# Patient Record
Sex: FEMALE | Race: WHITE | NOT HISPANIC OR LATINO | Employment: OTHER | URBAN - METROPOLITAN AREA
[De-identification: names, ages, dates, MRNs, and addresses within clinical notes are randomized per-mention and may not be internally consistent; named-entity substitution may affect disease eponyms.]

---

## 2017-01-06 ENCOUNTER — HOSPITAL ENCOUNTER (OUTPATIENT)
Dept: MRI IMAGING | Facility: HOSPITAL | Age: 57
Discharge: HOME | End: 2017-01-06
Attending: RADIOLOGY
Payer: COMMERCIAL

## 2017-01-06 DIAGNOSIS — C79.31 SECONDARY MALIGNANT NEOPLASM OF BRAIN (HCC): ICD-10-CM

## 2017-01-06 PROCEDURE — A9585 GADOBUTROL INJECTION: HCPCS | Performed by: RADIOLOGY

## 2017-01-06 PROCEDURE — 70553 MRI BRAIN STEM W/O & W/DYE: CPT

## 2017-01-06 RX ADMIN — GADOBUTROL 8 ML: 604.72 INJECTION INTRAVENOUS at 11:23

## 2017-01-09 ENCOUNTER — TRANSCRIBE ORDERS (OUTPATIENT)
Dept: ADMINISTRATIVE | Facility: HOSPITAL | Age: 57
End: 2017-01-09

## 2017-01-09 DIAGNOSIS — C34.00 MALIGNANT NEOPLASM OF HILUS OF LUNG, UNSPECIFIED LATERALITY (HCC): Primary | ICD-10-CM

## 2017-01-11 ENCOUNTER — GENERIC CONVERSION - ENCOUNTER (OUTPATIENT)
Dept: OTHER | Facility: OTHER | Age: 57
End: 2017-01-11

## 2017-01-11 ENCOUNTER — TRANSCRIBE ORDERS (OUTPATIENT)
Dept: ADMINISTRATIVE | Facility: HOSPITAL | Age: 57
End: 2017-01-11

## 2017-01-11 ENCOUNTER — APPOINTMENT (OUTPATIENT)
Dept: RADIATION ONCOLOGY | Facility: HOSPITAL | Age: 57
End: 2017-01-11
Attending: RADIOLOGY
Payer: COMMERCIAL

## 2017-01-11 DIAGNOSIS — C79.31 SECONDARY MALIGNANT NEOPLASM OF BRAIN (HCC): ICD-10-CM

## 2017-01-11 DIAGNOSIS — C79.49 SECONDARY MALIGNANT NEOPLASM OF BRAIN AND SPINAL CORD (HCC): Primary | ICD-10-CM

## 2017-01-11 DIAGNOSIS — C79.31 SECONDARY MALIGNANT NEOPLASM OF BRAIN AND SPINAL CORD (HCC): Primary | ICD-10-CM

## 2017-01-11 PROCEDURE — 99214 OFFICE O/P EST MOD 30 MIN: CPT | Performed by: RADIOLOGY

## 2017-01-16 ENCOUNTER — HOSPITAL ENCOUNTER (OUTPATIENT)
Dept: RADIOLOGY | Facility: HOSPITAL | Age: 57
Discharge: HOME | End: 2017-01-16
Attending: INTERNAL MEDICINE
Payer: COMMERCIAL

## 2017-01-16 DIAGNOSIS — C34.00 MALIGNANT NEOPLASM OF HILUS OF LUNG, UNSPECIFIED LATERALITY (HCC): ICD-10-CM

## 2017-01-16 PROCEDURE — 71250 CT THORAX DX C-: CPT

## 2017-01-16 PROCEDURE — 74176 CT ABD & PELVIS W/O CONTRAST: CPT

## 2017-01-18 ENCOUNTER — ALLSCRIPTS OFFICE VISIT (OUTPATIENT)
Dept: OTHER | Facility: OTHER | Age: 57
End: 2017-01-18

## 2017-01-19 ENCOUNTER — HOSPITAL ENCOUNTER (OUTPATIENT)
Dept: INFUSION CENTER | Facility: CLINIC | Age: 57
Discharge: HOME | End: 2017-01-19
Payer: COMMERCIAL

## 2017-01-19 VITALS
RESPIRATION RATE: 16 BRPM | BODY MASS INDEX: 30.28 KG/M2 | TEMPERATURE: 98 F | HEART RATE: 87 BPM | HEIGHT: 67 IN | WEIGHT: 192.9 LBS | OXYGEN SATURATION: 95 % | DIASTOLIC BLOOD PRESSURE: 82 MMHG | SYSTOLIC BLOOD PRESSURE: 128 MMHG

## 2017-01-19 PROCEDURE — 96413 CHEMO IV INFUSION 1 HR: CPT

## 2017-01-19 PROCEDURE — 96417 CHEMO IV INFUS EACH ADDL SEQ: CPT

## 2017-01-19 RX ORDER — SODIUM CHLORIDE 9 MG/ML
20 INJECTION, SOLUTION INTRAVENOUS CONTINUOUS
Status: DISCONTINUED | OUTPATIENT
Start: 2017-01-19 | End: 2017-01-22 | Stop reason: HOSPADM

## 2017-01-19 RX ADMIN — Medication 1200 MG: at 14:16

## 2017-01-19 RX ADMIN — HEPARIN 300 UNITS: 100 SYRINGE at 15:55

## 2017-01-19 RX ADMIN — SODIUM CHLORIDE 20 ML/HR: 0.9 INJECTION, SOLUTION INTRAVENOUS at 13:25

## 2017-01-19 RX ADMIN — Medication 1254 MG: at 15:07

## 2017-01-19 NOTE — PLAN OF CARE
Problem: Potential for Falls  Goal: Patient will remain free of falls  INTERVENTIONS:  - Assess patient frequently for physical needs  - Identify cognitive and physical deficits and behaviors that affect risk of falls    - [place] fall precautions as indicated by assessment   - Educate patient/family on patient safety including physical limitations  - Instruct patient to call for assistance with activity based on assessment  - Modify environment to reduce risk of injury  - Consider OT/PT consult to assist with strengthening/mobility   Outcome: Progressing

## 2017-02-08 ENCOUNTER — ALLSCRIPTS OFFICE VISIT (OUTPATIENT)
Dept: OTHER | Facility: OTHER | Age: 57
End: 2017-02-08

## 2017-02-09 ENCOUNTER — HOSPITAL ENCOUNTER (OUTPATIENT)
Dept: INFUSION CENTER | Facility: CLINIC | Age: 57
Discharge: HOME | End: 2017-02-09
Payer: COMMERCIAL

## 2017-02-09 VITALS
HEART RATE: 76 BPM | WEIGHT: 195 LBS | BODY MASS INDEX: 30.61 KG/M2 | RESPIRATION RATE: 18 BRPM | HEIGHT: 67 IN | TEMPERATURE: 97.6 F | DIASTOLIC BLOOD PRESSURE: 62 MMHG | SYSTOLIC BLOOD PRESSURE: 118 MMHG

## 2017-02-09 LAB — TSH SERPL DL<=0.05 MIU/L-ACNC: 2.55 UIU/ML (ref 0.36–3.74)

## 2017-02-09 PROCEDURE — 96413 CHEMO IV INFUSION 1 HR: CPT

## 2017-02-09 PROCEDURE — 84443 ASSAY THYROID STIM HORMONE: CPT | Performed by: INTERNAL MEDICINE

## 2017-02-09 RX ORDER — SODIUM CHLORIDE 9 MG/ML
20 INJECTION, SOLUTION INTRAVENOUS CONTINUOUS
Status: DISCONTINUED | OUTPATIENT
Start: 2017-02-09 | End: 2017-02-12 | Stop reason: HOSPADM

## 2017-02-09 RX ADMIN — Medication 1254 MG: at 15:45

## 2017-02-09 RX ADMIN — Medication 1200 MG: at 15:08

## 2017-02-09 RX ADMIN — SODIUM CHLORIDE 20 ML/HR: 0.9 INJECTION, SOLUTION INTRAVENOUS at 14:05

## 2017-02-09 RX ADMIN — HEPARIN 300 UNITS: 100 SYRINGE at 16:25

## 2017-02-22 ENCOUNTER — TRANSCRIBE ORDERS (OUTPATIENT)
Dept: ADMINISTRATIVE | Facility: HOSPITAL | Age: 57
End: 2017-02-22

## 2017-02-22 ENCOUNTER — ALLSCRIPTS OFFICE VISIT (OUTPATIENT)
Dept: OTHER | Facility: OTHER | Age: 57
End: 2017-02-22

## 2017-02-22 ENCOUNTER — HOSPITAL ENCOUNTER (OUTPATIENT)
Dept: RADIOLOGY | Facility: HOSPITAL | Age: 57
Discharge: HOME | End: 2017-02-22
Payer: COMMERCIAL

## 2017-02-22 DIAGNOSIS — M79.671 RIGHT FOOT PAIN: ICD-10-CM

## 2017-02-22 DIAGNOSIS — M79.671 PAIN OF RIGHT FOOT: ICD-10-CM

## 2017-02-22 DIAGNOSIS — M79.671 RIGHT FOOT PAIN: Primary | ICD-10-CM

## 2017-02-22 PROCEDURE — 73630 X-RAY EXAM OF FOOT: CPT

## 2017-02-23 ENCOUNTER — GENERIC CONVERSION - ENCOUNTER (OUTPATIENT)
Dept: OTHER | Facility: OTHER | Age: 57
End: 2017-02-23

## 2017-03-01 ENCOUNTER — ALLSCRIPTS OFFICE VISIT (OUTPATIENT)
Dept: OTHER | Facility: OTHER | Age: 57
End: 2017-03-01

## 2017-03-01 RX ORDER — SODIUM CHLORIDE 9 MG/ML
20 INJECTION, SOLUTION INTRAVENOUS CONTINUOUS
Status: DISCONTINUED | OUTPATIENT
Start: 2017-03-02 | End: 2017-03-05 | Stop reason: HOSPADM

## 2017-03-02 ENCOUNTER — TRANSCRIBE ORDERS (OUTPATIENT)
Dept: ADMINISTRATIVE | Facility: HOSPITAL | Age: 57
End: 2017-03-02

## 2017-03-02 ENCOUNTER — HOSPITAL ENCOUNTER (OUTPATIENT)
Dept: INFUSION CENTER | Facility: CLINIC | Age: 57
Discharge: HOME | End: 2017-03-02
Payer: COMMERCIAL

## 2017-03-02 VITALS
BODY MASS INDEX: 30.92 KG/M2 | SYSTOLIC BLOOD PRESSURE: 138 MMHG | HEART RATE: 87 BPM | HEIGHT: 67 IN | WEIGHT: 197 LBS | TEMPERATURE: 98.6 F | OXYGEN SATURATION: 97 % | DIASTOLIC BLOOD PRESSURE: 88 MMHG | RESPIRATION RATE: 19 BRPM

## 2017-03-02 DIAGNOSIS — C34.00 MALIGNANT NEOPLASM OF HILUS OF LUNG, UNSPECIFIED LATERALITY (HCC): Primary | ICD-10-CM

## 2017-03-02 LAB
BACTERIA UR QL AUTO: ABNORMAL /HPF
BILIRUB UR QL STRIP: NEGATIVE
CLARITY UR: CLEAR
COLOR UR: YELLOW
GLUCOSE UR STRIP-MCNC: NEGATIVE MG/DL
HGB UR QL STRIP.AUTO: NEGATIVE
KETONES UR STRIP-MCNC: NEGATIVE MG/DL
LEUKOCYTE ESTERASE UR QL STRIP: ABNORMAL
NITRITE UR QL STRIP: NEGATIVE
NON-SQ EPI CELLS URNS QL MICRO: ABNORMAL /HPF
PH UR STRIP.AUTO: 5.5 [PH] (ref 4.5–8)
PROT UR STRIP-MCNC: NEGATIVE MG/DL
RBC #/AREA URNS AUTO: ABNORMAL /HPF
SP GR UR STRIP.AUTO: 1.01 (ref 1–1.03)
UROBILINOGEN UR QL STRIP.AUTO: 0.2 E.U./DL
WBC #/AREA URNS AUTO: ABNORMAL /HPF

## 2017-03-02 PROCEDURE — 96417 CHEMO IV INFUS EACH ADDL SEQ: CPT

## 2017-03-02 PROCEDURE — 81001 URINALYSIS AUTO W/SCOPE: CPT | Performed by: INTERNAL MEDICINE

## 2017-03-02 PROCEDURE — 96413 CHEMO IV INFUSION 1 HR: CPT

## 2017-03-02 RX ADMIN — Medication 1254 MG: at 15:38

## 2017-03-02 RX ADMIN — HEPARIN 300 UNITS: 100 SYRINGE at 16:15

## 2017-03-02 RX ADMIN — Medication 1200 MG: at 15:00

## 2017-03-02 RX ADMIN — SODIUM CHLORIDE 20 ML/HR: 0.9 INJECTION, SOLUTION INTRAVENOUS at 14:20

## 2017-03-16 ENCOUNTER — HOSPITAL ENCOUNTER (OUTPATIENT)
Dept: RADIOLOGY | Facility: HOSPITAL | Age: 57
Discharge: HOME | End: 2017-03-16
Attending: INTERNAL MEDICINE
Payer: COMMERCIAL

## 2017-03-16 DIAGNOSIS — C34.00 MALIGNANT NEOPLASM OF HILUS OF LUNG, UNSPECIFIED LATERALITY (HCC): ICD-10-CM

## 2017-03-16 PROCEDURE — 71250 CT THORAX DX C-: CPT

## 2017-03-16 PROCEDURE — 74176 CT ABD & PELVIS W/O CONTRAST: CPT

## 2017-03-22 ENCOUNTER — ALLSCRIPTS OFFICE VISIT (OUTPATIENT)
Dept: OTHER | Facility: OTHER | Age: 57
End: 2017-03-22

## 2017-03-23 ENCOUNTER — HOSPITAL ENCOUNTER (OUTPATIENT)
Dept: INFUSION CENTER | Facility: CLINIC | Age: 57
Discharge: HOME | End: 2017-03-23
Payer: COMMERCIAL

## 2017-03-23 VITALS
SYSTOLIC BLOOD PRESSURE: 130 MMHG | HEIGHT: 67 IN | TEMPERATURE: 97.7 F | BODY MASS INDEX: 31.11 KG/M2 | DIASTOLIC BLOOD PRESSURE: 70 MMHG | HEART RATE: 80 BPM | RESPIRATION RATE: 20 BRPM | WEIGHT: 198.2 LBS

## 2017-03-23 PROCEDURE — 96417 CHEMO IV INFUS EACH ADDL SEQ: CPT

## 2017-03-23 PROCEDURE — 96413 CHEMO IV INFUSION 1 HR: CPT

## 2017-03-23 RX ORDER — SODIUM CHLORIDE 9 MG/ML
20 INJECTION, SOLUTION INTRAVENOUS CONTINUOUS
Status: DISCONTINUED | OUTPATIENT
Start: 2017-03-23 | End: 2017-03-26 | Stop reason: HOSPADM

## 2017-03-23 RX ADMIN — Medication 1254 MG: at 15:26

## 2017-03-23 RX ADMIN — HEPARIN 300 UNITS: 100 SYRINGE at 16:00

## 2017-03-23 RX ADMIN — Medication 1200 MG: at 14:50

## 2017-03-23 RX ADMIN — SODIUM CHLORIDE 20 ML/HR: 0.9 INJECTION, SOLUTION INTRAVENOUS at 14:00

## 2017-04-04 LAB
BUN SERPL-MCNC: 13 MG/DL (ref 6–24)
CREAT SERPL-MCNC: 0.83 MG/DL (ref 0.57–1)
EGFR AFRICAN AMERICAN (HISTORICAL): 90 ML/MIN/1.73
EGFR-AMERICAN CALC (HISTORICAL): 78 ML/MIN/1.73

## 2017-04-07 ENCOUNTER — HOSPITAL ENCOUNTER (OUTPATIENT)
Dept: MRI IMAGING | Facility: HOSPITAL | Age: 57
Discharge: HOME | End: 2017-04-07
Attending: RADIOLOGY
Payer: COMMERCIAL

## 2017-04-12 ENCOUNTER — ALLSCRIPTS OFFICE VISIT (OUTPATIENT)
Dept: OTHER | Facility: OTHER | Age: 57
End: 2017-04-12

## 2017-04-12 RX ORDER — SODIUM CHLORIDE 9 MG/ML
20 INJECTION, SOLUTION INTRAVENOUS CONTINUOUS
Status: DISCONTINUED | OUTPATIENT
Start: 2017-04-13 | End: 2017-04-16 | Stop reason: HOSPADM

## 2017-04-13 ENCOUNTER — HOSPITAL ENCOUNTER (OUTPATIENT)
Dept: INFUSION CENTER | Facility: CLINIC | Age: 57
Discharge: HOME | End: 2017-04-13
Payer: COMMERCIAL

## 2017-04-13 VITALS
TEMPERATURE: 98 F | BODY MASS INDEX: 30.76 KG/M2 | DIASTOLIC BLOOD PRESSURE: 70 MMHG | RESPIRATION RATE: 18 BRPM | HEART RATE: 86 BPM | OXYGEN SATURATION: 95 % | SYSTOLIC BLOOD PRESSURE: 118 MMHG | WEIGHT: 196.4 LBS

## 2017-04-13 PROCEDURE — 96413 CHEMO IV INFUSION 1 HR: CPT

## 2017-04-13 RX ADMIN — HEPARIN 300 UNITS: 100 SYRINGE at 15:33

## 2017-04-13 RX ADMIN — SODIUM CHLORIDE 20 ML/HR: 0.9 INJECTION, SOLUTION INTRAVENOUS at 14:20

## 2017-04-13 RX ADMIN — Medication 1254 MG: at 14:57

## 2017-04-13 NOTE — PROGRESS NOTES
Pt offers no complaints,  Pt seen by MD and clinical trials and cleared for Avastin today  Atezolizumab on hold for today due to elevated LFT's  [address] for [employer] per clinical trails TAL Alcazar

## 2017-05-03 ENCOUNTER — TRANSCRIBE ORDERS (OUTPATIENT)
Dept: ADMINISTRATIVE | Facility: HOSPITAL | Age: 57
End: 2017-05-03

## 2017-05-03 ENCOUNTER — ALLSCRIPTS OFFICE VISIT (OUTPATIENT)
Dept: OTHER | Facility: OTHER | Age: 57
End: 2017-05-03

## 2017-05-03 DIAGNOSIS — Z12.31 ENCOUNTER FOR SCREENING MAMMOGRAM FOR MALIGNANT NEOPLASM OF BREAST: ICD-10-CM

## 2017-05-03 DIAGNOSIS — C34.90 MALIGNANT NEOPLASM OF LUNG, UNSPECIFIED LATERALITY, UNSPECIFIED PART OF LUNG (HCC): Primary | ICD-10-CM

## 2017-05-04 ENCOUNTER — HOSPITAL ENCOUNTER (OUTPATIENT)
Dept: INFUSION CENTER | Facility: CLINIC | Age: 57
Discharge: HOME | End: 2017-05-04
Payer: COMMERCIAL

## 2017-05-04 VITALS
BODY MASS INDEX: 30.59 KG/M2 | OXYGEN SATURATION: 98 % | WEIGHT: 194.89 LBS | SYSTOLIC BLOOD PRESSURE: 122 MMHG | TEMPERATURE: 97.3 F | HEIGHT: 67 IN | RESPIRATION RATE: 20 BRPM | HEART RATE: 75 BPM | DIASTOLIC BLOOD PRESSURE: 72 MMHG

## 2017-05-04 PROCEDURE — 96413 CHEMO IV INFUSION 1 HR: CPT

## 2017-05-04 RX ORDER — SODIUM CHLORIDE 9 MG/ML
20 INJECTION, SOLUTION INTRAVENOUS CONTINUOUS
Status: DISCONTINUED | OUTPATIENT
Start: 2017-05-04 | End: 2017-05-07 | Stop reason: HOSPADM

## 2017-05-04 RX ADMIN — Medication 1254 MG: at 15:12

## 2017-05-04 RX ADMIN — Medication 300 UNITS: at 15:51

## 2017-05-04 RX ADMIN — SODIUM CHLORIDE 20 ML/HR: 0.9 INJECTION, SOLUTION INTRAVENOUS at 14:28

## 2017-05-15 ENCOUNTER — HOSPITAL ENCOUNTER (OUTPATIENT)
Dept: RADIOLOGY | Facility: HOSPITAL | Age: 57
Discharge: HOME | End: 2017-05-15
Attending: INTERNAL MEDICINE
Payer: COMMERCIAL

## 2017-05-15 DIAGNOSIS — C34.90 MALIGNANT NEOPLASM OF LUNG, UNSPECIFIED LATERALITY, UNSPECIFIED PART OF LUNG (HCC): ICD-10-CM

## 2017-05-15 PROCEDURE — 71250 CT THORAX DX C-: CPT

## 2017-05-15 PROCEDURE — 74176 CT ABD & PELVIS W/O CONTRAST: CPT

## 2017-05-23 ENCOUNTER — GENERIC CONVERSION - ENCOUNTER (OUTPATIENT)
Dept: OTHER | Facility: OTHER | Age: 57
End: 2017-05-23

## 2017-05-23 LAB
AMBIG ABBREV CMP14 DEFAULT (HISTORICAL): NORMAL
BASOPHILS # BLD AUTO: 0.1 X10E3/UL (ref 0–0.2)
BASOPHILS # BLD AUTO: 1 %
DEPRECATED RDW RBC AUTO: 14 % (ref 12.3–15.4)
EOSINOPHIL # BLD AUTO: 0.3 X10E3/UL (ref 0–0.4)
EOSINOPHIL # BLD AUTO: 4 %
HCT VFR BLD AUTO: 42.2 % (ref 34–46.6)
HGB BLD-MCNC: 14.2 G/DL (ref 11.1–15.9)
IMM.GRANULOCYTES (CD4/8) (HISTORICAL): 0 %
IMM.GRANULOCYTES (CD4/8) (HISTORICAL): 0 X10E3/UL (ref 0–0.1)
LYMPHOCYTES # BLD AUTO: 3 X10E3/UL (ref 0.7–3.1)
LYMPHOCYTES # BLD AUTO: 38 %
MCH RBC QN AUTO: 31.8 PG (ref 26.6–33)
MCHC RBC AUTO-ENTMCNC: 33.6 G/DL (ref 31.5–35.7)
MCV RBC AUTO: 95 FL (ref 79–97)
MONOCYTES # BLD AUTO: 0.6 X10E3/UL (ref 0.1–0.9)
MONOCYTES (HISTORICAL): 7 %
NEUTROPHILS # BLD AUTO: 4 X10E3/UL (ref 1.4–7)
NEUTROPHILS # BLD AUTO: 50 %
PLATELET # BLD AUTO: 274 X10E3/UL (ref 150–379)
RBC (HISTORICAL): 4.46 X10E6/UL (ref 3.77–5.28)
WBC # BLD AUTO: 8 X10E3/UL (ref 3.4–10.8)

## 2017-05-24 ENCOUNTER — ALLSCRIPTS OFFICE VISIT (OUTPATIENT)
Dept: OTHER | Facility: OTHER | Age: 57
End: 2017-05-24

## 2017-05-24 LAB
A/G RATIO (HISTORICAL): 1.7 (ref 1.2–2.2)
ALBUMIN SERPL BCP-MCNC: 4.7 G/DL (ref 3.5–5.5)
ALP SERPL-CCNC: 90 IU/L (ref 39–117)
ALT SERPL W P-5'-P-CCNC: 60 IU/L (ref 0–32)
AST SERPL W P-5'-P-CCNC: 35 IU/L (ref 0–40)
BACTERIA UR QL AUTO: ABNORMAL
BILIRUB SERPL-MCNC: 0.5 MG/DL (ref 0–1.2)
BILIRUB UR QL STRIP: NEGATIVE
BUN SERPL-MCNC: 19 MG/DL (ref 6–24)
BUN/CREA RATIO (HISTORICAL): 23 (ref 9–23)
CALCIUM SERPL-MCNC: 9.5 MG/DL (ref 8.7–10.2)
CHLORIDE SERPL-SCNC: 100 MMOL/L (ref 96–106)
CO2 SERPL-SCNC: 21 MMOL/L (ref 18–29)
COLOR UR: YELLOW
COMMENT (HISTORICAL): ABNORMAL
CREAT SERPL-MCNC: 0.82 MG/DL (ref 0.57–1)
EGFR AFRICAN AMERICAN (HISTORICAL): 91 ML/MIN/1.73
EGFR-AMERICAN CALC (HISTORICAL): 79 ML/MIN/1.73
FECAL OCCULT BLOOD DIAGNOSTIC (HISTORICAL): NEGATIVE
GLUCOSE (HISTORICAL): NEGATIVE
GLUCOSE SERPL-MCNC: 108 MG/DL (ref 65–99)
KETONES UR STRIP-MCNC: NEGATIVE MG/DL
LDH (HISTORICAL): 147 IU/L (ref 119–226)
LEUKOCYTE ESTERASE UR QL STRIP: ABNORMAL
MAGNESIUM SERPL-MCNC: 2.1 MG/DL (ref 1.6–2.3)
MICROSCOPIC EXAMINATION (HISTORICAL): ABNORMAL
MUCUS THREADS (HISTORICAL): PRESENT
NITRITE UR QL STRIP: NEGATIVE
NON-SQ EPI CELLS URNS QL MICRO: >10 /HPF
PH UR STRIP.AUTO: 6 [PH] (ref 5–7.5)
PHOSPHATE SERPL-MCNC: 3.6 MG/DL (ref 2.5–4.5)
POTASSIUM SERPL-SCNC: 4.7 MMOL/L (ref 3.5–5.2)
PROT UR STRIP-MCNC: ABNORMAL MG/DL
RBC (HISTORICAL): ABNORMAL /HPF
SODIUM SERPL-SCNC: 139 MMOL/L (ref 134–144)
SP GR UR STRIP.AUTO: 1.02 (ref 1–1.03)
T3FREE SERPL-MCNC: 2.4 PG/ML (ref 2–4.4)
T4 FREE SERPL-MCNC: 1.13 NG/DL (ref 0.82–1.77)
TOT. GLOBULIN, SERUM (HISTORICAL): 2.7 G/DL (ref 1.5–4.5)
TOTAL PROTEIN (HISTORICAL): 7.4 G/DL (ref 6–8.5)
TSH SERPL DL<=0.05 MIU/L-ACNC: 3.29 UIU/ML (ref 0.45–4.5)
UROBILINOGEN UR QL STRIP.AUTO: 0.2 EU/DL (ref 0.2–1)
WBC # BLD AUTO: ABNORMAL /HPF

## 2017-05-25 ENCOUNTER — HOSPITAL ENCOUNTER (OUTPATIENT)
Dept: INFUSION CENTER | Facility: HOSPITAL | Age: 57
Discharge: HOME | End: 2017-05-25
Payer: COMMERCIAL

## 2017-05-25 VITALS
TEMPERATURE: 98.6 F | WEIGHT: 195.11 LBS | BODY MASS INDEX: 30.62 KG/M2 | RESPIRATION RATE: 16 BRPM | DIASTOLIC BLOOD PRESSURE: 82 MMHG | SYSTOLIC BLOOD PRESSURE: 122 MMHG | OXYGEN SATURATION: 96 % | HEIGHT: 67 IN | HEART RATE: 104 BPM

## 2017-05-25 PROCEDURE — 96413 CHEMO IV INFUSION 1 HR: CPT

## 2017-05-25 RX ADMIN — Medication 1254 MG: at 15:45

## 2017-05-25 RX ADMIN — Medication 300 UNITS: at 16:28

## 2017-06-09 ENCOUNTER — APPOINTMENT (EMERGENCY)
Dept: RADIOLOGY | Facility: HOSPITAL | Age: 57
End: 2017-06-09
Payer: COMMERCIAL

## 2017-06-09 ENCOUNTER — HOSPITAL ENCOUNTER (EMERGENCY)
Facility: HOSPITAL | Age: 57
Discharge: HOME | End: 2017-06-09
Payer: COMMERCIAL

## 2017-06-09 VITALS
SYSTOLIC BLOOD PRESSURE: 150 MMHG | DIASTOLIC BLOOD PRESSURE: 94 MMHG | TEMPERATURE: 97.8 F | RESPIRATION RATE: 18 BRPM | OXYGEN SATURATION: 98 % | HEART RATE: 100 BPM

## 2017-06-09 DIAGNOSIS — L03.012 CELLULITIS OF LEFT INDEX FINGER: Primary | ICD-10-CM

## 2017-06-09 PROCEDURE — 99283 EMERGENCY DEPT VISIT LOW MDM: CPT

## 2017-06-09 PROCEDURE — A9270 NON-COVERED ITEM OR SERVICE: HCPCS | Performed by: PHYSICIAN ASSISTANT

## 2017-06-09 PROCEDURE — 73130 X-RAY EXAM OF HAND: CPT

## 2017-06-09 RX ORDER — NAPROXEN 500 MG/1
500 TABLET ORAL ONCE
Status: COMPLETED | OUTPATIENT
Start: 2017-06-09 | End: 2017-06-09

## 2017-06-09 RX ORDER — CEPHALEXIN 500 MG/1
500 CAPSULE ORAL ONCE
Status: COMPLETED | OUTPATIENT
Start: 2017-06-09 | End: 2017-06-09

## 2017-06-09 RX ORDER — CEPHALEXIN 500 MG/1
500 CAPSULE ORAL 4 TIMES DAILY
Qty: 40 CAPSULE | Refills: 0 | Status: SHIPPED | OUTPATIENT
Start: 2017-06-09 | End: 2017-06-19

## 2017-06-09 RX ADMIN — NAPROXEN 500 MG: 500 TABLET ORAL at 14:52

## 2017-06-09 RX ADMIN — CEPHALEXIN 500 MG: 500 CAPSULE ORAL at 14:51

## 2017-06-14 ENCOUNTER — ALLSCRIPTS OFFICE VISIT (OUTPATIENT)
Dept: OTHER | Facility: OTHER | Age: 57
End: 2017-06-14

## 2017-06-15 ENCOUNTER — HOSPITAL ENCOUNTER (OUTPATIENT)
Dept: INFUSION CENTER | Facility: CLINIC | Age: 57
Discharge: HOME | End: 2017-06-15
Payer: COMMERCIAL

## 2017-06-15 VITALS
BODY MASS INDEX: 30.02 KG/M2 | WEIGHT: 193.5 LBS | RESPIRATION RATE: 16 BRPM | TEMPERATURE: 96.2 F | SYSTOLIC BLOOD PRESSURE: 128 MMHG | HEART RATE: 78 BPM | DIASTOLIC BLOOD PRESSURE: 78 MMHG

## 2017-06-15 PROCEDURE — 96413 CHEMO IV INFUSION 1 HR: CPT

## 2017-06-15 RX ORDER — UBIDECARENONE 75 MG
CAPSULE ORAL DAILY
COMMUNITY
End: 2018-01-31 | Stop reason: SDUPTHER

## 2017-06-15 RX ORDER — SODIUM CHLORIDE 9 MG/ML
20 INJECTION, SOLUTION INTRAVENOUS CONTINUOUS
Status: DISCONTINUED | OUTPATIENT
Start: 2017-06-15 | End: 2017-06-18 | Stop reason: HOSPADM

## 2017-06-15 RX ADMIN — SODIUM CHLORIDE 20 ML/HR: 0.9 INJECTION, SOLUTION INTRAVENOUS at 14:23

## 2017-06-15 RX ADMIN — Medication 1254 MG: at 15:02

## 2017-06-15 RX ADMIN — Medication 300 UNITS: at 15:41

## 2017-06-15 NOTE — PLAN OF CARE
Problem: Potential for Falls  Goal: Patient will remain free of falls  INTERVENTIONS:  - Assess patient frequently for physical needs  -  Identify cognitive and physical deficits and behaviors that affect risk of falls    -  [place] fall precautions as indicated by assessment   - Educate patient/family on patient safety including physical limitations  - Instruct patient to call for assistance with activity based on assessment  - Modify environment to reduce risk of injury  - Consider OT/PT consult to assist with strengthening/mobility   Outcome: Progressing

## 2017-06-15 NOTE — PROGRESS NOTES
Pt to clinic for avastin therapy on study [id number] for NSCLC  OK to treat based on labs drawn on 6/14 per Clinical Trial staff  Pt is feeling well  Today she offers no c/o  Therapy given as per orders without incident  Pt to return as insructed by chemo calendar provided by CT office

## 2017-06-26 ENCOUNTER — ALLSCRIPTS OFFICE VISIT (OUTPATIENT)
Dept: OTHER | Facility: OTHER | Age: 57
End: 2017-06-26

## 2017-06-27 DIAGNOSIS — C79.31 SECONDARY MALIGNANT NEOPLASM OF BRAIN (HCC): ICD-10-CM

## 2017-06-27 DIAGNOSIS — S61.219A LACERATION OF FINGER WITHOUT FOREIGN BODY WITHOUT DAMAGE TO NAIL: ICD-10-CM

## 2017-06-28 ENCOUNTER — APPOINTMENT (OUTPATIENT)
Dept: OCCUPATIONAL THERAPY | Facility: CLINIC | Age: 57
End: 2017-06-28
Payer: COMMERCIAL

## 2017-06-28 PROCEDURE — 97165 OT EVAL LOW COMPLEX 30 MIN: CPT

## 2017-06-28 PROCEDURE — 97110 THERAPEUTIC EXERCISES: CPT

## 2017-07-03 ENCOUNTER — TRANSCRIBE ORDERS (OUTPATIENT)
Dept: ADMINISTRATIVE | Facility: HOSPITAL | Age: 57
End: 2017-07-03

## 2017-07-03 DIAGNOSIS — Z09 FOLLOW UP: Primary | ICD-10-CM

## 2017-07-05 ENCOUNTER — ALLSCRIPTS OFFICE VISIT (OUTPATIENT)
Dept: OTHER | Facility: OTHER | Age: 57
End: 2017-07-05

## 2017-07-05 RX ORDER — SODIUM CHLORIDE 9 MG/ML
20 INJECTION, SOLUTION INTRAVENOUS CONTINUOUS
Status: DISCONTINUED | OUTPATIENT
Start: 2017-07-06 | End: 2017-07-09 | Stop reason: HOSPADM

## 2017-07-06 ENCOUNTER — HOSPITAL ENCOUNTER (OUTPATIENT)
Dept: INFUSION CENTER | Facility: CLINIC | Age: 57
Discharge: HOME | End: 2017-07-06
Payer: COMMERCIAL

## 2017-07-06 ENCOUNTER — APPOINTMENT (OUTPATIENT)
Dept: OCCUPATIONAL THERAPY | Facility: CLINIC | Age: 57
End: 2017-07-06
Payer: COMMERCIAL

## 2017-07-06 VITALS
RESPIRATION RATE: 16 BRPM | BODY MASS INDEX: 30.53 KG/M2 | HEART RATE: 72 BPM | SYSTOLIC BLOOD PRESSURE: 138 MMHG | HEIGHT: 67 IN | TEMPERATURE: 98 F | WEIGHT: 194.5 LBS | DIASTOLIC BLOOD PRESSURE: 86 MMHG

## 2017-07-06 PROCEDURE — 96413 CHEMO IV INFUSION 1 HR: CPT

## 2017-07-06 RX ORDER — SODIUM CHLORIDE 9 MG/ML
20 INJECTION, SOLUTION INTRAVENOUS CONTINUOUS
Status: DISCONTINUED | OUTPATIENT
Start: 2017-07-06 | End: 2017-07-06

## 2017-07-06 RX ADMIN — Medication 300 UNITS: at 16:05

## 2017-07-06 RX ADMIN — SODIUM CHLORIDE 20 ML/HR: 0.9 INJECTION, SOLUTION INTRAVENOUS at 14:45

## 2017-07-06 RX ADMIN — Medication 1254 MG: at 15:30

## 2017-07-06 NOTE — PROGRESS NOTES
Pt tolerated infusion without issue  Port de accessed after infusion complete DSD applied   Pt has f/u appt declined AVS

## 2017-07-06 NOTE — PROGRESS NOTES
Pt to infusion center for avastin infusion for non small cell lung ca  Pt offers no complaints at this time  Labs reviewed  Port accessed without issue +blood return flushes easily pt tolerated well

## 2017-07-11 ENCOUNTER — APPOINTMENT (OUTPATIENT)
Dept: OCCUPATIONAL THERAPY | Facility: CLINIC | Age: 57
End: 2017-07-11
Payer: COMMERCIAL

## 2017-07-12 ENCOUNTER — APPOINTMENT (OUTPATIENT)
Dept: RADIATION ONCOLOGY | Facility: HOSPITAL | Age: 57
End: 2017-07-12
Attending: RADIOLOGY
Payer: COMMERCIAL

## 2017-07-12 ENCOUNTER — GENERIC CONVERSION - ENCOUNTER (OUTPATIENT)
Dept: OTHER | Facility: OTHER | Age: 57
End: 2017-07-12

## 2017-07-12 PROCEDURE — 99214 OFFICE O/P EST MOD 30 MIN: CPT | Performed by: RADIOLOGY

## 2017-07-12 PROCEDURE — G0463 HOSPITAL OUTPT CLINIC VISIT: HCPCS | Performed by: RADIOLOGY

## 2017-07-17 ENCOUNTER — HOSPITAL ENCOUNTER (OUTPATIENT)
Dept: RADIOLOGY | Facility: HOSPITAL | Age: 57
Discharge: HOME | End: 2017-07-17
Attending: INTERNAL MEDICINE
Payer: COMMERCIAL

## 2017-07-17 DIAGNOSIS — Z09 FOLLOW UP: ICD-10-CM

## 2017-07-17 PROCEDURE — 71250 CT THORAX DX C-: CPT

## 2017-07-17 PROCEDURE — 74176 CT ABD & PELVIS W/O CONTRAST: CPT

## 2017-07-26 ENCOUNTER — ALLSCRIPTS OFFICE VISIT (OUTPATIENT)
Dept: OTHER | Facility: OTHER | Age: 57
End: 2017-07-26

## 2017-07-26 RX ORDER — SODIUM CHLORIDE 9 MG/ML
20 INJECTION, SOLUTION INTRAVENOUS CONTINUOUS
Status: DISCONTINUED | OUTPATIENT
Start: 2017-07-27 | End: 2017-07-30 | Stop reason: HOSPADM

## 2017-07-27 ENCOUNTER — HOSPITAL ENCOUNTER (OUTPATIENT)
Dept: INFUSION CENTER | Facility: CLINIC | Age: 57
Discharge: HOME | End: 2017-07-27
Payer: COMMERCIAL

## 2017-07-27 VITALS
DIASTOLIC BLOOD PRESSURE: 80 MMHG | HEART RATE: 79 BPM | BODY MASS INDEX: 30.37 KG/M2 | OXYGEN SATURATION: 98 % | TEMPERATURE: 97.5 F | SYSTOLIC BLOOD PRESSURE: 142 MMHG | HEIGHT: 67 IN | WEIGHT: 193.5 LBS | RESPIRATION RATE: 16 BRPM

## 2017-07-27 PROCEDURE — 96413 CHEMO IV INFUSION 1 HR: CPT

## 2017-07-27 RX ADMIN — SODIUM CHLORIDE 20 ML/HR: 0.9 INJECTION, SOLUTION INTRAVENOUS at 14:02

## 2017-07-27 RX ADMIN — Medication 1254 MG: at 14:56

## 2017-07-27 RX ADMIN — HEPARIN 300 UNITS: 100 SYRINGE at 15:37

## 2017-07-27 NOTE — PROGRESS NOTES
Pt has no c/o --- feels well  Pt labs within parameters to proceed - clin Trials nurse cleared pt for treatment today

## 2017-08-16 ENCOUNTER — TRANSCRIBE ORDERS (OUTPATIENT)
Dept: ADMINISTRATIVE | Facility: HOSPITAL | Age: 57
End: 2017-08-16

## 2017-08-16 ENCOUNTER — ALLSCRIPTS OFFICE VISIT (OUTPATIENT)
Dept: OTHER | Facility: OTHER | Age: 57
End: 2017-08-16

## 2017-08-16 DIAGNOSIS — C34.90 MALIGNANT NEOPLASM OF LUNG, UNSPECIFIED LATERALITY, UNSPECIFIED PART OF LUNG (HCC): Primary | ICD-10-CM

## 2017-08-16 RX ORDER — SODIUM CHLORIDE 9 MG/ML
20 INJECTION, SOLUTION INTRAVENOUS CONTINUOUS
Status: DISCONTINUED | OUTPATIENT
Start: 2017-08-17 | End: 2017-08-20 | Stop reason: HOSPADM

## 2017-08-17 ENCOUNTER — HOSPITAL ENCOUNTER (OUTPATIENT)
Dept: INFUSION CENTER | Facility: CLINIC | Age: 57
Discharge: HOME | End: 2017-08-17
Payer: COMMERCIAL

## 2017-08-17 VITALS
HEIGHT: 67 IN | DIASTOLIC BLOOD PRESSURE: 72 MMHG | SYSTOLIC BLOOD PRESSURE: 122 MMHG | RESPIRATION RATE: 18 BRPM | TEMPERATURE: 97.8 F | WEIGHT: 189.5 LBS | HEART RATE: 76 BPM | BODY MASS INDEX: 29.74 KG/M2

## 2017-08-17 PROCEDURE — 96413 CHEMO IV INFUSION 1 HR: CPT

## 2017-08-17 RX ADMIN — Medication 300 UNITS: at 16:30

## 2017-08-17 RX ADMIN — SODIUM CHLORIDE 20 ML/HR: 0.9 INJECTION, SOLUTION INTRAVENOUS at 14:54

## 2017-08-17 RX ADMIN — Medication 1254 MG: at 15:47

## 2017-08-17 NOTE — PROGRESS NOTES
Pt is here for chemo  She offers no complaints at this time   Labs reviewed by Dr Timi Kamara on 8/16/17 and pt is ok to treat by Clinical Trials

## 2017-09-06 ENCOUNTER — GENERIC CONVERSION - ENCOUNTER (OUTPATIENT)
Dept: OTHER | Facility: OTHER | Age: 57
End: 2017-09-06

## 2017-09-06 RX ORDER — SODIUM CHLORIDE 9 MG/ML
20 INJECTION, SOLUTION INTRAVENOUS CONTINUOUS
Status: DISCONTINUED | OUTPATIENT
Start: 2017-09-07 | End: 2017-09-10 | Stop reason: HOSPADM

## 2017-09-07 ENCOUNTER — HOSPITAL ENCOUNTER (OUTPATIENT)
Dept: INFUSION CENTER | Facility: CLINIC | Age: 57
Discharge: HOME | End: 2017-09-07
Payer: COMMERCIAL

## 2017-09-07 VITALS
HEART RATE: 79 BPM | SYSTOLIC BLOOD PRESSURE: 130 MMHG | RESPIRATION RATE: 16 BRPM | BODY MASS INDEX: 29.29 KG/M2 | TEMPERATURE: 98 F | WEIGHT: 187 LBS | DIASTOLIC BLOOD PRESSURE: 100 MMHG | OXYGEN SATURATION: 96 %

## 2017-09-07 PROCEDURE — 96413 CHEMO IV INFUSION 1 HR: CPT

## 2017-09-07 RX ADMIN — Medication 1254 MG: at 15:26

## 2017-09-07 RX ADMIN — SODIUM CHLORIDE 20 ML/HR: 0.9 INJECTION, SOLUTION INTRAVENOUS at 14:44

## 2017-09-07 RX ADMIN — Medication 300 UNITS: at 16:00

## 2017-09-18 ENCOUNTER — HOSPITAL ENCOUNTER (OUTPATIENT)
Dept: RADIOLOGY | Facility: HOSPITAL | Age: 57
Discharge: HOME | End: 2017-09-18
Attending: INTERNAL MEDICINE
Payer: COMMERCIAL

## 2017-09-18 DIAGNOSIS — C34.90 MALIGNANT NEOPLASM OF UNSPECIFIED PART OF UNSPECIFIED BRONCHUS OR LUNG (HCC): ICD-10-CM

## 2017-09-18 PROCEDURE — 71250 CT THORAX DX C-: CPT

## 2017-09-18 PROCEDURE — 74176 CT ABD & PELVIS W/O CONTRAST: CPT

## 2017-09-27 ENCOUNTER — ALLSCRIPTS OFFICE VISIT (OUTPATIENT)
Dept: OTHER | Facility: OTHER | Age: 57
End: 2017-09-27

## 2017-09-27 RX ORDER — SODIUM CHLORIDE 9 MG/ML
20 INJECTION, SOLUTION INTRAVENOUS CONTINUOUS
Status: DISCONTINUED | OUTPATIENT
Start: 2017-09-28 | End: 2017-10-01 | Stop reason: HOSPADM

## 2017-09-28 ENCOUNTER — HOSPITAL ENCOUNTER (OUTPATIENT)
Dept: INFUSION CENTER | Facility: CLINIC | Age: 57
Discharge: HOME | End: 2017-09-28
Payer: COMMERCIAL

## 2017-09-28 VITALS
SYSTOLIC BLOOD PRESSURE: 138 MMHG | BODY MASS INDEX: 29.19 KG/M2 | HEART RATE: 80 BPM | DIASTOLIC BLOOD PRESSURE: 82 MMHG | RESPIRATION RATE: 20 BRPM | WEIGHT: 186.38 LBS | TEMPERATURE: 98.1 F

## 2017-09-28 PROCEDURE — 96413 CHEMO IV INFUSION 1 HR: CPT

## 2017-09-28 RX ADMIN — Medication 300 UNITS: at 15:15

## 2017-09-28 RX ADMIN — Medication 1254 MG: at 14:48

## 2017-09-28 RX ADMIN — SODIUM CHLORIDE 20 ML/HR: 0.9 INJECTION, SOLUTION INTRAVENOUS at 14:20

## 2017-09-28 NOTE — PROGRESS NOTES
Pt offers no complaints, resting comfortably  Vitals stable  Labs reviewed  Call bell in reach, will continue to monitor

## 2017-09-28 NOTE — PROGRESS NOTES
Pt tolerated treatment well without any adverse reactions  Aware of next appointments   Declines AVS

## 2017-10-16 ENCOUNTER — TRANSCRIBE ORDERS (OUTPATIENT)
Dept: ADMINISTRATIVE | Facility: HOSPITAL | Age: 57
End: 2017-10-16

## 2017-10-16 DIAGNOSIS — C34.90 MALIGNANT NEOPLASM OF LUNG, UNSPECIFIED LATERALITY, UNSPECIFIED PART OF LUNG (HCC): Primary | ICD-10-CM

## 2017-10-18 ENCOUNTER — GENERIC CONVERSION - ENCOUNTER (OUTPATIENT)
Dept: OTHER | Facility: OTHER | Age: 57
End: 2017-10-18

## 2017-10-18 RX ORDER — SODIUM CHLORIDE 9 MG/ML
20 INJECTION, SOLUTION INTRAVENOUS CONTINUOUS
Status: DISCONTINUED | OUTPATIENT
Start: 2017-10-19 | End: 2017-10-22 | Stop reason: HOSPADM

## 2017-10-19 ENCOUNTER — HOSPITAL ENCOUNTER (OUTPATIENT)
Dept: INFUSION CENTER | Facility: CLINIC | Age: 57
Discharge: HOME | End: 2017-10-19
Payer: COMMERCIAL

## 2017-11-08 ENCOUNTER — GENERIC CONVERSION - ENCOUNTER (OUTPATIENT)
Dept: OTHER | Facility: OTHER | Age: 57
End: 2017-11-08

## 2017-11-08 RX ORDER — SODIUM CHLORIDE 9 MG/ML
20 INJECTION, SOLUTION INTRAVENOUS CONTINUOUS
Status: DISCONTINUED | OUTPATIENT
Start: 2017-11-09 | End: 2017-11-12 | Stop reason: HOSPADM

## 2017-11-09 ENCOUNTER — HOSPITAL ENCOUNTER (OUTPATIENT)
Dept: INFUSION CENTER | Facility: CLINIC | Age: 57
Discharge: HOME | End: 2017-11-09
Payer: COMMERCIAL

## 2017-11-09 VITALS
HEART RATE: 73 BPM | RESPIRATION RATE: 16 BRPM | WEIGHT: 190.04 LBS | SYSTOLIC BLOOD PRESSURE: 122 MMHG | HEIGHT: 67 IN | TEMPERATURE: 98.2 F | DIASTOLIC BLOOD PRESSURE: 76 MMHG | BODY MASS INDEX: 29.83 KG/M2

## 2017-11-09 PROCEDURE — 96413 CHEMO IV INFUSION 1 HR: CPT

## 2017-11-09 PROCEDURE — J9035Q0 INV BEVACIZUMAB 400MG/16ML 16 ML: Performed by: INTERNAL MEDICINE

## 2017-11-09 RX ADMIN — SODIUM CHLORIDE 20 ML/HR: 0.9 INJECTION, SOLUTION INTRAVENOUS at 14:40

## 2017-11-09 RX ADMIN — Medication 1254 MG: at 15:18

## 2017-11-14 ENCOUNTER — ALLSCRIPTS OFFICE VISIT (OUTPATIENT)
Dept: OTHER | Facility: OTHER | Age: 57
End: 2017-11-14
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
## 2017-11-21 ENCOUNTER — HOSPITAL ENCOUNTER (OUTPATIENT)
Dept: RADIOLOGY | Facility: HOSPITAL | Age: 57
Discharge: HOME | End: 2017-11-21
Attending: INTERNAL MEDICINE
Payer: COMMERCIAL

## 2017-11-21 DIAGNOSIS — C34.90 MALIGNANT NEOPLASM OF LUNG, UNSPECIFIED LATERALITY, UNSPECIFIED PART OF LUNG (HCC): ICD-10-CM

## 2017-11-21 PROCEDURE — 74176 CT ABD & PELVIS W/O CONTRAST: CPT

## 2017-11-21 PROCEDURE — 71250 CT THORAX DX C-: CPT

## 2017-11-29 ENCOUNTER — GENERIC CONVERSION - ENCOUNTER (OUTPATIENT)
Dept: OTHER | Facility: OTHER | Age: 57
End: 2017-11-29

## 2017-11-29 RX ORDER — SODIUM CHLORIDE 9 MG/ML
20 INJECTION, SOLUTION INTRAVENOUS CONTINUOUS
Status: DISCONTINUED | OUTPATIENT
Start: 2017-11-30 | End: 2017-12-03 | Stop reason: HOSPADM

## 2017-11-30 ENCOUNTER — HOSPITAL ENCOUNTER (OUTPATIENT)
Dept: INFUSION CENTER | Facility: CLINIC | Age: 57
Discharge: HOME | End: 2017-11-30
Payer: COMMERCIAL

## 2017-11-30 VITALS
SYSTOLIC BLOOD PRESSURE: 124 MMHG | OXYGEN SATURATION: 98 % | TEMPERATURE: 97.5 F | DIASTOLIC BLOOD PRESSURE: 78 MMHG | WEIGHT: 192 LBS | RESPIRATION RATE: 20 BRPM | HEART RATE: 70 BPM | BODY MASS INDEX: 30.07 KG/M2

## 2017-11-30 PROCEDURE — J9035Q0 INV BEVACIZUMAB 400MG/16ML 16 ML: Performed by: INTERNAL MEDICINE

## 2017-11-30 PROCEDURE — 96413 CHEMO IV INFUSION 1 HR: CPT

## 2017-11-30 RX ADMIN — Medication 300 UNITS: at 15:35

## 2017-11-30 RX ADMIN — SODIUM CHLORIDE 20 ML/HR: 0.9 INJECTION, SOLUTION INTRAVENOUS at 14:20

## 2017-11-30 RX ADMIN — Medication 1254 MG: at 14:54

## 2017-11-30 NOTE — PROGRESS NOTES
Pt offers no complaints today  Clinical trials gave ok to proceed with treatment  Labs within parameters

## 2017-12-12 ENCOUNTER — TRANSCRIBE ORDERS (OUTPATIENT)
Dept: ADMINISTRATIVE | Facility: HOSPITAL | Age: 57
End: 2017-12-12

## 2017-12-12 DIAGNOSIS — C34.00 MALIGNANT NEOPLASM OF HILUS OF LUNG, UNSPECIFIED LATERALITY (HCC): Primary | ICD-10-CM

## 2017-12-20 ENCOUNTER — GENERIC CONVERSION - ENCOUNTER (OUTPATIENT)
Dept: OTHER | Facility: OTHER | Age: 57
End: 2017-12-20

## 2017-12-20 RX ORDER — SODIUM CHLORIDE 9 MG/ML
20 INJECTION, SOLUTION INTRAVENOUS CONTINUOUS
Status: DISCONTINUED | OUTPATIENT
Start: 2017-12-21 | End: 2017-12-24 | Stop reason: HOSPADM

## 2017-12-21 ENCOUNTER — HOSPITAL ENCOUNTER (OUTPATIENT)
Dept: INFUSION CENTER | Facility: CLINIC | Age: 57
Discharge: HOME | End: 2017-12-23
Payer: COMMERCIAL

## 2017-12-21 VITALS
RESPIRATION RATE: 16 BRPM | TEMPERATURE: 98.1 F | BODY MASS INDEX: 30.38 KG/M2 | DIASTOLIC BLOOD PRESSURE: 90 MMHG | WEIGHT: 194 LBS | HEART RATE: 99 BPM | SYSTOLIC BLOOD PRESSURE: 130 MMHG

## 2017-12-21 PROCEDURE — J9035Q0 INV BEVACIZUMAB 400MG/16ML 16 ML: Performed by: INTERNAL MEDICINE

## 2017-12-21 PROCEDURE — 96413 CHEMO IV INFUSION 1 HR: CPT

## 2017-12-21 RX ADMIN — SODIUM CHLORIDE 20 ML/HR: 0.9 INJECTION, SOLUTION INTRAVENOUS at 14:15

## 2017-12-21 RX ADMIN — Medication 300 UNITS: at 15:23

## 2017-12-21 RX ADMIN — Medication 1254 MG: at 14:41

## 2018-01-09 LAB
BUN SERPL-MCNC: 13 MG/DL (ref 6–24)
CREAT SERPL-MCNC: 0.81 MG/DL (ref 0.57–1)
EGFR AFRICAN AMERICAN (HISTORICAL): 92 ML/MIN/1.73
EGFR-AMERICAN CALC (HISTORICAL): 80 ML/MIN/1.73

## 2018-01-10 ENCOUNTER — GENERIC CONVERSION - ENCOUNTER (OUTPATIENT)
Dept: OTHER | Facility: OTHER | Age: 58
End: 2018-01-10

## 2018-01-10 RX ORDER — SODIUM CHLORIDE 9 MG/ML
20 INJECTION, SOLUTION INTRAVENOUS CONTINUOUS
Status: DISCONTINUED | OUTPATIENT
Start: 2018-01-11 | End: 2018-01-14 | Stop reason: HOSPADM

## 2018-01-11 ENCOUNTER — HOSPITAL ENCOUNTER (OUTPATIENT)
Dept: INFUSION CENTER | Facility: CLINIC | Age: 58
Discharge: HOME | End: 2018-01-13
Payer: COMMERCIAL

## 2018-01-11 VITALS
SYSTOLIC BLOOD PRESSURE: 142 MMHG | RESPIRATION RATE: 18 BRPM | HEART RATE: 96 BPM | WEIGHT: 190.5 LBS | HEIGHT: 67 IN | TEMPERATURE: 97.5 F | DIASTOLIC BLOOD PRESSURE: 90 MMHG | BODY MASS INDEX: 29.9 KG/M2

## 2018-01-11 PROCEDURE — 96413 CHEMO IV INFUSION 1 HR: CPT

## 2018-01-11 PROCEDURE — J9035Q0 INV BEVACIZUMAB 400MG/16ML 16 ML: Performed by: INTERNAL MEDICINE

## 2018-01-11 RX ADMIN — Medication 1254 MG: at 15:13

## 2018-01-11 RX ADMIN — Medication 300 UNITS: at 15:53

## 2018-01-11 RX ADMIN — SODIUM CHLORIDE 20 ML/HR: 0.9 INJECTION, SOLUTION INTRAVENOUS at 14:30

## 2018-01-11 NOTE — PROGRESS NOTES
Nurse spoke with Nima Jenkins from clinical trials & nurse to recheck b/p at this time but pt  Is o k  To treat today  Nurse & Autumn Roldan both informed the pt  Of the same  Pt  Agreeable &  verbalized understanding

## 2018-01-11 NOTE — PLAN OF CARE
Problem: Potential for Falls  Goal: Patient will remain free of falls  INTERVENTIONS:  - Assess patient frequently for physical needs  -  Identify cognitive and physical deficits and behaviors that affect risk of falls    -  [place] fall precautions as indicated by assessment   - Educate patient/family on patient safety including physical limitations  - Instruct patient to call for assistance with activity based on assessment  - Modify environment to reduce risk of injury  - Consider OT/PT consult to assist with strengthening/mobility   Outcome: Progressing

## 2018-01-11 NOTE — PROGRESS NOTES
Nurse spoke with Pietro Magallon from [facility] trials and informed her of pts  Manual b/p of 152/90  Leslie Najera stated she would inform Dr Boyd Stock of the same and get back to nurse regarding treatment
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
## 2018-01-11 NOTE — RESULT NOTES
Verified Results  * MAMMO SCREENING BILATERAL W CAD 22Apr2016 10:46AM Arielle Began Order Number: [id number]     Test Name Result Flag Reference   MAMMO SCREENING BILATERAL W CAD (Report)     Patient History:   Patient is postmenopausal, had previous chemotherapy at age 60,    and has history of other cancer  Patient is a former smoker  Patient's BMI is 27 7  Reason for exam: screening (asymptomatic)  Mammo Screening Bilateral W CAD: April 22, 2016 - Check In #:    [de-identified]   Bilateral CC and MLO view(s) were taken  Technologist: SKYLER Sandra   Prior study comparison: March 27, 2015, bilateral screening    mammogram performed at [place] [address]  July   10, 2013, bilateral screening mammogram performed at [place] [address]  May 9, 2011, bilateral screening    mammogram performed at [place] [address]  March 4, 2010, bilateral screening mammogram performed at [place] [address]  January 5, 2009, screening    mammogram performed at [place] [address]  There are scattered fibroglandular densities  No new dominant    soft tissue mass, architectural distortion or suspicious    calcifications are noted  The skin and nipple structures are    within normal limits  Benign appearing calcifications are    noted  Stable intramammary lymphnodes noted  No mammographic evidence of malignancy  No    significant changes when compared with prior studies  ASSESSMENT: BiRad:2 - Benign     Recommendation:   Routine screening mammogram of both breasts in 1 year  Analyzed by CAD     8-10% of cancers will be missed on mammography  Management of a    palpable abnormality must be based on clinical grounds  Patients   will be notified of their results via letter from our facility  Accredited by [employer] of [employer] and FDA       Transcription Location: ERIK Raman 98: [id number] Risk Value(s):   Danaer-Cutaick 10 Year: 3 117%, Danaer-Cuzianderson Lifetime: 9 026%,    Myriad Table: 1 5%, ALBARO 5 Year: 1 0%, NCI Lifetime: 6 5%   Signed by:   Maryann Pan MD   4/22/16       Discussion/Summary   Chan Cabrera,   Your mammogram is normal    Dr Collins Johnson

## 2018-01-12 ENCOUNTER — HOSPITAL ENCOUNTER (OUTPATIENT)
Dept: MRI IMAGING | Facility: HOSPITAL | Age: 58
Discharge: HOME | End: 2018-01-12
Attending: RADIOLOGY
Payer: COMMERCIAL

## 2018-01-12 VITALS
DIASTOLIC BLOOD PRESSURE: 90 MMHG | RESPIRATION RATE: 18 BRPM | SYSTOLIC BLOOD PRESSURE: 130 MMHG | HEART RATE: 101 BPM | WEIGHT: 185.5 LBS | TEMPERATURE: 98.6 F | OXYGEN SATURATION: 98 % | BODY MASS INDEX: 29.11 KG/M2 | HEIGHT: 67 IN

## 2018-01-12 DIAGNOSIS — C79.31 SECONDARY MALIGNANT NEOPLASM OF BRAIN (HCC): ICD-10-CM

## 2018-01-12 PROCEDURE — A9585 GADOBUTROL INJECTION: HCPCS | Performed by: RADIOLOGY

## 2018-01-12 PROCEDURE — 70553 MRI BRAIN STEM W/O & W/DYE: CPT

## 2018-01-12 RX ADMIN — GADOBUTROL 8 ML: 604.72 INJECTION INTRAVENOUS at 10:25

## 2018-01-12 NOTE — CONSULTS
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 metastases status post resection and radiotherapy  Patient will follow-up with neurosurgery and radiation oncology as directed  Patient will continue with the [address]  4 Left second digit infection - resolved  Patient will continue to monitor  5 Increased bruising - same as before  Patient will be careful about her activities and trauma  Patient will call if she has significant bleeding or progressive bruising, hematomas etc     6 Right eye chalazion  This has been going on for 3 days  The area is red and inflamed  Patient has been referred to Ophthalmology  Carefully review your medication list and verify that the list is accurate and up-to-date  Please call the oncology office if there are medications missing from the list, medications on the list that you are not currently taking or if there is a dosage or instruction that is different from how you're taking a medication  Health Management   * MAMMO SCREENING BILATERAL W CAD; every 2 years; Last 22Apr2016; Next Due: [id number]; Active    Thank you very much for allowing me to participate in the care of this patient  If you have any questions, please do not hesitate to contact me        Signatures   Electronically signed by : Tonya Wiggins Mike Harkins MD; Sep 27 2017  2:35PM EST                       (Author)
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
## 2018-01-12 NOTE — PROGRESS NOTES
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 x 4 1 cm strongly suspicious for bronchogenic carcinoma  The mass contacts the posterior medial pleural surface and potentially contacts the right posterior tracheal border  No pathologic adenopathy was identified and no evidence of metastatic disease in the chest, abdomen or pelvis  Screening bilateral digital mammogram from March 27, 2015 was category 2, benign       Lab Results 8/22/16 WBC = 7 4 hemoglobin = 14 hematocrit = 41 platelet = 630 calcium = 9 6 BUN = 12 creatinine = 0 85 LFTs WNL LDH = 156  6/22/16 WBC = 8 2 hemoglobin = 13 9 hematocrit = 42 platelet = 455 BUN = 11 creatinine = 1 00 LFTs WNL calcium = 9 1 LDH = 180  6/1/16 WBC = 6 7 hemoglobin = 13 3 hematocrit = 39 7 platelet = 109 magnesium = 1 8 phosphorus = 4 5 BUN = 16 creatinine = 0 9 calcium = 9 2 LFTs WNL  5/12/16 WBC = 7 hemoglobin = 13 2 hematocrit = 40 MCV = 103 platelet = 058 BUN = 14 creatinine = 0 80 calcium = 8 5 LFTs WNL LDH = 162 urinalysis without blood or protein, occasional bacteria  4/21/16 WBC = 6 26 hemoglobin = 13 2 hematocrit = 39 6 MCV = 105 platelet = 811 BUN = 13 creatinine = 0 8 calcium = 8 7 LFTs WNL LDH = 157 TSH = 1 665  3/31/16 WBC = 5 38 hemoglobin = 12 9 hematocrit = 39 MCV = 107 platelet count = 815 BUN = 10 creatinine = 0 69 LFTs WNL calcium = 9 1 magnesium = 2 0 phosphorus = 4 3 LDH = 137  3/10/16 LDH = 145  3/8/16 BUN = 12 creatinine = 0 59 LFTs WNL WBC = 9 0 hemoglobin = 9 7 hematocrit = 30 MCV = 106 platelet = 249  6/04/72 BUN = 15 creatinine = 0 68 calcium = 9 1 alkaline phosphatase = 125 other LFTs WNL WBC = 5 54 hemoglobin = 10 8 hematocrit = 32 platelet = 449  7/89/67 BUN = 10 creatinine = 0 65 calcium = 9 2 LDH = 135 WBC = 8 9 hemoglobin = 10 hematocrit = 30 1 platelet = 615  0/9/04 WBC = 6 48 hemoglobin = 11 hematocrit = 34 platelet = 818  28/77/47 hepatitis B surface antigen = nonreactive hepatitis C antibody = nonreactive HIV 1/2 antibody and p24 antigen = nonreactive    3/13/15 WBC = 9 7 hemoglobin = 14 3 hematocrit = 42 platelet = 569 BUN = 13 creatinine = 0 73 calcium = 9 5 LFTs WNL  PET PET CT was performed on October 8, 2015  Impression stated large right upper lobe mass with necrosis extending to the suprahilar region and mediastinum consistent with malignancy, SUV = 15 7  Mass measured 6 7 x 4 6 cm  There was a hypermetabolic right paratracheal mediastinal lymph node measuring 7 x 12 mm, SUV = 5 9  Patient had a few bilateral axillary lymph nodes that were nonspecific with an SUV measuring 1 5  There was a 3 mm subpleural nodule in the superior right lower lung too small to clarify  There was a hypermetabolic posterior superior prepancreatic lymph node measuring 1 3 x 1 cm with an SUV of 3 4 - radiologist was concerned this was a metastatic lesion  Health Management  Screening for malignant neoplasm of breast   * MAMMO SCREENING BILATERAL W CAD; every 2 years; Last 22Apr2016; Next Due: 46KPN7525;   Active    Future Appointments    Date/Time Provider Specialty Site   10/19/2016 01:30 PM Abad Jones MD PhD Neurosurgery [facility] [address] [place] [address]   09/14/2016 10:45 AM Benjamen Nones, Dipti De Souza MD Hematology Oncology [facility]     Signatures   Electronically signed by : Angel Wyatt MD; Aug 24 2016  2:06PM EST                       (Author)
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
## 2018-01-13 VITALS
SYSTOLIC BLOOD PRESSURE: 142 MMHG | WEIGHT: 190.13 LBS | DIASTOLIC BLOOD PRESSURE: 84 MMHG | HEIGHT: 67 IN | RESPIRATION RATE: 18 BRPM | OXYGEN SATURATION: 97 % | TEMPERATURE: 97.7 F | BODY MASS INDEX: 29.84 KG/M2 | HEART RATE: 90 BPM

## 2018-01-13 VITALS
DIASTOLIC BLOOD PRESSURE: 98 MMHG | SYSTOLIC BLOOD PRESSURE: 161 MMHG | WEIGHT: 194.25 LBS | HEART RATE: 77 BPM | BODY MASS INDEX: 30.49 KG/M2 | HEIGHT: 67 IN

## 2018-01-13 VITALS
RESPIRATION RATE: 18 BRPM | HEART RATE: 94 BPM | OXYGEN SATURATION: 94 % | BODY MASS INDEX: 29.86 KG/M2 | HEIGHT: 67 IN | SYSTOLIC BLOOD PRESSURE: 140 MMHG | DIASTOLIC BLOOD PRESSURE: 80 MMHG | WEIGHT: 190.25 LBS | TEMPERATURE: 96.8 F

## 2018-01-13 VITALS
HEART RATE: 87 BPM | TEMPERATURE: 98.7 F | DIASTOLIC BLOOD PRESSURE: 80 MMHG | OXYGEN SATURATION: 97 % | SYSTOLIC BLOOD PRESSURE: 132 MMHG | HEIGHT: 67 IN | BODY MASS INDEX: 30.63 KG/M2 | WEIGHT: 195.13 LBS | RESPIRATION RATE: 18 BRPM

## 2018-01-13 VITALS
RESPIRATION RATE: 18 BRPM | WEIGHT: 195.25 LBS | BODY MASS INDEX: 30.64 KG/M2 | DIASTOLIC BLOOD PRESSURE: 80 MMHG | HEART RATE: 88 BPM | SYSTOLIC BLOOD PRESSURE: 140 MMHG | OXYGEN SATURATION: 98 % | HEIGHT: 67 IN | TEMPERATURE: 96.4 F

## 2018-01-13 VITALS
OXYGEN SATURATION: 99 % | HEART RATE: 98 BPM | HEIGHT: 67 IN | DIASTOLIC BLOOD PRESSURE: 80 MMHG | WEIGHT: 193 LBS | BODY MASS INDEX: 30.29 KG/M2 | SYSTOLIC BLOOD PRESSURE: 128 MMHG

## 2018-01-14 VITALS
TEMPERATURE: 98.9 F | WEIGHT: 194.13 LBS | OXYGEN SATURATION: 98 % | HEART RATE: 98 BPM | DIASTOLIC BLOOD PRESSURE: 100 MMHG | SYSTOLIC BLOOD PRESSURE: 180 MMHG | BODY MASS INDEX: 30.47 KG/M2 | HEIGHT: 67 IN | RESPIRATION RATE: 18 BRPM

## 2018-01-14 VITALS
TEMPERATURE: 98 F | RESPIRATION RATE: 18 BRPM | HEART RATE: 112 BPM | HEIGHT: 67 IN | DIASTOLIC BLOOD PRESSURE: 80 MMHG | WEIGHT: 196 LBS | BODY MASS INDEX: 30.76 KG/M2 | SYSTOLIC BLOOD PRESSURE: 140 MMHG | OXYGEN SATURATION: 96 %

## 2018-01-14 VITALS
HEART RATE: 90 BPM | BODY MASS INDEX: 30.66 KG/M2 | OXYGEN SATURATION: 98 % | WEIGHT: 195.38 LBS | RESPIRATION RATE: 18 BRPM | TEMPERATURE: 97.6 F | SYSTOLIC BLOOD PRESSURE: 164 MMHG | DIASTOLIC BLOOD PRESSURE: 98 MMHG | HEIGHT: 67 IN

## 2018-01-14 VITALS
SYSTOLIC BLOOD PRESSURE: 140 MMHG | TEMPERATURE: 97.7 F | HEIGHT: 67 IN | DIASTOLIC BLOOD PRESSURE: 90 MMHG | OXYGEN SATURATION: 98 % | RESPIRATION RATE: 16 BRPM | BODY MASS INDEX: 30.97 KG/M2 | HEART RATE: 104 BPM | WEIGHT: 197.31 LBS

## 2018-01-14 VITALS
WEIGHT: 193.25 LBS | BODY MASS INDEX: 30.33 KG/M2 | HEIGHT: 67 IN | HEART RATE: 99 BPM | RESPIRATION RATE: 18 BRPM | TEMPERATURE: 97.3 F | OXYGEN SATURATION: 98 % | DIASTOLIC BLOOD PRESSURE: 100 MMHG | SYSTOLIC BLOOD PRESSURE: 160 MMHG

## 2018-01-14 VITALS
SYSTOLIC BLOOD PRESSURE: 144 MMHG | HEART RATE: 98 BPM | WEIGHT: 198.13 LBS | HEIGHT: 67 IN | DIASTOLIC BLOOD PRESSURE: 90 MMHG | OXYGEN SATURATION: 98 % | TEMPERATURE: 98.2 F | BODY MASS INDEX: 31.1 KG/M2 | RESPIRATION RATE: 18 BRPM

## 2018-01-14 VITALS
WEIGHT: 193.25 LBS | SYSTOLIC BLOOD PRESSURE: 132 MMHG | HEIGHT: 67 IN | HEART RATE: 80 BPM | OXYGEN SATURATION: 98 % | RESPIRATION RATE: 16 BRPM | TEMPERATURE: 97.3 F | DIASTOLIC BLOOD PRESSURE: 70 MMHG | BODY MASS INDEX: 30.33 KG/M2

## 2018-01-14 VITALS
BODY MASS INDEX: 30.63 KG/M2 | WEIGHT: 195.13 LBS | SYSTOLIC BLOOD PRESSURE: 118 MMHG | HEART RATE: 80 BPM | DIASTOLIC BLOOD PRESSURE: 80 MMHG | RESPIRATION RATE: 16 BRPM | HEIGHT: 67 IN | TEMPERATURE: 97.4 F

## 2018-01-14 VITALS
OXYGEN SATURATION: 98 % | SYSTOLIC BLOOD PRESSURE: 140 MMHG | HEIGHT: 67 IN | WEIGHT: 197 LBS | HEART RATE: 99 BPM | BODY MASS INDEX: 30.92 KG/M2 | RESPIRATION RATE: 18 BRPM | DIASTOLIC BLOOD PRESSURE: 100 MMHG | TEMPERATURE: 99.1 F

## 2018-01-14 VITALS
BODY MASS INDEX: 30.64 KG/M2 | HEART RATE: 97 BPM | RESPIRATION RATE: 18 BRPM | DIASTOLIC BLOOD PRESSURE: 80 MMHG | SYSTOLIC BLOOD PRESSURE: 140 MMHG | WEIGHT: 195.25 LBS | TEMPERATURE: 98.3 F | OXYGEN SATURATION: 98 % | HEIGHT: 67 IN

## 2018-01-15 NOTE — RESULT NOTES
Verified Results  * XR FOOT 3+ VIEW RIGHT [id number] 02:50PM Ce Salgado     Test Name Result Flag Reference   XR FOOT 3+ VW RIGHT (Report)     RIGHT FOOT     INDICATION: Right foot pain  History of fall  COMPARISON: None     VIEWS: 3     FINDINGS:     There is no acute fracture or dislocation  No degenerative changes  No lytic or blastic lesions are seen  Soft tissues are unremarkable  IMPRESSION:     No acute osseous abnormality  Workstation performed: [id number]     Signed by:   Bonifacio Vazquez MD   2/23/17       Discussion/Summary   Wesly Overton,    Here is a copy of the results we discussed     Dr Michael Gilbert

## 2018-01-15 NOTE — RESULT NOTES
Message  Patient seen today as part of the multi-disciplinary [facility]  MRI brain shows stability of the right temporal lobe finding  Radiation Oncology has ordered a follow-up MRI be done on 7/8, and a follow-up visit 7/13  Plan  Brain metastasis    · Follow-up visit in 3 months Evaluation and Treatment  Follow-up  Status: Hold For -  Scheduling  Requested for: 13Apr2016  Non-small cell lung cancer    · * CT CHEST ABDOMEN PELVIS WO CONTRAST; Status:Need Information - Financial  Authorization,Retrospective By Protocol Authorization;  Requested for:68Eju6280;     Signatures   Electronically signed by : Stacey Rodriguez MD PhD; Apr 13 2016  4:48PM EST                       (Author)
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
## 2018-01-22 ENCOUNTER — HOSPITAL ENCOUNTER (OUTPATIENT)
Dept: RADIOLOGY | Facility: HOSPITAL | Age: 58
Discharge: HOME | End: 2018-01-22
Attending: INTERNAL MEDICINE
Payer: COMMERCIAL

## 2018-01-22 ENCOUNTER — OFFICE VISIT (OUTPATIENT)
Dept: RADIATION ONCOLOGY | Facility: HOSPITAL | Age: 58
End: 2018-01-22
Attending: RADIOLOGY
Payer: COMMERCIAL

## 2018-01-22 ENCOUNTER — GENERIC CONVERSION - ENCOUNTER (OUTPATIENT)
Dept: OTHER | Facility: OTHER | Age: 58
End: 2018-01-22

## 2018-01-22 VITALS
WEIGHT: 189.25 LBS | OXYGEN SATURATION: 95 % | TEMPERATURE: 98.1 F | DIASTOLIC BLOOD PRESSURE: 90 MMHG | RESPIRATION RATE: 18 BRPM | HEART RATE: 110 BPM | HEIGHT: 67 IN | BODY MASS INDEX: 29.7 KG/M2 | SYSTOLIC BLOOD PRESSURE: 140 MMHG

## 2018-01-22 VITALS
RESPIRATION RATE: 18 BRPM | HEART RATE: 95 BPM | DIASTOLIC BLOOD PRESSURE: 80 MMHG | SYSTOLIC BLOOD PRESSURE: 138 MMHG | WEIGHT: 190.5 LBS | OXYGEN SATURATION: 98 % | HEIGHT: 67 IN | BODY MASS INDEX: 29.9 KG/M2 | TEMPERATURE: 97.6 F

## 2018-01-22 VITALS
TEMPERATURE: 98 F | HEART RATE: 114 BPM | WEIGHT: 189 LBS | BODY MASS INDEX: 29.66 KG/M2 | RESPIRATION RATE: 18 BRPM | DIASTOLIC BLOOD PRESSURE: 80 MMHG | OXYGEN SATURATION: 99 % | HEIGHT: 67 IN | SYSTOLIC BLOOD PRESSURE: 138 MMHG

## 2018-01-22 VITALS
TEMPERATURE: 96.6 F | WEIGHT: 191.25 LBS | SYSTOLIC BLOOD PRESSURE: 136 MMHG | HEART RATE: 91 BPM | HEIGHT: 67 IN | DIASTOLIC BLOOD PRESSURE: 78 MMHG | RESPIRATION RATE: 16 BRPM | OXYGEN SATURATION: 98 % | BODY MASS INDEX: 30.02 KG/M2

## 2018-01-22 DIAGNOSIS — C34.00 MALIGNANT NEOPLASM OF HILUS OF LUNG, UNSPECIFIED LATERALITY (HCC): ICD-10-CM

## 2018-01-22 DIAGNOSIS — C34.90 MALIGNANT NEOPLASM OF UNSPECIFIED PART OF UNSPECIFIED BRONCHUS OR LUNG (HCC): ICD-10-CM

## 2018-01-22 PROCEDURE — 71250 CT THORAX DX C-: CPT

## 2018-01-22 PROCEDURE — 99213 OFFICE O/P EST LOW 20 MIN: CPT | Performed by: RADIOLOGY

## 2018-01-22 PROCEDURE — 74176 CT ABD & PELVIS W/O CONTRAST: CPT

## 2018-01-22 PROCEDURE — G0463 HOSPITAL OUTPT CLINIC VISIT: HCPCS | Performed by: RADIOLOGY

## 2018-01-24 VITALS
RESPIRATION RATE: 16 BRPM | OXYGEN SATURATION: 97 % | WEIGHT: 191 LBS | HEART RATE: 104 BPM | SYSTOLIC BLOOD PRESSURE: 136 MMHG | DIASTOLIC BLOOD PRESSURE: 88 MMHG | HEIGHT: 67 IN | TEMPERATURE: 98.5 F | BODY MASS INDEX: 29.98 KG/M2

## 2018-01-24 VITALS
BODY MASS INDEX: 30.04 KG/M2 | RESPIRATION RATE: 15 BRPM | HEIGHT: 67 IN | WEIGHT: 191.38 LBS | HEART RATE: 98 BPM | TEMPERATURE: 98 F | DIASTOLIC BLOOD PRESSURE: 76 MMHG | OXYGEN SATURATION: 98 % | SYSTOLIC BLOOD PRESSURE: 122 MMHG

## 2018-01-31 ENCOUNTER — OFFICE VISIT (OUTPATIENT)
Dept: HEMATOLOGY ONCOLOGY | Facility: MEDICAL CENTER | Age: 58
End: 2018-01-31
Payer: COMMERCIAL

## 2018-01-31 ENCOUNTER — TELEPHONE (OUTPATIENT)
Dept: HEMATOLOGY ONCOLOGY | Facility: MEDICAL CENTER | Age: 58
End: 2018-01-31

## 2018-01-31 VITALS
TEMPERATURE: 98.1 F | DIASTOLIC BLOOD PRESSURE: 100 MMHG | BODY MASS INDEX: 30.38 KG/M2 | SYSTOLIC BLOOD PRESSURE: 160 MMHG | WEIGHT: 194 LBS | HEART RATE: 88 BPM

## 2018-01-31 DIAGNOSIS — R03.0 ELEVATED BLOOD PRESSURE READING IN OFFICE WITHOUT DIAGNOSIS OF HYPERTENSION: ICD-10-CM

## 2018-01-31 DIAGNOSIS — T45.1X5A CHEMOTHERAPY-INDUCED NAUSEA: ICD-10-CM

## 2018-01-31 DIAGNOSIS — R11.0 CHEMOTHERAPY-INDUCED NAUSEA: ICD-10-CM

## 2018-01-31 DIAGNOSIS — C34.91 ADENOCARCINOMA OF LUNG, STAGE 4, RIGHT (HCC): Primary | ICD-10-CM

## 2018-01-31 PROBLEM — R79.89 ABNORMAL LFTS: Status: ACTIVE | Noted: 2017-02-08

## 2018-01-31 PROCEDURE — 99215 OFFICE O/P EST HI 40 MIN: CPT | Performed by: PHYSICIAN ASSISTANT

## 2018-01-31 RX ORDER — RANITIDINE HCL 75 MG
1 TABLET ORAL DAILY
COMMUNITY
End: 2021-02-22 | Stop reason: ALTCHOICE

## 2018-01-31 RX ORDER — ONDANSETRON HYDROCHLORIDE 8 MG/1
8 TABLET, FILM COATED ORAL 3 TIMES DAILY
Qty: 1 TABLET | Refills: 2 | Status: SHIPPED | OUTPATIENT
Start: 2018-01-31 | End: 2021-02-22 | Stop reason: ALTCHOICE

## 2018-01-31 RX ORDER — SODIUM CHLORIDE 9 MG/ML
20 INJECTION, SOLUTION INTRAVENOUS CONTINUOUS
Status: DISCONTINUED | OUTPATIENT
Start: 2018-02-01 | End: 2018-02-04 | Stop reason: HOSPADM

## 2018-01-31 RX ORDER — UBIDECARENONE 75 MG
1 CAPSULE ORAL DAILY
COMMUNITY
End: 2018-10-03

## 2018-01-31 RX ORDER — ONDANSETRON HYDROCHLORIDE 24 MG/1
24 TABLET, FILM COATED ORAL ONCE
COMMUNITY
End: 2018-01-31 | Stop reason: SDUPTHER

## 2018-01-31 NOTE — PROGRESS NOTES
[facility]  Hematology/Oncology Outpatient Follow-Up  Alfredo Martinez, 1960, [id number]  1/31/2018    Assessment/Plan:    1  Adenocarcinoma of the right lung stage IV  Patient was initially diagnosed with symptomatic brain metastases  Patient is on [facility] [id number] (phase III open label randomized study of [id number] [anti-PD-L1 antibody] in combination with carboplatin and paclitaxel +/- Avastin versus carboplatin and paclitaxel +/- Avastin in patients with stage IV chemotherapy naive non-small cell lung carcinoma  Due to a grade 3 LFT elevation adverse event the patient was taken off of the study drug, PD-L1 agent,  is now on single agent Avastin for maintenance  Repeat scans demonstrate disease stability in both the chest as well as the brain  Laboratory assessment was acceptable for the patient to undergo cycle 39 on Thursday 2/1/18  This patient was seen in conjunction with clinical trials, Colette Krishnamurthy RN  Follow-up appointments have been scheduled with clinical trials  2   Elevated blood pressure without history of hypertension  Patient was asked to follow up with Dr Linda Us for further observation and advisement  Urinalysis was negative for proteinurea  3   Chemotherapy related nausea  Patient requests refill of Zofran  Patient uses this medication infrequently but use her last tablet within the last month  Labs and imaging for follow up:  Orders Placed This Encounter   Procedures    Ambulatory referral to Family Practice     Standing Status:   Future     Standing Expiration Date:   7/31/2018     Referral Priority:   Routine     Referral Type:   Consult - AMB     Referral Reason:   Specialty Services Required     Referred to Provider:   Meme Steve DO     Requested Specialty:   Family Medicine     Number of Visits Requested:   1     Expiration Date:   1/31/2019     The patient is scheduled for follow-up in approximately 3 weeks with Dr Oswaldo Cohen     Patient voiced agreement and understanding to the above  Patient knows to call the Hematology/Oncology office with any questions and concerns regarding the above  Patient was seen today with Walter Amaya research RN  Carefully review your medication list in verify the list is accurate and up-to-date  Please call the hematologic/Oncology office if there medications missing from the less, medications on the list that your not currently taking or if there is a dosage or instruction that is different from higher taking medication     -------------------------------------------------------------------------------------------------------    Chief complaint:   Chief Complaint   Patient presents with    Follow-up     Adenocarcinoma of the lung, stage IV with brain metastases status post excision and radiation  On clinical trial       History of present illness/Cancer History:      Adenocarcinoma of lung, stage 4, right ([facility] [address] )    9/5/2015 Initial Diagnosis     Patient was referred to the emergency room for evaluation of vertigo as sent from the balance center  (patient reported )  Patient underwent the following pertinent imaging scans  MRI of the brain demonstrated a mass in the right superior all temporal gyrus with measurements of 4 2 x 3 3 x 3 6 cm with vasogenic edema and mass effect  There was a near uncal herniation noted  CT of the chest and abdomen/pelvis demonstrated a necrotic right upper lobe mass measuring 7 x 4 2 x 4 1 cm strongly suspicious for bronchogenic carcinoma  The mass contacts the posterior medial pleural surface and potentially contacts the right posterior tracheal border  No pathologic adenopathy was identified in no evidence of metastatic disease in the chest abdomen or pelvis  9/6/2015 Surgery     Non-small cell carcinoma of lung ([facility] [address] ) diagnosed from biopsy of right temporal mass  Pathology demonstrates poorly differentiated non-small cell carcinoma    Pathologist stated that the immunoprofile supported the primary lung non-small cell carcinoma favor adenocarcinoma  Squamous carcinoma could not be excluded, secondary to focal P 40 staining  Franki Morales path report demonstrated no ALK gene rearrangement or dleation and negative ROS1 rearrangement, No EGFR mutations were found  Surgery completed by Dr Ze Bowie            - 10/15/2015 Radiation     SRT to brain 3000 cGY in 5 fractions  10/20/2015 -  Research Study Participant     [id number] Phase III open label randomized study of [id number] [Anti-PDL1 antibody] in combination with carboplatin and paclitaxel +/- Avastin versus Carboplatin and Paciltaxel +/- Avastin in patients with stage IV Chemotherapy  Naive non-small cell lung carcinoma  Patient was randomized to receive off for medications  2015 - 2016 Chemotherapy     Started Carboplatin, Paclitaxel, Avastin and PD-L1 (atezolizumab; study drug)  Completed 6 cycles with cycle 6 day 1 on 2/18/16         3/10/2016 -  Chemotherapy     Beginning cycle 7, maintenance cycle with Avastin and PD-L1 (Atezolizumab; study drug)         2017 Adverse Reaction     LFT elevation-persisted  Patient was taken off of study drug Atezolizumab, but continues on Avastin maintance  Staging: Non-small cell carcinoma of lung ([facility] [address] )    Staging form: Lung, AJCC 7th Edition    - Clinical stage from 2015: Stage IV (TX, NX, M1b) - Signed by Jessica Garcia PA-C on 2018     Molecular Testing: Neg ALK, ROS-1 and EGFR rearrangements/mutations    ECO    Interval history:  Patient notes feeling well today  Patient denies any significant issues with chemotherapy  Patient notes weight is stable  Patient states activities are within normal limits  Patient denies dizziness, balance issues, flushing, chest pain, palpitations, shortness of breath, lower extremity edema  Patient notes occasional nausea, last occurring either last cycle of the cycle before    Otherwise she denies vomiting  Patient notes constipation, taking 2 doses of stool softeners last night  Patient believes this is related to previous barium contrast from CT scans completed on 01/22/18  Patient reports having follow-up with Dr Angelic Ledesma  MRI, brain, is stable  Review of Systems   Constitutional: Negative for activity change, appetite change, fatigue, fever and unexpected weight change  HENT: Negative for nosebleeds  Respiratory: Negative for cough and shortness of breath  Cardiovascular: Negative for chest pain, palpitations and leg swelling  Gastrointestinal: Positive for nausea (occasional as noted in interval history)  Negative for abdominal pain, constipation, diarrhea and vomiting  Genitourinary: Negative for hematuria  Neurological: Negative for dizziness, weakness, light-headedness and headaches  Current Outpatient Prescriptions:     ondansetron (ZOFRAN) 8 mg tablet, Take 1 tablet (8 mg total) by mouth 3 (three) times a day, Disp: 1 tablet, Rfl: 2    ALPRAZolam (XANAX) 0 25 mg tablet, Take 0 25 mg by mouth daily at bedtime as needed for anxiety, Disp: , Rfl:     atorvastatin (LIPITOR) 10 mg tablet, Take 10 mg by mouth daily before breakfast  , Disp: , Rfl:     cyanocobalamin (VITAMIN B-12) 100 mcg tablet, Take by mouth daily, Disp: , Rfl:     levETIRAcetam (KEPPRA) 500 mg tablet, Take 500 mg by mouth 2 (two) times a day, Disp: , Rfl:     Omega-3 Fatty Acids (FISH OIL PO), Take 2 tablets by mouth , Disp: , Rfl:     prochlorperazine (COMPAZINE) 10 mg tablet, Take 10 mg by mouth every 6 (six) hours as needed for nausea or vomiting, Disp: , Rfl:     ranitidine (ZANTAC) 150 mg tablet, Take 150 mg by mouth daily  , Disp: , Rfl:   No current facility-administered medications for this visit       Facility-Administered Medications Ordered in Other Visits:     alteplase (CATHFLO) injection 2 mg, 2 mg, Intracatheter, PRN, Felice Baig MD    sodium chloride 0 9 % infusion, 20 mL/hr, Intravenous, Continuous, Mehul Goodwin MD      No current facility-administered medications for this visit  Allergies   Allergen Reactions    Clindamycin/Lincomycin     Iodinated Diagnostic Agents      Objective:   /100 (BP Location: Right arm, Patient Position: Sitting, Cuff Size: Large)   Pulse 88   Temp 98 1 °F (36 7 °C) (Tympanic)   Wt 88 kg (194 lb)   LMP  (LMP Unknown)   BMI 30 38 kg/m²     Physical Exam   Constitutional: She appears well-developed  No distress  HENT:   Head: Normocephalic and atraumatic  Mouth/Throat: No oropharyngeal exudate  Eyes: EOM are normal  Pupils are equal, round, and reactive to light  Neck: Normal range of motion  Cardiovascular: Normal rate and regular rhythm  Pulmonary/Chest: Effort normal and breath sounds normal  No respiratory distress  Vitals reviewed  Pertinent Laboratory Results and Imaging Review:  CT chest abdomen pelvis without contrast 1/22/18  IMPRESSION:     There is interval decrease in size of the cystic portion of the cavitary right upper lobe mass however, solid components of this mass are not significantly changed from previous examination  Otherwise unchanged examination with no new metastatic lesions   identified in the chest, abdomen or pelvis  MRI brain with and without contrast 1/12/18     IMPRESSION:     Stable MR appearance of the brain  Treatment-related changes right temporal lobe without evidence for tumor recurrence      Progression of paranasal sinus disease        Past Medical History:   Diagnosis Date    Allergic rhinitis     Eczema     Hyperlipemia     Lung cancer ([address] )     Maintenance chemotherapy     Secondary cancer of brain ([address] )     Vertigo      Past Surgical History:   Procedure Laterality Date    APPENDECTOMY      BRAIN TUMOR EXCISION      CENTRAL VENOUS CATHETER INSERTION Right     PORTACATH       Social History     Social History    Marital status: /Civil Union     Spouse name: N/A  Number of children: N/A    Years of education: N/A     Social History Main Topics    Smoking status: Former Smoker    Smokeless tobacco: Not on file    Alcohol use Yes      Comment: occasionally    Drug use: Yes     Types: Marijuana    Sexual activity: Not on file     Other Topics Concern    Not on file     Social History Narrative    No narrative on file       No family history on file  Please note: This report has been generated by a voice recognition software system  Therefore there may be syntax, spelling, and/or grammatical errors  Please call if you have any questions

## 2018-02-01 ENCOUNTER — OFFICE VISIT (OUTPATIENT)
Dept: FAMILY MEDICINE CLINIC | Facility: CLINIC | Age: 58
End: 2018-02-01
Payer: COMMERCIAL

## 2018-02-01 ENCOUNTER — HOSPITAL ENCOUNTER (OUTPATIENT)
Dept: INFUSION CENTER | Facility: CLINIC | Age: 58
Discharge: HOME | End: 2018-02-01
Payer: COMMERCIAL

## 2018-02-01 VITALS
HEART RATE: 76 BPM | BODY MASS INDEX: 30.8 KG/M2 | HEIGHT: 67 IN | TEMPERATURE: 97.4 F | WEIGHT: 196.21 LBS | RESPIRATION RATE: 18 BRPM | SYSTOLIC BLOOD PRESSURE: 160 MMHG | DIASTOLIC BLOOD PRESSURE: 78 MMHG

## 2018-02-01 VITALS
DIASTOLIC BLOOD PRESSURE: 90 MMHG | TEMPERATURE: 97.1 F | HEIGHT: 67 IN | RESPIRATION RATE: 18 BRPM | BODY MASS INDEX: 30.92 KG/M2 | WEIGHT: 197 LBS | HEART RATE: 72 BPM | SYSTOLIC BLOOD PRESSURE: 138 MMHG

## 2018-02-01 DIAGNOSIS — I10 BENIGN HYPERTENSION: Primary | ICD-10-CM

## 2018-02-01 PROCEDURE — J9035Q0 INV BEVACIZUMAB 400MG/16ML 16 ML: Performed by: PHYSICIAN ASSISTANT

## 2018-02-01 PROCEDURE — 96413 CHEMO IV INFUSION 1 HR: CPT

## 2018-02-01 PROCEDURE — 99213 OFFICE O/P EST LOW 20 MIN: CPT | Performed by: FAMILY MEDICINE

## 2018-02-01 RX ORDER — AMLODIPINE BESYLATE 2.5 MG/1
5 TABLET ORAL DAILY
Qty: 30 TABLET | Refills: 3 | Status: SHIPPED | OUTPATIENT
Start: 2018-02-01 | End: 2018-03-01 | Stop reason: SDUPTHER

## 2018-02-01 RX ADMIN — SODIUM CHLORIDE 20 ML/HR: 0.9 INJECTION, SOLUTION INTRAVENOUS at 14:32

## 2018-02-01 RX ADMIN — Medication 300 UNITS: at 15:36

## 2018-02-01 RX ADMIN — Medication 1254 MG: at 14:57

## 2018-02-01 NOTE — PLAN OF CARE
Problem: Potential for Falls  Goal: Patient will remain free of falls  INTERVENTIONS:  - Assess patient frequently for physical needs  -  Identify cognitive and physical deficits and behaviors that affect risk of falls  -  [place] fall precautions as indicated by assessment   - Educate patient/family on patient safety including physical limitations  - Instruct patient to call for assistance with activity based on assessment  - Modify environment to reduce risk of injury  - Consider OT/PT consult to assist with strengthening/mobility   Outcome: Progressing      Problem: SAFETY ADULT  Goal: Patient will remain free of falls  INTERVENTIONS:  - Assess patient frequently for physical needs  -  Identify cognitive and physical deficits and behaviors that affect risk of falls  -  [place] fall precautions as indicated by assessment   - Educate patient/family on patient safety including physical limitations  - Instruct patient to call for assistance with activity based on assessment  - Modify environment to reduce risk of injury  - Consider OT/PT consult to assist with strengthening/mobility   Outcome: Progressing      Problem: Knowledge Deficit  Goal: Patient/family/caregiver demonstrates understanding of disease process, treatment plan, medications, and discharge instructions  Complete learning assessment and assess knowledge base    Interventions:  - Provide teaching at level of understanding  - Provide teaching via preferred learning methods  Outcome: Progressing

## 2018-02-01 NOTE — ASSESSMENT & PLAN NOTE
Newly diagnosed, will start medication  Goal of keeping her pressure down so she can continue her cancer treatment

## 2018-02-01 NOTE — PROGRESS NOTES
Assessment/Plan:    Benign hypertension  Newly diagnosed, will start medication  Goal of keeping her pressure down so she can continue her cancer treatment  risks and benefits of medication discussed  Diagnoses and all orders for this visit:    Benign hypertension  -     amLODIPine (NORVASC) 2 5 mg tablet; Take 2 tablets (5 mg total) by mouth daily          Patient Instructions   Check pressure at home once a week  Bring home BP monitor to next visit  Return in about 1 month (around 3/1/2018) for Blood pressure check  Subjective:      Patient ID: Hardeep Lopez is a 59 y o  female  Chief Complaint   Patient presents with    Blood Pressure Check     sent by Dr Catracho Varghese office       She is still getting  Chemo  She had high blood pressure readings at her oncologist's office  Her brain scans are now clear  She has done with her trial   She has been busy kicking cancer's ass  The following portions of the patient's history were reviewed and updated as appropriate: allergies, current medications, past family history, past medical history, past social history, past surgical history and problem list     Review of Systems   Constitutional: Negative for fever  Neurological: Negative for headaches  Current Outpatient Prescriptions   Medication Sig Dispense Refill    ALPRAZolam (XANAX) 0 25 mg tablet Take 0 25 mg by mouth daily at bedtime as needed for anxiety      cyanocobalamin (VITAMIN B-12) 100 mcg tablet Take 1 tablet by mouth daily      levETIRAcetam (KEPPRA) 500 mg tablet Take 500 mg by mouth 2 (two) times a day      Omega-3 Fatty Acids (FISH OIL PO) Take 2 tablets by mouth        ondansetron (ZOFRAN) 8 mg tablet Take 1 tablet (8 mg total) by mouth 3 (three) times a day 1 tablet 2    prochlorperazine (COMPAZINE) 10 mg tablet Take 10 mg by mouth every 6 (six) hours as needed for nausea or vomiting      ranitidine (ZANTAC) 75 MG tablet Take 1 tablet by mouth daily      amLODIPine (NORVASC) 2 5 mg tablet Take 2 tablets (5 mg total) by mouth daily 30 tablet 3    atorvastatin (LIPITOR) 10 mg tablet Take 10 mg by mouth daily before breakfast         No current facility-administered medications for this visit  Facility-Administered Medications Ordered in Other Visits   Medication Dose Route Frequency Provider Last Rate Last Dose    alteplase (CATHFLO) injection 2 mg  2 mg Intracatheter PRN Hakeem Huntley MD        alteplase (CATHFLO) injection 2 mg  2 mg Intracatheter PRN Kathy Das, PA-C        heparin lock flush 100 units/mL injection 300 Units  300 Units Intracatheter PRN Lorevonne Das, PA-C   300 Units at 02/01/18 1536    sodium chloride 0 9 % infusion  20 mL/hr Intravenous Continuous Hakeem Huntley MD        sodium chloride 0 9 % infusion  20 mL/hr Intravenous Continuous Lorevonne Das, PA-C   Stopped at 02/01/18 1535       Objective:    /90   Pulse 72   Temp (!) 97 1 °F (36 2 °C)   Resp 18   Ht 5' 7" (1 702 m)   Wt 89 4 kg (197 lb)   LMP  (LMP Unknown)   BMI 30 85 kg/m²        Physical Exam   Constitutional: She appears well-developed and well-nourished  HENT:   Head: Normocephalic and atraumatic  Right Ear: External ear normal    Left Ear: External ear normal    Mouth/Throat: Oropharynx is clear and moist    Cardiovascular: Normal rate, regular rhythm and normal heart sounds  Exam reveals no friction rub  No murmur heard  Pulmonary/Chest: Effort normal and breath sounds normal  No respiratory distress  She has no wheezes  She has no rales  Musculoskeletal: She exhibits no edema or deformity  Nursing note and vitals reviewed               Jeromy Fletcher DO

## 2018-02-01 NOTE — PROGRESS NOTES
Patient /78  Patient was at PCP today regarding her elevated bp and they put her on amlodipine  Marleny Booker, research coordinator aware of patients bp today  Avastin to be given

## 2018-02-14 DIAGNOSIS — C34.91 MALIGNANT NEOPLASM OF RIGHT LUNG, UNSPECIFIED PART OF LUNG (HCC): Primary | ICD-10-CM

## 2018-02-20 LAB
ALBUMIN SERPL-MCNC: 4.4 G/DL (ref 3.5–5.5)
ALBUMIN/GLOB SERPL: 1.8 {RATIO} (ref 1.2–2.2)
ALP SERPL-CCNC: 81 IU/L (ref 39–117)
ALT SERPL-CCNC: 84 IU/L (ref 0–32)
APPEARANCE UR: CLEAR
AST SERPL-CCNC: 54 IU/L (ref 0–40)
BASOPHILS # BLD AUTO: 0 X10E3/UL (ref 0–0.2)
BASOPHILS NFR BLD AUTO: 1 %
BILIRUB SERPL-MCNC: 0.5 MG/DL (ref 0–1.2)
BILIRUB UR QL STRIP: NEGATIVE
BUN SERPL-MCNC: 12 MG/DL (ref 6–24)
BUN/CREAT SERPL: 13 (ref 9–23)
CALCIUM SERPL-MCNC: 9.7 MG/DL (ref 8.7–10.2)
CHLORIDE SERPL-SCNC: 99 MMOL/L (ref 96–106)
CO2 SERPL-SCNC: 23 MMOL/L (ref 18–29)
COLOR UR: YELLOW
CREAT SERPL-MCNC: 0.89 MG/DL (ref 0.57–1)
EOSINOPHIL # BLD AUTO: 0.1 X10E3/UL (ref 0–0.4)
EOSINOPHIL NFR BLD AUTO: 2 %
ERYTHROCYTE [DISTWIDTH] IN BLOOD BY AUTOMATED COUNT: 13.8 % (ref 12.3–15.4)
GLOBULIN SER-MCNC: 2.5 G/DL (ref 1.5–4.5)
GLUCOSE SERPL-MCNC: 107 MG/DL (ref 65–99)
GLUCOSE UR QL: NEGATIVE
HCT VFR BLD AUTO: 43.8 % (ref 34–46.6)
HGB BLD-MCNC: 15.1 G/DL (ref 11.1–15.9)
HGB UR QL STRIP: NEGATIVE
IMM GRANULOCYTES # BLD: 0 X10E3/UL (ref 0–0.1)
IMM GRANULOCYTES NFR BLD: 0 %
KETONES UR QL STRIP: NEGATIVE
LDH SERPL-CCNC: 149 IU/L (ref 119–226)
LEUKOCYTE ESTERASE UR QL STRIP: NEGATIVE
LYMPHOCYTES # BLD AUTO: 2.6 X10E3/UL (ref 0.7–3.1)
LYMPHOCYTES NFR BLD AUTO: 41 %
MAGNESIUM SERPL-MCNC: 1.7 MG/DL (ref 1.6–2.3)
MCH RBC QN AUTO: 32.2 PG (ref 26.6–33)
MCHC RBC AUTO-ENTMCNC: 34.5 G/DL (ref 31.5–35.7)
MCV RBC AUTO: 93 FL (ref 79–97)
MICRO URNS: NORMAL
MONOCYTES # BLD AUTO: 0.4 X10E3/UL (ref 0.1–0.9)
MONOCYTES NFR BLD AUTO: 7 %
NEUTROPHILS # BLD AUTO: 3.3 X10E3/UL (ref 1.4–7)
NEUTROPHILS NFR BLD AUTO: 49 %
NITRITE UR QL STRIP: NEGATIVE
PH UR STRIP: 5.5 [PH] (ref 5–7.5)
PHOSPHATE SERPL-MCNC: 3.5 MG/DL (ref 2.5–4.5)
PLATELET # BLD AUTO: 255 X10E3/UL (ref 150–379)
POTASSIUM SERPL-SCNC: 5.1 MMOL/L (ref 3.5–5.2)
PROT SERPL-MCNC: 6.9 G/DL (ref 6–8.5)
PROT UR QL STRIP: NEGATIVE
RBC # BLD AUTO: 4.69 X10E6/UL (ref 3.77–5.28)
SL AMB EGFR AFRICAN AMERICAN: 82 ML/MIN/1.73
SL AMB EGFR NON AFRICAN AMERICAN: 71 ML/MIN/1.73
SODIUM SERPL-SCNC: 137 MMOL/L (ref 134–144)
SP GR UR: 1.01 (ref 1–1.03)
UROBILINOGEN UR STRIP-ACNC: 0.2 EU/DL (ref 0.2–1)
WBC # BLD AUTO: 6.5 X10E3/UL (ref 3.4–10.8)

## 2018-02-21 ENCOUNTER — OFFICE VISIT (OUTPATIENT)
Dept: HEMATOLOGY ONCOLOGY | Facility: MEDICAL CENTER | Age: 58
End: 2018-02-21
Payer: COMMERCIAL

## 2018-02-21 VITALS — DIASTOLIC BLOOD PRESSURE: 84 MMHG | HEART RATE: 110 BPM | SYSTOLIC BLOOD PRESSURE: 122 MMHG | TEMPERATURE: 98.8 F

## 2018-02-21 DIAGNOSIS — C34.90 MALIGNANT NEOPLASM OF LUNG, UNSPECIFIED LATERALITY, UNSPECIFIED PART OF LUNG (HCC): Primary | ICD-10-CM

## 2018-02-21 PROCEDURE — 99213 OFFICE O/P EST LOW 20 MIN: CPT | Performed by: INTERNAL MEDICINE

## 2018-02-21 RX ORDER — SODIUM CHLORIDE 9 MG/ML
20 INJECTION, SOLUTION INTRAVENOUS CONTINUOUS
Status: DISCONTINUED | OUTPATIENT
Start: 2018-02-22 | End: 2018-02-25 | Stop reason: HOSPADM

## 2018-02-21 NOTE — PROGRESS NOTES
Magalis Contreras  1960  [facility] [place]  [address] [facility] [address]    DISCUSSION  SUMMARY:    75-year-old female with M1 non-small cell lung carcinoma/adenocarcinoma  Patient is on [facility] 20015-14 (phase III open label randomized study of [id number] [anti-PD-L1 antibody] in combination with carboplatin and paclitaxel +/- Avastin versus carboplatin and paclitaxel +/- Avastin in patients with stage IV chemotherapy naÃ¯ve non-small cell lung carcinoma)  Patient was randomized to receive all 4 medications  Mrs Karmen Harrison completed the 6 cycles of treatment without significant toxicities  Patient had been on maintenance (study drug (atezolizumab) and avastin) - now only Avastin (see below)  Issues:      1  Stage IV non-small cell lung carcinoma  Patient feels well and clinically there are no lung cancer related issues  Previous CAT scans demonstrated stable disease with no evidence of new metastatic lesions  LFTs are acceptable for Avastin  Next treatment is scheduled for tomorrow  Patient is also being followed by [facility],       2  Pain control - patient has had some increased right neck and shoulder pain  There is no evidence of disease progression clinically and recent scans did not demonstrate any abnormalities  We discussed options including a rheumatology evaluation  Patient would rather hold off on any additional evaluations at this time  Patient has pain medications at home if necessary      3  Brain metastases status post resection and radiotherapy  Patient will follow-up with neurosurgery and radiation oncology as directed  As above, the keppra dose was recently changed -no issues  She will follow up with neurology also  Patient is to return in 3 weeks  Patient knows to call the hematology/oncology office if there are any other questions or concerns      Carefully review your medication list and verify that the list is accurate and up-to-date  Please call the hematology/oncology office if there are medications missing from the list, medications on the list that you are not currently taking or if there is a dosage or instruction that is different from how you're taking that medication  Patient goals and areas of care: continue with the Avastin  Patient is able to self-care   _____________________________________________________________________________________    Chief Complaint   Patient presents with    Follow-up     non-small cell lung carcinoma with brain metastases on protocol, on Avastin     Advance Care Planning/Advance Directives: not discussed       Adenocarcinoma of lung, stage 4, right ([facility] [address] )    9/5/2015 Initial Diagnosis     Patient was referred to the emergency room for evaluation of vertigo as [facility]  (patient reported )  Patient underwent the following pertinent imaging scans  MRI of the brain demonstrated a mass in the right superior all temporal gyrus with measurements of 4 2 x 3 3 x 3 6 cm with vasogenic edema and mass effect  There was a near uncal herniation noted  CT of the chest and abdomen/pelvis demonstrated a necrotic right upper lobe mass measuring 7 x 4 2 x 4 1 cm strongly suspicious for bronchogenic carcinoma  The mass contacts the posterior medial pleural surface and potentially contacts the right posterior tracheal border  No pathologic adenopathy was identified in no evidence of metastatic disease in the chest abdomen or pelvis  9/6/2015 Surgery     Non-small cell carcinoma of lung ([facility] [address] ) diagnosed from biopsy of right temporal mass  Pathology demonstrates poorly differentiated non-small cell carcinoma  Pathologist stated that the immunoprofile supported the primary lung non-small cell carcinoma favor adenocarcinoma  Squamous carcinoma could not be excluded, secondary to focal P 40 staining    [facility] path report demonstrated no ALK gene rearrangement or dleation and negative ROS1 rearrangement, No EGFR mutations were found  Surgery completed by Dr Kt Rowan            - 10/15/2015 Radiation     SRT to brain 3000 cGY in 5 fractions  10/20/2015 -  Research Study Participant     [id number] Phase III open label randomized study of [id number] [Anti-PDL1 antibody] in combination with carboplatin and paclitaxel +/- Avastin versus Carboplatin and Paciltaxel +/- Avastin in patients with stage IV Chemotherapy  Naive non-small cell lung carcinoma  Patient was randomized to receive off for medications  11/30/2015 - 2/18/2016 Chemotherapy     Started Carboplatin, Paclitaxel, Avastin and PD-L1 (atezolizumab; study drug)  Completed 6 cycles with cycle 6 day 1 on 2/18/16         3/10/2016 -  Chemotherapy     Beginning cycle 7, maintenance cycle with Avastin and PD-L1 (Atezolizumab; study drug)         5/24/2017 Adverse Reaction     LFT elevation-persisted  Patient was taken off of study drug Atezolizumab, but continues on Avastin maintance  History of Present Illness:    59year old female recently diagnosed with IV lung cancer here for followup  Mrs Martita Cisneros began to have headaches last spring 2015  Patient was seen by her PCP and treated with antibiotics  Initially the symptoms got better the patient went on vacation  After returning from vacation, Mrs Martita Cisneros once again developed headaches  Patient was treated with a second round of antibiotics and then was diagnosed with vertigo  Patient was sent to the [address] [place] [address]  Although the specifics are not entirely clear, the therapist in the [address] [place] [address] sent the patient to the emergency room  A CAT scan of the brain demonstrated a brain lesion and patient was transferred to [place]  Patient was seen by Dr Kt Rowan and underwent resection demonstrating adenocarcinoma  Additional testing demonstrated a lung mass  Patient improved and was discharged   Patient completed SRT with Dr Karely Qiu and   Edgar  is on [facility] 55540-65 (phase III open label randomized study of [id number] [anti-PD-L1 antibody] in combination with carboplatin and paclitaxel +/- Avastin versus carboplatin and paclitaxel +/- Avastin in patients with stage IV chemotherapy naÃ¯ve non-small cell lung carcinoma)  Mrs Colton Shirley was randomized to receive the anti-PD-L1 antibody with chemotherapy - patient completed 6 cycles and was placed on maintenance  Mrs Temi Kohler previously suffered a tonic-clonic seizure and was seen in the emergency room  CAT scan of the head did not demonstrate any evidence of disease progression  The seizure was thought to be due to encephalomalacia  Since that time, there have been no seizure issues  Mrs Colton Shirley is on Keppra  Patient has had elevated liver function tests  Because the abnormalities were grade 3, patient was taken off the [id number] and has continued on protocol with Avastin only  Chemotherapy was held on the last cycle because of elevated LFTs  More recently, liver function tests have been better/closer to normal  Patient presents for follow-up  Patient states feeling okay, baseline  Appetite is good, weight is stable  No fevers, chills or sweats  No pulmonary issues  No CNS issues, mental status change, blurred vision, headaches or dizziness  No  or GI issues  No GYN issues  Activities are baseline  Fatigue is the same as before  No specific issues with the Avastin  Mrs Temi Kohler feels that she has had more pain/arthritis in her right neck and right shoulder recently  Patient states that the right anterior chest wall port feels uncomfortable  Review of Systems   Constitutional: Negative  HENT: Negative  Eyes: Negative  Respiratory: Negative  Cardiovascular: Negative  Gastrointestinal: Negative  Endocrine: Negative  Genitourinary: Negative  Musculoskeletal: Positive for arthralgias and neck pain  Skin: Negative  Allergic/Immunologic: Negative  Neurological: Negative  Hematological: Negative  Psychiatric/Behavioral: Negative  All other systems reviewed and are negative  Patient Active Problem List   Diagnosis    Seizure ([place] [address] )    Adenocarcinoma of lung, stage 4, right (HCC)    Hyperlipidemia    Abnormal LFTs    Allergic rhinitis    Brain metastasis ([place] [address] )    Brain tumor ([place] [address] )    Encephalomalacia    Impaired fasting glucose    Insomnia    Lesion of temporal lobe    Non-small cell lung cancer ([place] [address] )    Benign hypertension     Past Medical History:   Diagnosis Date    Allergic rhinitis     Benign hypertension 2/1/2018    Eczema     Hyperlipemia     Lung cancer ([place] [address] )     Maintenance chemotherapy     Secondary cancer of brain ([place] [address] )     Vertigo      Past Surgical History:   Procedure Laterality Date    APPENDECTOMY      BRAIN TUMOR EXCISION      CENTRAL VENOUS CATHETER INSERTION Right     PORTACATH     Family History   Problem Relation Age of Onset    Diabetes Mother     Lung cancer Father      Social History     Social History    Marital status: /Civil Union     Spouse name: N/A    Number of children: N/A    Years of education: N/A     Occupational History    Not on file       Social History Main Topics    Smoking status: Former Smoker    Smokeless tobacco: Not on file    Alcohol use Yes      Comment: occasionally    Drug use: Yes     Types: Marijuana    Sexual activity: Not on file     Other Topics Concern    Not on file     Social History Narrative    No narrative on file       Current Outpatient Prescriptions:     ALPRAZolam (XANAX) 0 25 mg tablet, Take 0 25 mg by mouth daily at bedtime as needed for anxiety, Disp: , Rfl:     amLODIPine (NORVASC) 2 5 mg tablet, Take 2 tablets (5 mg total) by mouth daily, Disp: 30 tablet, Rfl: 3    atorvastatin (LIPITOR) 10 mg tablet, Take 10 mg by mouth daily before breakfast  , Disp: , Rfl:     cyanocobalamin (VITAMIN B-12) 100 mcg tablet, Take 1 tablet by mouth daily, Disp: , Rfl:     levETIRAcetam (KEPPRA) 500 mg tablet, Take 500 mg by mouth 2 (two) times a day, Disp: , Rfl:     Omega-3 Fatty Acids (FISH OIL PO), Take 2 tablets by mouth , Disp: , Rfl:     ondansetron (ZOFRAN) 8 mg tablet, Take 1 tablet (8 mg total) by mouth 3 (three) times a day, Disp: 1 tablet, Rfl: 2    prochlorperazine (COMPAZINE) 10 mg tablet, Take 10 mg by mouth every 6 (six) hours as needed for nausea or vomiting, Disp: , Rfl:     ranitidine (ZANTAC) 75 MG tablet, Take 1 tablet by mouth daily, Disp: , Rfl:   No current facility-administered medications for this visit  Facility-Administered Medications Ordered in Other Visits:     alteplase (CATHFLO) injection 2 mg, 2 mg, Intracatheter, PRN, Aydin Mayers MD    sodium chloride 0 9 % infusion, 20 mL/hr, Intravenous, Continuous, Aydin Mayers MD    Allergies   Allergen Reactions    Iodinated Diagnostic Agents Anaphylaxis and Itching    Clindamycin     Clindamycin/Lincomycin        Vitals:    02/21/18 1406   BP: 122/84   Pulse: (!) 110   Temp: 98 8 °F (37 1 °C)     Physical Exam   Constitutional: She is oriented to person, place, and time  She appears well-developed and well-nourished  HENT:   Head: Normocephalic and atraumatic  Right Ear: External ear normal    Left Ear: External ear normal    Nose: Nose normal    Mouth/Throat: Oropharynx is clear and moist    Eyes: Conjunctivae and EOM are normal  Pupils are equal, round, and reactive to light  Neck: Normal range of motion  Neck supple  Cardiovascular: Normal rate, regular rhythm, normal heart sounds and intact distal pulses  Pulmonary/Chest: Effort normal and breath sounds normal    Abdominal: Soft  Bowel sounds are normal    Musculoskeletal: Normal range of motion     Good range of motion in neck and right upper extremity, other are no abnormal masses or abnormalities, area is not tender with palpation or pressure   Neurological: She is alert and oriented to person, place, and time  She has normal reflexes  Skin: Skin is warm  Psychiatric: She has a normal mood and affect  Her behavior is normal  Judgment and thought content normal    Extremities: no edema, no cords, pulses are 1+  Lymphatics: no adenopathy in the neck, supraclavicular region, axilla and groin bilaterally    Performance Status: 1 - Symptomatic but completely ambulatory    Labs:    2/19/18 LDH = 149 BUN = 12 creatinine = 0 9 LFTs WNL calcium = 9 7 WBC = 6 5 hemoglobin = 15 1 hematocrit = 44 platelet = 734 neutrophil = 49%    Imaging    1/22/18 CAT scan of the chest and abdomen/pelvis    RECIST MEASUREMENTS:  TARGET LESION:   1: Right upper lobe cavitary lung mass: The overall size of the lesion is 3 5 x 2 4 cm on image 14 of series 3, significantly decreased from 4 3 x 2 7 cm on previous examination  Solid component along the medial aspect of the posterior wall is not significantly changed from previous examination currently measuring 1 5 x 0 9 cm on image 14 of series 3 compared with 1 8 x 0 8 cm when measured using similar technique on previous   examination  Nodular solid component along the lateral aspect of posterior wall measures approximately 0 7 x 0 4 cm on image 13 of series 3, and when measured using similar technique is unchanged from previous examinations  There is interval decrease in size of the cystic portion of the cavitary right upper lobe mass however, solid components of this mass are not significantly changed from previous examination  Otherwise unchanged examination with no new metastatic lesions   identified in the chest, abdomen or pelvis  Pathology    [facility] results demonstrated no ALK gene rearrangement or deletion and negative for ROS1 rearrangement  No EGFR mutations were found also  temporal mass from September 6, 2015 demonstrated metastatic poorly differentiated non-small cell carcinoma   The tumor cells stained positive for CK 7, TTF-1 and Napsin and negative for CK 20, CK 5/6 andmucicarmine  Pathologist stated that the immunoprofile supported the primary lung non-small cell carcinoma, favor adenocarcinoma  The pathologist also stated that given the focal p40 staining, a squamous carcinoma could not be excluded

## 2018-02-22 ENCOUNTER — HOSPITAL ENCOUNTER (OUTPATIENT)
Dept: INFUSION CENTER | Facility: CLINIC | Age: 58
Discharge: HOME | End: 2018-02-22
Payer: COMMERCIAL

## 2018-02-22 VITALS
HEART RATE: 78 BPM | TEMPERATURE: 98.1 F | OXYGEN SATURATION: 97 % | DIASTOLIC BLOOD PRESSURE: 70 MMHG | WEIGHT: 197 LBS | RESPIRATION RATE: 20 BRPM | BODY MASS INDEX: 30.92 KG/M2 | SYSTOLIC BLOOD PRESSURE: 122 MMHG

## 2018-02-22 PROCEDURE — 96413 CHEMO IV INFUSION 1 HR: CPT

## 2018-02-22 PROCEDURE — J9035Q0 INV BEVACIZUMAB 400MG/16ML 16 ML: Performed by: INTERNAL MEDICINE

## 2018-02-22 RX ADMIN — Medication 1254 MG: at 14:49

## 2018-02-22 RX ADMIN — SODIUM CHLORIDE 20 ML/HR: 0.9 INJECTION, SOLUTION INTRAVENOUS at 14:22

## 2018-02-22 RX ADMIN — Medication 300 UNITS: at 15:25

## 2018-02-22 NOTE — PLAN OF CARE
Problem: Potential for Falls  Goal: Patient will remain free of falls  INTERVENTIONS:  - Assess patient frequently for physical needs  -  Identify cognitive and physical deficits and behaviors that affect risk of falls  -  Bigler fall precautions as indicated by assessment   - Educate patient/family on patient safety including physical limitations  - Instruct patient to call for assistance with activity based on assessment  - Modify environment to reduce risk of injury  - Consider OT/PT consult to assist with strengthening/mobility   Outcome: Progressing      Problem: SAFETY ADULT  Goal: Patient will remain free of falls  INTERVENTIONS:  - Assess patient frequently for physical needs  -  Identify cognitive and physical deficits and behaviors that affect risk of falls  -  [place]ler fall precautions as indicated by assessment   - Educate patient/family on patient safety including physical limitations  - Instruct patient to call for assistance with activity based on assessment  - Modify environment to reduce risk of injury  - Consider OT/PT consult to assist with strengthening/mobility   Outcome: Progressing      Problem: Knowledge Deficit  Goal: Patient/family/caregiver demonstrates understanding of disease process, treatment plan, medications, and discharge instructions  Complete learning assessment and assess knowledge base    Interventions:  - Provide teaching at level of understanding  - Provide teaching via preferred learning methods  Outcome: Progressing

## 2018-03-01 ENCOUNTER — OFFICE VISIT (OUTPATIENT)
Dept: FAMILY MEDICINE CLINIC | Facility: CLINIC | Age: 58
End: 2018-03-01
Payer: COMMERCIAL

## 2018-03-01 VITALS
HEIGHT: 67 IN | RESPIRATION RATE: 18 BRPM | SYSTOLIC BLOOD PRESSURE: 140 MMHG | DIASTOLIC BLOOD PRESSURE: 85 MMHG | HEART RATE: 78 BPM | BODY MASS INDEX: 30.76 KG/M2 | TEMPERATURE: 98 F | WEIGHT: 196 LBS

## 2018-03-01 DIAGNOSIS — E78.2 MIXED HYPERLIPIDEMIA: ICD-10-CM

## 2018-03-01 DIAGNOSIS — I10 BENIGN HYPERTENSION: Primary | ICD-10-CM

## 2018-03-01 DIAGNOSIS — R03.0 ELEVATED BLOOD PRESSURE READING IN OFFICE WITHOUT DIAGNOSIS OF HYPERTENSION: ICD-10-CM

## 2018-03-01 PROCEDURE — 99213 OFFICE O/P EST LOW 20 MIN: CPT | Performed by: FAMILY MEDICINE

## 2018-03-01 RX ORDER — ATORVASTATIN CALCIUM 10 MG/1
10 TABLET, FILM COATED ORAL
Qty: 90 TABLET | Refills: 1 | Status: SHIPPED | OUTPATIENT
Start: 2018-03-01 | End: 2018-08-16 | Stop reason: SDUPTHER

## 2018-03-01 RX ORDER — AMLODIPINE BESYLATE 2.5 MG/1
2.5 TABLET ORAL DAILY
Qty: 90 TABLET | Refills: 1 | Status: SHIPPED | OUTPATIENT
Start: 2018-03-01 | End: 2018-12-05 | Stop reason: SDUPTHER

## 2018-03-01 NOTE — PROGRESS NOTES
Assessment/Plan:    Benign hypertension  Started on amlodipine 5mg a day at last visit  Mixed hyperlipidemia  She is going to restart the Lipitor  She stopped it for a few weeks to see if that was causing her aches  She didn't feel any different off the medication  Medication refilled  Diagnoses and all orders for this visit:    Benign hypertension  -     amLODIPine (NORVASC) 2 5 mg tablet; Take 1 tablet (2 5 mg total) by mouth daily  -     Comprehensive metabolic panel; Future    Elevated blood pressure reading in office without diagnosis of hypertension  -     Ambulatory referral to Family Practice    Mixed hyperlipidemia  -     atorvastatin (LIPITOR) 10 mg tablet; Take 1 tablet (10 mg total) by mouth daily before breakfast  -     Lipid Panel with Direct LDL reflex; Future              Return in about 6 months (around 9/1/2018) for Next scheduled follow up  Subjective:      Patient ID: Aristeo Collins is a 59 y o  female  Chief Complaint   Patient presents with    Follow-up     medication review  [facility]    Blood Pressure Check       She has not had any side effects  She is taking the 2 5 of the amlodipine once a day  She has not had any low blood pressure symptoms  She has not had any side effects  The following portions of the patient's history were reviewed and updated as appropriate: allergies, current medications, past family history, past medical history, past social history, past surgical history and problem list     Review of Systems   Respiratory: Negative for shortness of breath  Cardiovascular: Negative for palpitations           Current Outpatient Prescriptions   Medication Sig Dispense Refill    ALPRAZolam (XANAX) 0 25 mg tablet Take 0 25 mg by mouth daily at bedtime as needed for anxiety      amLODIPine (NORVASC) 2 5 mg tablet Take 1 tablet (2 5 mg total) by mouth daily 90 tablet 1    atorvastatin (LIPITOR) 10 mg tablet Take 1 tablet (10 mg total) by mouth daily before breakfast 90 tablet 1    cyanocobalamin (VITAMIN B-12) 100 mcg tablet Take 1 tablet by mouth daily      levETIRAcetam (KEPPRA) 500 mg tablet Take 500 mg by mouth daily        Omega-3 Fatty Acids (FISH OIL PO) Take 2 tablets by mouth   ondansetron (ZOFRAN) 8 mg tablet Take 1 tablet (8 mg total) by mouth 3 (three) times a day 1 tablet 2    prochlorperazine (COMPAZINE) 10 mg tablet Take 10 mg by mouth every 6 (six) hours as needed for nausea or vomiting      ranitidine (ZANTAC) 75 MG tablet Take 1 tablet by mouth daily       No current facility-administered medications for this visit  Facility-Administered Medications Ordered in Other Visits   Medication Dose Route Frequency Provider Last Rate Last Dose    alteplase (CATHFLO) injection 2 mg  2 mg Intracatheter PRN Monae Rojo MD        sodium chloride 0 9 % infusion  20 mL/hr Intravenous Continuous Monae Rojo MD           Objective:    /85   Pulse 78   Temp 98 °F (36 7 °C)   Resp 18   Ht 5' 6 5" (1 689 m)   Wt 88 9 kg (196 lb)   LMP  (LMP Unknown)   BMI 31 16 kg/m²        Physical Exam   Constitutional: She appears well-developed and well-nourished  HENT:   Head: Normocephalic and atraumatic  Right Ear: External ear normal    Left Ear: External ear normal    Mouth/Throat: Oropharynx is clear and moist    Cardiovascular: Normal rate, regular rhythm and normal heart sounds  Exam reveals no friction rub  No murmur heard  Pulmonary/Chest: Effort normal and breath sounds normal  No respiratory distress  She has no wheezes  She has no rales  Musculoskeletal: She exhibits no edema or deformity  Nursing note and vitals reviewed               Katie Burciaga DO

## 2018-03-01 NOTE — ASSESSMENT & PLAN NOTE
She is going to restart the Lipitor  She stopped it for a few weeks to see if that was causing her aches  She didn't feel any different off the medication  Medication refilled

## 2018-03-09 DIAGNOSIS — C34.90 MALIGNANT NEOPLASM OF LUNG, UNSPECIFIED LATERALITY, UNSPECIFIED PART OF LUNG (HCC): Primary | ICD-10-CM

## 2018-03-13 LAB
ALBUMIN SERPL-MCNC: 4.4 G/DL (ref 3.5–5.5)
ALBUMIN SERPL-MCNC: 4.5 G/DL (ref 3.5–5.5)
ALBUMIN/GLOB SERPL: 1.5 {RATIO} (ref 1.2–2.2)
ALBUMIN/GLOB SERPL: 1.6 {RATIO} (ref 1.2–2.2)
ALP SERPL-CCNC: 93 IU/L (ref 39–117)
ALP SERPL-CCNC: 93 IU/L (ref 39–117)
ALT SERPL-CCNC: 78 IU/L (ref 0–32)
ALT SERPL-CCNC: 78 IU/L (ref 0–32)
AMBIG ABBREV DEFAULT: NORMAL
APPEARANCE UR: CLEAR
AST SERPL-CCNC: 56 IU/L (ref 0–40)
AST SERPL-CCNC: 57 IU/L (ref 0–40)
BACTERIA URNS QL MICRO: NORMAL
BASOPHILS # BLD AUTO: 0 X10E3/UL (ref 0–0.2)
BASOPHILS NFR BLD AUTO: 0 %
BILIRUB SERPL-MCNC: 0.8 MG/DL (ref 0–1.2)
BILIRUB SERPL-MCNC: 0.8 MG/DL (ref 0–1.2)
BILIRUB UR QL STRIP: NEGATIVE
BUN SERPL-MCNC: 9 MG/DL (ref 6–24)
BUN SERPL-MCNC: 9 MG/DL (ref 6–24)
BUN/CREAT SERPL: 10 (ref 9–23)
BUN/CREAT SERPL: 11 (ref 9–23)
CALCIUM SERPL-MCNC: 9.4 MG/DL (ref 8.7–10.2)
CALCIUM SERPL-MCNC: 9.4 MG/DL (ref 8.7–10.2)
CHLORIDE SERPL-SCNC: 97 MMOL/L (ref 96–106)
CHLORIDE SERPL-SCNC: 98 MMOL/L (ref 96–106)
CHOLEST SERPL-MCNC: 182 MG/DL (ref 100–199)
CO2 SERPL-SCNC: 24 MMOL/L (ref 18–29)
CO2 SERPL-SCNC: 24 MMOL/L (ref 18–29)
COLOR UR: YELLOW
CREAT SERPL-MCNC: 0.84 MG/DL (ref 0.57–1)
CREAT SERPL-MCNC: 0.87 MG/DL (ref 0.57–1)
EOSINOPHIL # BLD AUTO: 0.1 X10E3/UL (ref 0–0.4)
EOSINOPHIL NFR BLD AUTO: 1 %
EPI CELLS #/AREA URNS HPF: NORMAL /HPF
ERYTHROCYTE [DISTWIDTH] IN BLOOD BY AUTOMATED COUNT: 14 % (ref 12.3–15.4)
GLOBULIN SER-MCNC: 2.9 G/DL (ref 1.5–4.5)
GLOBULIN SER-MCNC: 2.9 G/DL (ref 1.5–4.5)
GLUCOSE SERPL-MCNC: 116 MG/DL (ref 65–99)
GLUCOSE SERPL-MCNC: 116 MG/DL (ref 65–99)
GLUCOSE UR QL: NEGATIVE
HCT VFR BLD AUTO: 43.5 % (ref 34–46.6)
HDLC SERPL-MCNC: 45 MG/DL
HGB BLD-MCNC: 14.6 G/DL (ref 11.1–15.9)
HGB UR QL STRIP: NEGATIVE
IMM GRANULOCYTES # BLD: 0 X10E3/UL (ref 0–0.1)
IMM GRANULOCYTES NFR BLD: 0 %
KETONES UR QL STRIP: NEGATIVE
LDH SERPL-CCNC: 173 IU/L (ref 119–226)
LDLC SERPL CALC-MCNC: 108 MG/DL (ref 0–99)
LEUKOCYTE ESTERASE UR QL STRIP: NEGATIVE
LYMPHOCYTES # BLD AUTO: 2.3 X10E3/UL (ref 0.7–3.1)
LYMPHOCYTES NFR BLD AUTO: 31 %
MAGNESIUM SERPL-MCNC: 2.1 MG/DL (ref 1.6–2.3)
MCH RBC QN AUTO: 31.7 PG (ref 26.6–33)
MCHC RBC AUTO-ENTMCNC: 33.6 G/DL (ref 31.5–35.7)
MCV RBC AUTO: 95 FL (ref 79–97)
MICRO URNS: ABNORMAL
MICRODELETION SYND BLD/T FISH: NORMAL
MONOCYTES # BLD AUTO: 0.5 X10E3/UL (ref 0.1–0.9)
MONOCYTES NFR BLD AUTO: 7 %
MUCOUS THREADS URNS QL MICRO: PRESENT
NEUTROPHILS # BLD AUTO: 4.5 X10E3/UL (ref 1.4–7)
NEUTROPHILS NFR BLD AUTO: 61 %
NITRITE UR QL STRIP: NEGATIVE
PH UR STRIP: 6 [PH] (ref 5–7.5)
PHOSPHATE SERPL-MCNC: 3.2 MG/DL (ref 2.5–4.5)
PLATELET # BLD AUTO: 255 X10E3/UL (ref 150–379)
POTASSIUM SERPL-SCNC: 4.5 MMOL/L (ref 3.5–5.2)
POTASSIUM SERPL-SCNC: 4.6 MMOL/L (ref 3.5–5.2)
PROT SERPL-MCNC: 7.3 G/DL (ref 6–8.5)
PROT SERPL-MCNC: 7.4 G/DL (ref 6–8.5)
PROT UR QL STRIP: ABNORMAL
RBC # BLD AUTO: 4.6 X10E6/UL (ref 3.77–5.28)
RBC #/AREA URNS HPF: NORMAL /HPF
SL AMB EGFR AFRICAN AMERICAN: 84 ML/MIN/1.73
SL AMB EGFR AFRICAN AMERICAN: 88 ML/MIN/1.73
SL AMB EGFR NON AFRICAN AMERICAN: 73 ML/MIN/1.73
SL AMB EGFR NON AFRICAN AMERICAN: 76 ML/MIN/1.73
SODIUM SERPL-SCNC: 137 MMOL/L (ref 134–144)
SODIUM SERPL-SCNC: 137 MMOL/L (ref 134–144)
SP GR UR: 1.01 (ref 1–1.03)
TRIGL SERPL-MCNC: 144 MG/DL (ref 0–149)
UROBILINOGEN UR STRIP-ACNC: 1 EU/DL (ref 0.2–1)
WBC # BLD AUTO: 7.5 X10E3/UL (ref 3.4–10.8)
WBC #/AREA URNS HPF: NORMAL /HPF

## 2018-03-14 ENCOUNTER — OFFICE VISIT (OUTPATIENT)
Dept: HEMATOLOGY ONCOLOGY | Facility: MEDICAL CENTER | Age: 58
End: 2018-03-14
Payer: COMMERCIAL

## 2018-03-14 VITALS
DIASTOLIC BLOOD PRESSURE: 96 MMHG | WEIGHT: 197.8 LBS | HEART RATE: 93 BPM | BODY MASS INDEX: 31.45 KG/M2 | SYSTOLIC BLOOD PRESSURE: 158 MMHG | TEMPERATURE: 98.6 F | RESPIRATION RATE: 18 BRPM | OXYGEN SATURATION: 98 %

## 2018-03-14 DIAGNOSIS — C34.91 ADENOCARCINOMA OF LUNG, STAGE 4, RIGHT (HCC): Primary | ICD-10-CM

## 2018-03-14 PROCEDURE — 99213 OFFICE O/P EST LOW 20 MIN: CPT | Performed by: INTERNAL MEDICINE

## 2018-03-14 RX ORDER — SODIUM CHLORIDE 9 MG/ML
20 INJECTION, SOLUTION INTRAVENOUS CONTINUOUS
Status: DISCONTINUED | OUTPATIENT
Start: 2018-03-15 | End: 2018-03-18 | Stop reason: HOSPADM

## 2018-03-14 NOTE — PROGRESS NOTES
Cora Moraes  1960  [facility] [place] [id number]    DISCUSSION  SUMMARY:    57-year-old female with M1 non-small cell lung carcinoma/adenocarcinoma  Patient is on [facility] 00213-14 (phase III open label randomized study of [id number] [anti-PD-L1 antibody] in combination with carboplatin and paclitaxel +/- Avastin versus carboplatin and paclitaxel +/- Avastin in patients with stage IV chemotherapy naÃ¯ve non-small cell lung carcinoma)  Patient was randomized to receive all 4 medications  Mrs Carol Wallace completed the 6 cycles of treatment without significant toxicities  Patient had been on maintenance (study drug (atezolizumab) and avastin) - now only Avastin (see below)  Issues:      1  Stage IV non-small cell lung carcinoma  Patient feels well and clinically there are no lung cancer related issues  Previous CAT scans demonstrated stable disease with no evidence of new metastatic lesions  LFTs are acceptable for Avastin  Next treatment is scheduled for tomorrow  Patient is also being followed by [facility],   Repeat CT scans have been ordered for 2 weeks      2   Elevated LFTs  Patient has a history of hypercholesterolemia and is on Lipitor  Liver function tests are slightly elevated but acceptable  Surveillance is ongoing  3  Brain metastases status post resection and radiotherapy  Patient will follow-up with neurosurgery and radiation oncology as directed  As above, the keppra dose was recently changed -no issues  She will follow up with neurology also  Patient is to return in 3 weeks  Patient knows to call the hematology/oncology office if there are any other questions or concerns  Carefully review your medication list and verify that the list is accurate and up-to-date   Please call the hematology/oncology office if there are medications missing from the list, medications on the list that you are not currently taking or if there is a dosage or instruction that is different from how you're taking that medication  Patient goals and areas of care: continue with the Avastin  Patient is able to self-care   _____________________________________________________________________________________    Chief Complaint   Patient presents with    Follow-up     Metastatic non-small cell lung carcinoma/adenocarcinoma on protocol, on Avastin     Advance Care Planning/Advance Directives: not discussed       Adenocarcinoma of lung, stage 4, right ([address] )    9/5/2015 Initial Diagnosis     Patient was referred to the emergency room for evaluation of vertigo as [facility]  (patient reported )  Patient underwent the following pertinent imaging scans  MRI of the brain demonstrated a mass in the right superior all temporal gyrus with measurements of 4 2 x 3 3 x 3 6 cm with vasogenic edema and mass effect  There was a near uncal herniation noted  CT of the chest and abdomen/pelvis demonstrated a necrotic right upper lobe mass measuring 7 x 4 2 x 4 1 cm strongly suspicious for bronchogenic carcinoma  The mass contacts the posterior medial pleural surface and potentially contacts the right posterior tracheal border  No pathologic adenopathy was identified in no evidence of metastatic disease in the chest abdomen or pelvis  9/6/2015 Surgery     Non-small cell carcinoma of lung ([address] ) diagnosed from biopsy of right temporal mass  Pathology demonstrates poorly differentiated non-small cell carcinoma  Pathologist stated that the immunoprofile supported the primary lung non-small cell carcinoma favor adenocarcinoma  Squamous carcinoma could not be excluded, secondary to focal P 40 staining  Memory Meagher path report demonstrated no ALK gene rearrangement or dleation and negative ROS1 rearrangement, No EGFR mutations were found       Surgery completed by Dr Amelia Metzger            - 10/15/2015 Radiation     SRT to brain 3000 cGY in 5 fractions  10/20/2015 -  Research Study Participant     [id number] Phase III open label randomized study of [id number] [Anti-PDL1 antibody] in combination with carboplatin and paclitaxel +/- Avastin versus Carboplatin and Paciltaxel +/- Avastin in patients with stage IV Chemotherapy  Naive non-small cell lung carcinoma  Patient was randomized to receive off for medications  11/30/2015 - 2/18/2016 Chemotherapy     Started Carboplatin, Paclitaxel, Avastin and PD-L1 (atezolizumab; study drug)  Completed 6 cycles with cycle 6 day 1 on 2/18/16         3/10/2016 -  Chemotherapy     Beginning cycle 7, maintenance cycle with Avastin and PD-L1 (Atezolizumab; study drug)         5/24/2017 Adverse Reaction     LFT elevation-persisted  Patient was taken off of study drug Atezolizumab, but continues on Avastin maintance  History of Present Illness:    59year old female recently diagnosed with IV lung cancer here for followup  Mrs Mya Robertson began to have headaches last spring 2015  Patient was seen by her PCP and treated with antibiotics  Initially the symptoms got better the patient went on vacation  After returning from vacation, Mrs Mya Robertson once again developed headaches  Patient was treated with a second round of antibiotics and then was diagnosed with vertigo  Patient was sent to the [address] [place] [place] [address]  Although the specifics are not entirely clear, the therapist in the [address] [place] [place] [address] sent the patient to the emergency room  A CAT scan of the brain demonstrated a brain lesion and patient was transferred to [facility]  Patient was seen by Dr Ash Garnica and underwent resection demonstrating adenocarcinoma  Additional testing demonstrated a lung mass  Patient improved and was discharged  Patient completed SRT with Dr Jeronimo Majano and Dr Neftali Hartman      [facility] (phase III open label randomized study of [id number] [anti-PD-L1 antibody] in combination with carboplatin and paclitaxel +/- Avastin versus carboplatin and paclitaxel +/- Avastin in patients with stage IV chemotherapy naÃ¯ve non-small cell lung carcinoma)  Mrs Helga Delaney was randomized to receive the anti-PD-L1 antibody with chemotherapy - patient completed 6 cycles and was placed on maintenance  Mrs Dario Strickland previously suffered a tonic-clonic seizure and was seen in the emergency room  CAT scan of the head did not demonstrate any evidence of disease progression  The seizure was thought to be due to encephalomalacia  Since that time, there have been no seizure issues  Mrs Helga Delaney is on Keppra  Patient has had elevated liver function tests  Because the abnormalities were grade 3, patient was taken off the [id number] and has continued on protocol with Avastin only  Chemotherapy was held on the last cycle because of elevated LFTs  More recently, liver function tests have been better/closer to normal  Patient presents for follow-up  Patient states feeling okay, baseline  Appetite is good, weight is stable  No fevers, chills or sweats  No pulmonary issues  No CNS issues, mental status change, blurred vision, headaches or dizziness  No  or GI issues  No GYN issues  Activities are baseline  Fatigue is the same as before  No specific issues with the Avastin  Mrs Dario Strickland feels that she has had more pain/arthritis in her right neck and right shoulder recently  Patient thought she had a rash on her right cheek early this morning -went away on its own  Review of Systems   Constitutional: Negative  HENT: Negative  Eyes: Negative  Respiratory: Negative  Cardiovascular: Negative  Gastrointestinal: Negative  Endocrine: Negative  Genitourinary: Negative  Musculoskeletal: Positive for arthralgias  Negative for neck pain  Skin: Negative  Allergic/Immunologic: Negative  Neurological: Negative  Hematological: Negative  Psychiatric/Behavioral: Negative  All other systems reviewed and are negative  Patient Active Problem List   Diagnosis    Seizure ([facility] )    [facility], [facility]    Mixed hyperlipidemia    Abnormal LFTs    Allergic rhinitis    Brain metastasis ([facility] )    Brain tumor ([place] [address] )    Encephalomalacia    Impaired fasting glucose    Insomnia    Lesion of temporal lobe    Non-small cell lung cancer ([facility] )    Benign hypertension     Past Medical History:   Diagnosis Date    Allergic rhinitis     Alopecia     resolved 10/25/16    Anxiety     last assessed 10/4/16, resolved 10/25/16    Benign hypertension 2/1/2018    Benign neoplasm of skin of trunk 03/25/2008    last assessed 3/25/08    Benign neoplasm of skin of upper extremity and shoulder 03/25/2008    last assessed 3/25/08    Ear deformity, acquired     last assessed 10/13/14, resolved 3/12/15    Eczema     Hyperlipemia     Lung cancer ([place] [address] )     Maintenance chemotherapy     Secondary cancer of brain ([place] [address] )     Vertigo      Past Surgical History:   Procedure Laterality Date    APPENDECTOMY  1964    BRAIN TUMOR EXCISION      CENTRAL VENOUS CATHETER INSERTION Right     PORTACATH     Family History   Problem Relation Age of Onset    Diabetes Mother     Heart disease Mother     Lung cancer Father      Social History     Social History    Marital status: /Civil Union     Spouse name: N/A    Number of children: 1    Years of education: N/A     Occupational History    Not on file       Social History Main Topics    Smoking status: Former Smoker    Smokeless tobacco: Never Used    Alcohol use Yes      Comment: occasionally / Social     Drug use: Yes     Types: Marijuana    Sexual activity: Yes     Other Topics Concern    Not on file     Social History Narrative    High school or GED     Lives independently with spouse        Current Outpatient Prescriptions:     ALPRAZolam (XANAX) 0 25 mg tablet, Take 0 25 mg by mouth daily at bedtime as needed for anxiety, Disp: , Rfl:     amLODIPine (NORVASC) 2 5 mg tablet, Take 1 tablet (2 5 mg total) by mouth daily, Disp: 90 tablet, Rfl: 1    atorvastatin (LIPITOR) 10 mg tablet, Take 1 tablet (10 mg total) by mouth daily before breakfast, Disp: 90 tablet, Rfl: 1    cyanocobalamin (VITAMIN B-12) 100 mcg tablet, Take 1 tablet by mouth daily, Disp: , Rfl:     levETIRAcetam (KEPPRA) 500 mg tablet, Take 500 mg by mouth daily  , Disp: , Rfl:     Omega-3 Fatty Acids (FISH OIL PO), Take 2 tablets by mouth , Disp: , Rfl:     ondansetron (ZOFRAN) 8 mg tablet, Take 1 tablet (8 mg total) by mouth 3 (three) times a day, Disp: 1 tablet, Rfl: 2    prochlorperazine (COMPAZINE) 10 mg tablet, Take 10 mg by mouth every 6 (six) hours as needed for nausea or vomiting, Disp: , Rfl:     ranitidine (ZANTAC) 75 MG tablet, Take 1 tablet by mouth daily, Disp: , Rfl:   No current facility-administered medications for this visit  Facility-Administered Medications Ordered in Other Visits:     alteplase (CATHFLO) injection 2 mg, 2 mg, Intracatheter, PRN, Naomi Lucio MD    sodium chloride 0 9 % infusion, 20 mL/hr, Intravenous, Continuous, Naomi Lucio MD    Allergies   Allergen Reactions    Iodinated Diagnostic Agents Anaphylaxis and Itching    Clindamycin     Clindamycin/Lincomycin        Vitals:    03/14/18 1418   BP: 158/96   Pulse: 93   Resp: 18   Temp: 98 6 °F (37 °C)   SpO2: 98%     Physical Exam   Constitutional: She is oriented to person, place, and time  She appears well-developed and well-nourished  HENT:   Head: Normocephalic and atraumatic  Right Ear: External ear normal    Left Ear: External ear normal    Nose: Nose normal    Mouth/Throat: Oropharynx is clear and moist    Eyes: Conjunctivae and EOM are normal  Pupils are equal, round, and reactive to light  Neck: Normal range of motion  Neck supple  Cardiovascular: Normal rate, regular rhythm, normal heart sounds and intact distal pulses      Pulmonary/Chest: Effort normal and breath sounds normal  Abdominal: Soft  Bowel sounds are normal    Musculoskeletal: Normal range of motion  Neurological: She is alert and oriented to person, place, and time  She has normal reflexes  Skin: Skin is warm  Psychiatric: She has a normal mood and affect  Her behavior is normal  Judgment and thought content normal    Extremities: no edema, no cords, pulses are 1+  Lymphatics: no adenopathy in the neck, supraclavicular region, axilla and groin bilaterally    Performance Status: 1 - Symptomatic but completely ambulatory    Labs:    03/12/2018 WBC = 7 5 hemoglobin = 14 6 hematocrit = 43 5 platelet = 979 BUN = 9 creatinine = 0 84 AST = 56 ALT = 78 calcium = 9 4 alkaline phosphatase = 93 LDH = 173  2/19/18 LDH = 149 BUN = 12 creatinine = 0 9 LFTs WNL calcium = 9 7 WBC = 6 5 hemoglobin = 15 1 hematocrit = 44 platelet = 165 neutrophil = 49%    Imaging    1/22/18 CAT scan of the chest and abdomen/pelvis    RECIST MEASUREMENTS:  TARGET LESION:   1: Right upper lobe cavitary lung mass: The overall size of the lesion is 3 5 x 2 4 cm on image 14 of series 3, significantly decreased from 4 3 x 2 7 cm on previous examination  Solid component along the medial aspect of the posterior wall is not significantly changed from previous examination currently measuring 1 5 x 0 9 cm on image 14 of series 3 compared with 1 8 x 0 8 cm when measured using similar technique on previous   examination  Nodular solid component along the lateral aspect of posterior wall measures approximately 0 7 x 0 4 cm on image 13 of series 3, and when measured using similar technique is unchanged from previous examinations  There is interval decrease in size of the cystic portion of the cavitary right upper lobe mass however, solid components of this mass are not significantly changed from previous examination  Otherwise unchanged examination with no new metastatic lesions   identified in the chest, abdomen or pelvis      Pathology    [facility] results demonstrated no ALK gene rearrangement or deletion and negative for ROS1 rearrangement  No EGFR mutations were found also  temporal mass from September 6, 2015 demonstrated metastatic poorly differentiated non-small cell carcinoma  The tumor cells stained positive for CK 7, TTF-1 and Napsin and negative for CK 20, CK 5/6 andmucicarmine  Pathologist stated that the immunoprofile supported the primary lung non-small cell carcinoma, favor adenocarcinoma  The pathologist also stated that given the focal p40 staining, a squamous carcinoma could not be excluded

## 2018-03-15 ENCOUNTER — HOSPITAL ENCOUNTER (OUTPATIENT)
Dept: INFUSION CENTER | Facility: CLINIC | Age: 58
Discharge: HOME | End: 2018-03-15
Payer: COMMERCIAL

## 2018-03-15 VITALS
RESPIRATION RATE: 20 BRPM | HEART RATE: 72 BPM | BODY MASS INDEX: 30.76 KG/M2 | TEMPERATURE: 99 F | SYSTOLIC BLOOD PRESSURE: 140 MMHG | DIASTOLIC BLOOD PRESSURE: 78 MMHG | HEIGHT: 67 IN | WEIGHT: 196 LBS

## 2018-03-15 PROCEDURE — J9035Q0 INV BEVACIZUMAB 400MG/16ML 16 ML: Performed by: INTERNAL MEDICINE

## 2018-03-15 PROCEDURE — 96413 CHEMO IV INFUSION 1 HR: CPT

## 2018-03-15 RX ADMIN — Medication 1254 MG: at 15:00

## 2018-03-15 RX ADMIN — Medication 300 UNITS: at 15:41

## 2018-03-15 RX ADMIN — SODIUM CHLORIDE 20 ML/HR: 0.9 INJECTION, SOLUTION INTRAVENOUS at 14:30

## 2018-03-29 DIAGNOSIS — C34.90 NON-SMALL CELL LUNG CANCER, UNSPECIFIED LATERALITY (HCC): Primary | ICD-10-CM

## 2018-04-03 LAB
ALBUMIN SERPL-MCNC: 4.6 G/DL (ref 3.5–5.5)
ALBUMIN/GLOB SERPL: 1.6 {RATIO} (ref 1.2–2.2)
ALP SERPL-CCNC: 84 IU/L (ref 39–117)
ALT SERPL-CCNC: 91 IU/L (ref 0–32)
APPEARANCE UR: CLEAR
AST SERPL-CCNC: 73 IU/L (ref 0–40)
BACTERIA URNS QL MICRO: NORMAL
BASOPHILS # BLD AUTO: 0 X10E3/UL (ref 0–0.2)
BASOPHILS NFR BLD AUTO: 0 %
BILIRUB SERPL-MCNC: 0.8 MG/DL (ref 0–1.2)
BILIRUB UR QL STRIP: NEGATIVE
BUN SERPL-MCNC: 12 MG/DL (ref 6–24)
BUN/CREAT SERPL: 13 (ref 9–23)
CALCIUM SERPL-MCNC: 9.7 MG/DL (ref 8.7–10.2)
CHLORIDE SERPL-SCNC: 97 MMOL/L (ref 96–106)
CO2 SERPL-SCNC: 23 MMOL/L (ref 18–29)
COLOR UR: YELLOW
CREAT SERPL-MCNC: 0.89 MG/DL (ref 0.57–1)
EOSINOPHIL # BLD AUTO: 0.1 X10E3/UL (ref 0–0.4)
EOSINOPHIL NFR BLD AUTO: 1 %
EPI CELLS #/AREA URNS HPF: NORMAL /HPF
ERYTHROCYTE [DISTWIDTH] IN BLOOD BY AUTOMATED COUNT: 13.7 % (ref 12.3–15.4)
GLOBULIN SER-MCNC: 2.9 G/DL (ref 1.5–4.5)
GLUCOSE SERPL-MCNC: 108 MG/DL (ref 65–99)
GLUCOSE UR QL: NEGATIVE
HCT VFR BLD AUTO: 43.9 % (ref 34–46.6)
HGB BLD-MCNC: 15.4 G/DL (ref 11.1–15.9)
HGB UR QL STRIP: NEGATIVE
IMM GRANULOCYTES # BLD: 0 X10E3/UL (ref 0–0.1)
IMM GRANULOCYTES NFR BLD: 0 %
KETONES UR QL STRIP: NEGATIVE
LDH SERPL-CCNC: 168 IU/L (ref 119–226)
LEUKOCYTE ESTERASE UR QL STRIP: ABNORMAL
LYMPHOCYTES # BLD AUTO: 2.8 X10E3/UL (ref 0.7–3.1)
LYMPHOCYTES NFR BLD AUTO: 40 %
MAGNESIUM SERPL-MCNC: 1.9 MG/DL (ref 1.6–2.3)
MCH RBC QN AUTO: 32.7 PG (ref 26.6–33)
MCHC RBC AUTO-ENTMCNC: 35.1 G/DL (ref 31.5–35.7)
MCV RBC AUTO: 93 FL (ref 79–97)
MICRO URNS: ABNORMAL
MONOCYTES # BLD AUTO: 0.6 X10E3/UL (ref 0.1–0.9)
MONOCYTES NFR BLD AUTO: 8 %
MUCOUS THREADS URNS QL MICRO: PRESENT
NEUTROPHILS # BLD AUTO: 3.5 X10E3/UL (ref 1.4–7)
NEUTROPHILS NFR BLD AUTO: 51 %
NITRITE UR QL STRIP: NEGATIVE
PH UR STRIP: 6 [PH] (ref 5–7.5)
PHOSPHATE SERPL-MCNC: 4 MG/DL (ref 2.5–4.5)
PLATELET # BLD AUTO: 273 X10E3/UL (ref 150–379)
POTASSIUM SERPL-SCNC: 4.5 MMOL/L (ref 3.5–5.2)
PROT SERPL-MCNC: 7.5 G/DL (ref 6–8.5)
PROT UR QL STRIP: ABNORMAL
RBC # BLD AUTO: 4.71 X10E6/UL (ref 3.77–5.28)
RBC #/AREA URNS HPF: NORMAL /HPF
SL AMB EGFR AFRICAN AMERICAN: 82 ML/MIN/1.73
SL AMB EGFR NON AFRICAN AMERICAN: 71 ML/MIN/1.73
SODIUM SERPL-SCNC: 135 MMOL/L (ref 134–144)
SP GR UR: 1.01 (ref 1–1.03)
UROBILINOGEN UR STRIP-ACNC: 0.2 EU/DL (ref 0.2–1)
WBC # BLD AUTO: 7 X10E3/UL (ref 3.4–10.8)
WBC #/AREA URNS HPF: NORMAL /HPF

## 2018-04-04 ENCOUNTER — OFFICE VISIT (OUTPATIENT)
Dept: HEMATOLOGY ONCOLOGY | Facility: MEDICAL CENTER | Age: 58
End: 2018-04-04
Payer: COMMERCIAL

## 2018-04-04 VITALS
WEIGHT: 197 LBS | OXYGEN SATURATION: 98 % | SYSTOLIC BLOOD PRESSURE: 162 MMHG | RESPIRATION RATE: 18 BRPM | BODY MASS INDEX: 30.85 KG/M2 | HEART RATE: 95 BPM | DIASTOLIC BLOOD PRESSURE: 90 MMHG | TEMPERATURE: 99.5 F

## 2018-04-04 DIAGNOSIS — C34.91 ADENOCARCINOMA OF LUNG, STAGE 4, RIGHT (HCC): Primary | ICD-10-CM

## 2018-04-04 PROCEDURE — 99213 OFFICE O/P EST LOW 20 MIN: CPT | Performed by: INTERNAL MEDICINE

## 2018-04-04 RX ORDER — SODIUM CHLORIDE 9 MG/ML
20 INJECTION, SOLUTION INTRAVENOUS CONTINUOUS
Status: DISCONTINUED | OUTPATIENT
Start: 2018-04-05 | End: 2018-04-08 | Stop reason: HOSPADM

## 2018-04-04 NOTE — PROGRESS NOTES
Radha Flow  1960  [facility] [place] [id number]    DISCUSSION  SUMMARY:    58-year-old female with M1 non-small cell lung carcinoma/adenocarcinoma  Patient is on [facility] 52675-14 (phase III open label randomized study of [id number] [anti-PD-L1 antibody] in combination with carboplatin and paclitaxel +/- Avastin versus carboplatin and paclitaxel +/- Avastin in patients with stage IV chemotherapy naÃ¯ve non-small cell lung carcinoma)  Patient was randomized to receive all 4 medications  Mrs Venice Lanes completed the 6 cycles of treatment without significant toxicities  Patient had been on maintenance (study drug (atezolizumab) and avastin) - now only Avastin (see below)  Issues:      1  Stage IV non-small cell lung carcinoma  Patient feels well and clinically there are no lung cancer related issues  Previous CAT scans demonstrated stable disease with no evidence of new metastatic lesions  LFTs are elevated but acceptable for Avastin  Next treatment is scheduled for tomorrow  Patient is also being [facility] by [facility],   Repeat CT scans have been delayed by 2 weeks because of insurance concerns      2   Elevated LFTs  Patient has a history of hypercholesterolemia and has been on Lipitor in the past - PCP is monitoring  Liver function tests are elevated but acceptable  Surveillance is ongoing  3  Brain metastases status post resection and radiotherapy  Patient will follow-up with neurosurgery and radiation oncology as directed  As above, the keppra dose was recently changed - no issues  Patient will follow up with neurology also  Patient is to return in 3 weeks  Patient knows to call the hematology/oncology office if there are any other questions or concerns  Carefully review your medication list and verify that the list is accurate and up-to-date   Please call the hematology/oncology office if there are medications missing from the list, medications on the list that you are not currently taking or if there is a dosage or instruction that is different from how you're taking that medication  Patient goals and areas of care: continue with the Avastin  Patient is able to self-care   _____________________________________________________________________________________    Chief Complaint   Patient presents with    Follow-up     Metastatic non-small cell lung carcinoma on Avastin     Advance Care Planning/Advance Directives: not discussed       Adenocarcinoma of lung, stage 4, right ([facility] [address] )    9/5/2015 Initial Diagnosis     Patient was referred to the emergency room for evaluation of vertigo as [facility]  (patient reported )  Patient underwent the following pertinent imaging scans  MRI of the brain demonstrated a mass in the right superior all temporal gyrus with measurements of 4 2 x 3 3 x 3 6 cm with vasogenic edema and mass effect  There was a near uncal herniation noted  CT of the chest and abdomen/pelvis demonstrated a necrotic right upper lobe mass measuring 7 x 4 2 x 4 1 cm strongly suspicious for bronchogenic carcinoma  The mass contacts the posterior medial pleural surface and potentially contacts the right posterior tracheal border  No pathologic adenopathy was identified in no evidence of metastatic disease in the chest abdomen or pelvis  9/6/2015 Surgery     Non-small cell carcinoma of lung ([facility] [address] ) diagnosed from biopsy of right temporal mass  Pathology demonstrates poorly differentiated non-small cell carcinoma  Pathologist stated that the immunoprofile supported the primary lung non-small cell carcinoma favor adenocarcinoma  Squamous carcinoma could not be excluded, secondary to focal P 40 staining  Nadege Gr path report demonstrated no ALK gene rearrangement or dleation and negative ROS1 rearrangement, No EGFR mutations were found  Surgery completed by Dr Filemon Streeter  - 10/15/2015 Radiation     SRT to brain 3000 cGY in 5 fractions  10/20/2015 -  Research Study Participant     [id number] Phase III open label randomized study of [id number] [Anti-PDL1 antibody] in combination with carboplatin and paclitaxel +/- Avastin versus Carboplatin and Paciltaxel +/- Avastin in patients with stage IV Chemotherapy  Naive non-small cell lung carcinoma  Patient was randomized to receive off for medications  11/30/2015 - 2/18/2016 Chemotherapy     Started Carboplatin, Paclitaxel, Avastin and PD-L1 (atezolizumab; study drug)  Completed 6 cycles with cycle 6 day 1 on 2/18/16         3/10/2016 -  Chemotherapy     Beginning cycle 7, maintenance cycle with Avastin and PD-L1 (Atezolizumab; study drug)         5/24/2017 Adverse Reaction     LFT elevation-persisted  Patient was taken off of study drug Atezolizumab, but continues on Avastin maintance  History of Present Illness:    59year old female recently diagnosed with IV lung cancer here for followup  Mrs Mayra Ferreira began to have headaches last spring 2015  Patient was seen by her PCP and treated with antibiotics  Initially the symptoms got better the patient went on vacation  After returning from vacation, Mrs Mayra Ferreira once again developed headaches  Patient was treated with a second round of antibiotics and then was diagnosed with vertigo  Patient was sent to the [address] [place] [address]  Although the specifics are not entirely clear, the therapist in the [address] [place] [address] sent the patient to the emergency room  A CAT scan of the brain demonstrated a brain lesion and patient was transferred to [place]  Patient was seen by Dr Ebenezer De and underwent resection demonstrating adenocarcinoma  Additional testing demonstrated a lung mass  Patient improved and was discharged  Patient completed SRT with Dr Robson Santos and Dr Miguel Ochoa      [facility] (phase III open label randomized study of [id number] [anti-PD-L1 antibody] in combination with carboplatin and paclitaxel +/- Avastin versus carboplatin and paclitaxel +/- Avastin in patients with stage IV chemotherapy naÃ¯ve non-small cell lung carcinoma)  Mrs Ibrahima Gracia was randomized to receive the anti-PD-L1 antibody with chemotherapy - patient completed 6 cycles and was placed on maintenance  Mrs Raz Garcia previously suffered a tonic-clonic seizure and was seen in the emergency room  CAT scan of the head did not demonstrate any evidence of disease progression  The seizure was thought to be due to encephalomalacia  Since that time, there have been no seizure issues  Mrs Ibrahima Gracia is on Keppra  Patient has had elevated liver function tests  Because the abnormalities were grade 3, patient was taken off the [id number] and has continued on protocol with Avastin only  Chemotherapy was held on the last cycle because of elevated LFTs  Patient presents for follow-up  Patient states feeling well, baseline  Appetite is good, weight is stable  No fevers, chills or sweats  No pulmonary issues  No CNS issues, mental status change, blurred vision, headaches or dizziness  No  or GI issues  No GYN issues  Activities are baseline  Fatigue is the same as before  No specific issues with the Avastin  Previous issues with a rash resolved without treatment  Review of Systems   Constitutional: Negative  HENT: Negative  Eyes: Negative  Respiratory: Negative  Cardiovascular: Negative  Gastrointestinal: Negative  Endocrine: Negative  Genitourinary: Negative  Musculoskeletal: Positive for arthralgias  Negative for neck pain  Skin: Negative  Allergic/Immunologic: Negative  Neurological: Negative  Hematological: Negative  Psychiatric/Behavioral: Negative  All other systems reviewed and are negative       Patient Active Problem List   Diagnosis    Seizure ([facility] [address] )    Adenocarcinoma of lung, stage 4, right ([address] )    Mixed hyperlipidemia    Abnormal LFTs    Allergic rhinitis    Brain metastasis ([facility] )    Brain tumor ([facility] )    Encephalomalacia    Impaired fasting glucose    Insomnia    Lesion of temporal lobe    Non-small cell lung cancer ([facility] )    Benign hypertension     Past Medical History:   Diagnosis Date    Allergic rhinitis     Alopecia     resolved 10/25/16    Anxiety     last assessed 10/4/16, resolved 10/25/16    Benign hypertension 2/1/2018    Benign neoplasm of skin of trunk 03/25/2008    last assessed 3/25/08    Benign neoplasm of skin of upper extremity and shoulder 03/25/2008    last assessed 3/25/08    Ear deformity, acquired     last assessed 10/13/14, resolved 3/12/15    Eczema     Hyperlipemia     Lung cancer ([facility] )     Maintenance chemotherapy     Secondary cancer of brain ([facility] )     Vertigo      Past Surgical History:   Procedure Laterality Date    APPENDECTOMY  1964    BRAIN TUMOR EXCISION      CENTRAL VENOUS CATHETER INSERTION Right     PORTACATH     Family History   Problem Relation Age of Onset    Diabetes Mother     Heart disease Mother     Lung cancer Father      Social History     Social History    Marital status: /Civil Union     Spouse name: N/A    Number of children: 1    Years of education: N/A     Occupational History    Not on file       Social History Main Topics    Smoking status: Former Smoker    Smokeless tobacco: Never Used    Alcohol use Yes      Comment: occasionally / Social     Drug use: Yes     Types: Marijuana    Sexual activity: Yes     Other Topics Concern    Not on file     Social History Narrative    High school or GED     Lives independently with spouse        Current Outpatient Prescriptions:     ALPRAZolam (XANAX) 0 25 mg tablet, Take 0 25 mg by mouth daily at bedtime as needed for anxiety, Disp: , Rfl:     amLODIPine (NORVASC) 2 5 mg tablet, Take 1 tablet (2 5 mg total) by mouth daily, Disp: 90 tablet, Rfl: 1    atorvastatin (LIPITOR) 10 mg tablet, Take 1 tablet (10 mg total) by mouth daily before breakfast, Disp: 90 tablet, Rfl: 1    cyanocobalamin (VITAMIN B-12) 100 mcg tablet, Take 1 tablet by mouth daily, Disp: , Rfl:     levETIRAcetam (KEPPRA) 500 mg tablet, Take 500 mg by mouth daily  , Disp: , Rfl:     Omega-3 Fatty Acids (FISH OIL PO), Take 2 tablets by mouth , Disp: , Rfl:     ondansetron (ZOFRAN) 8 mg tablet, Take 1 tablet (8 mg total) by mouth 3 (three) times a day, Disp: 1 tablet, Rfl: 2    prochlorperazine (COMPAZINE) 10 mg tablet, Take 10 mg by mouth every 6 (six) hours as needed for nausea or vomiting, Disp: , Rfl:     ranitidine (ZANTAC) 75 MG tablet, Take 1 tablet by mouth daily, Disp: , Rfl:   No current facility-administered medications for this visit  Facility-Administered Medications Ordered in Other Visits:     alteplase (CATHFLO) injection 2 mg, 2 mg, Intracatheter, PRN, Nilda Ospina MD    sodium chloride 0 9 % infusion, 20 mL/hr, Intravenous, Continuous, Nilda Ospina MD    Allergies   Allergen Reactions    Iodinated Diagnostic Agents Anaphylaxis and Itching    Clindamycin     Clindamycin/Lincomycin        Vitals:    04/04/18 1422   BP: 162/90   Pulse: 95   Resp: 18   Temp: 99 5 °F (37 5 °C)   SpO2: 98%     Physical Exam   Constitutional: She is oriented to person, place, and time  She appears well-developed and well-nourished  HENT:   Head: Normocephalic and atraumatic  Right Ear: External ear normal    Left Ear: External ear normal    Nose: Nose normal    Mouth/Throat: Oropharynx is clear and moist    Eyes: Conjunctivae and EOM are normal  Pupils are equal, round, and reactive to light  Neck: Normal range of motion  Neck supple  Cardiovascular: Normal rate, regular rhythm, normal heart sounds and intact distal pulses  Pulmonary/Chest: Effort normal and breath sounds normal    Abdominal: Soft  Bowel sounds are normal    Musculoskeletal: Normal range of motion  Neurological: She is alert and oriented to person, place, and time   She has normal reflexes  Skin: Skin is warm  Psychiatric: She has a normal mood and affect  Her behavior is normal  Judgment and thought content normal    Extremities: no edema, no cords, pulses are 1+  Lymphatics: no adenopathy in the neck, supraclavicular region, axilla and groin bilaterally    Performance Status: 1 - Symptomatic but completely ambulatory    Labs:    04/02/2018 WBC = 7 0 hemoglobin = 15 4 hematocrit = 44 platelet = 692 neutrophil = 51% BUN = 12 creatinine = 0 89 AST = 73 ALT = 91 alkaline phosphatase = 84 total bilirubin = 0 8 LDH = 168 phosphorus = 4 0 magnesium = 1 9  03/12/2018 WBC = 7 5 hemoglobin = 14 6 hematocrit = 43 5 platelet = 749 BUN = 9 creatinine = 0 84 AST = 56 ALT = 78 calcium = 9 4 alkaline phosphatase = 93 LDH = 173  2/19/18 LDH = 149 BUN = 12 creatinine = 0 9 LFTs WNL calcium = 9 7 WBC = 6 5 hemoglobin = 15 1 hematocrit = 44 platelet = 641 neutrophil = 49%    Imaging    1/22/18 CAT scan of the chest and abdomen/pelvis    RECIST MEASUREMENTS:  TARGET LESION:   1: Right upper lobe cavitary lung mass: The overall size of the lesion is 3 5 x 2 4 cm on image 14 of series 3, significantly decreased from 4 3 x 2 7 cm on previous examination  Solid component along the medial aspect of the posterior wall is not significantly changed from previous examination currently measuring 1 5 x 0 9 cm on image 14 of series 3 compared with 1 8 x 0 8 cm when measured using similar technique on previous   examination  Nodular solid component along the lateral aspect of posterior wall measures approximately 0 7 x 0 4 cm on image 13 of series 3, and when measured using similar technique is unchanged from previous examinations  There is interval decrease in size of the cystic portion of the cavitary right upper lobe mass however, solid components of this mass are not significantly changed from previous examination    Otherwise unchanged examination with no new metastatic lesions   identified in the chest, abdomen or pelvis  Pathology    [facility] results demonstrated no ALK gene rearrangement or deletion and negative for ROS1 rearrangement  No EGFR mutations were found also  temporal mass from September 6, 2015 demonstrated metastatic poorly differentiated non-small cell carcinoma  The tumor cells stained positive for CK 7, TTF-1 and Napsin and negative for CK 20, CK 5/6 andmucicarmine  Pathologist stated that the immunoprofile supported the primary lung non-small cell carcinoma, favor adenocarcinoma  The pathologist also stated that given the focal p40 staining, a squamous carcinoma could not be excluded

## 2018-04-05 ENCOUNTER — HOSPITAL ENCOUNTER (OUTPATIENT)
Dept: INFUSION CENTER | Facility: CLINIC | Age: 58
Discharge: HOME | End: 2018-04-05
Payer: COMMERCIAL

## 2018-04-05 VITALS
HEIGHT: 67 IN | TEMPERATURE: 97.6 F | HEART RATE: 91 BPM | OXYGEN SATURATION: 97 % | BODY MASS INDEX: 30.61 KG/M2 | WEIGHT: 195 LBS | DIASTOLIC BLOOD PRESSURE: 88 MMHG | RESPIRATION RATE: 18 BRPM | SYSTOLIC BLOOD PRESSURE: 138 MMHG

## 2018-04-05 PROCEDURE — 96413 CHEMO IV INFUSION 1 HR: CPT

## 2018-04-05 PROCEDURE — J9035Q0 INV BEVACIZUMAB 400MG/16ML 16 ML: Performed by: INTERNAL MEDICINE

## 2018-04-05 RX ADMIN — SODIUM CHLORIDE 20 ML/HR: 0.9 INJECTION, SOLUTION INTRAVENOUS at 14:15

## 2018-04-05 RX ADMIN — Medication 1254 MG: at 14:44

## 2018-04-05 RX ADMIN — Medication 300 UNITS: at 15:24

## 2018-04-05 NOTE — PLAN OF CARE
Problem: Potential for Falls  Goal: Patient will remain free of falls  INTERVENTIONS:  - Assess patient frequently for physical needs  -  Identify cognitive and physical deficits and behaviors that affect risk of falls    -  [place] fall precautions as indicated by assessment   - Educate patient/family on patient safety including physical limitations  - Instruct patient to call for assistance with activity based on assessment  - Modify environment to reduce risk of injury  - Consider OT/PT consult to assist with strengthening/mobility   Outcome: Progressing

## 2018-04-16 ENCOUNTER — HOSPITAL ENCOUNTER (OUTPATIENT)
Dept: RADIOLOGY | Facility: HOSPITAL | Age: 58
Discharge: HOME | End: 2018-04-16
Attending: INTERNAL MEDICINE
Payer: COMMERCIAL

## 2018-04-16 ENCOUNTER — TRANSCRIBE ORDERS (OUTPATIENT)
Dept: ADMINISTRATIVE | Facility: HOSPITAL | Age: 58
End: 2018-04-16

## 2018-04-16 DIAGNOSIS — C34.91 ADENOCARCINOMA OF LUNG, STAGE 4, RIGHT (HCC): ICD-10-CM

## 2018-04-16 PROCEDURE — 71250 CT THORAX DX C-: CPT

## 2018-04-16 PROCEDURE — 74176 CT ABD & PELVIS W/O CONTRAST: CPT

## 2018-04-17 DIAGNOSIS — C34.90 NON-SMALL CELL LUNG CANCER, UNSPECIFIED LATERALITY (HCC): Primary | ICD-10-CM

## 2018-04-24 LAB
ALBUMIN SERPL-MCNC: 4.4 G/DL (ref 3.5–5.5)
ALBUMIN/GLOB SERPL: 1.6 {RATIO} (ref 1.2–2.2)
ALP SERPL-CCNC: 90 IU/L (ref 39–117)
ALT SERPL-CCNC: 84 IU/L (ref 0–32)
APPEARANCE UR: CLEAR
AST SERPL-CCNC: 49 IU/L (ref 0–40)
BASOPHILS # BLD AUTO: 0.1 X10E3/UL (ref 0–0.2)
BASOPHILS NFR BLD AUTO: 1 %
BILIRUB SERPL-MCNC: 0.7 MG/DL (ref 0–1.2)
BILIRUB UR QL STRIP: NEGATIVE
BUN SERPL-MCNC: 15 MG/DL (ref 6–24)
BUN/CREAT SERPL: 16 (ref 9–23)
CALCIUM SERPL-MCNC: 9.5 MG/DL (ref 8.7–10.2)
CHLORIDE SERPL-SCNC: 99 MMOL/L (ref 96–106)
CO2 SERPL-SCNC: 20 MMOL/L (ref 18–29)
COLOR UR: YELLOW
CREAT SERPL-MCNC: 0.93 MG/DL (ref 0.57–1)
EOSINOPHIL # BLD AUTO: 0.1 X10E3/UL (ref 0–0.4)
EOSINOPHIL NFR BLD AUTO: 2 %
ERYTHROCYTE [DISTWIDTH] IN BLOOD BY AUTOMATED COUNT: 13.7 % (ref 12.3–15.4)
GLOBULIN SER-MCNC: 2.8 G/DL (ref 1.5–4.5)
GLUCOSE SERPL-MCNC: 145 MG/DL (ref 65–99)
GLUCOSE UR QL: NEGATIVE
HCT VFR BLD AUTO: 42.7 % (ref 34–46.6)
HGB BLD-MCNC: 14.7 G/DL (ref 11.1–15.9)
HGB UR QL STRIP: NEGATIVE
IMM GRANULOCYTES # BLD: 0 X10E3/UL (ref 0–0.1)
IMM GRANULOCYTES NFR BLD: 0 %
KETONES UR QL STRIP: NEGATIVE
LDH SERPL-CCNC: 157 IU/L (ref 119–226)
LEUKOCYTE ESTERASE UR QL STRIP: NEGATIVE
LYMPHOCYTES # BLD AUTO: 2.4 X10E3/UL (ref 0.7–3.1)
LYMPHOCYTES NFR BLD AUTO: 40 %
MAGNESIUM SERPL-MCNC: 2 MG/DL (ref 1.6–2.3)
MCH RBC QN AUTO: 32.3 PG (ref 26.6–33)
MCHC RBC AUTO-ENTMCNC: 34.4 G/DL (ref 31.5–35.7)
MCV RBC AUTO: 94 FL (ref 79–97)
MICRO URNS: NORMAL
MONOCYTES # BLD AUTO: 0.5 X10E3/UL (ref 0.1–0.9)
MONOCYTES NFR BLD AUTO: 9 %
NEUTROPHILS # BLD AUTO: 2.9 X10E3/UL (ref 1.4–7)
NEUTROPHILS NFR BLD AUTO: 48 %
NITRITE UR QL STRIP: NEGATIVE
PH UR STRIP: 5.5 [PH] (ref 5–7.5)
PHOSPHATE SERPL-MCNC: 3.7 MG/DL (ref 2.5–4.5)
PLATELET # BLD AUTO: 260 X10E3/UL (ref 150–379)
POTASSIUM SERPL-SCNC: 4.2 MMOL/L (ref 3.5–5.2)
PROT SERPL-MCNC: 7.2 G/DL (ref 6–8.5)
PROT UR QL STRIP: NEGATIVE
RBC # BLD AUTO: 4.55 X10E6/UL (ref 3.77–5.28)
SL AMB EGFR AFRICAN AMERICAN: 78 ML/MIN/1.73
SL AMB EGFR NON AFRICAN AMERICAN: 67 ML/MIN/1.73
SODIUM SERPL-SCNC: 137 MMOL/L (ref 134–144)
SP GR UR: 1.01 (ref 1–1.03)
T3FREE SERPL-MCNC: 2.6 PG/ML (ref 2–4.4)
T4 FREE SERPL-MCNC: 1.28 NG/DL (ref 0.82–1.77)
TSH SERPL DL<=0.005 MIU/L-ACNC: 2.45 UIU/ML (ref 0.45–4.5)
UROBILINOGEN UR STRIP-ACNC: 0.2 EU/DL (ref 0.2–1)
WBC # BLD AUTO: 6 X10E3/UL (ref 3.4–10.8)

## 2018-04-25 ENCOUNTER — OFFICE VISIT (OUTPATIENT)
Dept: HEMATOLOGY ONCOLOGY | Facility: MEDICAL CENTER | Age: 58
End: 2018-04-25
Payer: COMMERCIAL

## 2018-04-25 VITALS
HEART RATE: 99 BPM | RESPIRATION RATE: 18 BRPM | OXYGEN SATURATION: 96 % | SYSTOLIC BLOOD PRESSURE: 150 MMHG | HEIGHT: 67 IN | WEIGHT: 195 LBS | DIASTOLIC BLOOD PRESSURE: 90 MMHG | BODY MASS INDEX: 30.61 KG/M2 | TEMPERATURE: 98.3 F

## 2018-04-25 DIAGNOSIS — C34.91 ADENOCARCINOMA OF LUNG, STAGE 4, RIGHT (HCC): Primary | ICD-10-CM

## 2018-04-25 PROCEDURE — 99214 OFFICE O/P EST MOD 30 MIN: CPT | Performed by: INTERNAL MEDICINE

## 2018-04-25 RX ORDER — SODIUM CHLORIDE 9 MG/ML
20 INJECTION, SOLUTION INTRAVENOUS CONTINUOUS
Status: DISCONTINUED | OUTPATIENT
Start: 2018-04-26 | End: 2018-04-29 | Stop reason: HOSPADM

## 2018-04-25 NOTE — PROGRESS NOTES
Anthony Jones  1960  [facility] [place] [id number]    DISCUSSION  SUMMARY:    49-year-old female with M1 non-small cell lung carcinoma/adenocarcinoma  Patient is on [address] [place] [address]14 (phase III open label randomized study of [id number] [anti-PD-L1 antibody] in combination with carboplatin and paclitaxel +/- Avastin versus carboplatin and paclitaxel +/- Avastin in patients with stage IV chemotherapy naÃ¯ve non-small cell lung carcinoma)  Patient was randomized to receive all 4 medications  Mrs Rachel Gonzáles completed the 6 cycles of treatment without significant toxicities  Patient had been on maintenance (study drug (atezolizumab) and avastin) - now only Avastin (see below)  Issues:      1  Stage IV non-small cell lung carcinoma  Patient feels well and clinically there are no lung cancer related issues  Recent CAT scans demonstrated stable disease with no evidence of new metastatic lesions  LFTs are elevated but acceptable for Avastin  Next treatment is scheduled for tomorrow  Patient is also being followed by [facility],         2   Elevated LFTs  Patient has a history of hypercholesterolemia and has been on Lipitor in the past - PCP is monitoring  Liver function tests are elevated but acceptable  Surveillance is ongoing  3  Brain metastases status post resection and radiotherapy  Patient will follow-up with neurosurgery and radiation oncology as directed  As above, the keppra dose was recently changed - no issues  Patient will follow up with neurology also  Patient is to return in 6 weeks (the next follow-up is delayed due to vacation)  Patient knows to call the hematology/oncology office if there are any other questions or concerns  Carefully review your medication list and verify that the list is accurate and up-to-date   Please call the hematology/oncology office if there are medications missing from the list, medications on the list that you are not currently taking or if there is a dosage or instruction that is different from how you're taking that medication  Patient goals and areas of care: continue with the Avastin  Patient is able to self-care   _____________________________________________________________________________________    Chief Complaint   Patient presents with    Follow-up     Metastatic non-small cell lung carcinoma on Avastin     Advance Care Planning/Advance Directives: not discussed       Adenocarcinoma of lung, stage 4, right ([facility] [address] )    9/5/2015 Initial Diagnosis     Patient was referred to the emergency room for evaluation of vertigo as sent from the [facility]  (patient reported )  Patient underwent the following pertinent imaging scans  MRI of the brain demonstrated a mass in the right superior all temporal gyrus with measurements of 4 2 x 3 3 x 3 6 cm with vasogenic edema and mass effect  There was a near uncal herniation noted  CT of the chest and abdomen/pelvis demonstrated a necrotic right upper lobe mass measuring 7 x 4 2 x 4 1 cm strongly suspicious for bronchogenic carcinoma  The mass contacts the posterior medial pleural surface and potentially contacts the right posterior tracheal border  No pathologic adenopathy was identified in no evidence of metastatic disease in the chest abdomen or pelvis  9/6/2015 Surgery     Non-small cell carcinoma of lung ([facility] [address] ) diagnosed from biopsy of right temporal mass  Pathology demonstrates poorly differentiated non-small cell carcinoma  Pathologist stated that the immunoprofile supported the primary lung non-small cell carcinoma favor adenocarcinoma  Squamous carcinoma could not be excluded, secondary to focal P 40 staining  Prabha Hale path report demonstrated no ALK gene rearrangement or dleation and negative ROS1 rearrangement, No EGFR mutations were found       Surgery completed by Dr Ebenezer De            - 10/15/2015 Radiation SRT to brain 3000 cGY in 5 fractions  10/20/2015 -  Research Study Participant     [id number] Phase III open label randomized study of [id number] [Anti-PDL1 antibody] in combination with carboplatin and paclitaxel +/- Avastin versus Carboplatin and Paciltaxel +/- Avastin in patients with stage IV Chemotherapy  Naive non-small cell lung carcinoma  Patient was randomized to receive off for medications  11/30/2015 - 2/18/2016 Chemotherapy     Started Carboplatin, Paclitaxel, Avastin and PD-L1 (atezolizumab; study drug)  Completed 6 cycles with cycle 6 day 1 on 2/18/16         3/10/2016 -  Chemotherapy     Beginning cycle 7, maintenance cycle with Avastin and PD-L1 (Atezolizumab; study drug)         5/24/2017 Adverse Reaction     LFT elevation-persisted  Patient was taken off of study drug Atezolizumab, but continues on Avastin maintance  History of Present Illness:    59year old female recently diagnosed with IV lung cancer here for followup  Mrs Juliet Sears began to have headaches last spring 2015  Patient was seen by her PCP and treated with antibiotics  Initially the symptoms got better the patient went on vacation  After returning from vacation, Mrs Juliet Sears once again developed headaches  Patient was treated with a second round of antibiotics and then was diagnosed with vertigo  Patient was sent to the [address] [place] [address]  Although the specifics are not entirely clear, the therapist in the [address] [place] [address] sent the patient to the emergency room  A CAT scan of the brain demonstrated a brain lesion and patient was transferred to [place]  Patient was seen by Dr Filemon Streeter and underwent resection demonstrating adenocarcinoma  Additional testing demonstrated a lung mass  Patient improved and was discharged  Patient completed SRT with Dr Munira Magaña and Dr David Howell        [facility] [address] (phase III open label randomized study of [id number] [anti-PD-L1 antibody] in combination with carboplatin and paclitaxel +/- Avastin versus carboplatin and paclitaxel +/- Avastin in patients with stage IV chemotherapy naÃ¯ve non-small cell lung carcinoma)  Mrs Jose A Alonso was randomized to receive the anti-PD-L1 antibody with chemotherapy - patient completed 6 cycles and was placed on maintenance  Mrs Soha Trivedi previously suffered a tonic-clonic seizure and was seen in the emergency room  CAT scan of the head did not demonstrate any evidence of disease progression  The seizure was thought to be due to encephalomalacia  Since that time, there have been no seizure issues  Mrs Jose A Alonso is on Keppra  Patient has had elevated liver function tests  Because the abnormalities were grade 3, patient was taken off the [id number] and has continued on protocol with Avastin only  Chemotherapy was held on the last cycle because of elevated LFTs  Patient presents for follow-up  Patient states feeling well, baseline  Appetite is good, weight is stable  No fevers, chills or sweats  No pulmonary issues  No CNS issues, mental status change, blurred vision, headaches or dizziness  No recent seizures  No  or GI issues  No GYN issues  Activities are baseline  Fatigue is the same as before  No specific issues with the Avastin  Previous issues with a rash resolved without treatment  Patient has decided to delay the next cycle of treatment as she is going away on vacation  Review of Systems   Constitutional: Negative  HENT: Negative  Eyes: Negative  Respiratory: Negative  Cardiovascular: Negative  Gastrointestinal: Negative  Endocrine: Negative  Genitourinary: Negative  Musculoskeletal: Positive for arthralgias  Negative for neck pain  Skin: Negative  Allergic/Immunologic: Negative  Neurological: Negative  Hematological: Negative  Psychiatric/Behavioral: Negative  All other systems reviewed and are negative       Patient Active Problem List   Diagnosis    Seizure ([facility] [address] )    Adenocarcinoma of lung, stage 4, right ([facility] [address] )    Mixed hyperlipidemia    Abnormal LFTs    Allergic rhinitis    Brain metastasis (HCC)    [facility]    Encephalomalacia    Impaired fasting glucose    Insomnia    Lesion of temporal lobe    Non-small cell lung cancer ([facility] )    Benign hypertension     Past Medical History:   Diagnosis Date    Allergic rhinitis     Alopecia     resolved 10/25/16    Anxiety     last assessed 10/4/16, resolved 10/25/16    Benign hypertension 2/1/2018    Benign neoplasm of skin of trunk 03/25/2008    last assessed 3/25/08    Benign neoplasm of skin of upper extremity and shoulder 03/25/2008    last assessed 3/25/08    Ear deformity, acquired     last assessed 10/13/14, resolved 3/12/15    Eczema     Hyperlipemia     Lung cancer ([facility] )     Maintenance chemotherapy     Secondary cancer of brain ([facility] )     Vertigo      Past Surgical History:   Procedure Laterality Date    APPENDECTOMY  1964    BRAIN TUMOR EXCISION      CENTRAL VENOUS CATHETER INSERTION Right     PORTACATH     Family History   Problem Relation Age of Onset    Diabetes Mother     Heart disease Mother     Lung cancer Father      Social History     Social History    Marital status: /Civil Union     Spouse name: N/A    Number of children: 1    Years of education: N/A     Occupational History    Not on file       Social History Main Topics    Smoking status: Former Smoker    Smokeless tobacco: Never Used    Alcohol use Yes      Comment: occasionally / Social     Drug use: Yes     Types: Marijuana    Sexual activity: Yes     Other Topics Concern    Not on file     Social History Narrative    High school or GED     Lives independently with spouse        Current Outpatient Prescriptions:     ALPRAZolam (XANAX) 0 25 mg tablet, Take 0 25 mg by mouth daily at bedtime as needed for anxiety, Disp: , Rfl:     amLODIPine (NORVASC) 2 5 mg tablet, Take 1 tablet (2 5 mg total) by mouth daily, Disp: 90 tablet, Rfl: 1    atorvastatin (LIPITOR) 10 mg tablet, Take 1 tablet (10 mg total) by mouth daily before breakfast, Disp: 90 tablet, Rfl: 1    cyanocobalamin (VITAMIN B-12) 100 mcg tablet, Take 1 tablet by mouth daily, Disp: , Rfl:     levETIRAcetam (KEPPRA) 500 mg tablet, Take 500 mg by mouth daily  , Disp: , Rfl:     Omega-3 Fatty Acids (FISH OIL PO), Take 2 tablets by mouth , Disp: , Rfl:     ondansetron (ZOFRAN) 8 mg tablet, Take 1 tablet (8 mg total) by mouth 3 (three) times a day, Disp: 1 tablet, Rfl: 2    prochlorperazine (COMPAZINE) 10 mg tablet, Take 10 mg by mouth every 6 (six) hours as needed for nausea or vomiting, Disp: , Rfl:     ranitidine (ZANTAC) 75 MG tablet, Take 1 tablet by mouth daily, Disp: , Rfl:   No current facility-administered medications for this visit  Facility-Administered Medications Ordered in Other Visits:     alteplase (CATHFLO) injection 2 mg, 2 mg, Intracatheter, PRN, Reyes Briceno MD    [START ON 4/26/2018] alteplase (CATHFLO) injection 2 mg, 2 mg, Intracatheter, PRN, Reyes Briceno MD    [START ON 4/26/2018] heparin lock flush 100 units/mL injection 300 Units, 300 Units, Intracatheter, PRN, MD Rhonda Adhikari  [START ON 4/26/2018] INV bevacizumab (INVESTIGATIONAL) 1,254 mg in sodium chloride 0 9 % 49 84 mL IVPB, 1,254 mg, Intravenous, Once, Reyes Briceno MD    sodium chloride 0 9 % infusion, 20 mL/hr, Intravenous, Continuous, MD Rhonda Adhikari  [START ON 4/26/2018] sodium chloride 0 9 % infusion, 20 mL/hr, Intravenous, Continuous, Reyes Briceno MD    Allergies   Allergen Reactions    Iodinated Diagnostic Agents Anaphylaxis and Itching    Clindamycin     Clindamycin/Lincomycin        Vitals:    04/25/18 1400   BP: 150/90   Pulse: 99   Resp: 18   Temp: 98 3 °F (36 8 °C)   SpO2: 96%     Physical Exam   Constitutional: She is oriented to person, place, and time  She appears well-developed and well-nourished  HENT:   Head: Normocephalic and atraumatic  Right Ear: External ear normal    Left Ear: External ear normal    Nose: Nose normal    Mouth/Throat: Oropharynx is clear and moist    Eyes: Conjunctivae and EOM are normal  Pupils are equal, round, and reactive to light  Neck: Normal range of motion  Neck supple  Cardiovascular: Normal rate, regular rhythm, normal heart sounds and intact distal pulses  Pulmonary/Chest: Effort normal and breath sounds normal    Abdominal: Soft  Bowel sounds are normal    Musculoskeletal: Normal range of motion  Neurological: She is alert and oriented to person, place, and time  She has normal reflexes  Skin: Skin is warm  Psychiatric: She has a normal mood and affect  Her behavior is normal  Judgment and thought content normal    Extremities: no edema, no cords, pulses are 1+  Lymphatics: no adenopathy in the neck, supraclavicular region, axilla and groin bilaterally    Performance Status: 1 - Symptomatic but completely ambulatory    Labs:    04/23/2018 TSH = 2 450 LDH = 157 BUN = 15 creatinine = 0 93 AST = 49 ALT = 84 alkaline phosphatase = 90 total bilirubin = 0 7 WBC = 6 0 hemoglobin = 14 7 hematocrit = 42 7 platelet = 046 neutrophil = 40%  04/02/2018 WBC = 7 0 hemoglobin = 15 4 hematocrit = 44 platelet = 376 neutrophil = 51% BUN = 12 creatinine = 0 89 AST = 73 ALT = 91 alkaline phosphatase = 84 total bilirubin = 0 8 LDH = 168 phosphorus = 4 0 magnesium = 1 9  03/12/2018 WBC = 7 5 hemoglobin = 14 6 hematocrit = 43 5 platelet = 016 BUN = 9 creatinine = 0 84 AST = 56 ALT = 78 calcium = 9 4 alkaline phosphatase = 93 LDH = 173  2/19/18 LDH = 149 BUN = 12 creatinine = 0 9 LFTs WNL calcium = 9 7 WBC = 6 5 hemoglobin = 15 1 hematocrit = 44 platelet = 248 neutrophil = 49%    Imaging    04/16/2018 CT scan of the chest and abdomen/pelvis    There is no significant interval change in size of the cystic or nodular components of the cavitary right upper lobe mass    No new metastatic lesions identified in the chest, abdomen or pelvis  1/22/18 CAT scan of the chest and abdomen/pelvis    RECIST MEASUREMENTS:  TARGET LESION:   1: Right upper lobe cavitary lung mass: The overall size of the lesion is 3 5 x 2 4 cm on image 14 of series 3, significantly decreased from 4 3 x 2 7 cm on previous examination  Solid component along the medial aspect of the posterior wall is not significantly changed from previous examination currently measuring 1 5 x 0 9 cm on image 14 of series 3 compared with 1 8 x 0 8 cm when measured using similar technique on previous   examination  Nodular solid component along the lateral aspect of posterior wall measures approximately 0 7 x 0 4 cm on image 13 of series 3, and when measured using similar technique is unchanged from previous examinations  There is interval decrease in size of the cystic portion of the cavitary right upper lobe mass however, solid components of this mass are not significantly changed from previous examination  Otherwise unchanged examination with no new metastatic lesions   identified in the chest, abdomen or pelvis  Pathology    [facility] results demonstrated no ALK gene rearrangement or deletion and negative for ROS1 rearrangement  No EGFR mutations were found also  temporal mass from September 6, 2015 demonstrated metastatic poorly differentiated non-small cell carcinoma  The tumor cells stained positive for CK 7, TTF-1 and Napsin and negative for CK 20, CK 5/6 andmucicarmine  Pathologist stated that the immunoprofile supported the primary lung non-small cell carcinoma, favor adenocarcinoma  The pathologist also stated that given the focal p40 staining, a squamous carcinoma could not be excluded

## 2018-04-26 ENCOUNTER — HOSPITAL ENCOUNTER (OUTPATIENT)
Dept: INFUSION CENTER | Facility: CLINIC | Age: 58
Discharge: HOME | End: 2018-04-26
Payer: COMMERCIAL

## 2018-04-26 VITALS
OXYGEN SATURATION: 99 % | HEIGHT: 67 IN | TEMPERATURE: 97.5 F | BODY MASS INDEX: 30.52 KG/M2 | HEART RATE: 71 BPM | RESPIRATION RATE: 18 BRPM | WEIGHT: 194.45 LBS | DIASTOLIC BLOOD PRESSURE: 78 MMHG | SYSTOLIC BLOOD PRESSURE: 132 MMHG

## 2018-04-26 PROCEDURE — J9035Q0 INV BEVACIZUMAB 400MG/16ML 16 ML: Performed by: INTERNAL MEDICINE

## 2018-04-26 PROCEDURE — 96413 CHEMO IV INFUSION 1 HR: CPT

## 2018-04-26 RX ADMIN — SODIUM CHLORIDE 20 ML/HR: 0.9 INJECTION, SOLUTION INTRAVENOUS at 14:32

## 2018-04-26 RX ADMIN — Medication 1254 MG: at 14:59

## 2018-04-26 RX ADMIN — Medication 300 UNITS: at 15:39

## 2018-04-26 NOTE — PLAN OF CARE
Problem: Potential for Falls  Goal: Patient will remain free of falls  INTERVENTIONS:  - Assess patient frequently for physical needs  -  Identify cognitive and physical deficits and behaviors that affect risk of falls    -  [place] fall precautions as indicated by assessment   - Educate patient/family on patient safety including physical limitations  - Instruct patient to call for assistance with activity based on assessment  - Modify environment to reduce risk of injury  - Consider OT/PT consult to assist with strengthening/mobility   Outcome: Progressing

## 2018-04-26 NOTE — PLAN OF CARE
Problem: Potential for Falls  Goal: Patient will remain free of falls  INTERVENTIONS:  - Assess patient frequently for physical needs  -  Identify cognitive and physical deficits and behaviors that affect risk of falls    -  [place] fall precautions as indicated by assessment   - Educate patient/family on patient safety including physical limitations  - Instruct patient to call for assistance with activity based on assessment  - Modify environment to reduce risk of injury  - Consider OT/PT consult to assist with strengthening/mobility   Outcome: Progressing

## 2018-04-26 NOTE — PROGRESS NOTES
Pt has no c/o --- clinical trials research nurse Yandel Reason  is here at chairside   Pts labs are acceptable to proceed  Pt feels well   We have ok to proceed   callbell within reach

## 2018-04-29 DIAGNOSIS — G40.909 EPILEPSY (HCC): Primary | ICD-10-CM

## 2018-05-03 NOTE — TELEPHONE ENCOUNTER
Verbal refill called in #[phone] with 3 additional refills to [facility] 5/3 at 3:42 pm   Patient has an appointment on 7/31

## 2018-05-07 RX ORDER — LEVETIRACETAM 500 MG/1
TABLET, EXTENDED RELEASE ORAL
Qty: 30 TABLET | Refills: 5 | Status: SHIPPED | OUTPATIENT
Start: 2018-05-07 | End: 2018-12-05 | Stop reason: ALTCHOICE

## 2018-05-21 DIAGNOSIS — C34.90 MALIGNANT NEOPLASM OF LUNG, UNSPECIFIED LATERALITY, UNSPECIFIED PART OF LUNG (HCC): Primary | ICD-10-CM

## 2018-05-30 ENCOUNTER — OFFICE VISIT (OUTPATIENT)
Dept: HEMATOLOGY ONCOLOGY | Facility: MEDICAL CENTER | Age: 58
End: 2018-05-30
Payer: COMMERCIAL

## 2018-05-30 VITALS
HEART RATE: 85 BPM | WEIGHT: 187 LBS | DIASTOLIC BLOOD PRESSURE: 72 MMHG | SYSTOLIC BLOOD PRESSURE: 130 MMHG | OXYGEN SATURATION: 96 % | HEIGHT: 67 IN | RESPIRATION RATE: 18 BRPM | BODY MASS INDEX: 29.35 KG/M2 | TEMPERATURE: 97.9 F

## 2018-05-30 DIAGNOSIS — C34.91 ADENOCARCINOMA OF LUNG, STAGE 4, RIGHT (HCC): Primary | ICD-10-CM

## 2018-05-30 LAB
ALBUMIN SERPL-MCNC: 4.5 G/DL (ref 3.5–5.5)
ALBUMIN/GLOB SERPL: 1.7 {RATIO} (ref 1.2–2.2)
ALP SERPL-CCNC: 77 IU/L (ref 39–117)
ALT SERPL-CCNC: 73 IU/L (ref 0–32)
APPEARANCE UR: CLEAR
AST SERPL-CCNC: 54 IU/L (ref 0–40)
BACTERIA URNS QL MICRO: NORMAL
BASOPHILS # BLD AUTO: 0.1 X10E3/UL (ref 0–0.2)
BASOPHILS NFR BLD AUTO: 1 %
BILIRUB SERPL-MCNC: 0.5 MG/DL (ref 0–1.2)
BILIRUB UR QL STRIP: NEGATIVE
BUN SERPL-MCNC: 16 MG/DL (ref 6–24)
BUN/CREAT SERPL: 18 (ref 9–23)
CALCIUM SERPL-MCNC: 9.6 MG/DL (ref 8.7–10.2)
CHLORIDE SERPL-SCNC: 99 MMOL/L (ref 96–106)
CO2 SERPL-SCNC: 21 MMOL/L (ref 18–29)
COLOR UR: YELLOW
CREAT SERPL-MCNC: 0.91 MG/DL (ref 0.57–1)
EOSINOPHIL # BLD AUTO: 0.1 X10E3/UL (ref 0–0.4)
EOSINOPHIL NFR BLD AUTO: 2 %
EPI CELLS #/AREA URNS HPF: NORMAL /HPF
ERYTHROCYTE [DISTWIDTH] IN BLOOD BY AUTOMATED COUNT: 14.2 % (ref 12.3–15.4)
GLOBULIN SER-MCNC: 2.6 G/DL (ref 1.5–4.5)
GLUCOSE SERPL-MCNC: 100 MG/DL (ref 65–99)
GLUCOSE UR QL: NEGATIVE
HCT VFR BLD AUTO: 42.6 % (ref 34–46.6)
HGB BLD-MCNC: 14.2 G/DL (ref 11.1–15.9)
HGB UR QL STRIP: NEGATIVE
IMM GRANULOCYTES # BLD: 0 X10E3/UL (ref 0–0.1)
IMM GRANULOCYTES NFR BLD: 0 %
KETONES UR QL STRIP: NEGATIVE
LDH SERPL-CCNC: 159 IU/L (ref 119–226)
LEUKOCYTE ESTERASE UR QL STRIP: ABNORMAL
LYMPHOCYTES # BLD AUTO: 2.7 X10E3/UL (ref 0.7–3.1)
LYMPHOCYTES NFR BLD AUTO: 39 %
MAGNESIUM SERPL-MCNC: 1.9 MG/DL (ref 1.6–2.3)
MCH RBC QN AUTO: 31.1 PG (ref 26.6–33)
MCHC RBC AUTO-ENTMCNC: 33.3 G/DL (ref 31.5–35.7)
MCV RBC AUTO: 93 FL (ref 79–97)
MICRO URNS: ABNORMAL
MONOCYTES # BLD AUTO: 0.4 X10E3/UL (ref 0.1–0.9)
MONOCYTES NFR BLD AUTO: 6 %
MUCOUS THREADS URNS QL MICRO: PRESENT
NEUTROPHILS # BLD AUTO: 3.7 X10E3/UL (ref 1.4–7)
NEUTROPHILS NFR BLD AUTO: 52 %
NITRITE UR QL STRIP: NEGATIVE
PH UR STRIP: 5.5 [PH] (ref 5–7.5)
PHOSPHATE SERPL-MCNC: 3.5 MG/DL (ref 2.5–4.5)
PLATELET # BLD AUTO: 234 X10E3/UL (ref 150–379)
POTASSIUM SERPL-SCNC: 4.6 MMOL/L (ref 3.5–5.2)
PROT SERPL-MCNC: 7.1 G/DL (ref 6–8.5)
PROT UR QL STRIP: NEGATIVE
RBC # BLD AUTO: 4.56 X10E6/UL (ref 3.77–5.28)
RBC #/AREA URNS HPF: NORMAL /HPF
SL AMB EGFR AFRICAN AMERICAN: 80 ML/MIN/1.73
SL AMB EGFR NON AFRICAN AMERICAN: 69 ML/MIN/1.73
SODIUM SERPL-SCNC: 139 MMOL/L (ref 134–144)
SP GR UR: 1.01 (ref 1–1.03)
UROBILINOGEN UR STRIP-ACNC: 0.2 EU/DL (ref 0.2–1)
WBC # BLD AUTO: 7.1 X10E3/UL (ref 3.4–10.8)
WBC #/AREA URNS HPF: NORMAL /HPF

## 2018-05-30 PROCEDURE — 99215 OFFICE O/P EST HI 40 MIN: CPT | Performed by: INTERNAL MEDICINE

## 2018-05-30 RX ORDER — OXYCODONE HYDROCHLORIDE 5 MG/1
5 TABLET ORAL EVERY 6 HOURS PRN
Qty: 90 TABLET | Refills: 0 | Status: SHIPPED | OUTPATIENT
Start: 2018-05-30 | End: 2019-04-30 | Stop reason: SDUPTHER

## 2018-05-30 RX ORDER — SODIUM CHLORIDE 9 MG/ML
20 INJECTION, SOLUTION INTRAVENOUS CONTINUOUS
Status: DISCONTINUED | OUTPATIENT
Start: 2018-05-31 | End: 2018-06-03 | Stop reason: HOSPADM

## 2018-05-30 NOTE — PROGRESS NOTES
Weston Gallo  1960  [facility] [place] [id number]    DISCUSSION  SUMMARY:    57-year-old female with M1 non-small cell lung carcinoma/adenocarcinoma  Patient is on [facility] [id number] (phase III open label randomized study of [id number] [anti-PD-L1 antibody] in combination with carboplatin and paclitaxel +/- Avastin versus carboplatin and paclitaxel +/- Avastin in patients with stage IV chemotherapy naive non-small cell lung carcinoma)  Patient was randomized to receive all 4 medications  Mrs Riya Toro completed the 6 cycles of treatment without significant toxicities  Patient had been on maintenance (study drug (atezolizumab) and avastin) - now only Avastin (atezolizumab was discontinued by the PI because of the elevated LFTs)  Issues:      1  Stage IV non-small cell lung carcinoma  Patient feels well and clinically there are no lung cancer related issues  Recent CAT scans demonstrated stable disease with no evidence of new metastatic lesions  LFTs are elevated but acceptable for Avastin  Patient will continue with the same regimen  Patient is also being followed by [facility],         2   Elevated LFTs  Patient has a history of hypercholesterolemia and has been on Lipitor in the past - PCP is monitoring  Liver function tests are elevated but acceptable  Surveillance is ongoing  3  Brain metastases status post resection and radiotherapy  Patient will follow-up with neurosurgery and radiation oncology as directed  As above, the keppra dose was recently changed - no issues  Patient will follow up with neurology also  4  Tender right anterior chest wall by port  Port has flushed well, there are no signs of infection or bleeding  Patient will monitor  5   Occasional generalized body aches  Patient last requested an oxycodone prescription 1 year ago    Oxycodone 5 mg every 6 hours as needed was reordered, 90 tablets  Patient knows to monitor for constipation or other GI side effects  Patient is to return in 3 weeks (the next follow-up is delayed due to vacation)  Patient knows to call the hematology/oncology office if there are any other questions or concerns  Carefully review your medication list and verify that the list is accurate and up-to-date  Please call the hematology/oncology office if there are medications missing from the list, medications on the list that you are not currently taking or if there is a dosage or instruction that is different from how you're taking that medication  Patient goals and areas of care: continue with the Avastin  Patient is able to self-care   _____________________________________________________________________________________    Chief Complaint   Patient presents with    Follow-up     Metastatic non-small cell lung carcinoma on Avastin     Advance Care Planning/Advance Directives: not discussed       Adenocarcinoma of lung, stage 4, right ([address] )    9/5/2015 Initial Diagnosis     Patient was referred to the emergency room for evaluation of vertigo as [facility]  (patient reported )  Patient underwent the following pertinent imaging scans  MRI of the brain demonstrated a mass in the right superior all temporal gyrus with measurements of 4 2 x 3 3 x 3 6 cm with vasogenic edema and mass effect  There was a near uncal herniation noted  CT of the chest and abdomen/pelvis demonstrated a necrotic right upper lobe mass measuring 7 x 4 2 x 4 1 cm strongly suspicious for bronchogenic carcinoma  The mass contacts the posterior medial pleural surface and potentially contacts the right posterior tracheal border  No pathologic adenopathy was identified in no evidence of metastatic disease in the chest abdomen or pelvis  9/6/2015 Surgery     Non-small cell carcinoma of lung ([address] ) diagnosed from biopsy of right temporal mass   Pathology demonstrates poorly differentiated non-small cell carcinoma  Pathologist stated that the immunoprofile supported the primary lung non-small cell carcinoma favor adenocarcinoma  Squamous carcinoma could not be excluded, secondary to focal P 40 staining  Melonie Puentes path report demonstrated no ALK gene rearrangement or dleation and negative ROS1 rearrangement, No EGFR mutations were found  Surgery completed by Dr Tio Cruz            - 10/15/2015 Radiation     SRT to brain 3000 cGY in 5 fractions  10/20/2015 -  Research Study Participant     [id number] Phase III open label randomized study of [id number] [Anti-PDL1 antibody] in combination with carboplatin and paclitaxel +/- Avastin versus Carboplatin and Paciltaxel +/- Avastin in patients with stage IV Chemotherapy  Naive non-small cell lung carcinoma  Patient was randomized to receive off for medications  11/30/2015 - 2/18/2016 Chemotherapy     Started Carboplatin, Paclitaxel, Avastin and PD-L1 (atezolizumab; study drug)  Completed 6 cycles with cycle 6 day 1 on 2/18/16         3/10/2016 -  Chemotherapy     Beginning cycle 7, maintenance cycle with Avastin and PD-L1 (Atezolizumab; study drug)         5/24/2017 Adverse Reaction     LFT elevation-persisted  Patient was taken off of study drug Atezolizumab, but continues on Avastin maintance  History of Present Illness:    59year old female recently diagnosed with IV lung cancer here for followup  Mrs Tedd Felty began to have headaches last spring 2015  Patient was seen by her PCP and treated with antibiotics  Initially the symptoms got better the patient went on vacation  After returning from vacation, Mrs Tedd Felty once again developed headaches  Patient was treated with a second round of antibiotics and then was diagnosed with vertigo  Patient was sent to the [address] [place] [address]  Although the specifics are not entirely clear, the therapist in the [address] [place] [address] sent the patient to the emergency room   A CAT scan of the brain demonstrated a brain lesion and patient was transferred to [place]  Patient was seen by Dr Nishi Meyer and underwent resection demonstrating adenocarcinoma  Additional testing demonstrated a lung mass  Patient improved and was discharged  Patient completed SRT with Dr Ophelia Santiago and Dr Dilia Terry  Patient is on [facility] [address]-14 (phase III open label randomized study of [id number] [anti-PD-L1 antibody] in combination with carboplatin and paclitaxel +/- Avastin versus carboplatin and paclitaxel +/- Avastin in patients with stage IV chemotherapy naÃ¯ve non-small cell lung carcinoma)  Mrs Sunitha Che was randomized to receive the anti-PD-L1 antibody with chemotherapy - patient completed 6 cycles and was placed on maintenance  Mrs Johnnie White previously suffered a tonic-clonic seizure and was seen in the emergency room  CAT scan of the head did not demonstrate any evidence of disease progression  The seizure was thought to be due to encephalomalacia  Since that time, there have been no seizure issues  Mrs Sunitha Che is on Keppra  Patient has had elevated liver function tests  Because the abnormalities were grade 3, patient was taken off the [id number] and has continued on protocol with Avastin only  Chemotherapy was held on the last cycle because of elevated LFTs  Patient presents for follow-up  Patient states feeling well, baseline  Appetite is good, weight is stable  No fevers, chills or sweats  No pulmonary issues  No CNS issues, mental status change, blurred vision, headaches or dizziness  No recent seizures  No  or GI issues  No GYN issues  Activities are baseline  Fatigue is the same as before  No specific issues with the Avastin  Previous issues with a rash resolved without treatment  Patient had delayed the last treatment for vacation -no issues while away  Review of Systems   Constitutional: Negative  HENT: Negative  Eyes: Negative  Respiratory: Negative      Cardiovascular: Negative  Gastrointestinal: Negative  Endocrine: Negative  Genitourinary: Negative  Musculoskeletal: Positive for arthralgias  Negative for neck pain  Skin: Negative  Allergic/Immunologic: Negative  Neurological: Negative  Hematological: Negative  Psychiatric/Behavioral: Negative  All other systems reviewed and are negative  Patient Active Problem List   Diagnosis    Seizure ([place] [address] )    Adenocarcinoma of lung, stage 4, right ([facility] )    Mixed hyperlipidemia    Abnormal LFTs    Allergic rhinitis    Brain metastasis ([facility] )    Brain tumor ([place] [address] )    Encephalomalacia    Impaired fasting glucose    Insomnia    Lesion of temporal lobe    Non-small cell lung cancer ([facility] )    Benign hypertension     Past Medical History:   Diagnosis Date    Allergic rhinitis     Alopecia     resolved 10/25/16    Anxiety     last assessed 10/4/16, resolved 10/25/16    Benign hypertension 2/1/2018    Benign neoplasm of skin of trunk 03/25/2008    last assessed 3/25/08    Benign neoplasm of skin of upper extremity and shoulder 03/25/2008    last assessed 3/25/08    Ear deformity, acquired     last assessed 10/13/14, resolved 3/12/15    Eczema     Hyperlipemia     Lung cancer ([place] [address] )     Maintenance chemotherapy     Secondary cancer of brain ([place] [address] )     Vertigo      Past Surgical History:   Procedure Laterality Date    APPENDECTOMY  1964    BRAIN TUMOR EXCISION      CENTRAL VENOUS CATHETER INSERTION Right     PORTACATH     Family History   Problem Relation Age of Onset    Diabetes Mother     Heart disease Mother     Lung cancer Father      Social History     Social History    Marital status: /Civil Union     Spouse name: N/A    Number of children: 1    Years of education: N/A     Occupational History    Not on file       Social History Main Topics    Smoking status: Former Smoker    Smokeless tobacco: Never Used    Alcohol use Yes      Comment: occasionally / Social     Drug use: Yes     Types: Marijuana    Sexual activity: Yes     Other Topics Concern    Not on file     Social History Narrative    High school or GED     Lives independently with spouse        Current Outpatient Prescriptions:     ALPRAZolam (XANAX) 0 25 mg tablet, Take 0 25 mg by mouth daily at bedtime as needed for anxiety, Disp: , Rfl:     amLODIPine (NORVASC) 2 5 mg tablet, Take 1 tablet (2 5 mg total) by mouth daily, Disp: 90 tablet, Rfl: 1    atorvastatin (LIPITOR) 10 mg tablet, Take 1 tablet (10 mg total) by mouth daily before breakfast, Disp: 90 tablet, Rfl: 1    cyanocobalamin (VITAMIN B-12) 100 mcg tablet, Take 1 tablet by mouth daily, Disp: , Rfl:     levETIRAcetam (KEPPRA XR) 500 MG 24 hr tablet, TAKE ONE TABLET BY MOUTH ONCE DAILY IN THE MORNING, Disp: 30 tablet, Rfl: 5    levETIRAcetam (KEPPRA) 500 mg tablet, Take 500 mg by mouth daily  , Disp: , Rfl:     Omega-3 Fatty Acids (FISH OIL PO), Take 2 tablets by mouth , Disp: , Rfl:     ondansetron (ZOFRAN) 8 mg tablet, Take 1 tablet (8 mg total) by mouth 3 (three) times a day, Disp: 1 tablet, Rfl: 2    prochlorperazine (COMPAZINE) 10 mg tablet, Take 10 mg by mouth every 6 (six) hours as needed for nausea or vomiting, Disp: , Rfl:     ranitidine (ZANTAC) 75 MG tablet, Take 1 tablet by mouth daily, Disp: , Rfl:   No current facility-administered medications for this visit  Facility-Administered Medications Ordered in Other Visits:     alteplase (CATHFLO) injection 2 mg, 2 mg, Intracatheter, PRN, Marian Gutierrez MD    sodium chloride 0 9 % infusion, 20 mL/hr, Intravenous, Continuous, Marian Gutierrez MD    Allergies   Allergen Reactions    Iodinated Diagnostic Agents Anaphylaxis and Itching    Clindamycin     Clindamycin/Lincomycin        Vitals:    05/30/18 1403   BP: 130/72   Pulse: 85   Resp: 18   Temp: 97 9 °F (36 6 °C)   SpO2: 96%     Physical Exam   Constitutional: She is oriented to person, place, and time   She appears well-developed and well-nourished  HENT:   Head: Normocephalic and atraumatic  Right Ear: External ear normal    Left Ear: External ear normal    Nose: Nose normal    Mouth/Throat: Oropharynx is clear and moist    Eyes: Conjunctivae and EOM are normal  Pupils are equal, round, and reactive to light  Neck: Normal range of motion  Neck supple  Cardiovascular: Normal rate, regular rhythm, normal heart sounds and intact distal pulses  Pulmonary/Chest: Effort normal and breath sounds normal    Right anterior chest wall port in place, area clean and dry, area is tender to the touch, no signs of infection or bleeding underneath   Abdominal: Soft  Bowel sounds are normal    Musculoskeletal: Normal range of motion  Neurological: She is alert and oriented to person, place, and time  She has normal reflexes  Skin: Skin is warm  Skin is warm, well tanned, scattered few purpura on upper extremities, no petechiae, no hematomas   Psychiatric: She has a normal mood and affect   Her behavior is normal  Judgment and thought content normal    Extremities: no edema, no cords, pulses are 1+  Lymphatics: no adenopathy in the neck, supraclavicular region, axilla and groin bilaterally    Performance Status: 1 - Symptomatic but completely ambulatory    Labs:    05/29/2018 WBC = 7 1 hemoglobin = 14 2 hematocrit = 43 platelet = 690 BUN = 16 creatinine = 0 91 AST = 54 ALT = 73  04/23/2018 TSH = 2 450 LDH = 157 BUN = 15 creatinine = 0 93 AST = 49 ALT = 84 alkaline phosphatase = 90 total bilirubin = 0 7 WBC = 6 0 hemoglobin = 14 7 hematocrit = 42 7 platelet = 595 neutrophil = 40%  04/02/2018 WBC = 7 0 hemoglobin = 15 4 hematocrit = 44 platelet = 223 neutrophil = 51% BUN = 12 creatinine = 0 89 AST = 73 ALT = 91 alkaline phosphatase = 84 total bilirubin = 0 8 LDH = 168 phosphorus = 4 0 magnesium = 1 9  03/12/2018 WBC = 7 5 hemoglobin = 14 6 hematocrit = 43 5 platelet = 369 BUN = 9 creatinine = 0 84 AST = 56 ALT = 78 calcium = 9 4 alkaline phosphatase = 93 LDH = 173  2/19/18 LDH = 149 BUN = 12 creatinine = 0 9 LFTs WNL calcium = 9 7 WBC = 6 5 hemoglobin = 15 1 hematocrit = 44 platelet = 040 neutrophil = 49%    Imaging    04/16/2018 CT scan of the chest and abdomen/pelvis    There is no significant interval change in size of the cystic or nodular components of the cavitary right upper lobe mass  No new metastatic lesions identified in the chest, abdomen or pelvis  1/22/18 CAT scan of the chest and abdomen/pelvis    RECIST MEASUREMENTS:  TARGET LESION:   1: Right upper lobe cavitary lung mass: The overall size of the lesion is 3 5 x 2 4 cm on image 14 of series 3, significantly decreased from 4 3 x 2 7 cm on previous examination  Solid component along the medial aspect of the posterior wall is not significantly changed from previous examination currently measuring 1 5 x 0 9 cm on image 14 of series 3 compared with 1 8 x 0 8 cm when measured using similar technique on previous   examination  Nodular solid component along the lateral aspect of posterior wall measures approximately 0 7 x 0 4 cm on image 13 of series 3, and when measured using similar technique is unchanged from previous examinations  There is interval decrease in size of the cystic portion of the cavitary right upper lobe mass however, solid components of this mass are not significantly changed from previous examination  Otherwise unchanged examination with no new metastatic lesions   identified in the chest, abdomen or pelvis  Pathology    [facility] results demonstrated no ALK gene rearrangement or deletion and negative for ROS1 rearrangement  No EGFR mutations were found also  temporal mass from September 6, 2015 demonstrated metastatic poorly differentiated non-small cell carcinoma  The tumor cells stained positive for CK 7, TTF-1 and Napsin and negative for CK 20, CK 5/6 andmucicarmine   Pathologist stated that the immunoprofile supported the primary lung non-small cell carcinoma, favor adenocarcinoma  The pathologist also stated that given the focal p40 staining, a squamous carcinoma could not be excluded

## 2018-05-31 ENCOUNTER — HOSPITAL ENCOUNTER (OUTPATIENT)
Dept: INFUSION CENTER | Facility: CLINIC | Age: 58
Discharge: HOME | End: 2018-05-31
Payer: COMMERCIAL

## 2018-05-31 VITALS
RESPIRATION RATE: 16 BRPM | DIASTOLIC BLOOD PRESSURE: 72 MMHG | OXYGEN SATURATION: 97 % | HEART RATE: 62 BPM | WEIGHT: 188 LBS | BODY MASS INDEX: 29.44 KG/M2 | SYSTOLIC BLOOD PRESSURE: 110 MMHG | TEMPERATURE: 98.2 F

## 2018-05-31 PROCEDURE — J9035Q0 INV BEVACIZUMAB 400MG/16ML 16 ML: Performed by: INTERNAL MEDICINE

## 2018-05-31 PROCEDURE — 96413 CHEMO IV INFUSION 1 HR: CPT

## 2018-05-31 RX ADMIN — SODIUM CHLORIDE 20 ML/HR: 9 INJECTION, SOLUTION INTRAVENOUS at 14:15

## 2018-05-31 RX ADMIN — Medication 300 UNITS: at 15:35

## 2018-05-31 RX ADMIN — Medication 1254 MG: at 14:55

## 2018-05-31 NOTE — PROGRESS NOTES
Patient to [facility] for Avastin: Offers no complaints at present time: Lab work ( 05/29/18 ) reviewed: No parameters written: Right PAC accessed without dificulty: Good blood return noted

## 2018-06-13 DIAGNOSIS — C34.92 MALIGNANT NEOPLASM OF LEFT LUNG, UNSPECIFIED PART OF LUNG (HCC): Primary | ICD-10-CM

## 2018-06-13 DIAGNOSIS — C34.90 NON-SMALL CELL LUNG CANCER, UNSPECIFIED LATERALITY (HCC): Primary | ICD-10-CM

## 2018-06-18 ENCOUNTER — TRANSCRIBE ORDERS (OUTPATIENT)
Dept: ADMINISTRATIVE | Facility: HOSPITAL | Age: 58
End: 2018-06-18

## 2018-06-18 ENCOUNTER — HOSPITAL ENCOUNTER (OUTPATIENT)
Dept: RADIOLOGY | Facility: HOSPITAL | Age: 58
Discharge: HOME | End: 2018-06-18
Attending: INTERNAL MEDICINE
Payer: COMMERCIAL

## 2018-06-18 DIAGNOSIS — C34.90 MALIGNANT NEOPLASM OF UNSPECIFIED PART OF UNSPECIFIED BRONCHUS OR LUNG (HCC): ICD-10-CM

## 2018-06-18 DIAGNOSIS — C34.90 MALIGNANT NEOPLASM OF LUNG, UNSPECIFIED LATERALITY, UNSPECIFIED PART OF LUNG (HCC): ICD-10-CM

## 2018-06-18 PROCEDURE — 71250 CT THORAX DX C-: CPT

## 2018-06-18 PROCEDURE — 74176 CT ABD & PELVIS W/O CONTRAST: CPT

## 2018-06-20 ENCOUNTER — OFFICE VISIT (OUTPATIENT)
Dept: HEMATOLOGY ONCOLOGY | Facility: MEDICAL CENTER | Age: 58
End: 2018-06-20
Payer: COMMERCIAL

## 2018-06-20 VITALS
WEIGHT: 188 LBS | HEART RATE: 87 BPM | SYSTOLIC BLOOD PRESSURE: 126 MMHG | DIASTOLIC BLOOD PRESSURE: 70 MMHG | HEIGHT: 67 IN | OXYGEN SATURATION: 98 % | RESPIRATION RATE: 18 BRPM | BODY MASS INDEX: 29.51 KG/M2 | TEMPERATURE: 98.6 F

## 2018-06-20 DIAGNOSIS — C34.91 ADENOCARCINOMA OF LUNG, STAGE 4, RIGHT (HCC): Primary | ICD-10-CM

## 2018-06-20 LAB
ALBUMIN SERPL-MCNC: 4.5 G/DL (ref 3.5–5.5)
ALBUMIN/GLOB SERPL: 1.6 {RATIO} (ref 1.2–2.2)
ALP SERPL-CCNC: 77 IU/L (ref 39–117)
ALT SERPL-CCNC: 50 IU/L (ref 0–32)
APPEARANCE UR: CLEAR
AST SERPL-CCNC: 42 IU/L (ref 0–40)
BACTERIA URNS QL MICRO: ABNORMAL
BASOPHILS # BLD AUTO: 0.1 X10E3/UL (ref 0–0.2)
BASOPHILS NFR BLD AUTO: 1 %
BILIRUB SERPL-MCNC: 0.4 MG/DL (ref 0–1.2)
BILIRUB UR QL STRIP: NEGATIVE
BUN SERPL-MCNC: 11 MG/DL (ref 6–24)
BUN/CREAT SERPL: 11 (ref 9–23)
CALCIUM SERPL-MCNC: 9.9 MG/DL (ref 8.7–10.2)
CHLORIDE SERPL-SCNC: 97 MMOL/L (ref 96–106)
CO2 SERPL-SCNC: 22 MMOL/L (ref 20–29)
COLOR UR: YELLOW
CREAT SERPL-MCNC: 1.01 MG/DL (ref 0.57–1)
EOSINOPHIL # BLD AUTO: 0.1 X10E3/UL (ref 0–0.4)
EOSINOPHIL NFR BLD AUTO: 2 %
EPI CELLS #/AREA URNS HPF: ABNORMAL /HPF
ERYTHROCYTE [DISTWIDTH] IN BLOOD BY AUTOMATED COUNT: 13.8 % (ref 12.3–15.4)
GLOBULIN SER-MCNC: 2.8 G/DL (ref 1.5–4.5)
GLUCOSE SERPL-MCNC: 118 MG/DL (ref 65–99)
GLUCOSE UR QL: NEGATIVE
HCT VFR BLD AUTO: 42.2 % (ref 34–46.6)
HGB BLD-MCNC: 14.6 G/DL (ref 11.1–15.9)
HGB UR QL STRIP: NEGATIVE
IMM GRANULOCYTES # BLD: 0 X10E3/UL (ref 0–0.1)
IMM GRANULOCYTES NFR BLD: 0 %
KETONES UR QL STRIP: NEGATIVE
LDH SERPL-CCNC: 136 IU/L (ref 119–226)
LEUKOCYTE ESTERASE UR QL STRIP: ABNORMAL
LYMPHOCYTES # BLD AUTO: 2.7 X10E3/UL (ref 0.7–3.1)
LYMPHOCYTES NFR BLD AUTO: 32 %
MAGNESIUM SERPL-MCNC: 1.9 MG/DL (ref 1.6–2.3)
MCH RBC QN AUTO: 32.4 PG (ref 26.6–33)
MCHC RBC AUTO-ENTMCNC: 34.6 G/DL (ref 31.5–35.7)
MCV RBC AUTO: 94 FL (ref 79–97)
MICRO URNS: ABNORMAL
MONOCYTES # BLD AUTO: 0.5 X10E3/UL (ref 0.1–0.9)
MONOCYTES NFR BLD AUTO: 5 %
MUCOUS THREADS URNS QL MICRO: PRESENT
NEUTROPHILS # BLD AUTO: 5.1 X10E3/UL (ref 1.4–7)
NEUTROPHILS NFR BLD AUTO: 60 %
NITRITE UR QL STRIP: NEGATIVE
PH UR STRIP: 5.5 [PH] (ref 5–7.5)
PHOSPHATE SERPL-MCNC: 4.4 MG/DL (ref 2.5–4.5)
PLATELET # BLD AUTO: 269 X10E3/UL (ref 150–379)
POTASSIUM SERPL-SCNC: 4.8 MMOL/L (ref 3.5–5.2)
PROT SERPL-MCNC: 7.3 G/DL (ref 6–8.5)
PROT UR QL STRIP: NEGATIVE
RBC # BLD AUTO: 4.51 X10E6/UL (ref 3.77–5.28)
RBC #/AREA URNS HPF: ABNORMAL /HPF
SL AMB EGFR AFRICAN AMERICAN: 70 ML/MIN/1.73
SL AMB EGFR NON AFRICAN AMERICAN: 61 ML/MIN/1.73
SODIUM SERPL-SCNC: 137 MMOL/L (ref 134–144)
SP GR UR: 1.01 (ref 1–1.03)
UROBILINOGEN UR STRIP-ACNC: 0.2 EU/DL (ref 0.2–1)
WBC # BLD AUTO: 8.5 X10E3/UL (ref 3.4–10.8)
WBC #/AREA URNS HPF: ABNORMAL /HPF

## 2018-06-20 PROCEDURE — 99214 OFFICE O/P EST MOD 30 MIN: CPT | Performed by: INTERNAL MEDICINE

## 2018-06-20 RX ORDER — SODIUM CHLORIDE 9 MG/ML
20 INJECTION, SOLUTION INTRAVENOUS CONTINUOUS
Status: DISCONTINUED | OUTPATIENT
Start: 2018-06-21 | End: 2018-06-24 | Stop reason: HOSPADM

## 2018-06-20 NOTE — PROGRESS NOTES
Randal Yung  1960  [facility] [place] [id number]    DISCUSSION  SUMMARY:    57-year-old female with M1 non-small cell lung carcinoma/adenocarcinoma  Patient is on [facility] [address]17 (phase III open label randomized study of [id number] [anti-PD-L1 antibody] in combination with carboplatin and paclitaxel +/- Avastin versus carboplatin and paclitaxel +/- Avastin in patients with stage IV chemotherapy naive non-small cell lung carcinoma)  Patient was randomized to receive all 4 medications  Mrs Christi Waldron completed the 6 cycles of treatment without significant toxicities  Patient had been on maintenance (study drug (atezolizumab) and avastin) - now only Avastin (atezolizumab was discontinued by the PI because of the elevated LFTs)  Issues:      1  Stage IV non-small cell lung carcinoma  Patient feels well and clinically there are no lung cancer related issues  Recent CAT scans demonstrated stable disease with no evidence of new metastatic lesions  LFTs are elevated but acceptable for Avastin  Patient will continue with the same regimen       2   Elevated LFTs  Patient has a history of hypercholesterolemia and has been on Lipitor in the past - PCP is monitoring  Liver function tests are elevated but acceptable  Surveillance is ongoing  3  Brain metastases status post resection and radiotherapy  Patient will follow-up with neurosurgery and radiation oncology as directed  As above, the keppra dose was recently changed - no issues  Patient will follow up with neurology also  Patient is to return in 3 weeks   Patient knows to call the hematology/oncology office if there are any other questions or concerns  Carefully review your medication list and verify that the list is accurate and up-to-date   Please call the hematology/oncology office if there are medications missing from the list, medications on the list that you are not currently taking or if there is a dosage or instruction that is different from how you're taking that medication  Patient goals and areas of care: continue with the Avastin  Patient is able to self-care   _____________________________________________________________________________________    Chief Complaint   Patient presents with    Follow-up     Advance Care Planning/Advance Directives: not discussed       Adenocarcinoma of lung, stage 4, right ([facility] )    9/5/2015 Initial Diagnosis     Patient was referred to the emergency room for evaluation of vertigo as sent from the [facility]  (patient reported )  Patient underwent the following pertinent imaging scans  MRI of the brain demonstrated a mass in the right superior all temporal gyrus with measurements of 4 2 x 3 3 x 3 6 cm with vasogenic edema and mass effect  There was a near uncal herniation noted  CT of the chest and abdomen/pelvis demonstrated a necrotic right upper lobe mass measuring 7 x 4 2 x 4 1 cm strongly suspicious for bronchogenic carcinoma  The mass contacts the posterior medial pleural surface and potentially contacts the right posterior tracheal border  No pathologic adenopathy was identified in no evidence of metastatic disease in the chest abdomen or pelvis  9/6/2015 Surgery     Non-[facility] diagnosed from biopsy of right temporal mass  Pathology demonstrates poorly differentiated non-small cell carcinoma  Pathologist stated that the immunoprofile supported the primary lung non-small cell carcinoma favor adenocarcinoma  Squamous carcinoma could not be excluded, secondary to focal P 40 staining  [facility] path report demonstrated no ALK gene rearrangement or dleation and negative ROS1 rearrangement, No EGFR mutations were found  Surgery completed by Dr Jami Hagan            - 10/15/2015 Radiation     SRT to brain 3000 cGY in 5 fractions           10/20/2015 -  Research Study Participant [id number] Phase III open label randomized study of [id number] [Anti-PDL1 antibody] in combination with carboplatin and paclitaxel +/- Avastin versus Carboplatin and Paciltaxel +/- Avastin in patients with stage IV Chemotherapy  Naive non-small cell lung carcinoma  Patient was randomized to receive off for medications  11/30/2015 - 2/18/2016 Chemotherapy     Started Carboplatin, Paclitaxel, Avastin and PD-L1 (atezolizumab; study drug)  Completed 6 cycles with cycle 6 day 1 on 2/18/16         3/10/2016 -  Chemotherapy     Beginning cycle 7, maintenance cycle with Avastin and PD-L1 (Atezolizumab; study drug)         5/24/2017 Adverse Reaction     LFT elevation-persisted  Patient was taken off of study drug Atezolizumab, but continues on Avastin maintance  History of Present Illness:    59year old female recently diagnosed with IV lung cancer here for followup  Mrs Richard Garcia began to have headaches last spring 2015  Patient was seen by her PCP and treated with antibiotics  Initially the symptoms got better the patient went on vacation  After returning from vacation, Mrs Richard Garcia once again developed headaches  Patient was treated with a second round of antibiotics and then was diagnosed with vertigo  Patient was sent to the [address] [place] [address]  Although the specifics are not entirely clear, the therapist in the [address] [place] [address] sent the patient to the emergency room  A CAT scan of the brain demonstrated a brain lesion and patient was transferred to [place]  Patient was seen by Dr Eloisa Hoskins and underwent resection demonstrating adenocarcinoma  Additional testing demonstrated a lung mass  Patient improved and was discharged  Patient completed SRT with Dr Bertram Winn and Dr Diogenes Newman        Patient is on [facility] [id number] (phase III open label randomized study of [id number] [anti-PD-L1 antibody] in combination with carboplatin and paclitaxel +/- Avastin versus carboplatin and paclitaxel +/- Avastin in patients with stage IV chemotherapy naÃ¯ve non-small cell lung carcinoma)  Mrs Richard Garcia was randomized to receive the anti-PD-L1 antibody with chemotherapy - patient completed 6 cycles and was placed on maintenance  Mrs Roque Hernandez previously suffered a tonic-clonic seizure and was seen in the emergency room  CAT scan of the head did not demonstrate any evidence of disease progression  The seizure was thought to be due to encephalomalacia  Since that time, there have been no seizure issues  Mrs Richard Garcia is on Keppra  Patient has had elevated liver function tests  Because the abnormalities were grade 3, patient was taken off the [id number] and has continued on protocol with Avastin only  Chemotherapy was held on the last cycle because of elevated LFTs  Patient presents for follow-up  Patient states feeling well, baseline  Appetite is good, weight is stable  No fevers, chills or sweats  No pulmonary issues  No CNS issues, mental status change, blurred vision, headaches or dizziness  No recent seizures  No  or GI issues  No GYN issues  Activities are baseline  Fatigue is the same as before  No specific issues with the Avastin  Review of Systems   Constitutional: Negative  HENT: Negative  Eyes: Negative  Respiratory: Negative  Cardiovascular: Negative  Gastrointestinal: Negative  Endocrine: Negative  Genitourinary: Negative  Musculoskeletal: Positive for arthralgias  Negative for neck pain  Skin: Negative  Allergic/Immunologic: Negative  Neurological: Negative  Hematological: Negative  Psychiatric/Behavioral: Negative  All other systems reviewed and are negative       Patient Active Problem List   Diagnosis    Seizure [facility] )    Adenocarcinoma of lung, stage 4, right ([facility] )    Mixed hyperlipidemia    Abnormal LFTs    Allergic rhinitis    Brain metastasis ([facility]    Brain tumor ([facility] )    Encephalomalacia    Impaired fasting glucose    Insomnia    Lesion of temporal lobe  Non-small cell lung cancer ([facility] )    Benign hypertension     Past Medical History:   Diagnosis Date    Allergic rhinitis     Alopecia     resolved 10/25/16    Anxiety     last assessed 10/4/16, resolved 10/25/16    Benign hypertension 2/1/2018    Benign neoplasm of skin of trunk 03/25/2008    last assessed 3/25/08    Benign neoplasm of skin of upper extremity and shoulder 03/25/2008    last assessed 3/25/08    Ear deformity, acquired     last assessed 10/13/14, resolved 3/12/15    Eczema     Hyperlipemia     Lung cancer ([facility] )     Maintenance chemotherapy     Secondary cancer of brain ([facility] )     Vertigo      Past Surgical History:   Procedure Laterality Date    APPENDECTOMY  1964    BRAIN TUMOR EXCISION      CENTRAL VENOUS CATHETER INSERTION Right     PORTACATH     Family History   Problem Relation Age of Onset    Diabetes Mother     Heart disease Mother     Lung cancer Father      Social History     Social History    Marital status: /Civil Union     Spouse name: N/A    Number of children: 1    Years of education: N/A     Occupational History    Not on file       Social History Main Topics    Smoking status: Former Smoker    Smokeless tobacco: Never Used    Alcohol use Yes      Comment: occasionally / Social     Drug use: Yes     Types: Marijuana    Sexual activity: Yes     Other Topics Concern    Not on file     Social History Narrative    High school or GED     Lives independently with spouse        Current Outpatient Prescriptions:     ALPRAZolam (XANAX) 0 25 mg tablet, Take 0 25 mg by mouth daily at bedtime as needed for anxiety, Disp: , Rfl:     amLODIPine (NORVASC) 2 5 mg tablet, Take 1 tablet (2 5 mg total) by mouth daily, Disp: 90 tablet, Rfl: 1    atorvastatin (LIPITOR) 10 mg tablet, Take 1 tablet (10 mg total) by mouth daily before breakfast, Disp: 90 tablet, Rfl: 1    cyanocobalamin (VITAMIN B-12) 100 mcg tablet, Take 1 tablet by mouth daily, Disp: , Rfl:    levETIRAcetam (KEPPRA XR) 500 MG 24 hr tablet, TAKE ONE TABLET BY MOUTH ONCE DAILY IN THE MORNING, Disp: 30 tablet, Rfl: 5    levETIRAcetam (KEPPRA) 500 mg tablet, Take 500 mg by mouth daily  , Disp: , Rfl:     Omega-3 Fatty Acids (FISH OIL PO), Take 2 tablets by mouth , Disp: , Rfl:     ondansetron (ZOFRAN) 8 mg tablet, Take 1 tablet (8 mg total) by mouth 3 (three) times a day, Disp: 1 tablet, Rfl: 2    oxyCODONE (ROXICODONE) 5 mg immediate release tablet, Take 1 tablet (5 mg total) by mouth every 6 (six) hours as needed for moderate pain Max Daily Amount: 20 mg, Disp: 90 tablet, Rfl: 0    prochlorperazine (COMPAZINE) 10 mg tablet, Take 10 mg by mouth every 6 (six) hours as needed for nausea or vomiting, Disp: , Rfl:     ranitidine (ZANTAC) 75 MG tablet, Take 1 tablet by mouth daily, Disp: , Rfl:   No current facility-administered medications for this visit  Facility-Administered Medications Ordered in Other Visits:     alteplase (CATHFLO) injection 2 mg, 2 mg, Intracatheter, PRN, Roopa Schneider MD    [START ON 6/21/2018] alteplase (CATHFLO) injection 2 mg, 2 mg, Intracatheter, PRN, Roopa Schneider MD    [START ON 6/21/2018] heparin lock flush 100 units/mL injection 300 Units, 300 Units, Intracatheter, PRN, MD Aaron Mir  [START ON 6/21/2018] INV bevacizumab (INVESTIGATIONAL) 1,254 mg in sodium chloride 0 9 % 49 84 mL IVPB, 1,254 mg, Intravenous, Once, Roopa Schneider MD    sodium chloride 0 9 % infusion, 20 mL/hr, Intravenous, Continuous, MD Aaron Mir  [START ON 6/21/2018] sodium chloride 0 9 % infusion, 20 mL/hr, Intravenous, Continuous, Roopa Schneider MD    Allergies   Allergen Reactions    Iodinated Diagnostic Agents Anaphylaxis and Itching    Clindamycin     Clindamycin/Lincomycin        Vitals:    06/20/18 1403   BP: 126/70   Pulse: 87   Resp: 18   Temp: 98 6 °F (37 °C)   SpO2: 98%     Physical Exam   Constitutional: She is oriented to person, place, and time   She appears well-developed and well-nourished  HENT:   Head: Normocephalic and atraumatic  Right Ear: External ear normal    Left Ear: External ear normal    Nose: Nose normal    Mouth/Throat: Oropharynx is clear and moist    Eyes: Conjunctivae and EOM are normal  Pupils are equal, round, and reactive to light  Neck: Normal range of motion  Neck supple  Cardiovascular: Normal rate, regular rhythm, normal heart sounds and intact distal pulses  Pulmonary/Chest: Effort normal and breath sounds normal    Right anterior chest wall port in place, area clean and dry, area is tender to the touch, no signs of infection or bleeding underneath   Abdominal: Soft  Bowel sounds are normal    Musculoskeletal: Normal range of motion  Neurological: She is alert and oriented to person, place, and time  She has normal reflexes  Skin: Skin is warm  Skin is warm, well tanned, scattered few purpura on upper extremities, no petechiae, no hematomas   Psychiatric: She has a normal mood and affect   Her behavior is normal  Judgment and thought content normal    Extremities: no edema, no cords, pulses are 1+  Lymphatics: no adenopathy in the neck, supraclavicular region, axilla and groin bilaterally    Performance Status: 1 - Symptomatic but completely ambulatory    Labs:    06/19/2018 BUN = 11 creatinine = 1 01 calcium = 9 9 AST = 42 ALT = 50 WBC = 8 5 hemoglobin = 14 6 hematocrit = 42 platelet = 954  70/07/4324 WBC = 7 1 hemoglobin = 14 2 hematocrit = 43 platelet = 811 BUN = 16 creatinine = 0 91 AST = 54 ALT = 73  04/23/2018 TSH = 2 450 LDH = 157 BUN = 15 creatinine = 0 93 AST = 49 ALT = 84 alkaline phosphatase = 90 total bilirubin = 0 7 WBC = 6 0 hemoglobin = 14 7 hematocrit = 42 7 platelet = 989 neutrophil = 40%  04/02/2018 WBC = 7 0 hemoglobin = 15 4 hematocrit = 44 platelet = 720 neutrophil = 51% BUN = 12 creatinine = 0 89 AST = 73 ALT = 91 alkaline phosphatase = 84 total bilirubin = 0 8 LDH = 168 phosphorus = 4 0 magnesium = 1 9    Imaging    06/18/2018 CT scan of the chest and abdomen/pelvis    Unchanged cavitary right upper lobe mass    No new metastatic disease  04/16/2018 CT scan of the chest and abdomen/pelvis    There is no significant interval change in size of the cystic or nodular components of the cavitary right upper lobe mass  No new metastatic lesions identified in the chest, abdomen or pelvis  1/22/18 CAT scan of the chest and abdomen/pelvis    RECIST MEASUREMENTS:  TARGET LESION:   1: Right upper lobe cavitary lung mass: The overall size of the lesion is 3 5 x 2 4 cm on image 14 of series 3, significantly decreased from 4 3 x 2 7 cm on previous examination  Solid component along the medial aspect of the posterior wall is not significantly changed from previous examination currently measuring 1 5 x 0 9 cm on image 14 of series 3 compared with 1 8 x 0 8 cm when measured using similar technique on previous   examination  Nodular solid component along the lateral aspect of posterior wall measures approximately 0 7 x 0 4 cm on image 13 of series 3, and when measured using similar technique is unchanged from previous examinations  There is interval decrease in size of the cystic portion of the cavitary right upper lobe mass however, solid components of this mass are not significantly changed from previous examination  Otherwise unchanged examination with no new metastatic lesions   identified in the chest, abdomen or pelvis  Pathology    [facility] results demonstrated no ALK gene rearrangement or deletion and negative for ROS1 rearrangement  No EGFR mutations were found also  Right temporal mass from September 6, 2015 demonstrated metastatic poorly differentiated non-small cell carcinoma  The tumor cells stained positive for CK 7, TTF-1 and Napsin and negative for CK 20, CK 5/6 andmucicarmine  Pathologist stated that the immunoprofile supported the primary lung non-small cell carcinoma, favor adenocarcinoma  The pathologist also stated that given the focal p40 staining, a squamous carcinoma could not be excluded

## 2018-06-21 ENCOUNTER — HOSPITAL ENCOUNTER (OUTPATIENT)
Dept: INFUSION CENTER | Facility: CLINIC | Age: 58
Discharge: HOME | End: 2018-06-21
Payer: COMMERCIAL

## 2018-06-21 VITALS
HEIGHT: 67 IN | DIASTOLIC BLOOD PRESSURE: 82 MMHG | OXYGEN SATURATION: 98 % | HEART RATE: 82 BPM | TEMPERATURE: 97.6 F | BODY MASS INDEX: 29.41 KG/M2 | WEIGHT: 187.39 LBS | RESPIRATION RATE: 16 BRPM | SYSTOLIC BLOOD PRESSURE: 128 MMHG

## 2018-06-21 PROCEDURE — 96413 CHEMO IV INFUSION 1 HR: CPT

## 2018-06-21 PROCEDURE — J9035Q0 INV BEVACIZUMAB 400MG/16ML 16 ML: Performed by: INTERNAL MEDICINE

## 2018-06-21 RX ADMIN — SODIUM CHLORIDE 20 ML/HR: 0.9 INJECTION, SOLUTION INTRAVENOUS at 14:32

## 2018-06-21 RX ADMIN — Medication 300 UNITS: at 15:38

## 2018-06-21 RX ADMIN — Medication 1254 MG: at 14:49

## 2018-06-21 NOTE — PROGRESS NOTES
Pt to clinic for clinical trial avastan, pt offers no complaints at this time, aware of next appointment, declines avs

## 2018-07-03 DIAGNOSIS — C34.92 LUNG CANCER, PRIMARY, WITH METASTASIS FROM LUNG TO OTHER SITE, LEFT (HCC): Primary | ICD-10-CM

## 2018-07-10 LAB
ALBUMIN SERPL-MCNC: 4.4 G/DL (ref 3.5–5.5)
ALBUMIN/GLOB SERPL: 1.5 {RATIO} (ref 1.2–2.2)
ALP SERPL-CCNC: 86 IU/L (ref 39–117)
ALT SERPL-CCNC: 48 IU/L (ref 0–32)
APPEARANCE UR: CLEAR
AST SERPL-CCNC: 36 IU/L (ref 0–40)
BACTERIA URNS QL MICRO: ABNORMAL
BASOPHILS # BLD AUTO: 0 X10E3/UL (ref 0–0.2)
BASOPHILS NFR BLD AUTO: 1 %
BILIRUB SERPL-MCNC: 0.6 MG/DL (ref 0–1.2)
BILIRUB UR QL STRIP: NEGATIVE
BUN SERPL-MCNC: 11 MG/DL (ref 6–24)
BUN/CREAT SERPL: 12 (ref 9–23)
CALCIUM SERPL-MCNC: 9.7 MG/DL (ref 8.7–10.2)
CHLORIDE SERPL-SCNC: 98 MMOL/L (ref 96–106)
CO2 SERPL-SCNC: 23 MMOL/L (ref 20–29)
COLOR UR: YELLOW
CREAT SERPL-MCNC: 0.94 MG/DL (ref 0.57–1)
EOSINOPHIL # BLD AUTO: 0.1 X10E3/UL (ref 0–0.4)
EOSINOPHIL NFR BLD AUTO: 2 %
EPI CELLS #/AREA URNS HPF: ABNORMAL /HPF
ERYTHROCYTE [DISTWIDTH] IN BLOOD BY AUTOMATED COUNT: 13.4 % (ref 12.3–15.4)
GLOBULIN SER-MCNC: 2.9 G/DL (ref 1.5–4.5)
GLUCOSE SERPL-MCNC: 116 MG/DL (ref 65–99)
GLUCOSE UR QL: NEGATIVE
HCT VFR BLD AUTO: 43.5 % (ref 34–46.6)
HGB BLD-MCNC: 14.6 G/DL (ref 11.1–15.9)
HGB UR QL STRIP: NEGATIVE
IMM GRANULOCYTES # BLD: 0 X10E3/UL (ref 0–0.1)
IMM GRANULOCYTES NFR BLD: 0 %
KETONES UR QL STRIP: NEGATIVE
LDH SERPL-CCNC: 141 IU/L (ref 119–226)
LEUKOCYTE ESTERASE UR QL STRIP: ABNORMAL
LYMPHOCYTES # BLD AUTO: 2.5 X10E3/UL (ref 0.7–3.1)
LYMPHOCYTES NFR BLD AUTO: 34 %
MAGNESIUM SERPL-MCNC: 1.9 MG/DL (ref 1.6–2.3)
MCH RBC QN AUTO: 32.2 PG (ref 26.6–33)
MCHC RBC AUTO-ENTMCNC: 33.6 G/DL (ref 31.5–35.7)
MCV RBC AUTO: 96 FL (ref 79–97)
MICRO URNS: ABNORMAL
MONOCYTES # BLD AUTO: 0.4 X10E3/UL (ref 0.1–0.9)
MONOCYTES NFR BLD AUTO: 6 %
MUCOUS THREADS URNS QL MICRO: PRESENT
NEUTROPHILS # BLD AUTO: 4.4 X10E3/UL (ref 1.4–7)
NEUTROPHILS NFR BLD AUTO: 57 %
NITRITE UR QL STRIP: NEGATIVE
PH UR STRIP: 6 [PH] (ref 5–7.5)
PHOSPHATE SERPL-MCNC: 3.8 MG/DL (ref 2.5–4.5)
PLATELET # BLD AUTO: 251 X10E3/UL (ref 150–379)
POTASSIUM SERPL-SCNC: 5.1 MMOL/L (ref 3.5–5.2)
PROT SERPL-MCNC: 7.3 G/DL (ref 6–8.5)
PROT UR QL STRIP: NEGATIVE
RBC # BLD AUTO: 4.54 X10E6/UL (ref 3.77–5.28)
RBC #/AREA URNS HPF: ABNORMAL /HPF
SL AMB EGFR AFRICAN AMERICAN: 77 ML/MIN/1.73
SL AMB EGFR NON AFRICAN AMERICAN: 67 ML/MIN/1.73
SODIUM SERPL-SCNC: 137 MMOL/L (ref 134–144)
SP GR UR: 1.01 (ref 1–1.03)
UROBILINOGEN UR STRIP-ACNC: 0.2 EU/DL (ref 0.2–1)
WBC # BLD AUTO: 7.5 X10E3/UL (ref 3.4–10.8)
WBC #/AREA URNS HPF: ABNORMAL /HPF

## 2018-07-11 ENCOUNTER — OFFICE VISIT (OUTPATIENT)
Dept: HEMATOLOGY ONCOLOGY | Facility: MEDICAL CENTER | Age: 58
End: 2018-07-11
Payer: COMMERCIAL

## 2018-07-11 VITALS
DIASTOLIC BLOOD PRESSURE: 82 MMHG | HEART RATE: 97 BPM | WEIGHT: 186 LBS | SYSTOLIC BLOOD PRESSURE: 130 MMHG | TEMPERATURE: 98.3 F | OXYGEN SATURATION: 98 % | HEIGHT: 67 IN | RESPIRATION RATE: 18 BRPM | BODY MASS INDEX: 29.19 KG/M2

## 2018-07-11 DIAGNOSIS — C34.91 ADENOCARCINOMA OF LUNG, STAGE 4, RIGHT (HCC): Primary | ICD-10-CM

## 2018-07-11 PROCEDURE — 99213 OFFICE O/P EST LOW 20 MIN: CPT | Performed by: INTERNAL MEDICINE

## 2018-07-11 RX ORDER — SODIUM CHLORIDE 9 MG/ML
20 INJECTION, SOLUTION INTRAVENOUS CONTINUOUS
Status: DISCONTINUED | OUTPATIENT
Start: 2018-07-12 | End: 2018-07-15 | Stop reason: HOSPADM

## 2018-07-11 NOTE — PROGRESS NOTES
Mare Becerra  1960  [facility]  [address] [place] [id number]    DISCUSSION  SUMMARY:    68-year-old female with M1 non-small cell lung carcinoma/adenocarcinoma  Patient is on [facility][note 3] 13798-23 (phase III open label randomized study of [id number] [anti-PD-L1 antibody] in combination with carboplatin and paclitaxel +/- Avastin versus carboplatin and paclitaxel +/- Avastin in patients with stage IV chemotherapy naive non-small cell lung carcinoma)  Patient was randomized to receive all 4 medications  Mrs Juliane Pereyra completed the 6 cycles of treatment without significant toxicities  Patient had been on maintenance (study drug (atezolizumab) and avastin) - now only Avastin (atezolizumab was discontinued by the PI because of the elevated LFTs)  Issues:      1  Stage IV non-small cell lung carcinoma  Patient feels well and clinically there are no lung cancer related issues  Recent CAT scans demonstrated stable disease with no evidence of new metastatic lesions  LFTs are slightly elevated but acceptable for Avastin  Patient will continue with the same regimen; next dose is scheduled for tomorrow  The next set of scans is August 20, 2018       2   Elevated LFTs  Patient has a history of hypercholesterolemia and has been on Lipitor in the past - PCP is monitoring  Surveillance is ongoing  3  Brain metastases status post resection and radiotherapy  Patient will follow-up with neurosurgery and radiation oncology as directed  As above, the keppra dose was recently changed - no issues  Patient will follow up with neurology also  Patient is to return in 3 weeks   Patient knows to call the hematology/oncology office if there are any other questions or concerns  Carefully review your medication list and verify that the list is accurate and up-to-date   Please call the hematology/oncology office if there are medications missing from the list, medications on the list that you are not currently taking or if there is a dosage or instruction that is different from how you're taking that medication  Patient goals and areas of care: continue with the Avastin  Patient is able to self-care   _____________________________________________________________________________________    Chief Complaint   Patient presents with    Follow-up     Non-small cell lung carcinoma on Avastin     Advance Care Planning/Advance Directives: not discussed       Adenocarcinoma of lung, stage 4, right ([facility] )    9/5/2015 Initial Diagnosis     Patient was referred to the emergency room for evaluation of vertigo as sent from the [facility]  (patient reported )  Patient underwent the following pertinent imaging scans  MRI of the brain demonstrated a mass in the right superior all temporal gyrus with measurements of 4 2 x 3 3 x 3 6 cm with vasogenic edema and mass effect  There was a near uncal herniation noted  CT of the chest and abdomen/pelvis demonstrated a necrotic right upper lobe mass measuring 7 x 4 2 x 4 1 cm strongly suspicious for bronchogenic carcinoma  The mass contacts the posterior medial pleural surface and potentially contacts the right posterior tracheal border  No pathologic adenopathy was identified in no evidence of metastatic disease in the chest abdomen or pelvis  9/6/2015 Surgery     Non-small cell carcinoma [facility] lung [facility] diagnosed from biopsy of right temporal mass  Pathology demonstrates poorly differentiated non-small cell carcinoma  Pathologist stated that the immunoprofile supported the primary lung non-small cell carcinoma favor adenocarcinoma  Squamous carcinoma could not be excluded, secondary to focal P 40 staining  Andres Escalante path report demonstrated no ALK gene rearrangement or dleation and negative ROS1 rearrangement, No EGFR mutations were found       Surgery completed by Dr Brown Esters            - 10/15/2015 Radiation     SRT to brain 3000 cGY in 5 fractions  10/20/2015 -  Research Study Participant     [id number] Phase III open label randomized study of [id number] [Anti-PDL1 antibody] in combination with carboplatin and paclitaxel +/- Avastin versus Carboplatin and Paciltaxel +/- Avastin in patients with stage IV Chemotherapy  Naive non-small cell lung carcinoma  Patient was randomized to receive off for medications  11/30/2015 - 2/18/2016 Chemotherapy     Started Carboplatin, Paclitaxel, Avastin and PD-L1 (atezolizumab; study drug)  Completed 6 cycles with cycle 6 day 1 on 2/18/16         3/10/2016 -  Chemotherapy     Beginning cycle 7, maintenance cycle with Avastin and PD-L1 (Atezolizumab; study drug)         5/24/2017 Adverse Reaction     LFT elevation-persisted  Patient was taken off of study drug Atezolizumab, but continues on Avastin maintance  History of Present Illness:    59year old female recently diagnosed with IV lung cancer here for followup  Mrs Juliet Sears began to have headaches last spring 2015  Patient was seen by her PCP and treated with antibiotics  Initially the symptoms got better the patient went on vacation  After returning from vacation, Mrs Juliet Sears once again developed headaches  Patient was treated with a second round of antibiotics and then was diagnosed with vertigo  Patient was sent to the [address] [place] [address]  Although the specifics are not entirely clear, the therapist in the [address] [place] [address] sent the patient to the emergency room  A CAT scan of the brain demonstrated a brain lesion and patient was transferred to [place]  Patient was seen by Dr Filemon Streeter and underwent resection demonstrating adenocarcinoma  Additional testing demonstrated a lung mass  Patient improved and was discharged  Patient completed SRT with Dr Munira Magaña and Dr David Howell        Patient is on [facility] 41159-71 (phase III open label randomized study of [id number] [anti-PD-L1 antibody] in combination with carboplatin and paclitaxel +/- Avastin versus carboplatin and paclitaxel +/- Avastin in patients with stage IV chemotherapy naÃ¯ve non-small cell lung carcinoma)  Mrs Shanae Stanley was randomized to receive the anti-PD-L1 antibody with chemotherapy - patient completed 6 cycles and was placed on maintenance  Mrs Kush Sabillon previously suffered a tonic-clonic seizure and was seen in the emergency room  CAT scan of the head did not demonstrate any evidence of disease progression  The seizure was thought to be due to encephalomalacia  Since that time, there have been no seizure issues  Mrs Shanae Stanley is on Keppra  Patient has had elevated liver function tests  Because the abnormalities were grade 3, patient was taken off the [id number] and has continued on protocol with Avastin only  Chemotherapy was held on the last cycle because of elevated LFTs  Patient presents for follow-up  Patient states feeling OK, baseline  Appetite is good, weight is stable  No fevers, chills or sweats  No pulmonary issues  No CNS issues, mental status change, blurred vision, headaches or dizziness  No recent seizures  No  or GI issues  No GYN issues  Activities are baseline  Fatigue is the same as before  No specific issues with the Avastin  Right anterior chest wall and neck discomfort from the port is the same as before  Routine health maintenance and medical care is otherwise up-to-date with Dr Nehemias Woodson  Review of Systems   Constitutional: Negative  HENT: Negative  Eyes: Negative  Respiratory: Negative  Cardiovascular: Negative  Gastrointestinal: Negative  Endocrine: Negative  Genitourinary: Negative  Musculoskeletal: Positive for arthralgias  Negative for neck pain  Skin: Negative  Allergic/Immunologic: Negative  Neurological: Negative  Hematological: Negative  Psychiatric/Behavioral: Negative  All other systems reviewed and are negative       Patient Active Problem List   Diagnosis    Seizure ([facility] )    Adenocarcinoma of lung, stage 4, right ([facility] )    Mixed hyperlipidemia    Abnormal LFTs    Allergic rhinitis    Brain metastasis (HCC)    Brain tumor ([facility] )    Encephalomalacia    Impaired fasting glucose    Insomnia    Lesion of temporal lobe    Non-small cell lung cancer ([facility] )    Benign hypertension     Past Medical History:   Diagnosis Date    Allergic rhinitis     Alopecia     resolved 10/25/16    Anxiety     last assessed 10/4/16, resolved 10/25/16    Benign hypertension 2/1/2018    Benign neoplasm of skin of trunk 03/25/2008    last assessed 3/25/08    Benign neoplasm of skin of upper extremity and shoulder 03/25/2008    last assessed 3/25/08    Ear deformity, acquired     last assessed 10/13/14, resolved 3/12/15    Eczema     Hyperlipemia     Lung cancer ([facility] )     Maintenance chemotherapy     Secondary cancer of brain ([facility] )     Vertigo      Past Surgical History:   Procedure Laterality Date    APPENDECTOMY  1964    BRAIN TUMOR EXCISION      CENTRAL VENOUS CATHETER INSERTION Right     PORTACATH     Family History   Problem Relation Age of Onset    Diabetes Mother     Heart disease Mother     Lung cancer Father      Social History     Social History    Marital status: /Civil Union     Spouse name: N/A    Number of children: 1    Years of education: N/A     Occupational History    Not on file       Social History Main Topics    Smoking status: Former Smoker    Smokeless tobacco: Never Used    Alcohol use Yes      Comment: occasionally / Social     Drug use: Yes     Types: Marijuana    Sexual activity: Yes     Other Topics Concern    Not on file     Social History Narrative    High school or GED     Lives independently with spouse        Current Outpatient Prescriptions:     ALPRAZolam (XANAX) 0 25 mg tablet, Take 0 25 mg by mouth daily at bedtime as needed for anxiety, Disp: , Rfl:     amLODIPine (NORVASC) 2 5 mg tablet, Take 1 tablet (2 5 mg total) by mouth daily, Disp: 90 tablet, Rfl: 1    atorvastatin (LIPITOR) 10 mg tablet, Take 1 tablet (10 mg total) by mouth daily before breakfast, Disp: 90 tablet, Rfl: 1    cyanocobalamin (VITAMIN B-12) 100 mcg tablet, Take 1 tablet by mouth daily, Disp: , Rfl:     levETIRAcetam (KEPPRA XR) 500 MG 24 hr tablet, TAKE ONE TABLET BY MOUTH ONCE DAILY IN THE MORNING, Disp: 30 tablet, Rfl: 5    levETIRAcetam (KEPPRA) 500 mg tablet, Take 500 mg by mouth daily  , Disp: , Rfl:     Omega-3 Fatty Acids (FISH OIL PO), Take 2 tablets by mouth , Disp: , Rfl:     ondansetron (ZOFRAN) 8 mg tablet, Take 1 tablet (8 mg total) by mouth 3 (three) times a day, Disp: 1 tablet, Rfl: 2    oxyCODONE (ROXICODONE) 5 mg immediate release tablet, Take 1 tablet (5 mg total) by mouth every 6 (six) hours as needed for moderate pain Max Daily Amount: 20 mg, Disp: 90 tablet, Rfl: 0    prochlorperazine (COMPAZINE) 10 mg tablet, Take 10 mg by mouth every 6 (six) hours as needed for nausea or vomiting, Disp: , Rfl:     ranitidine (ZANTAC) 75 MG tablet, Take 1 tablet by mouth daily, Disp: , Rfl:   No current facility-administered medications for this visit       Facility-Administered Medications Ordered in Other Visits:     alteplase (CATHFLO) injection 2 mg, 2 mg, Intracatheter, PRN, Amy Espitia MD    [START ON 7/12/2018] alteplase (CATHFLO) injection 2 mg, 2 mg, Intracatheter, PRN, Amy Espitia MD    [START ON 7/12/2018] heparin lock flush 100 units/mL injection 300 Units, 300 Units, Intracatheter, PRN, Amy Espitia MD    sodium chloride 0 9 % infusion, 20 mL/hr, Intravenous, Continuous, MD Marian Kidd  [START ON 7/12/2018] sodium chloride 0 9 % infusion, 20 mL/hr, Intravenous, Continuous, Amy Espitia MD    Allergies   Allergen Reactions    Iodinated Diagnostic Agents Anaphylaxis and Itching    Clindamycin     Clindamycin/Lincomycin        Vitals:    07/11/18 1438   BP: 130/82   Pulse: 97   Resp: 18   Temp: 98 3 °F (36 8 °C)   SpO2: 98% Physical Exam   Constitutional: She is oriented to person, place, and time  She appears well-developed and well-nourished  HENT:   Head: Normocephalic and atraumatic  Right Ear: External ear normal    Left Ear: External ear normal    Nose: Nose normal    Mouth/Throat: Oropharynx is clear and moist    Eyes: Conjunctivae and EOM are normal  Pupils are equal, round, and reactive to light  Neck: Normal range of motion  Neck supple  Cardiovascular: Normal rate, regular rhythm, normal heart sounds and intact distal pulses  Pulmonary/Chest: Effort normal and breath sounds normal    Right anterior chest wall port in place, area clean and dry, area is tender to the touch, no signs of infection or bleeding underneath   Abdominal: Soft  Bowel sounds are normal    Musculoskeletal: Normal range of motion  Neurological: She is alert and oriented to person, place, and time  She has normal reflexes  Skin: Skin is warm  Skin is warm, well tanned, scattered few purpura on upper extremities, no petechiae, no hematomas   Psychiatric: She has a normal mood and affect   Her behavior is normal  Judgment and thought content normal    Extremities: no edema, no cords, pulses are 1+  Lymphatics: no adenopathy in the neck, supraclavicular region, axilla and groin bilaterally    Performance Status: 1 - Symptomatic but completely ambulatory    Labs:    07/09/2018 WBC = 7 5 hemoglobin = 14 6 hematocrit = 43 5 platelet = 314 neutrophil = 57% BUN = 11 creatinine = 0 94 calcium = 9 7 AST = 36 ALT = 48  06/19/2018 BUN = 11 creatinine = 1 01 calcium = 9 9 AST = 42 ALT = 50 WBC = 8 5 hemoglobin = 14 6 hematocrit = 42 platelet = 267  32/19/3571 WBC = 7 1 hemoglobin = 14 2 hematocrit = 43 platelet = 179 BUN = 16 creatinine = 0 91 AST = 54 ALT = 73  04/23/2018 TSH = 2 450 LDH = 157 BUN = 15 creatinine = 0 93 AST = 49 ALT = 84 alkaline phosphatase = 90 total bilirubin = 0 7 WBC = 6 0 hemoglobin = 14 7 hematocrit = 42 7 platelet = 606 neutrophil = 40%  04/02/2018 WBC = 7 0 hemoglobin = 15 4 hematocrit = 44 platelet = 623 neutrophil = 51% BUN = 12 creatinine = 0 89 AST = 73 ALT = 91 alkaline phosphatase = 84 total bilirubin = 0 8 LDH = 168 phosphorus = 4 0 magnesium = 1 9    Imaging    06/18/2018 CT scan of the chest and abdomen/pelvis    Unchanged cavitary right upper lobe mass    No new metastatic disease  04/16/2018 CT scan of the chest and abdomen/pelvis    There is no significant interval change in size of the cystic or nodular components of the cavitary right upper lobe mass  No new metastatic lesions identified in the chest, abdomen or pelvis  1/22/18 CAT scan of the chest and abdomen/pelvis    RECIST MEASUREMENTS:  TARGET LESION:   1: Right upper lobe cavitary lung mass: The overall size of the lesion is 3 5 x 2 4 cm on image 14 of series 3, significantly decreased from 4 3 x 2 7 cm on previous examination  Solid component along the medial aspect of the posterior wall is not significantly changed from previous examination currently measuring 1 5 x 0 9 cm on image 14 of series 3 compared with 1 8 x 0 8 cm when measured using similar technique on previous   examination  Nodular solid component along the lateral aspect of posterior wall measures approximately 0 7 x 0 4 cm on image 13 of series 3, and when measured using similar technique is unchanged from previous examinations  There is interval decrease in size of the cystic portion of the cavitary right upper lobe mass however, solid components of this mass are not significantly changed from previous examination  Otherwise unchanged examination with no new metastatic lesions   identified in the chest, abdomen or pelvis  Pathology    GenPath results demonstrated no ALK gene rearrangement or deletion and negative for ROS1 rearrangement  No EGFR mutations were found also       Right temporal mass from September 6, 2015 demonstrated metastatic poorly differentiated non-small cell carcinoma  The tumor cells stained positive for CK 7, TTF-1 and Napsin and negative for CK 20, CK 5/6 andmucicarmine  Pathologist stated that the immunoprofile supported the primary lung non-small cell carcinoma, favor adenocarcinoma  The pathologist also stated that given the focal p40 staining, a squamous carcinoma could not be excluded

## 2018-07-12 ENCOUNTER — HOSPITAL ENCOUNTER (OUTPATIENT)
Dept: INFUSION CENTER | Facility: CLINIC | Age: 58
Discharge: HOME | End: 2018-07-12
Payer: COMMERCIAL

## 2018-07-12 VITALS
BODY MASS INDEX: 29 KG/M2 | HEART RATE: 77 BPM | OXYGEN SATURATION: 96 % | RESPIRATION RATE: 16 BRPM | SYSTOLIC BLOOD PRESSURE: 120 MMHG | WEIGHT: 184.75 LBS | DIASTOLIC BLOOD PRESSURE: 78 MMHG | TEMPERATURE: 97.8 F | HEIGHT: 67 IN

## 2018-07-12 DIAGNOSIS — C34.90 MALIGNANT NEOPLASM OF BRONCHUS AND LUNG (HCC): Primary | ICD-10-CM

## 2018-07-12 PROCEDURE — J9035Q0 INV BEVACIZUMAB 400MG/16ML 16 ML: Performed by: INTERNAL MEDICINE

## 2018-07-12 PROCEDURE — 96413 CHEMO IV INFUSION 1 HR: CPT

## 2018-07-12 RX ADMIN — SODIUM CHLORIDE 20 ML/HR: 0.9 INJECTION, SOLUTION INTRAVENOUS at 14:20

## 2018-07-12 RX ADMIN — Medication 1254 MG: at 14:45

## 2018-07-12 RX ADMIN — Medication 300 UNITS: at 15:30

## 2018-07-12 NOTE — PROGRESS NOTES
Pt resting with no complaints  Vitals stable; labs drawn and okay for treatment per Clinical Trials  Callbell within reach; will continue to monitor

## 2018-07-25 DIAGNOSIS — C34.91 PRIMARY LUNG CANCER WITH METASTASIS FROM LUNG TO OTHER SITE, RIGHT (HCC): Primary | ICD-10-CM

## 2018-07-31 ENCOUNTER — OFFICE VISIT (OUTPATIENT)
Dept: NEUROLOGY | Facility: CLINIC | Age: 58
End: 2018-07-31
Payer: COMMERCIAL

## 2018-07-31 VITALS
HEIGHT: 67 IN | WEIGHT: 182 LBS | BODY MASS INDEX: 28.56 KG/M2 | DIASTOLIC BLOOD PRESSURE: 102 MMHG | SYSTOLIC BLOOD PRESSURE: 152 MMHG | HEART RATE: 62 BPM

## 2018-07-31 DIAGNOSIS — C79.31 METASTATIC CANCER TO BRAIN (HCC): ICD-10-CM

## 2018-07-31 DIAGNOSIS — R56.9 SINGLE SEIZURE (HCC): Primary | ICD-10-CM

## 2018-07-31 DIAGNOSIS — C34.91 NON-SMALL CELL CANCER OF RIGHT LUNG (HCC): ICD-10-CM

## 2018-07-31 LAB
ALBUMIN SERPL-MCNC: 4.3 G/DL (ref 3.5–5.5)
ALBUMIN/GLOB SERPL: 1.7 {RATIO} (ref 1.2–2.2)
ALP SERPL-CCNC: 88 IU/L (ref 39–117)
ALT SERPL-CCNC: 56 IU/L (ref 0–32)
APPEARANCE UR: CLEAR
AST SERPL-CCNC: 51 IU/L (ref 0–40)
BACTERIA URNS QL MICRO: ABNORMAL
BASOPHILS # BLD AUTO: 0 X10E3/UL (ref 0–0.2)
BASOPHILS NFR BLD AUTO: 1 %
BILIRUB SERPL-MCNC: 0.5 MG/DL (ref 0–1.2)
BILIRUB UR QL STRIP: NEGATIVE
BUN SERPL-MCNC: 12 MG/DL (ref 6–24)
BUN/CREAT SERPL: 13 (ref 9–23)
CALCIUM SERPL-MCNC: 9.4 MG/DL (ref 8.7–10.2)
CHLORIDE SERPL-SCNC: 98 MMOL/L (ref 96–106)
CO2 SERPL-SCNC: 20 MMOL/L (ref 20–29)
COLOR UR: YELLOW
CREAT SERPL-MCNC: 0.95 MG/DL (ref 0.57–1)
EOSINOPHIL # BLD AUTO: 0.1 X10E3/UL (ref 0–0.4)
EOSINOPHIL NFR BLD AUTO: 2 %
EPI CELLS #/AREA URNS HPF: ABNORMAL /HPF
ERYTHROCYTE [DISTWIDTH] IN BLOOD BY AUTOMATED COUNT: 13.8 % (ref 12.3–15.4)
GLOBULIN SER-MCNC: 2.6 G/DL (ref 1.5–4.5)
GLUCOSE SERPL-MCNC: 98 MG/DL (ref 65–99)
GLUCOSE UR QL: NEGATIVE
HCT VFR BLD AUTO: 39.1 % (ref 34–46.6)
HGB BLD-MCNC: 13.9 G/DL (ref 11.1–15.9)
HGB UR QL STRIP: NEGATIVE
IMM GRANULOCYTES # BLD: 0 X10E3/UL (ref 0–0.1)
IMM GRANULOCYTES NFR BLD: 0 %
KETONES UR QL STRIP: NEGATIVE
LDH SERPL-CCNC: 146 IU/L (ref 119–226)
LEUKOCYTE ESTERASE UR QL STRIP: ABNORMAL
LYMPHOCYTES # BLD AUTO: 2 X10E3/UL (ref 0.7–3.1)
LYMPHOCYTES NFR BLD AUTO: 31 %
MAGNESIUM SERPL-MCNC: 2.1 MG/DL (ref 1.6–2.3)
MCH RBC QN AUTO: 31.9 PG (ref 26.6–33)
MCHC RBC AUTO-ENTMCNC: 35.5 G/DL (ref 31.5–35.7)
MCV RBC AUTO: 90 FL (ref 79–97)
MICRO URNS: ABNORMAL
MONOCYTES # BLD AUTO: 0.3 X10E3/UL (ref 0.1–0.9)
MONOCYTES NFR BLD AUTO: 5 %
MUCOUS THREADS URNS QL MICRO: PRESENT
NEUTROPHILS # BLD AUTO: 4.1 X10E3/UL (ref 1.4–7)
NEUTROPHILS NFR BLD AUTO: 61 %
NITRITE UR QL STRIP: NEGATIVE
PH UR STRIP: 6 [PH] (ref 5–7.5)
PHOSPHATE SERPL-MCNC: 3.7 MG/DL (ref 2.5–4.5)
PLATELET # BLD AUTO: 248 X10E3/UL (ref 150–379)
POTASSIUM SERPL-SCNC: 4.7 MMOL/L (ref 3.5–5.2)
PROT SERPL-MCNC: 6.9 G/DL (ref 6–8.5)
PROT UR QL STRIP: ABNORMAL
RBC # BLD AUTO: 4.36 X10E6/UL (ref 3.77–5.28)
RBC #/AREA URNS HPF: ABNORMAL /HPF
SL AMB EGFR AFRICAN AMERICAN: 76 ML/MIN/1.73
SL AMB EGFR NON AFRICAN AMERICAN: 66 ML/MIN/1.73
SODIUM SERPL-SCNC: 138 MMOL/L (ref 134–144)
SP GR UR: 1.01 (ref 1–1.03)
T3 SERPL-MCNC: 106 NG/DL (ref 71–180)
TSH SERPL DL<=0.005 MIU/L-ACNC: 2.31 UIU/ML (ref 0.45–4.5)
UROBILINOGEN UR STRIP-ACNC: 0.2 EU/DL (ref 0.2–1)
WBC # BLD AUTO: 6.6 X10E3/UL (ref 3.4–10.8)
WBC #/AREA URNS HPF: ABNORMAL /HPF

## 2018-07-31 PROCEDURE — 99214 OFFICE O/P EST MOD 30 MIN: CPT | Performed by: PSYCHIATRY & NEUROLOGY

## 2018-07-31 NOTE — PROGRESS NOTES
Return NeuroOutpatient Note        Anthony Jones  [id number]  59 y o   1960       Seizures        History obtained from:  Patient     HPI/Subjective:    Mrs Rachel Gonzáles is a 58 yo F with PMH significant for 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  On Oct 17th 2016th, she had seizure like activity where her son witnessed patient stiffening up and shaking  She had MRI brain on Oct 14th revealed stable post resection of right superior temporal lobe mass with encephalomalacia and gliosis  She had radiation therapy in Dec/Jan 2017  She is under care of Dr Vania Bender and is on chemotherapy, Avastin at present for maintenance every 3 weeks  Patient is on Keppra 500mg XR daily  She hasn't had any seizure like activity since Oct 17th, 2016  Her EEG had revealed right temporal region breach rhythm and no epileptiform discharges  Patient is interested in being off of [place] [address] as she has remained seizure free  She would like to take least amount of medications  Past Medical History:   Diagnosis Date    Allergic rhinitis     Alopecia     resolved 10/25/16    Anxiety     last assessed 10/4/16, resolved 10/25/16    Benign hypertension 2/1/2018    Benign neoplasm of skin of trunk 03/25/2008    last assessed 3/25/08    Benign neoplasm of skin of upper extremity and shoulder 03/25/2008    last assessed 3/25/08    Ear deformity, acquired     last assessed 10/13/14, resolved 3/12/15    Eczema     Hyperlipemia     Lung cancer ([facility]     Maintenance chemotherapy     Secondary cancer of brain ([facility] )     [facility]ures [facility]     Vertigo      Social History     Social History    Marital status: /Civil Union     Spouse name: N/A    Number of children: 1    Years of education: N/A     Occupational History    Not on file       Social History Main Topics    Smoking status: Former Smoker    Smokeless tobacco: Never Used    Alcohol use Yes      Comment: occasionally / Social     Drug use: Yes     Types: Marijuana    Sexual activity: Yes     Other Topics Concern    Not on file     Social History Narrative    High school or GED     Lives independently with spouse      Family History   Problem Relation Age of Onset    Diabetes Mother     Heart disease Mother     Lung cancer Father     Pancreatitis Brother     No Known Problems Brother      Allergies   Allergen Reactions    Iodinated Diagnostic Agents Anaphylaxis and Itching    Clindamycin     Clindamycin/Lincomycin      Current Outpatient Prescriptions on File Prior to Visit   Medication Sig Dispense Refill    ALPRAZolam (XANAX) 0 25 mg tablet Take 0 25 mg by mouth daily at bedtime as needed for anxiety      amLODIPine (NORVASC) 2 5 mg tablet Take 1 tablet (2 5 mg total) by mouth daily 90 tablet 1    atorvastatin (LIPITOR) 10 mg tablet Take 1 tablet (10 mg total) by mouth daily before breakfast 90 tablet 1    cyanocobalamin (VITAMIN B-12) 100 mcg tablet Take 1 tablet by mouth daily      levETIRAcetam (KEPPRA XR) 500 MG 24 hr tablet TAKE ONE TABLET BY MOUTH ONCE DAILY IN THE MORNING 30 tablet 5    Omega-3 Fatty Acids (FISH OIL PO) Take 2 tablets by mouth        ondansetron (ZOFRAN) 8 mg tablet Take 1 tablet (8 mg total) by mouth 3 (three) times a day 1 tablet 2    oxyCODONE (ROXICODONE) 5 mg immediate release tablet Take 1 tablet (5 mg total) by mouth every 6 (six) hours as needed for moderate pain Max Daily Amount: 20 mg 90 tablet 0    prochlorperazine (COMPAZINE) 10 mg tablet Take 10 mg by mouth every 6 (six) hours as needed for nausea or vomiting      ranitidine (ZANTAC) 75 MG tablet Take 1 tablet by mouth daily      levETIRAcetam (KEPPRA) 500 mg tablet Take 500 mg by mouth daily         Current Facility-Administered Medications on File Prior to Visit   Medication Dose Route Frequency Provider Last Rate Last Dose    alteplase (CATHFLO) injection 2 mg  2 mg Intracatheter PRN Noelle Clayton MD        sodium chloride 0 9 % infusion 20 mL/hr Intravenous Continuous Marian Gutierrez MD             Review of Systems   Refer to positive review of systems in HPI  Constitutional- No fever  Eyes- No visual change  ENT- Hearing normal  CV- No chest pain  Resp- No Shortness of breath  GI- No diarrhea  - Bladder normal  MS- No Arthritis   Skin- No rash  Psych- No depression  Endo- No DM  Heme- No nodes    Vitals:    07/31/18 1108   BP: (!) 152/102   BP Location: Right arm   Patient Position: Sitting   Pulse: 62   Weight: 82 6 kg (182 lb)   Height: 5' 7" (1 702 m)       PHYSICAL EXAM:  Appearance: No Acute Distress  Ophthalmoscopic: Disc Flat, Normal fundus  Mental status:  Orientation: Awake, Alert, and Orientedx3  Memory: Registation 3/3 Recall 3/3  Attention: normal  Knowledge: good  Language: No aphasia  Speech: No dysarthria  Cranial Nerves:  2 No Visual Defect on Confrontation, Pupils round, equal, reactive to light  3,4,6 Extraocular Movements Intact, no nystagmus  5 Facial Sensation Intact  7 No facial asymmetry  8 Intact hearing  9,10 Palate symmetric, normal gag  11 Good shoulder shrug  12 Tongue Midline  Gait: Stable  Coordination: No ataxia with finger to nose testing, and heel to shin  Sensory: Intact, Symmetric to pinprick, light touch, vibration, and joint position  Muscle Tone: Normal              Muscle exam:  Arm Right Left Leg Right Left   Deltoid 5/5 5/5 Iliopsoas 5/5 5/5   Biceps 5/5 5/5 Quads 5/5 5/5   Triceps 5/5 5/5 Hamstrings 5/5 5/5   Wrist Extension 5/5 5/5 Ankle Dorsi Flexion 5/5 5/5   Wrist Flexion 5/5 5/5 Ankle Plantar Flexion 5/5 5/5   Interossei 5/5 5/5 Ankle Eversion 5/5 5/5   APB 5/5 5/5 Ankle Inversion 5/5 5/5       Reflexes   RJ BJ TJ KJ AJ Plantars Harvey's   Right 2+ 2+ 2+ 2+ 2+ Downgoing Not present   Left 2+ 2+ 2+ 2+ 2+ Downgoing Not present     Personal review of  Labs:                    Diagnoses and all orders for this visit:        1  Single seizure ([facility] )     2  Metastatic cancer to brain ([facility] )     3  Non-small cell cancer of right lung [facility])       Patient has remained seizure free since Oct 2016  She has been stable from malignancy standpoint  Patient had one partial seizure with secondary generalization in Oct 2016  Patient would like to try to d/c keppra if able  Discussed that there is risk of breakthrough seizure and to make herself aware of auras and take precautionary measures  In Nov, she can take Keppra 500mg every other day for 2 weeks and then can stop  Discussed that if she ever has breakthrough seizure, she will have to remain on AED for lifetime  Counseling Documentation:  The patient and/or patient's family were  counseled regarding diagnostic results  Instructions for management,risk factor reductions,prognosis of disease were discussed  Patient and family were educated regarding impressions,risks and benefits of treatment options,importance of compliance with treatment        Total time of encounter:  30 min  More than 50% of the time was used in counseling and/or coordination of care  Extent of counseling and/or coordination of care        MD Domenico Rodriguez [facility]  [address]  Alaina Perez   [phone]

## 2018-08-01 ENCOUNTER — OFFICE VISIT (OUTPATIENT)
Dept: HEMATOLOGY ONCOLOGY | Facility: MEDICAL CENTER | Age: 58
End: 2018-08-01
Payer: COMMERCIAL

## 2018-08-01 VITALS
OXYGEN SATURATION: 95 % | TEMPERATURE: 98.3 F | BODY MASS INDEX: 28.88 KG/M2 | SYSTOLIC BLOOD PRESSURE: 134 MMHG | WEIGHT: 184 LBS | HEART RATE: 83 BPM | DIASTOLIC BLOOD PRESSURE: 88 MMHG | HEIGHT: 67 IN | RESPIRATION RATE: 18 BRPM

## 2018-08-01 DIAGNOSIS — C34.91 ADENOCARCINOMA OF LUNG, STAGE 4, RIGHT (HCC): Primary | ICD-10-CM

## 2018-08-01 PROCEDURE — 99214 OFFICE O/P EST MOD 30 MIN: CPT | Performed by: INTERNAL MEDICINE

## 2018-08-01 NOTE — PROGRESS NOTES
Anthony Jones  1960  [address] [address] [facility] [address] [facility] [id number]    DISCUSSION  SUMMARY:    70-year-old female with M1 non-small cell lung carcinoma/adenocarcinoma  Patient is on [facility] [address]506-36 (phase III open label randomized study of [id number] [anti-PD-L1 antibody] in combination with carboplatin and paclitaxel +/- Avastin versus carboplatin and paclitaxel +/- Avastin in patients with stage IV chemotherapy naive non-small cell lung carcinoma)  Patient was randomized to receive all 4 medications  Mrs Rachel Gonzáles completed the 6 cycles of treatment without significant toxicities  Patient had been on maintenance (study drug (atezolizumab) and avastin) - now only Avastin (atezolizumab was discontinued by the PI because of the elevated LFTs)  Issues:      1  Stage IV non-small cell lung carcinoma  Patient feels well and clinically there are no lung cancer related issues  Recent CAT scans demonstrated stable disease with no evidence of new metastatic lesions  LFTs are slightly elevated but acceptable for Avastin  Patient will continue with the same regimen; next dose is scheduled for tomorrow  The next set of scans is August 20, 2018       2   Elevated LFTs  Patient has a history of hypercholesterolemia and has been on Lipitor in the past - PCP is monitoring  Surveillance is ongoing  3  Brain metastases status post resection and radiotherapy  Patient will follow-up with neurosurgery and radiation oncology as directed  As above, the Keppra dose was recently changed - no issues  Patient will follow up with neurology also  Mrs Rachel Gonzáles states that the plan is to discontinue the Keppra later on this year  Patient is to return in 3 weeks   Patient knows to call the hematology/oncology office if there are any other questions or concerns  Carefully review your medication list and verify that the list is accurate and up-to-date   Please call the hematology/oncology office if there are medications missing from the list, medications on the list that you are not currently taking or if there is a dosage or instruction that is different from how you're taking that medication  Patient goals and areas of care: continue with the Avastin  Patient is able to self-care   _____________________________________________________________________________________    Chief Complaint   Patient presents with    Follow-up     Advance Care Planning/Advance Directives: not discussed       Adenocarcinoma of lung, stage 4, right ([facility] )    9/5/2015 Initial Diagnosis     Patient was referred to the emergency room for evaluation of vertigo as sent from the [facility]  (patient reported )  Patient underwent the following pertinent imaging scans  MRI of the brain demonstrated a mass in the right superior all temporal gyrus with measurements of 4 2 x 3 3 x 3 6 cm with vasogenic edema and mass effect  There was a near uncal herniation noted  CT of the chest and abdomen/pelvis demonstrated a necrotic right upper lobe mass measuring 7 x 4 2 x 4 1 cm strongly suspicious for bronchogenic carcinoma  The mass contacts the posterior medial pleural surface and potentially contacts the right posterior tracheal border  No pathologic adenopathy was identified in no evidence of metastatic disease in the chest abdomen or pelvis  9/6/2015 Surgery     Non-small cell carcinoma of lung ([facility] [address] ) diagnosed from biopsy of right temporal mass  Pathology demonstrates poorly differentiated non-small cell carcinoma  Pathologist stated that the immunoprofile supported the primary lung non-small cell carcinoma favor adenocarcinoma  Squamous carcinoma could not be excluded, secondary to focal P 40 staining  Duaine Fortis path report demonstrated no ALK gene rearrangement or dleation and negative ROS1 rearrangement, No EGFR mutations were found  Surgery completed by Dr Adilene Billings             - 10/15/2015 Radiation     SRT to brain 3000 cGY in 5 fractions  10/20/2015 -  Research Study Participant     [id number] Phase III open label randomized study of [id number] [Anti-PDL1 antibody] in combination with carboplatin and paclitaxel +/- Avastin versus Carboplatin and Paciltaxel +/- Avastin in patients with stage IV Chemotherapy  Naive non-small cell lung carcinoma  Patient was randomized to receive off for medications  11/30/2015 - 2/18/2016 Chemotherapy     Started Carboplatin, Paclitaxel, Avastin and PD-L1 (atezolizumab; study drug)  Completed 6 cycles with cycle 6 day 1 on 2/18/16         3/10/2016 -  Chemotherapy     Beginning cycle 7, maintenance cycle with Avastin and PD-L1 (Atezolizumab; study drug)         5/24/2017 Adverse Reaction     LFT elevation-persisted  Patient was taken off of study drug Atezolizumab, but continues on Avastin maintance  History of Present Illness:    59year old female recently diagnosed with IV lung cancer here for followup  Mrs Samir Carbajal began to have headaches last spring 2015  Patient was seen by her PCP and treated with antibiotics  Initially the symptoms got better the patient went on vacation  After returning from vacation, Mrs Samir Carbajal once again developed headaches  Patient was treated with a second round of antibiotics and then was diagnosed with vertigo  Patient was sent to the [address] [place] [address]  Although the specifics are not entirely clear, the therapist in the [address] [place] [address] sent the patient to the emergency room  A CAT scan of the brain demonstrated a brain lesion and patient was transferred to [facility]  Patient was seen by Dr Sai Bangura and underwent resection demonstrating adenocarcinoma  Additional testing demonstrated a lung mass  Patient improved and was discharged  Patient completed SRT with Dr Giles Tomlinson and Dr Richard Haider        Patient is on [facility] [address]66 (phase III open label randomized study of [id number] [anti-PD-L1 antibody] in combination with carboplatin and paclitaxel +/- Avastin versus carboplatin and paclitaxel +/- Avastin in patients with stage IV chemotherapy naÃ¯ve non-small cell lung carcinoma)  Mrs January Parker was randomized to receive the anti-PD-L1 antibody with chemotherapy - patient completed 6 cycles and was placed on maintenance  Mrs Elle Suggs previously suffered a tonic-clonic seizure and was seen in the emergency room  CAT scan of the head did not demonstrate any evidence of disease progression  The seizure was thought to be due to encephalomalacia  Since that time, there have been no seizure issues  Mrs January Parker is on Keppra  Patient has had elevated liver function tests  Because the abnormalities were grade 3, patient was taken off the [id number] and has continued on protocol with Avastin only  Chemotherapy was held on the last cycle because of elevated LFTs  Patient presents for follow-up  Patient states feeling OK, baseline  Appetite is good, weight is stable  No fevers, chills or sweats  No pulmonary issues  No CNS issues, mental status change, blurred vision, headaches or dizziness  No recent seizures  No  or GI issues  No GYN issues  Activities are baseline  Fatigue is the same as before  No specific issues with the Avastin  Right anterior chest wall and neck discomfort from the port is the same as before - massages are helpful  Routine health maintenance and medical care is otherwise up-to-date with Dr Tami Kc  Review of Systems   Constitutional: Negative  HENT: Negative  Eyes: Negative  Respiratory: Negative  Cardiovascular: Negative  Gastrointestinal: Negative  Endocrine: Negative  Genitourinary: Negative  Musculoskeletal: Positive for arthralgias  Negative for neck pain  Skin: Negative  Allergic/Immunologic: Negative  Neurological: Negative  Hematological: Negative  Psychiatric/Behavioral: Negative  All other systems reviewed and are negative       Patient Active Problem List   Diagnosis    [facility]    Adenocarcinoma of lung, stage 4, right [facility]    Mixed hyperlipidemia    Abnormal LFTs    Allergic rhinitis    [facility]    Brain tumor ([facility] )    Encephalomalacia    Impaired fasting glucose    Insomnia    Lesion of temporal lobe    Non-small cell lung cancer [facility]    Benign hypertension     Past Medical History:   Diagnosis Date    Allergic rhinitis     Alopecia     resolved 10/25/16    Anxiety     last assessed 10/4/16, resolved 10/25/16    Benign hypertension 2/1/2018    Benign neoplasm of skin of trunk 03/25/2008    last assessed 3/25/08    Benign neoplasm of skin of upper extremity and shoulder 03/25/2008    last assessed 3/25/08    Ear deformity, acquired     last assessed 10/13/14, resolved 3/12/15    Eczema     Hyperlipemia     Lung cancer ([facility] )     Maintenance chemotherapy     Secondary cancer of brain ([facility] )     Seizures ([facility] )     Vertigo      Past Surgical History:   Procedure Laterality Date    APPENDECTOMY  1964    BRAIN TUMOR EXCISION      CENTRAL VENOUS CATHETER INSERTION Right     PORTACATH     Family History   Problem Relation Age of Onset    Diabetes Mother     Heart disease Mother     Lung cancer Father     Pancreatitis Brother     No Known Problems Brother      Social History     Social History    Marital status: /Civil Union     Spouse name: N/A    Number of children: 1    Years of education: N/A     Occupational History    Not on file       Social History Main Topics    Smoking status: Former Smoker    Smokeless tobacco: Never Used    Alcohol use Yes      Comment: occasionally / Social     Drug use: Yes     Types: Marijuana    Sexual activity: Yes     Other Topics Concern    Not on file     Social History Narrative    High school or GED     Lives independently with spouse        Current Outpatient Prescriptions:     ALPRAZolam (XANAX) 0 25 mg tablet, Take 0 25 mg by mouth daily at bedtime as needed for anxiety, Disp: , Rfl:     amLODIPine (NORVASC) 2 5 mg tablet, Take 1 tablet (2 5 mg total) by mouth daily, Disp: 90 tablet, Rfl: 1    atorvastatin (LIPITOR) 10 mg tablet, Take 1 tablet (10 mg total) by mouth daily before breakfast, Disp: 90 tablet, Rfl: 1    cyanocobalamin (VITAMIN B-12) 100 mcg tablet, Take 1 tablet by mouth daily, Disp: , Rfl:     levETIRAcetam (KEPPRA XR) 500 MG 24 hr tablet, TAKE ONE TABLET BY MOUTH ONCE DAILY IN THE MORNING, Disp: 30 tablet, Rfl: 5    levETIRAcetam (KEPPRA) 500 mg tablet, Take 500 mg by mouth daily  , Disp: , Rfl:     Omega-3 Fatty Acids (FISH OIL PO), Take 2 tablets by mouth , Disp: , Rfl:     ondansetron (ZOFRAN) 8 mg tablet, Take 1 tablet (8 mg total) by mouth 3 (three) times a day, Disp: 1 tablet, Rfl: 2    oxyCODONE (ROXICODONE) 5 mg immediate release tablet, Take 1 tablet (5 mg total) by mouth every 6 (six) hours as needed for moderate pain Max Daily Amount: 20 mg, Disp: 90 tablet, Rfl: 0    prochlorperazine (COMPAZINE) 10 mg tablet, Take 10 mg by mouth every 6 (six) hours as needed for nausea or vomiting, Disp: , Rfl:     ranitidine (ZANTAC) 75 MG tablet, Take 1 tablet by mouth daily, Disp: , Rfl:   No current facility-administered medications for this visit  Facility-Administered Medications Ordered in Other Visits:     alteplase (CATHFLO) injection 2 mg, 2 mg, Intracatheter, PRN, Bayron Upton MD    sodium chloride 0 9 % infusion, 20 mL/hr, Intravenous, Continuous, Bayron Upton MD    Allergies   Allergen Reactions    Iodinated Diagnostic Agents Anaphylaxis and Itching    Clindamycin     Clindamycin/Lincomycin        Vitals:    08/01/18 1414   BP: 134/88   Pulse: 83   Resp: 18   Temp: 98 3 °F (36 8 °C)   SpO2: 95%     Physical Exam   Constitutional: She is oriented to person, place, and time  She appears well-developed and well-nourished  HENT:   Head: Normocephalic and atraumatic     Right Ear: External ear normal    Left Ear: External ear normal    Nose: Nose normal    Mouth/Throat: Oropharynx is clear and moist    Eyes: Conjunctivae and EOM are normal  Pupils are equal, round, and reactive to light  Neck: Normal range of motion  Neck supple  Cardiovascular: Normal rate, regular rhythm, normal heart sounds and intact distal pulses  Pulmonary/Chest: Effort normal and breath sounds normal    Right anterior chest wall port in place, area clean and dry, area is tender to the touch, no signs of infection or bleeding underneath   Abdominal: Soft  Bowel sounds are normal    Musculoskeletal: Normal range of motion  Neurological: She is alert and oriented to person, place, and time  She has normal reflexes  Skin: Skin is warm  Skin is warm, well tanned, scattered few purpura on upper extremities, no petechiae, no hematomas   Psychiatric: She has a normal mood and affect   Her behavior is normal  Judgment and thought content normal    Extremities: no edema, no cords, pulses are 1+  Lymphatics: no adenopathy in the neck, supraclavicular region, axilla and groin bilaterally    Performance Status: 1 - Symptomatic but completely ambulatory    Labs:    07/30/2018 WBC = 6 6 hemoglobin = 13 9 hematocrit = 39 1 platelet = 139 neutrophil = 61% BUN = 12 creatinine = 0 95 AST = 51 ALT = 56 alkaline phosphatase = 88 total bilirubin = 0 5 TSH = 2 310 LDH = 146  07/09/2018 WBC = 7 5 hemoglobin = 14 6 hematocrit = 43 5 platelet = 634 neutrophil = 57% BUN = 11 creatinine = 0 94 calcium = 9 7 AST = 36 ALT = 48  06/19/2018 BUN = 11 creatinine = 1 01 calcium = 9 9 AST = 42 ALT = 50 WBC = 8 5 hemoglobin = 14 6 hematocrit = 42 platelet = 227  43/64/9475 WBC = 7 1 hemoglobin = 14 2 hematocrit = 43 platelet = 395 BUN = 16 creatinine = 0 91 AST = 54 ALT = 73  04/23/2018 TSH = 2 450 LDH = 157 BUN = 15 creatinine = 0 93 AST = 49 ALT = 84 alkaline phosphatase = 90 total bilirubin = 0 7 WBC = 6 0 hemoglobin = 14 7 hematocrit = 42 7 platelet = 874 neutrophil = 40%  04/02/2018 WBC = 7 0 hemoglobin = 15 4 hematocrit = 44 platelet = 651 neutrophil = 51% BUN = 12 creatinine = 0 89 AST = 73 ALT = 91 alkaline phosphatase = 84 total bilirubin = 0 8 LDH = 168 phosphorus = 4 0 magnesium = 1 9    Imaging    06/18/2018 CT scan of the chest and abdomen/pelvis    Unchanged cavitary right upper lobe mass    No new metastatic disease  04/16/2018 CT scan of the chest and abdomen/pelvis    There is no significant interval change in size of the cystic or nodular components of the cavitary right upper lobe mass  No new metastatic lesions identified in the chest, abdomen or pelvis  1/22/18 CAT scan of the chest and abdomen/pelvis    RECIST MEASUREMENTS:  TARGET LESION:   1: Right upper lobe cavitary lung mass: The overall size of the lesion is 3 5 x 2 4 cm on image 14 of series 3, significantly decreased from 4 3 x 2 7 cm on previous examination  Solid component along the medial aspect of the posterior wall is not significantly changed from previous examination currently measuring 1 5 x 0 9 cm on image 14 of series 3 compared with 1 8 x 0 8 cm when measured using similar technique on previous   examination  Nodular solid component along the lateral aspect of posterior wall measures approximately 0 7 x 0 4 cm on image 13 of series 3, and when measured using similar technique is unchanged from previous examinations  There is interval decrease in size of the cystic portion of the cavitary right upper lobe mass however, solid components of this mass are not significantly changed from previous examination  Otherwise unchanged examination with no new metastatic lesions   identified in the chest, abdomen or pelvis  Pathology    [facility] results demonstrated no ALK gene rearrangement or deletion and negative for ROS1 rearrangement  No EGFR mutations were found also  Right temporal mass from September 6, 2015 demonstrated metastatic poorly differentiated non-small cell carcinoma  The tumor cells stained positive for CK 7, TTF-1 and Napsin and negative for CK 20, CK 5/6 andmucicarmine  Pathologist stated that the immunoprofile supported the primary lung non-small cell carcinoma, favor adenocarcinoma  The pathologist also stated that given the focal p40 staining, a squamous carcinoma could not be excluded

## 2018-08-02 ENCOUNTER — HOSPITAL ENCOUNTER (OUTPATIENT)
Dept: INFUSION CENTER | Facility: CLINIC | Age: 58
Discharge: HOME | End: 2018-08-02
Payer: COMMERCIAL

## 2018-08-02 VITALS
BODY MASS INDEX: 28.79 KG/M2 | OXYGEN SATURATION: 97 % | DIASTOLIC BLOOD PRESSURE: 80 MMHG | HEART RATE: 83 BPM | TEMPERATURE: 98 F | SYSTOLIC BLOOD PRESSURE: 118 MMHG | RESPIRATION RATE: 18 BRPM | HEIGHT: 67 IN | WEIGHT: 183.42 LBS

## 2018-08-02 PROCEDURE — J9035Q0 INV BEVACIZUMAB 400MG/16ML 16 ML: Performed by: INTERNAL MEDICINE

## 2018-08-02 PROCEDURE — 96413 CHEMO IV INFUSION 1 HR: CPT

## 2018-08-02 RX ORDER — SODIUM CHLORIDE 9 MG/ML
20 INJECTION, SOLUTION INTRAVENOUS CONTINUOUS
Status: DISCONTINUED | OUTPATIENT
Start: 2018-08-02 | End: 2018-08-05 | Stop reason: HOSPADM

## 2018-08-02 RX ADMIN — Medication 300 UNITS: at 15:49

## 2018-08-02 RX ADMIN — SODIUM CHLORIDE 20 ML/HR: 0.9 INJECTION, SOLUTION INTRAVENOUS at 14:10

## 2018-08-02 RX ADMIN — Medication 1254 MG: at 14:55

## 2018-08-15 DIAGNOSIS — C34.91 NON-SMALL CELL CANCER OF RIGHT LUNG (HCC): Primary | ICD-10-CM

## 2018-08-16 DIAGNOSIS — E78.2 MIXED HYPERLIPIDEMIA: ICD-10-CM

## 2018-08-16 RX ORDER — ATORVASTATIN CALCIUM 10 MG/1
TABLET, FILM COATED ORAL
Qty: 90 TABLET | Refills: 1 | Status: SHIPPED | OUTPATIENT
Start: 2018-08-16 | End: 2019-03-11 | Stop reason: SDUPTHER

## 2018-08-20 ENCOUNTER — HOSPITAL ENCOUNTER (OUTPATIENT)
Dept: RADIOLOGY | Facility: HOSPITAL | Age: 58
Discharge: HOME | End: 2018-08-20
Attending: INTERNAL MEDICINE

## 2018-08-20 DIAGNOSIS — C34.90 MALIGNANT NEOPLASM OF BRONCHUS AND LUNG (HCC): ICD-10-CM

## 2018-08-20 PROCEDURE — 71250 CT THORAX DX C-: CPT

## 2018-08-20 PROCEDURE — 74176 CT ABD & PELVIS W/O CONTRAST: CPT

## 2018-08-22 ENCOUNTER — OFFICE VISIT (OUTPATIENT)
Dept: HEMATOLOGY ONCOLOGY | Facility: MEDICAL CENTER | Age: 58
End: 2018-08-22
Payer: COMMERCIAL

## 2018-08-22 VITALS
TEMPERATURE: 96.9 F | SYSTOLIC BLOOD PRESSURE: 138 MMHG | BODY MASS INDEX: 28.56 KG/M2 | OXYGEN SATURATION: 98 % | HEART RATE: 103 BPM | HEIGHT: 67 IN | RESPIRATION RATE: 18 BRPM | DIASTOLIC BLOOD PRESSURE: 84 MMHG | WEIGHT: 182 LBS

## 2018-08-22 DIAGNOSIS — C34.91 ADENOCARCINOMA OF LUNG, STAGE 4, RIGHT (HCC): Primary | ICD-10-CM

## 2018-08-22 LAB
ALBUMIN SERPL-MCNC: 4.3 G/DL (ref 3.5–5.5)
ALBUMIN/GLOB SERPL: 1.7 {RATIO} (ref 1.2–2.2)
ALP SERPL-CCNC: 89 IU/L (ref 39–117)
ALT SERPL-CCNC: 41 IU/L (ref 0–32)
APPEARANCE UR: CLEAR
AST SERPL-CCNC: 28 IU/L (ref 0–40)
BACTERIA URNS QL MICRO: ABNORMAL
BASOPHILS # BLD AUTO: 0 X10E3/UL (ref 0–0.2)
BASOPHILS NFR BLD AUTO: 0 %
BILIRUB SERPL-MCNC: 0.3 MG/DL (ref 0–1.2)
BILIRUB UR QL STRIP: NEGATIVE
BUN SERPL-MCNC: 14 MG/DL (ref 6–24)
BUN/CREAT SERPL: 16 (ref 9–23)
CALCIUM SERPL-MCNC: 9.8 MG/DL (ref 8.7–10.2)
CHLORIDE SERPL-SCNC: 100 MMOL/L (ref 96–106)
CO2 SERPL-SCNC: 23 MMOL/L (ref 20–29)
COLOR UR: YELLOW
CREAT SERPL-MCNC: 0.86 MG/DL (ref 0.57–1)
EOSINOPHIL # BLD AUTO: 0.2 X10E3/UL (ref 0–0.4)
EOSINOPHIL NFR BLD AUTO: 3 %
EPI CELLS #/AREA URNS HPF: ABNORMAL /HPF
ERYTHROCYTE [DISTWIDTH] IN BLOOD BY AUTOMATED COUNT: 13.4 % (ref 12.3–15.4)
GLOBULIN SER-MCNC: 2.6 G/DL (ref 1.5–4.5)
GLUCOSE SERPL-MCNC: 131 MG/DL (ref 65–99)
GLUCOSE UR QL: NEGATIVE
HCT VFR BLD AUTO: 42.9 % (ref 34–46.6)
HGB BLD-MCNC: 14.6 G/DL (ref 11.1–15.9)
HGB UR QL STRIP: NEGATIVE
IMM GRANULOCYTES # BLD: 0 X10E3/UL (ref 0–0.1)
IMM GRANULOCYTES NFR BLD: 0 %
KETONES UR QL STRIP: NEGATIVE
LDH SERPL-CCNC: 126 IU/L (ref 119–226)
LEUKOCYTE ESTERASE UR QL STRIP: ABNORMAL
LYMPHOCYTES # BLD AUTO: 2.7 X10E3/UL (ref 0.7–3.1)
LYMPHOCYTES NFR BLD AUTO: 35 %
MAGNESIUM SERPL-MCNC: 2 MG/DL (ref 1.6–2.3)
MCH RBC QN AUTO: 31.6 PG (ref 26.6–33)
MCHC RBC AUTO-ENTMCNC: 34 G/DL (ref 31.5–35.7)
MCV RBC AUTO: 93 FL (ref 79–97)
MICRO URNS: ABNORMAL
MONOCYTES # BLD AUTO: 0.4 X10E3/UL (ref 0.1–0.9)
MONOCYTES NFR BLD AUTO: 6 %
MUCOUS THREADS URNS QL MICRO: PRESENT
NEUTROPHILS # BLD AUTO: 4.3 X10E3/UL (ref 1.4–7)
NEUTROPHILS NFR BLD AUTO: 56 %
NITRITE UR QL STRIP: NEGATIVE
PH UR STRIP: 5.5 [PH] (ref 5–7.5)
PHOSPHATE SERPL-MCNC: 4.2 MG/DL (ref 2.5–4.5)
PLATELET # BLD AUTO: 308 X10E3/UL (ref 150–379)
POTASSIUM SERPL-SCNC: 5 MMOL/L (ref 3.5–5.2)
PROT SERPL-MCNC: 6.9 G/DL (ref 6–8.5)
PROT UR QL STRIP: NEGATIVE
RBC # BLD AUTO: 4.62 X10E6/UL (ref 3.77–5.28)
RBC #/AREA URNS HPF: ABNORMAL /HPF
SL AMB EGFR AFRICAN AMERICAN: 86 ML/MIN/1.73
SL AMB EGFR NON AFRICAN AMERICAN: 74 ML/MIN/1.73
SODIUM SERPL-SCNC: 138 MMOL/L (ref 134–144)
SP GR UR: 1.01 (ref 1–1.03)
UROBILINOGEN UR STRIP-ACNC: 0.2 EU/DL (ref 0.2–1)
WBC # BLD AUTO: 7.6 X10E3/UL (ref 3.4–10.8)
WBC #/AREA URNS HPF: ABNORMAL /HPF

## 2018-08-22 PROCEDURE — 99214 OFFICE O/P EST MOD 30 MIN: CPT | Performed by: INTERNAL MEDICINE

## 2018-08-22 RX ORDER — SODIUM CHLORIDE 9 MG/ML
20 INJECTION, SOLUTION INTRAVENOUS CONTINUOUS
Status: DISCONTINUED | OUTPATIENT
Start: 2018-08-23 | End: 2018-08-26 | Stop reason: HOSPADM

## 2018-08-22 NOTE — PROGRESS NOTES
Camila Sellers  1960  [facility] [place] [id number]    DISCUSSION  SUMMARY:    49-year-old female with M1 non-small cell lung carcinoma/adenocarcinoma  Patient is on [facility] 20015-14 (phase III open label randomized study of [id number] [anti-PD-L1 antibody] in combination with carboplatin and paclitaxel +/- Avastin versus carboplatin and paclitaxel +/- Avastin in patients with stage IV chemotherapy naive non-small cell lung carcinoma)  Patient was randomized to receive all 4 medications  Mrs Rajan Griffin completed the 6 cycles of treatment without significant toxicities  Patient had been on maintenance (study drug (atezolizumab) and avastin) - now only Avastin (atezolizumab was discontinued by the PI because of the elevated LFTs)  Issues:      1  Stage IV non-small cell lung carcinoma  Patient feels well and clinically there are no lung cancer related issues/sign  The recent CT scan did not demonstrate any clear evidence for disease progression but there was a new mid left lower lobe infiltrate  This will need to be re-evaluated on the next CT scan  LFTs are slightly elevated but acceptable for Avastin  Patient will continue with the same regimen; next dose is scheduled for tomorrow  From an oncology standpoint, patient should continue with the same regimen  The next CT scans will be scheduled for 9 weeks  We discussed what to monitor for in regard to disease progression (fatigue, cough, hemoptysis, on plan weight loss etc)  We also discussed the possibility of repeating a PET-CT  The recent CT scan results did not specifically states the size of this patchy infiltrate  I have discussed with the patient that I would rather hold off on the PET-CT now for number of reasons  The last PET CT was 2- 3 years ago; patient has been undergoing CT scans more recently    I think it best that we wait for the next CT scan to see if there is evidence of disease progression versus resolution (dnd then make decisions)      2   Elevated LFTs  Patient has a history of hypercholesterolemia and has been on Lipitor in the past - PCP is monitoring  Surveillance is ongoing  Recent AST and ALT were lower/better than before  3  Brain metastases status post resection and radiotherapy  Patient will follow-up with neurosurgery and radiation oncology as directed  As above, the Keppra dose was recently changed - no issues  Patient will follow up with neurology also  Mrs Juliet Sears states that the plan is to discontinue the Keppra later on this year  Patient is to return in 3 weeks   Patient knows to call the hematology/oncology office if there are any other questions or concerns  Carefully review your medication list and verify that the list is accurate and up-to-date  Please call the hematology/oncology office if there are medications missing from the list, medications on the list that you are not currently taking or if there is a dosage or instruction that is different from how you're taking that medication  Patient goals and areas of care: continue with the Avastin  Patient is able to self-care   _____________________________________________________________________________________    Chief Complaint   Patient presents with    Follow-up     Metastatic non-small cell lung carcinoma on Avastin     Advance Care Planning/Advance Directives: not discussed       Adenocarcinoma of lung, stage 4, right ([facility] [address] )    9/5/2015 Initial Diagnosis     Patient was referred to the emergency room for evaluation of vertigo as sent from the [facility]  (patient reported )  Patient underwent the following pertinent imaging scans  MRI of the brain demonstrated a mass in the right superior all temporal gyrus with measurements of 4 2 x 3 3 x 3 6 cm with vasogenic edema and mass effect  There was a near uncal herniation noted     CT of the chest and abdomen/pelvis demonstrated a necrotic right upper lobe mass measuring 7 x 4 2 x 4 1 cm strongly suspicious for bronchogenic carcinoma  The mass contacts the posterior medial pleural surface and potentially contacts the right posterior tracheal border  No pathologic adenopathy was identified in no evidence of metastatic disease in the chest abdomen or pelvis  9/6/2015 Surgery     Non-small cell carcinoma of lung ([address] ) diagnosed from biopsy of right temporal mass  Pathology demonstrates poorly differentiated non-small cell carcinoma  Pathologist stated that the immunoprofile supported the primary lung non-small cell carcinoma favor adenocarcinoma  Squamous carcinoma could not be excluded, secondary to focal P 40 staining  [facility] path report demonstrated no ALK gene rearrangement or dleation and negative ROS1 rearrangement, No EGFR mutations were found  Surgery completed by Dr Alexander Watkins            - 10/15/2015 Radiation     SRT to brain 3000 cGY in 5 fractions  10/20/2015 -  Research Study Participant     [id number] Phase III open label randomized study of [id number] [Anti-PDL1 antibody] in combination with carboplatin and paclitaxel +/- Avastin versus Carboplatin and Paciltaxel +/- Avastin in patients with stage IV Chemotherapy  Naive non-small cell lung carcinoma  Patient was randomized to receive off for medications  11/30/2015 - 2/18/2016 Chemotherapy     Started Carboplatin, Paclitaxel, Avastin and PD-L1 (atezolizumab; study drug)  Completed 6 cycles with cycle 6 day 1 on 2/18/16         3/10/2016 -  Chemotherapy     Beginning cycle 7, maintenance cycle with Avastin and PD-L1 (Atezolizumab; study drug)         5/24/2017 Adverse Reaction     LFT elevation-persisted  Patient was taken off of study drug Atezolizumab, but continues on Avastin maintance  History of Present Illness:    59year old female recently diagnosed with IV lung cancer here for followup  Mrs Ibrahima Gracia began to have headaches last spring 2015  Patient was seen by her PCP and treated with antibiotics  Initially the symptoms got better the patient went on vacation  After returning from vacation, Mrs Angie Ventura once again developed headaches  Patient was treated with a second round of antibiotics and then was diagnosed with vertigo  Patient was sent to the [address] [place] [address]  Although the specifics are not entirely clear, the therapist in the [address] [place] [address] sent the patient to the emergency room  A CAT scan of the brain demonstrated a brain lesion and patient was transferred to [facility]  Patient was seen by Dr Byron Walker and underwent resection demonstrating adenocarcinoma  Additional testing demonstrated a lung mass  Patient improved and was discharged  Patient completed SRT with Dr Estelita Oconnell and Dr Raul Bauer  [facility] (phase III open label randomized study of [id number] [anti-PD-L1 antibody] in combination with carboplatin and paclitaxel +/- Avastin versus carboplatin and paclitaxel +/- Avastin in patients with stage IV chemotherapy naÃ¯ve non-small cell lung carcinoma)  Mrs Angie Ventura was randomized to receive the anti-PD-L1 antibody with chemotherapy - patient completed 6 cycles and was placed on maintenance  Mrs Steph Hale previously suffered a tonic-clonic seizure and was seen in the emergency room  CAT scan of the head did not demonstrate any evidence of disease progression  The seizure was thought to be due to encephalomalacia  Since that time, there have been no seizure issues  Mrs Angie Ventura is on Keppra  Patient has had elevated liver function tests  Because the abnormalities were grade 3, patient was taken off the [id number] and has continued on protocol with Avastin only  Chemotherapy was held on the last cycle because of elevated LFTs  Patient presents for follow-up  Patient states feeling okay, about the same as before  Fatigue is minimal  No fevers, chills or sweats   No pulmonary issues  No CNS issues, mental status change, blurred vision, headaches or dizziness  No recent seizures  No  or GI issues  No GYN issues  Activities are baseline  No specific issues with the Avastin  Right anterior chest wall and neck discomfort from the port is the same as before - massages are helpful  Review of Systems   Constitutional: Positive for fatigue  HENT: Negative  Eyes: Negative  Respiratory: Negative  Cardiovascular: Negative  Gastrointestinal: Negative  Endocrine: Negative  Genitourinary: Negative  Musculoskeletal: Positive for arthralgias  Negative for neck pain  Skin: Negative  Allergic/Immunologic: Negative  Neurological: Negative  Hematological: Negative  Psychiatric/Behavioral: Negative  All other systems reviewed and are negative       Patient Active Problem List   Diagnosis    Seizure ([facility] [address] )    Adenocarcinoma of lung, stage 4, right ([facility] [address] )    Mixed hyperlipidemia    Abnormal LFTs    Allergic rhinitis    Brain metastasis ([facility] [address] )    Brain tumor ([facility] [address] )    Encephalomalacia    Impaired fasting glucose    Insomnia    Lesion of temporal lobe    Non-small cell lung cancer ([facility] [address] )    Benign hypertension     Past Medical History:   Diagnosis Date    Allergic rhinitis     Alopecia     resolved 10/25/16    Anxiety     last assessed 10/4/16, resolved 10/25/16    Benign hypertension 2/1/2018    Benign neoplasm of skin of trunk 03/25/2008    last assessed 3/25/08    Benign neoplasm of skin of upper extremity and shoulder 03/25/2008    last assessed 3/25/08    Ear deformity, acquired     last assessed 10/13/14, resolved 3/12/15    Eczema     Hyperlipemia     Lung cancer ([facility] [address] )     Maintenance chemotherapy     Secondary cancer of brain ([address] )     Seizures ([facility] [address] )     Vertigo      Past Surgical History:   Procedure Laterality Date    APPENDECTOMY  1964    BRAIN TUMOR EXCISION      CENTRAL VENOUS CATHETER INSERTION Right     PORTACATH     Family History   Problem Relation Age of Onset    Diabetes Mother     Heart disease Mother     Lung cancer Father     Pancreatitis Brother     No Known Problems Brother      Social History     Social History    Marital status: /Civil Union     Spouse name: N/A    Number of children: 1    Years of education: N/A     Occupational History    Not on file       Social History Main Topics    Smoking status: Former Smoker    Smokeless tobacco: Never Used    Alcohol use Yes      Comment: occasionally / Social     Drug use: Yes     Types: Marijuana    Sexual activity: Yes     Other Topics Concern    Not on file     Social History Narrative    High school or GED     Lives independently with spouse        Current Outpatient Prescriptions:     ALPRAZolam (XANAX) 0 25 mg tablet, Take 0 25 mg by mouth daily at bedtime as needed for anxiety, Disp: , Rfl:     amLODIPine (NORVASC) 2 5 mg tablet, Take 1 tablet (2 5 mg total) by mouth daily, Disp: 90 tablet, Rfl: 1    atorvastatin (LIPITOR) 10 mg tablet, TAKE 1 TABLET BY MOUTH ONCE DAILY BEFORE BREAKFAST, Disp: 90 tablet, Rfl: 1    cyanocobalamin (VITAMIN B-12) 100 mcg tablet, Take 1 tablet by mouth daily, Disp: , Rfl:     levETIRAcetam (KEPPRA XR) 500 MG 24 hr tablet, TAKE ONE TABLET BY MOUTH ONCE DAILY IN THE MORNING, Disp: 30 tablet, Rfl: 5    levETIRAcetam (KEPPRA) 500 mg tablet, Take 500 mg by mouth daily  , Disp: , Rfl:     Omega-3 Fatty Acids (FISH OIL PO), Take 2 tablets by mouth , Disp: , Rfl:     ondansetron (ZOFRAN) 8 mg tablet, Take 1 tablet (8 mg total) by mouth 3 (three) times a day, Disp: 1 tablet, Rfl: 2    oxyCODONE (ROXICODONE) 5 mg immediate release tablet, Take 1 tablet (5 mg total) by mouth every 6 (six) hours as needed for moderate pain Max Daily Amount: 20 mg, Disp: 90 tablet, Rfl: 0    prochlorperazine (COMPAZINE) 10 mg tablet, Take 10 mg by mouth every 6 (six) hours as needed for nausea or vomiting, Disp: , Rfl:     ranitidine (ZANTAC) 75 MG tablet, Take 1 tablet by mouth daily, Disp: , Rfl:   No current facility-administered medications for this visit  Facility-Administered Medications Ordered in Other Visits:     alteplase (CATHFLO) injection 2 mg, 2 mg, Intracatheter, PRRoopa PRADHAN MD    [START ON 8/23/2018] alteplase (CATHFLO) injection 2 mg, 2 mg, Intracatheter, PRRoopa PRADHAN MD    [START ON 8/23/2018] heparin lock flush 100 units/mL injection 300 Units, 300 Units, Intracatheter, PRNRoopa MD    sodium chloride 0 9 % infusion, 20 mL/hr, Intravenous, Continuous, Roopa Schneider MD  Aaron Crawley  Tiny Blotter ON 8/23/2018] sodium chloride 0 9 % infusion, 20 mL/hr, Intravenous, Continuous, Roopa Schneider MD    Allergies   Allergen Reactions    Iodinated Diagnostic Agents Anaphylaxis and Itching    Clindamycin     Clindamycin/Lincomycin        Vitals:    08/22/18 1357   BP: 138/84   Pulse: 103   Resp: 18   Temp: (!) 96 9 °F (36 1 °C)   SpO2: 98%     Physical Exam   Constitutional: She is oriented to person, place, and time  She appears well-developed and well-nourished  HENT:   Head: Normocephalic and atraumatic  Right Ear: External ear normal    Left Ear: External ear normal    Nose: Nose normal    Mouth/Throat: Oropharynx is clear and moist    Eyes: Conjunctivae and EOM are normal  Pupils are equal, round, and reactive to light  Neck: Normal range of motion  Neck supple  Cardiovascular: Normal rate, regular rhythm, normal heart sounds and intact distal pulses  Pulmonary/Chest: Effort normal and breath sounds normal    Right anterior chest wall port in place, area clean and dry, area is tender to the touch, no signs of infection or bleeding underneath   Abdominal: Soft  Bowel sounds are normal    Musculoskeletal: Normal range of motion  Neurological: She is alert and oriented to person, place, and time  She has normal reflexes  Skin: Skin is warm     Skin is warm, well tanned, scattered few purpura on upper extremities, no petechiae, no hematomas   Psychiatric: She has a normal mood and affect  Her behavior is normal  Judgment and thought content normal    Extremities: no edema, no cords, pulses are 1+  Lymphatics: no adenopathy in the neck, supraclavicular region, axilla and groin bilaterally    Performance Status: 1 - Symptomatic but completely ambulatory    Labs:    08/21/2018 BUN = 14 creatinine = 0 86 calcium = 9 3 AST = 28 ALT = 41 total bilirubin = 0 3 alkaline phosphatase = 89 WBC = 7 6 hemoglobin = 14 6 hematocrit = 43 platelet = 045 LDH = 126 phosphorus = 4 2 magnesium = 2 0  07/30/2018 WBC = 6 6 hemoglobin = 13 9 hematocrit = 39 1 platelet = 851 neutrophil = 61% BUN = 12 creatinine = 0 95 AST = 51 ALT = 56 alkaline phosphatase = 88 total bilirubin = 0 5 TSH = 2 310 LDH = 146  07/09/2018 WBC = 7 5 hemoglobin = 14 6 hematocrit = 43 5 platelet = 816 neutrophil = 57% BUN = 11 creatinine = 0 94 calcium = 9 7 AST = 36 ALT = 48  06/19/2018 BUN = 11 creatinine = 1 01 calcium = 9 9 AST = 42 ALT = 50 WBC = 8 5 hemoglobin = 14 6 hematocrit = 42 platelet = 885  33/38/8343 WBC = 7 1 hemoglobin = 14 2 hematocrit = 43 platelet = 545 BUN = 16 creatinine = 0 91 AST = 54 ALT = 73  04/23/2018 TSH = 2 450 LDH = 157 BUN = 15 creatinine = 0 93 AST = 49 ALT = 84 alkaline phosphatase = 90 total bilirubin = 0 7 WBC = 6 0 hemoglobin = 14 7 hematocrit = 42 7 platelet = 299 neutrophil = 40%    Imaging    08/20/2018 CT scan of the chest and abdomen/pelvis without contrast    RECIST target lesion: Cavitary right upper lobe mass is unchanged, measuring 3 4 x 2 6 cm on series 3 image 15 (previously 3 5 x 2 5 cm)  Solid component along the medial aspect of the posterior wall is also unchanged, measuring 1 4 x 0 9 cm      LUNGS:  Stable cavitary right upper lobe mass, as above  No new nodules identified  There is new small patchy density in the posterior left base compatible with infiltrate    There is no tracheal or endobronchial lesion  IMPRESSION    Unchanged cavitary right upper lobe mass  No new metastatic disease  Interval development of mild left lower lobe infiltrate noted  06/18/2018 CT scan of the chest and abdomen/pelvis    Unchanged cavitary right upper lobe mass    No new metastatic disease  04/16/2018 CT scan of the chest and abdomen/pelvis    There is no significant interval change in size of the cystic or nodular components of the cavitary right upper lobe mass  No new metastatic lesions identified in the chest, abdomen or pelvis  1/22/18 CAT scan of the chest and abdomen/pelvis    RECIST MEASUREMENTS:  TARGET LESION:   1: Right upper lobe cavitary lung mass: The overall size of the lesion is 3 5 x 2 4 cm on image 14 of series 3, significantly decreased from 4 3 x 2 7 cm on previous examination  Solid component along the medial aspect of the posterior wall is not significantly changed from previous examination currently measuring 1 5 x 0 9 cm on image 14 of series 3 compared with 1 8 x 0 8 cm when measured using similar technique on previous   examination  Nodular solid component along the lateral aspect of posterior wall measures approximately 0 7 x 0 4 cm on image 13 of series 3, and when measured using similar technique is unchanged from previous examinations  There is interval decrease in size of the cystic portion of the cavitary right upper lobe mass however, solid components of this mass are not significantly changed from previous examination  Otherwise unchanged examination with no new metastatic lesions   identified in the chest, abdomen or pelvis  Pathology    [facility] results demonstrated no ALK gene rearrangement or deletion and negative for ROS1 rearrangement  No EGFR mutations were found also  Right temporal mass from September 6, 2015 demonstrated metastatic poorly differentiated non-small cell carcinoma   The tumor cells stained positive for CK 7, TTF-1 and Napsin and negative for CK 20, CK 5/6 andmucicarmine  Pathologist stated that the immunoprofile supported the primary lung non-small cell carcinoma, favor adenocarcinoma  The pathologist also stated that given the focal p40 staining, a squamous carcinoma could not be excluded

## 2018-08-23 ENCOUNTER — HOSPITAL ENCOUNTER (OUTPATIENT)
Dept: INFUSION CENTER | Facility: CLINIC | Age: 58
Discharge: HOME | End: 2018-08-23
Payer: COMMERCIAL

## 2018-08-23 VITALS
BODY MASS INDEX: 28.66 KG/M2 | HEART RATE: 90 BPM | SYSTOLIC BLOOD PRESSURE: 150 MMHG | DIASTOLIC BLOOD PRESSURE: 80 MMHG | WEIGHT: 183 LBS | RESPIRATION RATE: 18 BRPM | TEMPERATURE: 97 F

## 2018-08-23 PROCEDURE — 96413 CHEMO IV INFUSION 1 HR: CPT

## 2018-08-23 PROCEDURE — J9035Q0 INV BEVACIZUMAB 400MG/16ML 16 ML: Performed by: INTERNAL MEDICINE

## 2018-08-23 RX ADMIN — SODIUM CHLORIDE 20 ML/HR: 0.9 INJECTION, SOLUTION INTRAVENOUS at 14:19

## 2018-08-23 RX ADMIN — Medication 300 UNITS: at 16:05

## 2018-08-23 RX ADMIN — Medication 1254 MG: at 15:18

## 2018-08-23 NOTE — PLAN OF CARE
Problem: Potential for Falls  Goal: Patient will remain free of falls  INTERVENTIONS:  - Assess patient frequently for physical needs  -  Identify cognitive and physical deficits and behaviors that affect risk of falls    -  [place] fall precautions as indicated by assessment   - Educate patient/family on patient safety including physical limitations  - Instruct patient to call for assistance with activity based on assessment  - Modify environment to reduce risk of injury  - Consider OT/PT consult to assist with strengthening/mobility   Outcome: Progressing

## 2018-08-23 NOTE — PROGRESS NOTES
Per Dr Marcio Piedra, ok to proceed with today's therapy as ordered  Pt counseled by myself and by Research RN that she needs to follow up with her PCP in regards to her antihypertensive medication management  Reviewed the seriousness of uncontrolled hypertension with the patient to encourage her to follow through with this advice

## 2018-08-23 NOTE — PROGRESS NOTES
To clinic for cycle 48 on [id number] for NSCLC  Of note is a blood pressure of 150/80  Per study protocol, avastin to be held for systolic pressure of >750  Awaiting treatment plan from Dr Lorena Dobbs in the setting of hypertension in a patient receiving avastin

## 2018-09-04 DIAGNOSIS — C34.91 PRIMARY LUNG CANCER WITH METASTASIS FROM LUNG TO OTHER SITE, RIGHT (HCC): Primary | ICD-10-CM

## 2018-09-06 ENCOUNTER — OFFICE VISIT (OUTPATIENT)
Dept: FAMILY MEDICINE CLINIC | Facility: CLINIC | Age: 58
End: 2018-09-06
Payer: COMMERCIAL

## 2018-09-06 VITALS
DIASTOLIC BLOOD PRESSURE: 76 MMHG | HEIGHT: 67 IN | BODY MASS INDEX: 28.72 KG/M2 | TEMPERATURE: 97 F | WEIGHT: 183 LBS | HEART RATE: 72 BPM | RESPIRATION RATE: 16 BRPM | SYSTOLIC BLOOD PRESSURE: 126 MMHG

## 2018-09-06 DIAGNOSIS — E78.2 MIXED HYPERLIPIDEMIA: ICD-10-CM

## 2018-09-06 DIAGNOSIS — R73.01 IMPAIRED FASTING GLUCOSE: ICD-10-CM

## 2018-09-06 DIAGNOSIS — I10 BENIGN HYPERTENSION: Primary | ICD-10-CM

## 2018-09-06 DIAGNOSIS — Z23 NEED FOR VACCINATION: ICD-10-CM

## 2018-09-06 DIAGNOSIS — Z12.31 SCREENING MAMMOGRAM, ENCOUNTER FOR: ICD-10-CM

## 2018-09-06 PROCEDURE — 3074F SYST BP LT 130 MM HG: CPT | Performed by: FAMILY MEDICINE

## 2018-09-06 PROCEDURE — 99214 OFFICE O/P EST MOD 30 MIN: CPT | Performed by: FAMILY MEDICINE

## 2018-09-06 PROCEDURE — 90682 RIV4 VACC RECOMBINANT DNA IM: CPT

## 2018-09-06 PROCEDURE — 3078F DIAST BP <80 MM HG: CPT | Performed by: FAMILY MEDICINE

## 2018-09-06 PROCEDURE — 1036F TOBACCO NON-USER: CPT | Performed by: FAMILY MEDICINE

## 2018-09-06 NOTE — PROGRESS NOTES
Assessment/Plan:    Problem List Items Addressed This Visit     Mixed hyperlipidemia     stable         Relevant Orders    Comprehensive metabolic panel    Lipid Panel with Direct LDL reflex    Impaired fasting glucose     Last fasting sugar was 98  Will continue to monitor the sugar         Relevant Orders    Comprehensive metabolic panel    Hemoglobin A1C    Benign hypertension - Primary     Well controlled          Relevant Orders    CBC    Comprehensive metabolic panel      Other Visit Diagnoses     Screening mammogram, encounter for        Relevant Orders    Mammo screening bilateral w cad    Need for vaccination        Relevant Orders    influenza vaccine, [id number], quadrivalent, recombinant, PF, 0 5 mL, for patients 18 yr+ (FLUBLOK) (Completed)      Colonoscopy declined- she is getting frequent scans for her lung cancer and does not feel a further testing is necessary at this time  She does not feel comfortable getting invasive procedure at this point  There are no Patient Instructions on file for this visit  Return in about 6 months (around 3/6/2019) for Next scheduled follow up  Subjective:      Patient ID: Tahir Eldridge is a 59 y o  female  Chief Complaint   Patient presents with    Follow-up     b/p med-lj       It is 3 years since her brain surgery  She is on the avastatin still       Hypertension   This is a chronic problem  The current episode started more than 1 year ago  The problem is controlled  Past treatments include calcium channel blockers  The current treatment provides moderate improvement  There are no compliance problems  Hyperlipidemia   This is a chronic problem  The current episode started more than 1 year ago  The problem is controlled  Recent lipid tests were reviewed and are normal  Current antihyperlipidemic treatment includes statins  There are no compliance problems          The following portions of the patient's history were reviewed and updated as appropriate: past social history    Review of Systems      Current Outpatient Prescriptions   Medication Sig Dispense Refill    ALPRAZolam (XANAX) 0 25 mg tablet Take 0 25 mg by mouth daily at bedtime as needed for anxiety      amLODIPine (NORVASC) 2 5 mg tablet Take 1 tablet (2 5 mg total) by mouth daily 90 tablet 1    atorvastatin (LIPITOR) 10 mg tablet TAKE 1 TABLET BY MOUTH ONCE DAILY BEFORE BREAKFAST 90 tablet 1    cyanocobalamin (VITAMIN B-12) 100 mcg tablet Take 1 tablet by mouth daily      levETIRAcetam (KEPPRA XR) 500 MG 24 hr tablet TAKE ONE TABLET BY MOUTH ONCE DAILY IN THE MORNING 30 tablet 5    Omega-3 Fatty Acids (FISH OIL PO) Take 2 tablets by mouth   ondansetron (ZOFRAN) 8 mg tablet Take 1 tablet (8 mg total) by mouth 3 (three) times a day 1 tablet 2    oxyCODONE (ROXICODONE) 5 mg immediate release tablet Take 1 tablet (5 mg total) by mouth every 6 (six) hours as needed for moderate pain Max Daily Amount: 20 mg 90 tablet 0    prochlorperazine (COMPAZINE) 10 mg tablet Take 10 mg by mouth every 6 (six) hours as needed for nausea or vomiting      ranitidine (ZANTAC) 75 MG tablet Take 1 tablet by mouth daily       No current facility-administered medications for this visit  Facility-Administered Medications Ordered in Other Visits   Medication Dose Route Frequency Provider Last Rate Last Dose    alteplase (CATHFLO) injection 2 mg  2 mg Intracatheter PRN Titus Aguero MD        sodium chloride 0 9 % infusion  20 mL/hr Intravenous Continuous Titus Aguero MD           Objective:    /76   Pulse 72   Temp (!) 97 °F (36 1 °C)   Resp 16   Ht 5' 7" (1 702 m)   Wt 83 kg (183 lb)   LMP  (LMP Unknown)   BMI 28 66 kg/m²        Physical Exam   Constitutional: She appears well-developed and well-nourished  HENT:   Head: Normocephalic and atraumatic     Right Ear: External ear normal    Left Ear: External ear normal    Mouth/Throat: Oropharynx is clear and moist    Cardiovascular: Normal rate, regular rhythm and normal heart sounds  Exam reveals no friction rub  No murmur heard  Pulmonary/Chest: Effort normal and breath sounds normal  No respiratory distress  She has no wheezes  She has no rales  Musculoskeletal: She exhibits no edema or deformity  Nursing note and vitals reviewed               Florentin Meek DO

## 2018-09-11 LAB
ALBUMIN SERPL-MCNC: 4.3 G/DL (ref 3.5–5.5)
ALBUMIN/GLOB SERPL: 1.5 {RATIO} (ref 1.2–2.2)
ALP SERPL-CCNC: 90 IU/L (ref 39–117)
ALT SERPL-CCNC: 87 IU/L (ref 0–32)
AMBIG ABBREV DEFAULT: NORMAL
APPEARANCE UR: CLEAR
AST SERPL-CCNC: 60 IU/L (ref 0–40)
BACTERIA URNS QL MICRO: ABNORMAL
BASOPHILS # BLD AUTO: 0 X10E3/UL (ref 0–0.2)
BASOPHILS NFR BLD AUTO: 1 %
BILIRUB SERPL-MCNC: 0.5 MG/DL (ref 0–1.2)
BILIRUB UR QL STRIP: NEGATIVE
BUN SERPL-MCNC: 10 MG/DL (ref 6–24)
BUN/CREAT SERPL: 11 (ref 9–23)
CALCIUM SERPL-MCNC: 9.3 MG/DL (ref 8.7–10.2)
CHLORIDE SERPL-SCNC: 100 MMOL/L (ref 96–106)
CO2 SERPL-SCNC: 20 MMOL/L (ref 20–29)
COLOR UR: YELLOW
CREAT SERPL-MCNC: 0.87 MG/DL (ref 0.57–1)
EOSINOPHIL # BLD AUTO: 0.1 X10E3/UL (ref 0–0.4)
EOSINOPHIL NFR BLD AUTO: 2 %
EPI CELLS #/AREA URNS HPF: ABNORMAL /HPF
ERYTHROCYTE [DISTWIDTH] IN BLOOD BY AUTOMATED COUNT: 13.3 % (ref 12.3–15.4)
GLOBULIN SER-MCNC: 2.8 G/DL (ref 1.5–4.5)
GLUCOSE SERPL-MCNC: 124 MG/DL (ref 65–99)
GLUCOSE UR QL: NEGATIVE
HCT VFR BLD AUTO: 41.9 % (ref 34–46.6)
HGB BLD-MCNC: 14.6 G/DL (ref 11.1–15.9)
HGB UR QL STRIP: NEGATIVE
IMM GRANULOCYTES # BLD: 0 X10E3/UL (ref 0–0.1)
IMM GRANULOCYTES NFR BLD: 0 %
KETONES UR QL STRIP: NEGATIVE
LDH SERPL-CCNC: 139 IU/L (ref 119–226)
LEUKOCYTE ESTERASE UR QL STRIP: ABNORMAL
LYMPHOCYTES # BLD AUTO: 2 X10E3/UL (ref 0.7–3.1)
LYMPHOCYTES NFR BLD AUTO: 33 %
MAGNESIUM SERPL-MCNC: 2 MG/DL (ref 1.6–2.3)
MCH RBC QN AUTO: 31.4 PG (ref 26.6–33)
MCHC RBC AUTO-ENTMCNC: 34.8 G/DL (ref 31.5–35.7)
MCV RBC AUTO: 90 FL (ref 79–97)
MICRO URNS: ABNORMAL
MONOCYTES # BLD AUTO: 0.4 X10E3/UL (ref 0.1–0.9)
MONOCYTES NFR BLD AUTO: 6 %
MUCOUS THREADS URNS QL MICRO: PRESENT
NEUTROPHILS # BLD AUTO: 3.6 X10E3/UL (ref 1.4–7)
NEUTROPHILS NFR BLD AUTO: 58 %
NITRITE UR QL STRIP: NEGATIVE
PH UR STRIP: 6 [PH] (ref 5–7.5)
PHOSPHATE SERPL-MCNC: 4 MG/DL (ref 2.5–4.5)
PLATELET # BLD AUTO: 252 X10E3/UL (ref 150–379)
POTASSIUM SERPL-SCNC: 4.4 MMOL/L (ref 3.5–5.2)
PROT SERPL-MCNC: 7.1 G/DL (ref 6–8.5)
PROT UR QL STRIP: NEGATIVE
RBC # BLD AUTO: 4.65 X10E6/UL (ref 3.77–5.28)
RBC #/AREA URNS HPF: ABNORMAL /HPF
SL AMB EGFR AFRICAN AMERICAN: 84 ML/MIN/1.73
SL AMB EGFR NON AFRICAN AMERICAN: 73 ML/MIN/1.73
SODIUM SERPL-SCNC: 137 MMOL/L (ref 134–144)
SP GR UR: 1.01 (ref 1–1.03)
TSH SERPL DL<=0.005 MIU/L-ACNC: 3.09 UIU/ML (ref 0.45–4.5)
UROBILINOGEN UR STRIP-ACNC: 0.2 EU/DL (ref 0.2–1)
WBC # BLD AUTO: 6.2 X10E3/UL (ref 3.4–10.8)
WBC #/AREA URNS HPF: ABNORMAL /HPF

## 2018-09-12 ENCOUNTER — OFFICE VISIT (OUTPATIENT)
Dept: HEMATOLOGY ONCOLOGY | Facility: MEDICAL CENTER | Age: 58
End: 2018-09-12
Payer: COMMERCIAL

## 2018-09-12 VITALS
HEIGHT: 67 IN | OXYGEN SATURATION: 96 % | TEMPERATURE: 98.1 F | DIASTOLIC BLOOD PRESSURE: 78 MMHG | WEIGHT: 182 LBS | HEART RATE: 87 BPM | SYSTOLIC BLOOD PRESSURE: 146 MMHG | RESPIRATION RATE: 18 BRPM | BODY MASS INDEX: 28.56 KG/M2

## 2018-09-12 DIAGNOSIS — C34.91 ADENOCARCINOMA OF LUNG, STAGE 4, RIGHT (HCC): Primary | ICD-10-CM

## 2018-09-12 DIAGNOSIS — C34.91 PRIMARY LUNG CANCER WITH METASTASIS FROM LUNG TO OTHER SITE, RIGHT (HCC): Primary | ICD-10-CM

## 2018-09-12 PROCEDURE — 99214 OFFICE O/P EST MOD 30 MIN: CPT | Performed by: INTERNAL MEDICINE

## 2018-09-12 RX ORDER — ALPRAZOLAM 0.25 MG/1
0.25 TABLET ORAL 3 TIMES DAILY PRN
Qty: 30 TABLET | Refills: 0 | Status: SHIPPED | OUTPATIENT
Start: 2018-09-12 | End: 2019-02-27 | Stop reason: SDUPTHER

## 2018-09-12 RX ORDER — SODIUM CHLORIDE 9 MG/ML
20 INJECTION, SOLUTION INTRAVENOUS CONTINUOUS
Status: DISCONTINUED | OUTPATIENT
Start: 2018-09-13 | End: 2018-09-16 | Stop reason: HOSPADM

## 2018-09-12 NOTE — PROGRESS NOTES
Kyle Bowen  1960  [address] [address] [facility] [address] [facility] [id number]    DISCUSSION  SUMMARY:    38-year-old female with M1 non-small cell lung carcinoma/adenocarcinoma  Patient is on [facility] 20015-14 (phase III open label randomized study of [id number] [anti-PD-L1 antibody] in combination with carboplatin and paclitaxel +/- Avastin versus carboplatin and paclitaxel +/- Avastin in patients with stage IV chemotherapy naive non-small cell lung carcinoma)  Patient was randomized to receive all 4 medications  Mrs Parish Vang completed the 6 cycles of treatment without significant toxicities  Patient had been on maintenance (study drug (atezolizumab) and avastin) - now only Avastin (atezolizumab was discontinued by the PI because of the elevated LFTs)  Issues:      1  Stage IV non-small cell lung carcinoma  Patient feels well and clinically there are no lung cancer related issues/sign  The recent CT scan did not demonstrate any clear evidence for disease progression but there was a new mid left lower lobe infiltrate  This will need to be re-evaluated on the next CT scan  LFTs are elevated but acceptable for Avastin  Patient will continue with the same regimen; next dose is scheduled for tomorrow  From an oncology standpoint, patient should continue with the same regimen  The next CT scans will be scheduled for 6 weeks  We discussed what to monitor for in regard to disease progression (fatigue, cough, hemoptysis, on plan weight loss etc)  We also discussed the possibility of repeating a PET-CT  The recent CT scan results did not specifically states the size of this patchy infiltrate  I have discussed with the patient that I would rather hold off on the PET-CT now for number of reasons  The last PET CT was 2- 3 years ago; patient has been undergoing CT scans more recently    I think it best that we wait for the next CT scan to see if there is evidence of disease progression versus resolution (dnd then make decisions)      2   Elevated LFTs  Patient has a history of hypercholesterolemia and has been on Lipitor in the past - PCP is monitoring  Surveillance is ongoing  Recent AST and ALT are higher than before  Patient states that she has not been drinking recently  Test will be repeated in 3 weeks  From a protocol standpoint, patient can be treated  3  Brain metastases status post resection and radiotherapy  Patient will follow-up with neurosurgery and radiation oncology as directed  As above, the Keppra dose was recently changed - no issues  Patient will follow up with neurology also  Mrs Barraza Records states that the plan is to discontinue the Keppra later on this year  4  Anxiety  The alprazolam 0 25 mg every 6-8 hours as needed was renewed  Patient is to return in 3 weeks   Patient knows to call the hematology/oncology office if there are any other questions or concerns  Carefully review your medication list and verify that the list is accurate and up-to-date  Please call the hematology/oncology office if there are medications missing from the list, medications on the list that you are not currently taking or if there is a dosage or instruction that is different from how you're taking that medication  Patient goals and areas of care: continue with the Avastin  Patient is able to self-care   _____________________________________________________________________________________    Chief Complaint   Patient presents with    Follow-up     Stage IV non-small cell lung carcinoma on protocol, on Avastin     Advance Care Planning/Advance Directives: not discussed       Adenocarcinoma of lung, stage 4, right ([facility] [address] )    9/5/2015 Initial Diagnosis     Patient was referred to the emergency room for evaluation of vertigo as [facility]  (patient reported )  Patient underwent the following pertinent imaging scans    MRI of the brain demonstrated a mass in the right superior all temporal gyrus with measurements of 4 2 x 3 3 x 3 6 cm with vasogenic edema and mass effect  There was a near uncal herniation noted  CT of the chest and abdomen/pelvis demonstrated a necrotic right upper lobe mass measuring 7 x 4 2 x 4 1 cm strongly suspicious for bronchogenic carcinoma  The mass contacts the posterior medial pleural surface and potentially contacts the right posterior tracheal border  No pathologic adenopathy was identified in no evidence of metastatic disease in the chest abdomen or pelvis  9/6/2015 Surgery     Non-small cell carcinoma of lung ([address] ) diagnosed from biopsy of right temporal mass  Pathology demonstrates poorly differentiated non-small cell carcinoma  Pathologist stated that the immunoprofile supported the primary lung non-small cell carcinoma favor adenocarcinoma  Squamous carcinoma could not be excluded, secondary to focal P 40 staining  Otilia Bryant path report demonstrated no ALK gene rearrangement or dleation and negative ROS1 rearrangement, No EGFR mutations were found  Surgery completed by Dr Bela Ashton            - 10/15/2015 Radiation     SRT to brain 3000 cGY in 5 fractions  10/20/2015 -  Research Study Participant     [id number] Phase III open label randomized study of [id number] [Anti-PDL1 antibody] in combination with carboplatin and paclitaxel +/- Avastin versus Carboplatin and Paciltaxel +/- Avastin in patients with stage IV Chemotherapy  Naive non-small cell lung carcinoma  Patient was randomized to receive off for medications  11/30/2015 - 2/18/2016 Chemotherapy     Started Carboplatin, Paclitaxel, Avastin and PD-L1 (atezolizumab; study drug)  Completed 6 cycles with cycle 6 day 1 on 2/18/16         3/10/2016 -  Chemotherapy     Beginning cycle 7, maintenance cycle with Avastin and PD-L1 (Atezolizumab; study drug)         5/24/2017 Adverse Reaction     LFT elevation-persisted  Patient was taken off of study drug Atezolizumab, but continues on Avastin maintance  History of Present Illness:    59year old female recently diagnosed with IV lung cancer here for followup  Mrs Tedd Felty began to have headaches last spring 2015  Patient was seen by her PCP and treated with antibiotics  Initially the symptoms got better the patient went on vacation  After returning from vacation, Mrs Tedd Felty once again developed headaches  Patient was treated with a second round of antibiotics and then was diagnosed with vertigo  Patient was sent to the [address] [place] [address]  Although the specifics are not entirely clear, the therapist in the [address] [place] [address] sent the patient to the emergency room  A CAT scan of the brain demonstrated a brain lesion and patient was transferred to [place]  Patient was seen by Dr Tio Cruz and underwent resection demonstrating adenocarcinoma  Additional testing demonstrated a lung mass  Patient improved and was discharged  Patient completed SRT with Dr García Fox and Dr Lb Richardson  [facility] (phase III open label randomized study of [id number] [anti-PD-L1 antibody] in combination with carboplatin and paclitaxel +/- Avastin versus carboplatin and paclitaxel +/- Avastin in patients with stage IV chemotherapy naÃ¯ve non-small cell lung carcinoma)  Mrs Tedd Felty was randomized to receive the anti-PD-L1 antibody with chemotherapy - patient completed 6 cycles and was placed on maintenance  Mrs Winnie Marshall previously suffered a tonic-clonic seizure and was seen in the emergency room  CAT scan of the head did not demonstrate any evidence of disease progression  The seizure was thought to be due to encephalomalacia  Since that time, there have been no seizure issues  Mrs Tedd Felty is on Keppra  Patient has had elevated liver function tests  Because the abnormalities were grade 3, patient was taken off the [id number] and has continued on protocol with Avastin only  Chemotherapy was held on the last cycle because of elevated LFTs  Patient presents for follow-up  Patient states feeling okay, about the same as before  Fatigue is minimal  No fevers, chills or sweats  No pulmonary issues  No CNS issues, mental status change, blurred vision, headaches or dizziness  No recent seizures  No  or GI issues  No GYN issues  Activities are baseline  No specific issues with the Avastin  Neck discomfort from the port is the same as before - massages are helpful  Review of Systems   Constitutional: Positive for fatigue  HENT: Negative  Eyes: Negative  Respiratory: Negative  Cardiovascular: Negative  Gastrointestinal: Negative  Endocrine: Negative  Genitourinary: Negative  Musculoskeletal: Positive for arthralgias  Negative for neck pain  Skin: Negative  Allergic/Immunologic: Negative  Neurological: Negative  Hematological: Negative  Psychiatric/Behavioral: Negative  All other systems reviewed and are negative       Patient Active Problem List   Diagnosis    Seizure ([facility] [address] )    Adenocarcinoma of lung, stage 4, right ([facility] [address] )    Mixed hyperlipidemia    Abnormal LFTs    Allergic rhinitis    Brain metastasis ([facility] [address] [facility]    Brain tumor ([facility] [address] [facility]    Encephalomalacia    Impaired fasting glucose    Insomnia    Lesion of temporal lobe    Non-small cell lung cancer ([facility] [address] )    Benign hypertension     Past Medical History:   Diagnosis Date    Allergic rhinitis     Alopecia     resolved 10/25/16    Anxiety     last assessed 10/4/16, resolved 10/25/16    Benign hypertension 2/1/2018    Benign neoplasm of skin of trunk 03/25/2008    last assessed 3/25/08    Benign neoplasm of skin of upper extremity and shoulder 03/25/2008    last assessed 3/25/08    Ear deformity, acquired     last assessed 10/13/14, resolved 3/12/15    Eczema     Hyperlipemia     Lung cancer ([facility] [address] )     Maintenance chemotherapy     Secondary cancer of brain ([facility] [address] )     Seizures ([facility] [address] )  Vertigo      Past Surgical History:   Procedure Laterality Date    APPENDECTOMY  1964    BRAIN TUMOR EXCISION      CENTRAL VENOUS CATHETER INSERTION Right     PORTACATH     Family History   Problem Relation Age of Onset    Diabetes Mother     Heart disease Mother     Lung cancer Father     Pancreatitis Brother     No Known Problems Brother      Social History     Social History    Marital status: /Civil Union     Spouse name: N/A    Number of children: 1    Years of education: N/A     Occupational History    Not on file       Social History Main Topics    Smoking status: Former Smoker    Smokeless tobacco: Never Used    Alcohol use Yes      Comment: occasionally / Social     Drug use: Yes     Types: Marijuana    Sexual activity: Yes     Other Topics Concern    Not on file     Social History Narrative    High school or GED     Lives independently with spouse        Current Outpatient Prescriptions:     ALPRAZolam (XANAX) 0 25 mg tablet, Take 0 25 mg by mouth daily at bedtime as needed for anxiety, Disp: , Rfl:     amLODIPine (NORVASC) 2 5 mg tablet, Take 1 tablet (2 5 mg total) by mouth daily, Disp: 90 tablet, Rfl: 1    atorvastatin (LIPITOR) 10 mg tablet, TAKE 1 TABLET BY MOUTH ONCE DAILY BEFORE BREAKFAST, Disp: 90 tablet, Rfl: 1    cyanocobalamin (VITAMIN B-12) 100 mcg tablet, Take 1 tablet by mouth daily, Disp: , Rfl:     levETIRAcetam (KEPPRA XR) 500 MG 24 hr tablet, TAKE ONE TABLET BY MOUTH ONCE DAILY IN THE MORNING, Disp: 30 tablet, Rfl: 5    Omega-3 Fatty Acids (FISH OIL PO), Take 2 tablets by mouth , Disp: , Rfl:     ondansetron (ZOFRAN) 8 mg tablet, Take 1 tablet (8 mg total) by mouth 3 (three) times a day, Disp: 1 tablet, Rfl: 2    oxyCODONE (ROXICODONE) 5 mg immediate release tablet, Take 1 tablet (5 mg total) by mouth every 6 (six) hours as needed for moderate pain Max Daily Amount: 20 mg, Disp: 90 tablet, Rfl: 0    prochlorperazine (COMPAZINE) 10 mg tablet, Take 10 mg by mouth every 6 (six) hours as needed for nausea or vomiting, Disp: , Rfl:     Pseudoephedrine-Guaifenesin (MUCINEX D PO), Take by mouth, Disp: , Rfl:     ranitidine (ZANTAC) 75 MG tablet, Take 1 tablet by mouth daily, Disp: , Rfl:   No current facility-administered medications for this visit  Facility-Administered Medications Ordered in Other Visits:     alteplase (CATHFLO) injection 2 mg, 2 mg, Intracatheter, PRN, Edie Zambrano MD    sodium chloride 0 9 % infusion, 20 mL/hr, Intravenous, Continuous, Edie Zambrano MD    Allergies   Allergen Reactions    Iodinated Diagnostic Agents Anaphylaxis and Itching    Clindamycin     Clindamycin/Lincomycin        Vitals:    09/12/18 0841   BP: 146/78   Pulse: 87   Resp: 18   Temp: 98 1 °F (36 7 °C)   SpO2: 96%     Physical Exam   Constitutional: She is oriented to person, place, and time  She appears well-developed and well-nourished  HENT:   Head: Normocephalic and atraumatic  Right Ear: External ear normal    Left Ear: External ear normal    Nose: Nose normal    Mouth/Throat: Oropharynx is clear and moist    Eyes: Conjunctivae and EOM are normal  Pupils are equal, round, and reactive to light  Neck: Normal range of motion  Neck supple  Cardiovascular: Normal rate, regular rhythm, normal heart sounds and intact distal pulses  Pulmonary/Chest: Effort normal and breath sounds normal    Right anterior chest wall port in place, area clean and dry, area is tender to the touch, no signs of infection or bleeding underneath   Abdominal: Soft  Bowel sounds are normal    Musculoskeletal: Normal range of motion  Neurological: She is alert and oriented to person, place, and time  She has normal reflexes  Skin: Skin is warm  Skin is warm, well tanned, scattered few purpura on upper extremities, no petechiae, no hematomas   Psychiatric: She has a normal mood and affect   Her behavior is normal  Judgment and thought content normal    Extremities: no edema, no cords, pulses are 1+  Lymphatics: no adenopathy in the neck, supraclavicular region, axilla and groin bilaterally    Performance Status: 1 - Symptomatic but completely ambulatory    Labs:    09/10/2018 WBC = 6 2 hemoglobin = 14 6 hematocrit = 42 platelet = 226 neutrophil = 58% BUN = 10 creatinine = 0 87 AST = 60 ALT = 87    08/21/2018 BUN = 14 creatinine = 0 86 calcium = 9 3 AST = 28 ALT = 41 total bilirubin = 0 3 alkaline phosphatase = 89 WBC = 7 6 hemoglobin = 14 6 hematocrit = 43 platelet = 040 LDH = 126 phosphorus = 4 2 magnesium = 2 0  07/30/2018 WBC = 6 6 hemoglobin = 13 9 hematocrit = 39 1 platelet = 057 neutrophil = 61% BUN = 12 creatinine = 0 95 AST = 51 ALT = 56 alkaline phosphatase = 88 total bilirubin = 0 5 TSH = 2 310 LDH = 146  07/09/2018 WBC = 7 5 hemoglobin = 14 6 hematocrit = 43 5 platelet = 303 neutrophil = 57% BUN = 11 creatinine = 0 94 calcium = 9 7 AST = 36 ALT = 48  06/19/2018 BUN = 11 creatinine = 1 01 calcium = 9 9 AST = 42 ALT = 50 WBC = 8 5 hemoglobin = 14 6 hematocrit = 42 platelet = 915  04/92/3297 WBC = 7 1 hemoglobin = 14 2 hematocrit = 43 platelet = 374 BUN = 16 creatinine = 0 91 AST = 54 ALT = 73    Imaging    08/20/2018 CT scan of the chest and abdomen/pelvis without contrast    RECIST target lesion: Cavitary right upper lobe mass is unchanged, measuring 3 4 x 2 6 cm on series 3 image 15 (previously 3 5 x 2 5 cm)  Solid component along the medial aspect of the posterior wall is also unchanged, measuring 1 4 x 0 9 cm      LUNGS:  Stable cavitary right upper lobe mass, as above  No new nodules identified  There is new small patchy density in the posterior left base compatible with infiltrate  There is no tracheal or endobronchial lesion  IMPRESSION    Unchanged cavitary right upper lobe mass  No new metastatic disease  Interval development of mild left lower lobe infiltrate noted        06/18/2018 CT scan of the chest and abdomen/pelvis    Unchanged cavitary right upper lobe mass    No new metastatic disease  04/16/2018 CT scan of the chest and abdomen/pelvis    There is no significant interval change in size of the cystic or nodular components of the cavitary right upper lobe mass  No new metastatic lesions identified in the chest, abdomen or pelvis  1/22/18 CAT scan of the chest and abdomen/pelvis    RECIST MEASUREMENTS:  TARGET LESION:   1: Right upper lobe cavitary lung mass: The overall size of the lesion is 3 5 x 2 4 cm on image 14 of series 3, significantly decreased from 4 3 x 2 7 cm on previous examination  Solid component along the medial aspect of the posterior wall is not significantly changed from previous examination currently measuring 1 5 x 0 9 cm on image 14 of series 3 compared with 1 8 x 0 8 cm when measured using similar technique on previous   examination  Nodular solid component along the lateral aspect of posterior wall measures approximately 0 7 x 0 4 cm on image 13 of series 3, and when measured using similar technique is unchanged from previous examinations  There is interval decrease in size of the cystic portion of the cavitary right upper lobe mass however, solid components of this mass are not significantly changed from previous examination  Otherwise unchanged examination with no new metastatic lesions   identified in the chest, abdomen or pelvis  Pathology    [facility] results demonstrated no ALK gene rearrangement or deletion and negative for ROS1 rearrangement  No EGFR mutations were found also  Right temporal mass from September 6, 2015 demonstrated metastatic poorly differentiated non-small cell carcinoma  The tumor cells stained positive for CK 7, TTF-1 and Napsin and negative for CK 20, CK 5/6 andmucicarmine  Pathologist stated that the immunoprofile supported the primary lung non-small cell carcinoma, favor adenocarcinoma  The pathologist also stated that given the focal p40 staining, a squamous carcinoma could not be excluded

## 2018-09-13 ENCOUNTER — HOSPITAL ENCOUNTER (OUTPATIENT)
Dept: INFUSION CENTER | Facility: CLINIC | Age: 58
Discharge: HOME | End: 2018-09-13
Payer: COMMERCIAL

## 2018-09-13 VITALS
OXYGEN SATURATION: 97 % | TEMPERATURE: 97.8 F | DIASTOLIC BLOOD PRESSURE: 82 MMHG | WEIGHT: 184.3 LBS | HEART RATE: 82 BPM | BODY MASS INDEX: 28.93 KG/M2 | SYSTOLIC BLOOD PRESSURE: 146 MMHG | RESPIRATION RATE: 18 BRPM | HEIGHT: 67 IN

## 2018-09-13 PROCEDURE — 96413 CHEMO IV INFUSION 1 HR: CPT

## 2018-09-13 PROCEDURE — J9035Q0 INV BEVACIZUMAB 400MG/16ML 16 ML: Performed by: INTERNAL MEDICINE

## 2018-09-13 RX ADMIN — HEPARIN 300 UNITS: 100 SYRINGE at 15:52

## 2018-09-13 RX ADMIN — SODIUM CHLORIDE 20 ML/HR: 0.9 INJECTION, SOLUTION INTRAVENOUS at 14:10

## 2018-09-13 RX ADMIN — Medication 1254 MG: at 15:07

## 2018-09-13 NOTE — PROGRESS NOTES
Spoke with Gayle Bryant RN with DR Low Graft office to confirm to proceed as ordered with pts Bp of 157/84, re check was 146/82  orders/ per protocol we must call  to proceed with noted BP systolic >314 in patients  Dr Chandu Chavez is aware of BPs and pts PCP is also following pts hypertension

## 2018-09-13 NOTE — PROGRESS NOTES
Pt has elevated bps - spoke with TAL Barrera who said pt was gwendolyn yesterday by DR Cinthia Kumar and Bp was high      I will call and confirm with Dr Cinthia Kumar that we can proceed to treat with ordered Bevacizumab

## 2018-09-13 NOTE — PROGRESS NOTES
Pt has no c/o -- labs are within parameters to proceed  Pt has no changs to report -- did see MD yesterday  PCP Mucinex ordered    no fevers  Pt reports that she did take her Xanax as her Bp " tends to be high with these infusion appts "

## 2018-10-02 LAB
ALBUMIN SERPL-MCNC: 4.5 G/DL (ref 3.5–5.5)
ALBUMIN/GLOB SERPL: 1.7 {RATIO} (ref 1.2–2.2)
ALP SERPL-CCNC: 87 IU/L (ref 39–117)
ALT SERPL-CCNC: 42 IU/L (ref 0–32)
APPEARANCE UR: CLEAR
AST SERPL-CCNC: 35 IU/L (ref 0–40)
BACTERIA URNS QL MICRO: NORMAL
BASOPHILS # BLD AUTO: 0.1 X10E3/UL (ref 0–0.2)
BASOPHILS NFR BLD AUTO: 1 %
BILIRUB SERPL-MCNC: 0.6 MG/DL (ref 0–1.2)
BILIRUB UR QL STRIP: NEGATIVE
BUN SERPL-MCNC: 13 MG/DL (ref 6–24)
BUN/CREAT SERPL: 14 (ref 9–23)
CALCIUM SERPL-MCNC: 9.8 MG/DL (ref 8.7–10.2)
CHLORIDE SERPL-SCNC: 99 MMOL/L (ref 96–106)
CO2 SERPL-SCNC: 23 MMOL/L (ref 20–29)
COLOR UR: YELLOW
CREAT SERPL-MCNC: 0.95 MG/DL (ref 0.57–1)
EOSINOPHIL # BLD AUTO: 0.2 X10E3/UL (ref 0–0.4)
EOSINOPHIL NFR BLD AUTO: 3 %
EPI CELLS #/AREA URNS HPF: NORMAL /HPF
ERYTHROCYTE [DISTWIDTH] IN BLOOD BY AUTOMATED COUNT: 13.9 % (ref 12.3–15.4)
GLOBULIN SER-MCNC: 2.7 G/DL (ref 1.5–4.5)
GLUCOSE SERPL-MCNC: 115 MG/DL (ref 65–99)
GLUCOSE UR QL: NEGATIVE
HCT VFR BLD AUTO: 42.3 % (ref 34–46.6)
HGB BLD-MCNC: 14.8 G/DL (ref 11.1–15.9)
HGB UR QL STRIP: NEGATIVE
IMM GRANULOCYTES # BLD: 0 X10E3/UL (ref 0–0.1)
IMM GRANULOCYTES NFR BLD: 0 %
KETONES UR QL STRIP: NEGATIVE
LDH SERPL-CCNC: 135 IU/L (ref 119–226)
LEUKOCYTE ESTERASE UR QL STRIP: ABNORMAL
LYMPHOCYTES # BLD AUTO: 2.6 X10E3/UL (ref 0.7–3.1)
LYMPHOCYTES NFR BLD AUTO: 37 %
MAGNESIUM SERPL-MCNC: 2.1 MG/DL (ref 1.6–2.3)
MCH RBC QN AUTO: 31.8 PG (ref 26.6–33)
MCHC RBC AUTO-ENTMCNC: 35 G/DL (ref 31.5–35.7)
MCV RBC AUTO: 91 FL (ref 79–97)
MICRO URNS: ABNORMAL
MONOCYTES # BLD AUTO: 0.5 X10E3/UL (ref 0.1–0.9)
MONOCYTES NFR BLD AUTO: 7 %
MUCOUS THREADS URNS QL MICRO: PRESENT
NEUTROPHILS # BLD AUTO: 3.7 X10E3/UL (ref 1.4–7)
NEUTROPHILS NFR BLD AUTO: 52 %
NITRITE UR QL STRIP: NEGATIVE
PH UR STRIP: 6 [PH] (ref 5–7.5)
PHOSPHATE SERPL-MCNC: 3.5 MG/DL (ref 2.5–4.5)
PLATELET # BLD AUTO: 285 X10E3/UL (ref 150–379)
POTASSIUM SERPL-SCNC: 4.8 MMOL/L (ref 3.5–5.2)
PROT SERPL-MCNC: 7.2 G/DL (ref 6–8.5)
PROT UR QL STRIP: NEGATIVE
RBC # BLD AUTO: 4.66 X10E6/UL (ref 3.77–5.28)
RBC #/AREA URNS HPF: NORMAL /HPF
SL AMB EGFR AFRICAN AMERICAN: 76 ML/MIN/1.73
SL AMB EGFR NON AFRICAN AMERICAN: 66 ML/MIN/1.73
SODIUM SERPL-SCNC: 138 MMOL/L (ref 134–144)
SP GR UR: 1.01 (ref 1–1.03)
UROBILINOGEN UR STRIP-ACNC: 0.2 EU/DL (ref 0.2–1)
WBC # BLD AUTO: 6.9 X10E3/UL (ref 3.4–10.8)
WBC #/AREA URNS HPF: NORMAL /HPF

## 2018-10-03 ENCOUNTER — OFFICE VISIT (OUTPATIENT)
Dept: HEMATOLOGY ONCOLOGY | Facility: MEDICAL CENTER | Age: 58
End: 2018-10-03
Payer: COMMERCIAL

## 2018-10-03 VITALS
HEIGHT: 67 IN | BODY MASS INDEX: 28.72 KG/M2 | TEMPERATURE: 97.1 F | HEART RATE: 87 BPM | DIASTOLIC BLOOD PRESSURE: 88 MMHG | RESPIRATION RATE: 18 BRPM | OXYGEN SATURATION: 99 % | WEIGHT: 183 LBS | SYSTOLIC BLOOD PRESSURE: 148 MMHG

## 2018-10-03 DIAGNOSIS — L03.113 CELLULITIS OF RIGHT UPPER EXTREMITY: Primary | ICD-10-CM

## 2018-10-03 DIAGNOSIS — C34.91 ADENOCARCINOMA OF LUNG, STAGE 4, RIGHT (HCC): Primary | ICD-10-CM

## 2018-10-03 PROCEDURE — 99215 OFFICE O/P EST HI 40 MIN: CPT | Performed by: INTERNAL MEDICINE

## 2018-10-03 RX ORDER — SODIUM CHLORIDE 9 MG/ML
20 INJECTION, SOLUTION INTRAVENOUS CONTINUOUS
Status: DISCONTINUED | OUTPATIENT
Start: 2018-10-04 | End: 2018-10-07 | Stop reason: HOSPADM

## 2018-10-03 RX ORDER — CEPHALEXIN 500 MG/1
500 CAPSULE ORAL EVERY 12 HOURS SCHEDULED
Qty: 14 CAPSULE | Refills: 0 | Status: SHIPPED | OUTPATIENT
Start: 2018-10-03 | End: 2018-10-10

## 2018-10-03 NOTE — PROGRESS NOTES
Lola Pete  1960  [facility] [place] [id number]    DISCUSSION  SUMMARY:    64-year-old female with M1 non-small cell lung carcinoma/adenocarcinoma  Patient is on [facility] 20015-14 (phase III open label randomized study of [id number] [anti-PD-L1 antibody] in combination with carboplatin and paclitaxel +/- Avastin versus carboplatin and paclitaxel +/- Avastin in patients with stage IV chemotherapy naive non-small cell lung carcinoma)  Patient was randomized to receive all 4 medications  Mrs Jose Giordano completed the 6 cycles of treatment without significant toxicities  Patient had been on maintenance (study drug (atezolizumab) and avastin) - now only Avastin (atezolizumab was discontinued by the PI because of the elevated LFTs)  Issues:      1  Stage IV non-small cell lung carcinoma  Patient feels well and clinically there are no lung cancer related issues/sign  The recent CT scan did not demonstrate any clear evidence for disease progression but there was a new mid left lower lobe infiltrate  This will need to be re-evaluated on the next CT scan (in 3 weeks)  LFTs are elevated but acceptable for Avastin  Patient will continue with the same regimen; next dose is scheduled for tomorrow  We discussed what to monitor for in regard to disease progression (fatigue, cough, hemoptysis, on plan weight loss etc)  We also discussed the possibility of repeating a PET-CT  The recent CT scan results did not specifically states the size of this patchy infiltrate  I have discussed with the patient that I would rather hold off on the PET-CT now for number of reasons  The last PET CT was 2- 3 years ago; patient has been undergoing CT scans more recently    I think it best that we wait for the next CT scan to see if there is evidence of disease progression versus resolution (dnd then make decisions)      Regimen  Bevacizumab 15 mg/kg IV every 3 weeks  Goal = prolongation of life    2  Elevated LFTs  Patient has a history of hypercholesterolemia and has been on Lipitor in the past - PCP is monitoring  Surveillance is ongoing  Recent AST and ALT lower/better than before  Surveillance is ongoing  3  Brain metastases status post resection and radiotherapy  Patient will follow-up with neurosurgery and radiation oncology as directed  As above, the Keppra dose was recently changed - no issues  Patient will follow up with neurology also  Mrs Kobe Guillory states that the plan is to discontinue the Keppra later on this year  4  Anxiety  The alprazolam 0 25 mg every 6-8 hours as needed was renewed  5    Right upper extremity lesion  The lesion seems to be associated with the bruise  There is no overt signs of cellulitis but to be sure, patient is to begin Keflex 500 mg every 12 hours for 7 days  Patient is to return in 3 weeks   Patient knows to call the hematology/oncology office if there are any other questions or concerns  Carefully review your medication list and verify that the list is accurate and up-to-date  Please call the hematology/oncology office if there are medications missing from the list, medications on the list that you are not currently taking or if there is a dosage or instruction that is different from how you're taking that medication      Patient goals and areas of care: continue with the Avastin  Patient is able to self-care   _____________________________________________________________________________________    Chief Complaint   Patient presents with    Follow-up     Metastatic non-small cell lung carcinoma on Avastin     Advance Care Planning/Advance Directives: not discussed       Adenocarcinoma of lung, stage 4, right ([facility] [address] )    9/5/2015 Initial Diagnosis     Patient was referred to the emergency room for evaluation of vertigo as sent from the [facility]  (patient reported )  Patient underwent the following pertinent imaging scans  MRI of the brain demonstrated a mass in the right superior all temporal gyrus with measurements of 4 2 x 3 3 x 3 6 cm with vasogenic edema and mass effect  There was a near uncal herniation noted  CT of the chest and abdomen/pelvis demonstrated a necrotic right upper lobe mass measuring 7 x 4 2 x 4 1 cm strongly suspicious for bronchogenic carcinoma  The mass contacts the posterior medial pleural surface and potentially contacts the right posterior tracheal border  No pathologic adenopathy was identified in no evidence of metastatic disease in the chest abdomen or pelvis  9/6/2015 Surgery     Non-small cell carcinoma of lung ([address] ) diagnosed from biopsy of right temporal mass  Pathology demonstrates poorly differentiated non-small cell carcinoma  Pathologist stated that the immunoprofile supported the primary lung non-small cell carcinoma favor adenocarcinoma  Squamous carcinoma could not be excluded, secondary to focal P 40 staining  Shannan Breslow path report demonstrated no ALK gene rearrangement or dleation and negative ROS1 rearrangement, No EGFR mutations were found  Surgery completed by Dr Suresh Logan            - 10/15/2015 Radiation     SRT to brain 3000 cGY in 5 fractions  10/20/2015 -  Research Study Participant     [id number] Phase III open label randomized study of [id number] [Anti-PDL1 antibody] in combination with carboplatin and paclitaxel +/- Avastin versus Carboplatin and Paciltaxel +/- Avastin in patients with stage IV Chemotherapy  Naive non-small cell lung carcinoma  Patient was randomized to receive off for medications  11/30/2015 - 2/18/2016 Chemotherapy     Started Carboplatin, Paclitaxel, Avastin and PD-L1 (atezolizumab; study drug)    Completed 6 cycles with cycle 6 day 1 on 2/18/16         3/10/2016 -  Chemotherapy     Beginning cycle 7, maintenance cycle with Avastin and PD-L1 (Atezolizumab; study drug)         5/24/2017 Adverse Reaction     LFT elevation-persisted  Patient was taken off of study drug Atezolizumab, but continues on Avastin maintance  History of Present Illness:    59year old female recently diagnosed with IV lung cancer here for followup  Mrs Bobbi Cardenas began to have headaches last spring 2015  Patient was seen by her PCP and treated with antibiotics  Initially the symptoms got better the patient went on vacation  After returning from vacation, Mrs Bobbi Cardenas once again developed headaches  Patient was treated with a second round of antibiotics and then was diagnosed with vertigo  Patient was sent to the [address] [place] [address]  Although the specifics are not entirely clear, the therapist in the [address] [place] [address] sent the patient to the emergency room  A CAT scan of the brain demonstrated a brain lesion and patient was transferred to [place]  Patient was seen by Dr Pete Mcrae and underwent resection demonstrating adenocarcinoma  Additional testing demonstrated a lung mass  Patient improved and was discharged  Patient completed SRT with Dr Jesus Alberto Fowler and Dr Tisha Goldsmith  [facility] (phase III open label randomized study of [id number] [anti-PD-L1 antibody] in combination with carboplatin and paclitaxel +/- Avastin versus carboplatin and paclitaxel +/- Avastin in patients with stage IV chemotherapy naÃ¯ve non-small cell lung carcinoma)  Mrs Bobbi Cardenas was randomized to receive the anti-PD-L1 antibody with chemotherapy - patient completed 6 cycles and was placed on maintenance  Mrs Livan Moran previously suffered a tonic-clonic seizure and was seen in the emergency room  CAT scan of the head did not demonstrate any evidence of disease progression  The seizure was thought to be due to encephalomalacia  Since that time, there have been no seizure issues  Mrs Bobbi Cardenas is on Keppra  Patient has had elevated liver function tests   Because the abnormalities were grade 3, patient was taken off the [id number] and has continued on protocol with Avastin only  Chemotherapy was held on the last cycle because of elevated LFTs  Patient presents for follow-up  Patient states feeling okay, about the same as before  Fatigue is minimal  No fevers, chills or sweats  No pulmonary issues  No CNS issues, mental status change, blurred vision, headaches or dizziness  No recent seizures  No  or GI issues  No GYN issues  Activities are baseline  Patient believes that she has had more bruising on her upper extremities  Patient apparently hit her gait and toward the skin on her right for  Area is slightly swollen, red and itchy  Neck discomfort from the port is the same as before - massages are helpful  Review of Systems   Constitutional: Positive for fatigue  HENT: Negative  Eyes: Negative  Respiratory: Negative  Cardiovascular: Negative  Gastrointestinal: Negative  Endocrine: Negative  Genitourinary: Negative  Musculoskeletal: Positive for arthralgias  Negative for neck pain  Skin: Negative  Allergic/Immunologic: Negative  Neurological: Negative  Hematological: Bruises/bleeds easily  Psychiatric/Behavioral: Negative  All other systems reviewed and are negative       Patient Active Problem List   Diagnosis    Seizure ([facility] [address] )    Adenocarcinoma of lung, stage 4, right ([facility] [address] )    Mixed hyperlipidemia    Abnormal LFTs    Allergic rhinitis    Brain metastasis ([facility] [address] )    Brain tumor ([facility] [address] )    Encephalomalacia    Impaired fasting glucose    Insomnia    Lesion of temporal lobe    Non-small cell lung cancer ([facility] [address] )    Benign hypertension     Past Medical History:   Diagnosis Date    Allergic rhinitis     Alopecia     resolved 10/25/16    Anxiety     last assessed 10/4/16, resolved 10/25/16    Benign hypertension 2/1/2018    Benign neoplasm of skin of trunk 03/25/2008    last assessed 3/25/08    Benign neoplasm of skin of upper extremity and shoulder 03/25/2008    last assessed 3/25/08    Ear deformity, acquired     last assessed 10/13/14, resolved 3/12/15    Eczema     Hyperlipemia     Lung cancer ([facility] [address] )     Maintenance chemotherapy     [facility]     Seizures ([facility] )     Vertigo      Past Surgical History:   Procedure Laterality Date    APPENDECTOMY  1964    BRAIN TUMOR EXCISION      CENTRAL VENOUS CATHETER INSERTION Right     PORTACATH     Family History   Problem Relation Age of Onset    Diabetes Mother     Heart disease Mother     Lung cancer Father     Pancreatitis Brother     No Known Problems Brother      Social History     Social History    Marital status: /Civil Union     Spouse name: N/A    Number of children: 1    Years of education: N/A     Occupational History    Not on file       Social History Main Topics    Smoking status: Former Smoker    Smokeless tobacco: Never Used    Alcohol use Yes      Comment: occasionally / Social     Drug use: Yes     Types: Marijuana    Sexual activity: Yes     Other Topics Concern    Not on file     Social History Narrative    High school or GED     Lives independently with spouse        Current Outpatient Prescriptions:     ALPRAZolam (XANAX) 0 25 mg tablet, Take 1 tablet (0 25 mg total) by mouth 3 (three) times a day as needed for anxiety, Disp: 30 tablet, Rfl: 0    amLODIPine (NORVASC) 2 5 mg tablet, Take 1 tablet (2 5 mg total) by mouth daily, Disp: 90 tablet, Rfl: 1    atorvastatin (LIPITOR) 10 mg tablet, TAKE 1 TABLET BY MOUTH ONCE DAILY BEFORE BREAKFAST, Disp: 90 tablet, Rfl: 1    levETIRAcetam (KEPPRA XR) 500 MG 24 hr tablet, TAKE ONE TABLET BY MOUTH ONCE DAILY IN THE MORNING, Disp: 30 tablet, Rfl: 5    Omega-3 Fatty Acids (FISH OIL PO), Take 2 tablets by mouth , Disp: , Rfl:     ondansetron (ZOFRAN) 8 mg tablet, Take 1 tablet (8 mg total) by mouth 3 (three) times a day, Disp: 1 tablet, Rfl: 2    oxyCODONE (ROXICODONE) 5 mg immediate release tablet, Take 1 tablet (5 mg total) by mouth every 6 (six) hours as needed for moderate pain Max Daily Amount: 20 mg, Disp: 90 tablet, Rfl: 0    prochlorperazine (COMPAZINE) 10 mg tablet, Take 10 mg by mouth every 6 (six) hours as needed for nausea or vomiting, Disp: , Rfl:     Pseudoephedrine-Guaifenesin (MUCINEX D PO), Take by mouth, Disp: , Rfl:     ranitidine (ZANTAC) 75 MG tablet, Take 1 tablet by mouth daily, Disp: , Rfl:   No current facility-administered medications for this visit  Facility-Administered Medications Ordered in Other Visits:     alteplase (CATHFLO) injection 2 mg, 2 mg, Intracatheter, PRN, Monae Rojo MD    sodium chloride 0 9 % infusion, 20 mL/hr, Intravenous, Continuous, Monae Rojo MD    Allergies   Allergen Reactions    Iodinated Diagnostic Agents Anaphylaxis and Itching    Clindamycin     Clindamycin/Lincomycin        Vitals:    10/03/18 1036   BP: 148/88   Pulse: 87   Resp: 18   Temp: (!) 97 1 °F (36 2 °C)   SpO2: 99%     Physical Exam   Constitutional: She is oriented to person, place, and time  She appears well-developed and well-nourished  HENT:   Head: Normocephalic and atraumatic  Right Ear: External ear normal    Left Ear: External ear normal    Nose: Nose normal    Mouth/Throat: Oropharynx is clear and moist    Eyes: Pupils are equal, round, and reactive to light  Conjunctivae and EOM are normal    Neck: Normal range of motion  Neck supple  Cardiovascular: Normal rate, regular rhythm, normal heart sounds and intact distal pulses  Pulmonary/Chest: Effort normal and breath sounds normal    Right anterior chest wall port in place, area clean and dry, area is tender to the touch, no signs of infection or bleeding underneath   Abdominal: Soft  Bowel sounds are normal    Musculoskeletal: Normal range of motion  Neurological: She is alert and oriented to person, place, and time  She has normal reflexes  Skin: Skin is warm     Skin is warm, well tanned, scattered few purpura on upper extremities - more than before, no petechiae, no hematomas, patient has an area of mild redness, swelling, scabbing and opening on the right volar forearm, no obvious signs of cellulitis   Psychiatric: She has a normal mood and affect  Her behavior is normal  Judgment and thought content normal    Extremities: no edema, no cords, pulses are 1+  Lymphatics: no adenopathy in the neck, supraclavicular region, axilla and groin bilaterally    Performance Status: 1 - Symptomatic but completely ambulatory    Labs:    10/01/2018 WBC = 6 9 hemoglobin = 14 8 hematocrit = 42 3 platelet = 539 neutrophil = 52% BUN = 13 creatinine = 0 95 calcium = 9 8 AST = 35 ALT = 42 alkaline phosphatase = 87 total bilirubin = 0 6 LDH = 135 magnesium = 2 1    09/10/2018 WBC = 6 2 hemoglobin = 14 6 hematocrit = 42 platelet = 116 neutrophil = 58% BUN = 10 creatinine = 0 87 AST = 60 ALT = 87  08/21/2018 BUN = 14 creatinine = 0 86 calcium = 9 3 AST = 28 ALT = 41 total bilirubin = 0 3 alkaline phosphatase = 89 WBC = 7 6 hemoglobin = 14 6 hematocrit = 43 platelet = 609 LDH = 126 phosphorus = 4 2 magnesium = 2 0  07/30/2018 WBC = 6 6 hemoglobin = 13 9 hematocrit = 39 1 platelet = 434 neutrophil = 61% BUN = 12 creatinine = 0 95 AST = 51 ALT = 56 alkaline phosphatase = 88 total bilirubin = 0 5 TSH = 2 310 LDH = 146  07/09/2018 WBC = 7 5 hemoglobin = 14 6 hematocrit = 43 5 platelet = 352 neutrophil = 57% BUN = 11 creatinine = 0 94 calcium = 9 7 AST = 36 ALT = 48    Imaging    08/20/2018 CT scan of the chest and abdomen/pelvis without contrast    RECIST target lesion: Cavitary right upper lobe mass is unchanged, measuring 3 4 x 2 6 cm on series 3 image 15 (previously 3 5 x 2 5 cm)  Solid component along the medial aspect of the posterior wall is also unchanged, measuring 1 4 x 0 9 cm      LUNGS:  Stable cavitary right upper lobe mass, as above  No new nodules identified  There is new small patchy density in the posterior left base compatible with infiltrate    There is no tracheal or endobronchial lesion  IMPRESSION    Unchanged cavitary right upper lobe mass  No new metastatic disease  Interval development of mild left lower lobe infiltrate noted  06/18/2018 CT scan of the chest and abdomen/pelvis    Unchanged cavitary right upper lobe mass    No new metastatic disease  04/16/2018 CT scan of the chest and abdomen/pelvis    There is no significant interval change in size of the cystic or nodular components of the cavitary right upper lobe mass  No new metastatic lesions identified in the chest, abdomen or pelvis  1/22/18 CAT scan of the chest and abdomen/pelvis    RECIST MEASUREMENTS:  TARGET LESION:   1: Right upper lobe cavitary lung mass: The overall size of the lesion is 3 5 x 2 4 cm on image 14 of series 3, significantly decreased from 4 3 x 2 7 cm on previous examination  Solid component along the medial aspect of the posterior wall is not significantly changed from previous examination currently measuring 1 5 x 0 9 cm on image 14 of series 3 compared with 1 8 x 0 8 cm when measured using similar technique on previous   examination  Nodular solid component along the lateral aspect of posterior wall measures approximately 0 7 x 0 4 cm on image 13 of series 3, and when measured using similar technique is unchanged from previous examinations  There is interval decrease in size of the cystic portion of the cavitary right upper lobe mass however, solid components of this mass are not significantly changed from previous examination  Otherwise unchanged examination with no new metastatic lesions   identified in the chest, abdomen or pelvis  Pathology    [facility] results demonstrated no ALK gene rearrangement or deletion and negative for ROS1 rearrangement  No EGFR mutations were found also  Right temporal mass from September 6, 2015 demonstrated metastatic poorly differentiated non-small cell carcinoma   The tumor cells stained positive for CK 7, TTF-1 and Napsin and negative for CK 20, CK 5/6 andmucicarmine  Pathologist stated that the immunoprofile supported the primary lung non-small cell carcinoma, favor adenocarcinoma  The pathologist also stated that given the focal p40 staining, a squamous carcinoma could not be excluded

## 2018-10-04 ENCOUNTER — HOSPITAL ENCOUNTER (OUTPATIENT)
Dept: INFUSION CENTER | Facility: CLINIC | Age: 58
Discharge: HOME | End: 2018-10-04
Payer: COMMERCIAL

## 2018-10-04 VITALS
HEIGHT: 67 IN | WEIGHT: 182.32 LBS | HEART RATE: 65 BPM | BODY MASS INDEX: 28.62 KG/M2 | RESPIRATION RATE: 18 BRPM | SYSTOLIC BLOOD PRESSURE: 130 MMHG | DIASTOLIC BLOOD PRESSURE: 84 MMHG | OXYGEN SATURATION: 98 % | TEMPERATURE: 97.8 F

## 2018-10-04 DIAGNOSIS — C34.91 ADENOCARCINOMA OF LUNG, STAGE 4, RIGHT (HCC): Primary | ICD-10-CM

## 2018-10-04 PROCEDURE — J9035Q0 INV BEVACIZUMAB 400MG/16ML 16 ML: Performed by: INTERNAL MEDICINE

## 2018-10-04 PROCEDURE — 96413 CHEMO IV INFUSION 1 HR: CPT

## 2018-10-04 RX ADMIN — HEPARIN 300 UNITS: 100 SYRINGE at 15:45

## 2018-10-04 RX ADMIN — SODIUM CHLORIDE 20 ML/HR: 0.9 INJECTION, SOLUTION INTRAVENOUS at 14:20

## 2018-10-04 RX ADMIN — Medication 1254 MG: at 14:56

## 2018-10-04 NOTE — PROGRESS NOTES
Pt offers no c/o --- she is starting an oral abx / Keflex for RUE infection / pt started yesterday   She has labs within parameters and we can proceed

## 2018-10-04 NOTE — PROGRESS NOTES
Pt port accessed  Call bell within reach and we are ok to start Bp noted 130/84 Heather Gutierrez RN with clinical trials is at chairside with pt

## 2018-10-04 NOTE — PROGRESS NOTES
Patient tolerated treatment today without comp;ications  Verified patients upcoming appointments   Declined AVS

## 2018-10-04 NOTE — PLAN OF CARE
Problem: Potential for Falls  Goal: Patient will remain free of falls  INTERVENTIONS:  - Assess patient frequently for physical needs  -  Identify cognitive and physical deficits and behaviors that affect risk of falls    -  [facility] fall precautions as indicated by assessment   - Educate patient/family on patient safety including physical limitations  - Instruct patient to call for assistance with activity based on assessment  - Modify environment to reduce risk of injury  - Consider OT/PT consult to assist with strengthening/mobility   Outcome: Progressing

## 2018-10-11 ENCOUNTER — TELEPHONE (OUTPATIENT)
Dept: HEMATOLOGY ONCOLOGY | Facility: MEDICAL CENTER | Age: 58
End: 2018-10-11

## 2018-10-11 NOTE — TELEPHONE ENCOUNTER
We cannot prescribe any additional abx  Pt will need to be seen  May be best for her to f/u with her PCP

## 2018-10-17 ENCOUNTER — TRANSCRIBE ORDERS (OUTPATIENT)
Dept: HEMATOLOGY ONCOLOGY | Facility: MEDICAL CENTER | Age: 58
End: 2018-10-17

## 2018-10-17 DIAGNOSIS — C34.91 ADENOCARCINOMA OF LUNG, STAGE 4, RIGHT (HCC): Primary | ICD-10-CM

## 2018-10-18 ENCOUNTER — HOSPITAL ENCOUNTER (OUTPATIENT)
Dept: RADIOLOGY | Facility: HOSPITAL | Age: 58
Discharge: HOME | End: 2018-10-18
Attending: INTERNAL MEDICINE
Payer: COMMERCIAL

## 2018-10-18 ENCOUNTER — TRANSCRIBE ORDERS (OUTPATIENT)
Dept: ADMINISTRATIVE | Facility: HOSPITAL | Age: 58
End: 2018-10-18

## 2018-10-18 DIAGNOSIS — C34.91 NON-SMALL CELL CANCER OF RIGHT LUNG (HCC): ICD-10-CM

## 2018-10-18 PROCEDURE — 71250 CT THORAX DX C-: CPT

## 2018-10-23 LAB
ALBUMIN SERPL-MCNC: 4.4 G/DL (ref 3.5–5.5)
ALBUMIN/GLOB SERPL: 1.7 {RATIO} (ref 1.2–2.2)
ALP SERPL-CCNC: 69 IU/L (ref 39–117)
ALT SERPL-CCNC: 38 IU/L (ref 0–32)
APPEARANCE UR: CLEAR
AST SERPL-CCNC: 34 IU/L (ref 0–40)
BACTERIA URNS QL MICRO: ABNORMAL
BASOPHILS # BLD AUTO: 0.1 X10E3/UL (ref 0–0.2)
BASOPHILS NFR BLD AUTO: 1 %
BILIRUB SERPL-MCNC: 0.6 MG/DL (ref 0–1.2)
BILIRUB UR QL STRIP: NEGATIVE
BUN SERPL-MCNC: 12 MG/DL (ref 6–24)
BUN/CREAT SERPL: 11 (ref 9–23)
CALCIUM SERPL-MCNC: 9.2 MG/DL (ref 8.7–10.2)
CASTS URNS MICRO: ABNORMAL
CASTS URNS QL MICRO: PRESENT /LPF
CHLORIDE SERPL-SCNC: 103 MMOL/L (ref 96–106)
CO2 SERPL-SCNC: 23 MMOL/L (ref 20–29)
COLOR UR: YELLOW
CREAT SERPL-MCNC: 1.08 MG/DL (ref 0.57–1)
EOSINOPHIL # BLD AUTO: 0.2 X10E3/UL (ref 0–0.4)
EOSINOPHIL NFR BLD AUTO: 2 %
EPI CELLS #/AREA URNS HPF: ABNORMAL /HPF
ERYTHROCYTE [DISTWIDTH] IN BLOOD BY AUTOMATED COUNT: 13.2 % (ref 12.3–15.4)
GLOBULIN SER-MCNC: 2.6 G/DL (ref 1.5–4.5)
GLUCOSE SERPL-MCNC: 93 MG/DL (ref 65–99)
GLUCOSE UR QL: NEGATIVE
HCT VFR BLD AUTO: 41.9 % (ref 34–46.6)
HGB BLD-MCNC: 14.1 G/DL (ref 11.1–15.9)
HGB UR QL STRIP: NEGATIVE
IMM GRANULOCYTES # BLD: 0 X10E3/UL (ref 0–0.1)
IMM GRANULOCYTES NFR BLD: 0 %
KETONES UR QL STRIP: NEGATIVE
LDH SERPL-CCNC: 166 IU/L (ref 119–226)
LEUKOCYTE ESTERASE UR QL STRIP: ABNORMAL
LYMPHOCYTES # BLD AUTO: 3.3 X10E3/UL (ref 0.7–3.1)
LYMPHOCYTES NFR BLD AUTO: 36 %
MAGNESIUM SERPL-MCNC: 2 MG/DL (ref 1.6–2.3)
MCH RBC QN AUTO: 31.5 PG (ref 26.6–33)
MCHC RBC AUTO-ENTMCNC: 33.7 G/DL (ref 31.5–35.7)
MCV RBC AUTO: 94 FL (ref 79–97)
MICRO URNS: ABNORMAL
MONOCYTES # BLD AUTO: 0.7 X10E3/UL (ref 0.1–0.9)
MONOCYTES NFR BLD AUTO: 8 %
MUCOUS THREADS URNS QL MICRO: PRESENT
NEUTROPHILS # BLD AUTO: 4.8 X10E3/UL (ref 1.4–7)
NEUTROPHILS NFR BLD AUTO: 53 %
NITRITE UR QL STRIP: NEGATIVE
PH UR STRIP: 5.5 [PH] (ref 5–7.5)
PHOSPHATE SERPL-MCNC: 4.1 MG/DL (ref 2.5–4.5)
PLATELET # BLD AUTO: 268 X10E3/UL (ref 150–379)
POTASSIUM SERPL-SCNC: 4.4 MMOL/L (ref 3.5–5.2)
PROT SERPL-MCNC: 7 G/DL (ref 6–8.5)
PROT UR QL STRIP: ABNORMAL
RBC # BLD AUTO: 4.47 X10E6/UL (ref 3.77–5.28)
RBC #/AREA URNS HPF: ABNORMAL /HPF
SL AMB EGFR AFRICAN AMERICAN: 65 ML/MIN/1.73
SL AMB EGFR NON AFRICAN AMERICAN: 56 ML/MIN/1.73
SODIUM SERPL-SCNC: 140 MMOL/L (ref 134–144)
SP GR UR: 1.01 (ref 1–1.03)
UROBILINOGEN UR STRIP-ACNC: 0.2 EU/DL (ref 0.2–1)
WBC # BLD AUTO: 9.1 X10E3/UL (ref 3.4–10.8)
WBC #/AREA URNS HPF: ABNORMAL /HPF

## 2018-10-24 ENCOUNTER — OFFICE VISIT (OUTPATIENT)
Dept: HEMATOLOGY ONCOLOGY | Facility: MEDICAL CENTER | Age: 58
End: 2018-10-24
Payer: COMMERCIAL

## 2018-10-24 VITALS
BODY MASS INDEX: 28.72 KG/M2 | SYSTOLIC BLOOD PRESSURE: 138 MMHG | HEART RATE: 105 BPM | DIASTOLIC BLOOD PRESSURE: 82 MMHG | TEMPERATURE: 96.8 F | RESPIRATION RATE: 18 BRPM | OXYGEN SATURATION: 96 % | HEIGHT: 67 IN | WEIGHT: 183 LBS

## 2018-10-24 DIAGNOSIS — C34.91 ADENOCARCINOMA OF LUNG, STAGE 4, RIGHT (HCC): Primary | ICD-10-CM

## 2018-10-24 PROCEDURE — 99214 OFFICE O/P EST MOD 30 MIN: CPT | Performed by: INTERNAL MEDICINE

## 2018-10-24 RX ORDER — SODIUM CHLORIDE 9 MG/ML
20 INJECTION, SOLUTION INTRAVENOUS CONTINUOUS
Status: DISCONTINUED | OUTPATIENT
Start: 2018-10-25 | End: 2018-10-28 | Stop reason: HOSPADM

## 2018-10-24 NOTE — PROGRESS NOTES
[facility]  1960  [address] [facility] [place] [id number]    DISCUSSION  SUMMARY:    57-year-old female with M1 non-small cell lung carcinoma/adenocarcinoma  Patient is on [facility] [address]-50 (phase III open label randomized study of [id number] [anti-PD-L1 antibody] in combination with carboplatin and paclitaxel +/- Avastin versus carboplatin and paclitaxel +/- Avastin in patients with stage IV chemotherapy naive non-small cell lung carcinoma)  Patient was randomized to receive all 4 medications  Mrs Wanda Marin completed the 6 cycles of treatment without significant toxicities  Patient had been on maintenance (study drug (atezolizumab) and avastin) - now only Avastin (atezolizumab was discontinued by the PI because of the elevated LFTs)  Issues:      1  Stage IV non-small cell lung carcinoma  Patient feels well and clinically there are no lung cancer related issues/sign  The recent CT scan did not demonstrate any clear evidence for disease progression; the prior left lower lobe infiltrate was less/better  LFTs are okay/acceptable  The plan is to continue with the Avastin, scheduled for tomorrow  Although the specifics are not presently available, there have been some issues with insurance paying for CT scans     The plan is to continue with CT scans every 9 weeks  On the 1st interval, the patient will receive a CT scan of the chest without contrast (covered by insurance)  Nine weeks later, patient will get a CT scan of the chest and abdomen/pelvis with contrast paid for by the protocol  The cycle will then be repeated      Regimen  Bevacizumab 15 mg/kg IV every 3 weeks  Goal = prolongation of life    2  Elevated LFTs  Patient has a history of hypercholesterolemia and has been on Lipitor in the past - PCP is monitoring  Surveillance is ongoing  Recent AST and ALT lower/better than before  Surveillance is ongoing      3  Brain metastases status post resection and radiotherapy  Patient will follow-up with neurosurgery and radiation oncology as directed  As above, the Keppra dose was recently changed - no issues  Patient will follow up with neurology also  Mrs Glen Boykinp states that the plan is to discontinue the Keppra later on this year  4  Anxiety  The alprazolam 0 25 mg every 6-8 hours as needed was renewed  Patient is to return in 3 weeks   Patient knows to call the hematology/oncology office if there are any other questions or concerns  Carefully review your medication list and verify that the list is accurate and up-to-date  Please call the hematology/oncology office if there are medications missing from the list, medications on the list that you are not currently taking or if there is a dosage or instruction that is different from how you're taking that medication  Patient goals and areas of care: continue with the Avastin  Patient is able to self-care   _____________________________________________________________________________________    Chief Complaint   Patient presents with    Follow-up     Non-small cell lung carcinoma     Advance Care Planning/Advance Directives: not discussed       Adenocarcinoma of lung, stage 4, right ([facility] [place] [address] )    9/5/2015 Initial Diagnosis     Patient was referred to the emergency room for evaluation of vertigo as sent from the [facility]  (patient reported )  Patient underwent the following pertinent imaging scans  MRI of the brain demonstrated a mass in the right superior all temporal gyrus with measurements of 4 2 x 3 3 x 3 6 cm with vasogenic edema and mass effect  There was a near uncal herniation noted  CT of the chest and abdomen/pelvis demonstrated a necrotic right upper lobe mass measuring 7 x 4 2 x 4 1 cm strongly suspicious for bronchogenic carcinoma  The mass contacts the posterior medial pleural surface and potentially contacts the right posterior tracheal border    No pathologic adenopathy was identified in no evidence of metastatic disease in the chest abdomen or pelvis  9/6/2015 Surgery     Non-small cell carcinoma of lung ([address] ) diagnosed from biopsy of right temporal mass  Pathology demonstrates poorly differentiated non-small cell carcinoma  Pathologist stated that the immunoprofile supported the primary lung non-small cell carcinoma favor adenocarcinoma  Squamous carcinoma could not be excluded, secondary to focal P 40 staining  [place] path report demonstrated no ALK gene rearrangement or dleation and negative ROS1 rearrangement, No EGFR mutations were found  Surgery completed by Dr Concepción Bruce            - 10/15/2015 Radiation     SRT to brain 3000 cGY in 5 fractions  10/20/2015 -  Research Study Participant     [id number] Phase III open label randomized study of [id number] [Anti-PDL1 antibody] in combination with carboplatin and paclitaxel +/- Avastin versus Carboplatin and Paciltaxel +/- Avastin in patients with stage IV Chemotherapy  Naive non-small cell lung carcinoma  Patient was randomized to receive off for medications  11/30/2015 - 2/18/2016 Chemotherapy     Started Carboplatin, Paclitaxel, Avastin and PD-L1 (atezolizumab; study drug)  Completed 6 cycles with cycle 6 day 1 on 2/18/16         3/10/2016 -  Chemotherapy     Beginning cycle 7, maintenance cycle with Avastin and PD-L1 (Atezolizumab; study drug)         5/24/2017 Adverse Reaction     LFT elevation-persisted  Patient was taken off of study drug Atezolizumab, but continues on Avastin maintance  History of Present Illness:    59year old female recently diagnosed with IV lung cancer here for followup  Mrs Nallely Chirinos began to have headaches last spring 2015  Patient was seen by her PCP and treated with antibiotics  Initially the symptoms got better the patient went on vacation  After returning from vacation, Mrs Nallely Chirinos once again developed headaches   Patient was treated with a second round of antibiotics and then was diagnosed with vertigo  Patient was sent to the [address] [place] [address]  Although the specifics are not entirely clear, the therapist in the [address] [place] [address] sent the patient to the emergency room  A CAT scan of the brain demonstrated a brain lesion and patient was transferred to [facility]  Patient was seen by Dr Annetta Vyas and underwent resection demonstrating adenocarcinoma  Additional testing demonstrated a lung mass  Patient improved and was discharged  Patient completed SRT with Dr Marcelle Castillo and Dr Addie Frazier  Patient is on [facility] [address]-14 (phase III open label randomized study of [id number] [anti-PD-L1 antibody] in combination with carboplatin and paclitaxel +/- Avastin versus carboplatin and paclitaxel +/- Avastin in patients with stage IV chemotherapy naÃ¯ve non-small cell lung carcinoma)  Mrs Polo Lennon was randomized to receive the anti-PD-L1 antibody with chemotherapy - patient completed 6 cycles and was placed on maintenance  Mrs Kalyani Rangel previously suffered a tonic-clonic seizure and was seen in the emergency room  CAT scan of the head did not demonstrate any evidence of disease progression  The seizure was thought to be due to encephalomalacia  Since that time, there have been no seizure issues  Mrs Polo Lennon is on Keppra  Patient has had elevated liver function tests  Because the abnormalities were grade 3, patient was taken off the [id number] and has continued on protocol with Avastin only  Chemotherapy was held on the last cycle because of elevated LFTs  Patient presents for follow-up  Patient states feeling okay, about the same as before  Fatigue is minimal, same  No fevers, chills or sweats  No pulmonary issues  No CNS issues, mental status change, blurred vision, headaches or dizziness  No recent seizures  No  or GI issues  No GYN issues  Activities are baseline  Neck discomfort from the port is the same as before - massages are helpful  Review of Systems   Constitutional: Positive for fatigue  HENT: Negative  Eyes: Negative  Respiratory: Negative  Cardiovascular: Negative  Gastrointestinal: Negative  Endocrine: Negative  Genitourinary: Negative  Musculoskeletal: Positive for arthralgias  Negative for neck pain  Skin: Negative  Allergic/Immunologic: Negative  Neurological: Negative  Hematological: Bruises/bleeds easily  Psychiatric/Behavioral: Negative  All other systems reviewed and are negative       Patient Active Problem List   Diagnosis    Seizure [facility] [address]    Adenocarcinoma of lung, stage 4, right ([facility] [address]    Mixed hyperlipidemia    Abnormal LFTs    Allergic rhinitis    Brain metastasis ([facility] [address]    Brain tumor [facility] [address]    Encephalomalacia    Impaired fasting glucose    Insomnia    Lesion of temporal lobe    Non-small cell lung cancer ([facility] [address]    Benign hypertension     Past Medical History:   Diagnosis Date    Allergic rhinitis     Alopecia     resolved 10/25/16    Anxiety     last assessed 10/4/16, resolved 10/25/16    Benign hypertension 2/1/2018    Benign neoplasm of skin of trunk 03/25/2008    last assessed 3/25/08    Benign neoplasm of skin of upper extremity and shoulder 03/25/2008    last assessed 3/25/08    Ear deformity, acquired     last assessed 10/13/14, resolved 3/12/15    Eczema     Hyperlipemia     Lung cancer ([facility] [address] [facility]     Maintenance chemotherapy     Secondary cancer of brain ([facility] [address] [facility]     Seizures ([facility] [address] [facility]     Vertigo      Past Surgical History:   Procedure Laterality Date    APPENDECTOMY  1964    BRAIN TUMOR EXCISION      CENTRAL VENOUS CATHETER INSERTION Right     PORTACATH     Family History   Problem Relation Age of Onset    Diabetes Mother     Heart disease Mother     Lung cancer Father     Pancreatitis Brother     No Known Problems Brother      Social History     Social History    Marital status: /Civil Union     Spouse name: N/A    Number of children: 1    Years of education: N/A     Occupational History    Not on file  Social History Main Topics    Smoking status: Former Smoker    Smokeless tobacco: Never Used    Alcohol use Yes      Comment: occasionally / Social     Drug use: Yes     Types: Marijuana    Sexual activity: Yes     Other Topics Concern    Not on file     Social History Narrative    High school or GED     Lives independently with spouse        Current Outpatient Prescriptions:     ALPRAZolam (XANAX) 0 25 mg tablet, Take 1 tablet (0 25 mg total) by mouth 3 (three) times a day as needed for anxiety, Disp: 30 tablet, Rfl: 0    amLODIPine (NORVASC) 2 5 mg tablet, Take 1 tablet (2 5 mg total) by mouth daily, Disp: 90 tablet, Rfl: 1    atorvastatin (LIPITOR) 10 mg tablet, TAKE 1 TABLET BY MOUTH ONCE DAILY BEFORE BREAKFAST, Disp: 90 tablet, Rfl: 1    levETIRAcetam (KEPPRA XR) 500 MG 24 hr tablet, TAKE ONE TABLET BY MOUTH ONCE DAILY IN THE MORNING, Disp: 30 tablet, Rfl: 5    Omega-3 Fatty Acids (FISH OIL PO), Take 2 tablets by mouth , Disp: , Rfl:     ondansetron (ZOFRAN) 8 mg tablet, Take 1 tablet (8 mg total) by mouth 3 (three) times a day, Disp: 1 tablet, Rfl: 2    oxyCODONE (ROXICODONE) 5 mg immediate release tablet, Take 1 tablet (5 mg total) by mouth every 6 (six) hours as needed for moderate pain Max Daily Amount: 20 mg, Disp: 90 tablet, Rfl: 0    prochlorperazine (COMPAZINE) 10 mg tablet, Take 10 mg by mouth every 6 (six) hours as needed for nausea or vomiting, Disp: , Rfl:     Pseudoephedrine-Guaifenesin (MUCINEX D PO), Take by mouth, Disp: , Rfl:     ranitidine (ZANTAC) 75 MG tablet, Take 1 tablet by mouth daily, Disp: , Rfl:   No current facility-administered medications for this visit       Facility-Administered Medications Ordered in Other Visits:     alteplase (CATHFLO) injection 2 mg, 2 mg, Intracatheter, PRN, Alex Carcamo MD    sodium chloride 0 9 % infusion, 20 mL/hr, Intravenous, Continuous, Alex Carcamo MD    Allergies Allergen Reactions    Iodinated Diagnostic Agents Anaphylaxis and Itching    Clindamycin     Clindamycin/Lincomycin        Vitals:    10/24/18 0952   BP: 138/82   Pulse: 105   Resp: 18   Temp: (!) 96 8 °F (36 °C)   SpO2: 96%     Physical Exam   Constitutional: She is oriented to person, place, and time  She appears well-developed and well-nourished  HENT:   Head: Normocephalic and atraumatic  Right Ear: External ear normal    Left Ear: External ear normal    Nose: Nose normal    Mouth/Throat: Oropharynx is clear and moist    Eyes: Pupils are equal, round, and reactive to light  Conjunctivae and EOM are normal    Neck: Normal range of motion  Neck supple  Cardiovascular: Normal rate, regular rhythm, normal heart sounds and intact distal pulses  Pulmonary/Chest: Effort normal and breath sounds normal    Right anterior chest wall port in place, area clean and dry, area is tender to the touch, no signs of infection or bleeding underneath   Abdominal: Soft  Bowel sounds are normal    Musculoskeletal: Normal range of motion  Neurological: She is alert and oriented to person, place, and time  She has normal reflexes  Skin: Skin is warm  Skin is warm, well tanned, scattered few purpura on upper extremities - same as before, no signs of cellulitis, no petechiae   Psychiatric: She has a normal mood and affect   Her behavior is normal  Judgment and thought content normal    Extremities: no lower extremity edema, no cords, pulses are 1+  Lymphatics: no adenopathy in the neck, supraclavicular region, axilla and groin bilaterally    Performance Status: 1 - Symptomatic but completely ambulatory    Labs:    10/22/2018 WBC = 9 1 hemoglobin = 14 1 hematocrit = 42 platelet = 002 neutrophil = 53% BUN = 12 creatinine = 1 08 AST = 34 ALT = 38 alkaline phosphatase = 69    10/01/2018 WBC = 6 9 hemoglobin = 14 8 hematocrit = 42 3 platelet = 305 neutrophil = 52% BUN = 13 creatinine = 0 95 calcium = 9 8 AST = 35 ALT = 42 alkaline phosphatase = 87 total bilirubin = 0 6 LDH = 135 magnesium = 2 1  09/10/2018 WBC = 6 2 hemoglobin = 14 6 hematocrit = 42 platelet = 494 neutrophil = 58% BUN = 10 creatinine = 0 87 AST = 60 ALT = 87  08/21/2018 BUN = 14 creatinine = 0 86 calcium = 9 3 AST = 28 ALT = 41 total bilirubin = 0 3 alkaline phosphatase = 89 WBC = 7 6 hemoglobin = 14 6 hematocrit = 43 platelet = 817 LDH = 126 phosphorus = 4 2 magnesium = 2 0  07/30/2018 WBC = 6 6 hemoglobin = 13 9 hematocrit = 39 1 platelet = 378 neutrophil = 61% BUN = 12 creatinine = 0 95 AST = 51 ALT = 56 alkaline phosphatase = 88 total bilirubin = 0 5 TSH = 2 310 LDH = 146  07/09/2018 WBC = 7 5 hemoglobin = 14 6 hematocrit = 43 5 platelet = 231 neutrophil = 57% BUN = 11 creatinine = 0 94 calcium = 9 7 AST = 36 ALT = 48    Imaging    10/18/2018 CT scan of the chest without contrast: IMPRESSION: Improved appearance of the left lung base infiltrate  Stable right cavitary lesion  No new findings    08/20/2018 CT scan of the chest and abdomen/pelvis without contrast    RECIST target lesion: Cavitary right upper lobe mass is unchanged, measuring 3 4 x 2 6 cm on series 3 image 15 (previously 3 5 x 2 5 cm)  Solid component along the medial aspect of the posterior wall is also unchanged, measuring 1 4 x 0 9 cm      LUNGS:  Stable cavitary right upper lobe mass, as above  No new nodules identified  There is new small patchy density in the posterior left base compatible with infiltrate  There is no tracheal or endobronchial lesion  IMPRESSION    Unchanged cavitary right upper lobe mass  No new metastatic disease  Interval development of mild left lower lobe infiltrate noted  06/18/2018 CT scan of the chest and abdomen/pelvis    Unchanged cavitary right upper lobe mass    No new metastatic disease      04/16/2018 CT scan of the chest and abdomen/pelvis    There is no significant interval change in size of the cystic or nodular components of the cavitary right upper lobe mass  No new metastatic lesions identified in the chest, abdomen or pelvis  1/22/18 CAT scan of the chest and abdomen/pelvis    RECIST MEASUREMENTS:  TARGET LESION:   1: Right upper lobe cavitary lung mass: The overall size of the lesion is 3 5 x 2 4 cm on image 14 of series 3, significantly decreased from 4 3 x 2 7 cm on previous examination  Solid component along the medial aspect of the posterior wall is not significantly changed from previous examination currently measuring 1 5 x 0 9 cm on image 14 of series 3 compared with 1 8 x 0 8 cm when measured using similar technique on previous   examination  Nodular solid component along the lateral aspect of posterior wall measures approximately 0 7 x 0 4 cm on image 13 of series 3, and when measured using similar technique is unchanged from previous examinations  There is interval decrease in size of the cystic portion of the cavitary right upper lobe mass however, solid components of this mass are not significantly changed from previous examination  Otherwise unchanged examination with no new metastatic lesions   identified in the chest, abdomen or pelvis  Pathology    [facility] results demonstrated no ALK gene rearrangement or deletion and negative for ROS1 rearrangement  No EGFR mutations were found also  Right temporal mass from September 6, 2015 demonstrated metastatic poorly differentiated non-small cell carcinoma  The tumor cells stained positive for CK 7, TTF-1 and Napsin and negative for CK 20, CK 5/6 andmucicarmine  Pathologist stated that the immunoprofile supported the primary lung non-small cell carcinoma, favor adenocarcinoma  The pathologist also stated that given the focal p40 staining, a squamous carcinoma could not be excluded

## 2018-10-25 ENCOUNTER — HOSPITAL ENCOUNTER (OUTPATIENT)
Dept: INFUSION CENTER | Facility: CLINIC | Age: 58
Discharge: HOME | End: 2018-10-25
Payer: COMMERCIAL

## 2018-10-25 VITALS
RESPIRATION RATE: 18 BRPM | BODY MASS INDEX: 29.09 KG/M2 | DIASTOLIC BLOOD PRESSURE: 78 MMHG | OXYGEN SATURATION: 99 % | HEART RATE: 82 BPM | WEIGHT: 183 LBS | TEMPERATURE: 98.6 F | SYSTOLIC BLOOD PRESSURE: 126 MMHG

## 2018-10-25 DIAGNOSIS — C34.90 NON-SMALL CELL LUNG CANCER, UNSPECIFIED LATERALITY (HCC): Primary | ICD-10-CM

## 2018-10-25 PROCEDURE — J9035Q0 INV BEVACIZUMAB 400MG/16ML 16 ML: Performed by: INTERNAL MEDICINE

## 2018-10-25 PROCEDURE — 96413 CHEMO IV INFUSION 1 HR: CPT

## 2018-10-25 RX ADMIN — HEPARIN 300 UNITS: 100 SYRINGE at 15:45

## 2018-10-25 RX ADMIN — Medication 1254 MG: at 14:58

## 2018-10-25 RX ADMIN — SODIUM CHLORIDE 20 ML/HR: 0.9 INJECTION, SOLUTION INTRAVENOUS at 14:23

## 2018-10-30 DIAGNOSIS — C34.91 ADENOCARCINOMA OF LUNG, STAGE 4, RIGHT (HCC): Primary | ICD-10-CM

## 2018-11-13 LAB
ALBUMIN SERPL-MCNC: 4.4 G/DL (ref 3.5–5.5)
ALBUMIN/GLOB SERPL: 1.4 {RATIO} (ref 1.2–2.2)
ALP SERPL-CCNC: 80 IU/L (ref 39–117)
ALT SERPL-CCNC: 58 IU/L (ref 0–32)
APPEARANCE UR: CLEAR
AST SERPL-CCNC: 55 IU/L (ref 0–40)
BACTERIA URNS QL MICRO: ABNORMAL
BASOPHILS # BLD AUTO: 0.1 X10E3/UL (ref 0–0.2)
BASOPHILS NFR BLD AUTO: 1 %
BILIRUB SERPL-MCNC: 0.6 MG/DL (ref 0–1.2)
BILIRUB UR QL STRIP: NEGATIVE
BUN SERPL-MCNC: 11 MG/DL (ref 6–24)
BUN/CREAT SERPL: 13 (ref 9–23)
CALCIUM SERPL-MCNC: 9.4 MG/DL (ref 8.7–10.2)
CHLORIDE SERPL-SCNC: 102 MMOL/L (ref 96–106)
CO2 SERPL-SCNC: 21 MMOL/L (ref 20–29)
COLOR UR: YELLOW
CREAT SERPL-MCNC: 0.88 MG/DL (ref 0.57–1)
EOSINOPHIL # BLD AUTO: 0.2 X10E3/UL (ref 0–0.4)
EOSINOPHIL NFR BLD AUTO: 2 %
EPI CELLS #/AREA URNS HPF: ABNORMAL /HPF
ERYTHROCYTE [DISTWIDTH] IN BLOOD BY AUTOMATED COUNT: 12.9 % (ref 12.3–15.4)
GLOBULIN SER-MCNC: 3.1 G/DL (ref 1.5–4.5)
GLUCOSE SERPL-MCNC: 118 MG/DL (ref 65–99)
GLUCOSE UR QL: NEGATIVE
HCT VFR BLD AUTO: 42.6 % (ref 34–46.6)
HGB BLD-MCNC: 14.9 G/DL (ref 11.1–15.9)
HGB UR QL STRIP: NEGATIVE
IMM GRANULOCYTES # BLD: 0 X10E3/UL (ref 0–0.1)
IMM GRANULOCYTES NFR BLD: 0 %
KETONES UR QL STRIP: NEGATIVE
LDH SERPL-CCNC: 152 IU/L (ref 119–226)
LEUKOCYTE ESTERASE UR QL STRIP: ABNORMAL
LYMPHOCYTES # BLD AUTO: 2.7 X10E3/UL (ref 0.7–3.1)
LYMPHOCYTES NFR BLD AUTO: 33 %
MAGNESIUM SERPL-MCNC: 2.1 MG/DL (ref 1.6–2.3)
MCH RBC QN AUTO: 31.7 PG (ref 26.6–33)
MCHC RBC AUTO-ENTMCNC: 35 G/DL (ref 31.5–35.7)
MCV RBC AUTO: 91 FL (ref 79–97)
MICRO URNS: ABNORMAL
MONOCYTES # BLD AUTO: 0.5 X10E3/UL (ref 0.1–0.9)
MONOCYTES NFR BLD AUTO: 6 %
MUCOUS THREADS URNS QL MICRO: PRESENT
NEUTROPHILS # BLD AUTO: 4.8 X10E3/UL (ref 1.4–7)
NEUTROPHILS NFR BLD AUTO: 58 %
NITRITE UR QL STRIP: NEGATIVE
PH UR STRIP: 5.5 [PH] (ref 5–7.5)
PHOSPHATE SERPL-MCNC: 4 MG/DL (ref 2.5–4.5)
PLATELET # BLD AUTO: 268 X10E3/UL (ref 150–379)
POTASSIUM SERPL-SCNC: 4.7 MMOL/L (ref 3.5–5.2)
PROT SERPL-MCNC: 7.5 G/DL (ref 6–8.5)
PROT UR QL STRIP: NEGATIVE
RBC # BLD AUTO: 4.7 X10E6/UL (ref 3.77–5.28)
RBC #/AREA URNS HPF: ABNORMAL /HPF
SL AMB EGFR AFRICAN AMERICAN: 83 ML/MIN/1.73
SL AMB EGFR NON AFRICAN AMERICAN: 72 ML/MIN/1.73
SODIUM SERPL-SCNC: 140 MMOL/L (ref 134–144)
SP GR UR: 1.01 (ref 1–1.03)
UROBILINOGEN UR STRIP-ACNC: 0.2 EU/DL (ref 0.2–1)
WBC # BLD AUTO: 8.3 X10E3/UL (ref 3.4–10.8)
WBC #/AREA URNS HPF: ABNORMAL /HPF

## 2018-11-14 ENCOUNTER — OFFICE VISIT (OUTPATIENT)
Dept: HEMATOLOGY ONCOLOGY | Facility: MEDICAL CENTER | Age: 58
End: 2018-11-14
Payer: COMMERCIAL

## 2018-11-14 VITALS
SYSTOLIC BLOOD PRESSURE: 128 MMHG | HEART RATE: 94 BPM | DIASTOLIC BLOOD PRESSURE: 70 MMHG | RESPIRATION RATE: 18 BRPM | OXYGEN SATURATION: 98 % | WEIGHT: 181 LBS | TEMPERATURE: 97.8 F | BODY MASS INDEX: 28.41 KG/M2 | HEIGHT: 67 IN

## 2018-11-14 DIAGNOSIS — C34.91 ADENOCARCINOMA OF LUNG, STAGE 4, RIGHT (HCC): Primary | ICD-10-CM

## 2018-11-14 PROCEDURE — 99214 OFFICE O/P EST MOD 30 MIN: CPT | Performed by: INTERNAL MEDICINE

## 2018-11-14 RX ORDER — SODIUM CHLORIDE 9 MG/ML
20 INJECTION, SOLUTION INTRAVENOUS CONTINUOUS
Status: DISCONTINUED | OUTPATIENT
Start: 2018-11-15 | End: 2018-11-18 | Stop reason: HOSPADM

## 2018-11-14 NOTE — PROGRESS NOTES
Magalis Contreras  1960  [facility]  [address] [id number]    DISCUSSION  SUMMARY:    53-year-old female with M1 non-small cell lung carcinoma/adenocarcinoma  Patient is on [facility] 62613-86 (phase III open label randomized study of [id number] [anti-PD-L1 antibody] in combination with carboplatin and paclitaxel +/- Avastin versus carboplatin and paclitaxel +/- Avastin in patients with stage IV chemotherapy naive non-small cell lung carcinoma)  Patient was randomized to receive all 4 medications  Mrs Karmen Harrison completed the 6 cycles of treatment without significant toxicities  Patient had been on maintenance (study drug (atezolizumab) and avastin) - now only Avastin (atezolizumab was discontinued by the PI because of the elevated LFTs)  Issues:      1  Stage IV non-small cell lung carcinoma  Patient feels well and clinically there are no troubling signs  The recent CT scan did not demonstrate any evidence for progression and the prior left lower infiltrate had improved  Recent laboratory tests results are good/acceptable  Patient will continue with the Avastin, next dose is scheduled for tomorrow  Although the specifics are not presently available, there have been some issues with insurance paying for CT scans     The plan is to continue with CT scans every 9 weeks  On the 1st interval, the patient will receive a CT scan of the chest without contrast (covered by insurance)  Nine weeks later, patient will get a CT scan of the chest and abdomen/pelvis with contrast paid for by the protocol  The cycle will then be repeated      Regimen  Bevacizumab 15 mg/kg IV every 3 weeks  Goal = prolongation of life    2  Elevated LFTs  This has been an issue for over a year  Patient had been on Lipitor in the past, this was changed  Patient also drinks + alcohol    LFTs are slightly higher than before but okay/acceptable for treatment  3  Brain metastases status post resection and radiotherapy  Patient is on [address]  Apparently patient/neurologist are planning a taper trial later on this month  Patient knows that if she has any additional seizures, she will need Keppra for life  4  Anxiety  The alprazolam 0 25 mg every 6-8 hours as needed was renewed  Patient is to return in 3 weeks   Patient knows to call the hematology/oncology office if there are any other questions or concerns  Carefully review your medication list and verify that the list is accurate and up-to-date  Please call the hematology/oncology office if there are medications missing from the list, medications on the list that you are not currently taking or if there is a dosage or instruction that is different from how you're taking that medication  Patient goals and areas of care: continue with the Avastin  Patient is able to self-care   _____________________________________________________________________________________    Chief Complaint   Patient presents with    Follow-up     Non-small cell lung carcinoma, stage IV     Advance Care Planning/Advance Directives: not discussed       Adenocarcinoma of lung, stage 4, right ([facility] [address] )    9/5/2015 Initial Diagnosis     Patient was referred to the emergency room for evaluation of vertigo as sent from the [facility]  (patient reported )  Patient underwent the following pertinent imaging scans  MRI of the brain demonstrated a mass in the right superior all temporal gyrus with measurements of 4 2 x 3 3 x 3 6 cm with vasogenic edema and mass effect  There was a near uncal herniation noted  CT of the chest and abdomen/pelvis demonstrated a necrotic right upper lobe mass measuring 7 x 4 2 x 4 1 cm strongly suspicious for bronchogenic carcinoma  The mass contacts the posterior medial pleural surface and potentially contacts the right posterior tracheal border    No pathologic adenopathy was identified in no evidence of metastatic disease in the chest abdomen or pelvis  9/6/2015 Surgery     Non-small cell carcinoma of lung ([address] ) diagnosed from biopsy of right temporal mass  Pathology demonstrates poorly differentiated non-small cell carcinoma  Pathologist stated that the immunoprofile supported the primary lung non-small cell carcinoma favor adenocarcinoma  Squamous carcinoma could not be excluded, secondary to focal P 40 staining  [facility] path report demonstrated no ALK gene rearrangement or dleation and negative ROS1 rearrangement, No EGFR mutations were found  Surgery completed by Dr Woodrow Nuñez            - 10/15/2015 Radiation     SRT to brain 3000 cGY in 5 fractions  10/20/2015 -  Research Study Participant     [id number] Phase III open label randomized study of [id number] [Anti-PDL1 antibody] in combination with carboplatin and paclitaxel +/- Avastin versus Carboplatin and Paciltaxel +/- Avastin in patients with stage IV Chemotherapy  Naive non-small cell lung carcinoma  Patient was randomized to receive off for medications  11/30/2015 - 2/18/2016 Chemotherapy     Started Carboplatin, Paclitaxel, Avastin and PD-L1 (atezolizumab; study drug)  Completed 6 cycles with cycle 6 day 1 on 2/18/16         3/10/2016 -  Chemotherapy     Beginning cycle 7, maintenance cycle with Avastin and PD-L1 (Atezolizumab; study drug)         5/24/2017 Adverse Reaction     LFT elevation-persisted  Patient was taken off of study drug Atezolizumab, but continues on Avastin maintance  History of Present Illness:    59year old female recently diagnosed with IV lung cancer here for followup  Mrs Kobe Guillory began to have headaches last spring 2015  Patient was seen by her PCP and treated with antibiotics  Initially the symptoms got better the patient went on vacation  After returning from vacation, Mrs Kobe Guillory once again developed headaches   Patient was treated with a second round of antibiotics and then was diagnosed with vertigo  Patient was sent to the [address] [place] [address]  Although the specifics are not entirely clear, the therapist in the [address] [place] [address] sent the patient to the emergency room  A CAT scan of the brain demonstrated a brain lesion and patient was transferred to [place]  Patient was seen by Dr Duyen Huffman and underwent resection demonstrating adenocarcinoma  Additional testing demonstrated a lung mass  Patient improved and was discharged  Patient completed SRT with Dr Caleb Cummins and Dr Mell Mckinney  Patient is on [facility] [address]14 (phase III open label randomized study of [id number] [anti-PD-L1 antibody] in combination with carboplatin and paclitaxel +/- Avastin versus carboplatin and paclitaxel +/- Avastin in patients with stage IV chemotherapy naÃ¯ve non-small cell lung carcinoma)  Mrs Nata Chavez was randomized to receive the anti-PD-L1 antibody with chemotherapy - patient completed 6 cycles and was placed on maintenance  Mrs Mina Fitzgerald previously suffered a tonic-clonic seizure and was seen in the emergency room  CAT scan of the head did not demonstrate any evidence of disease progression  The seizure was thought to be due to encephalomalacia  Since that time, there have been no seizure issues  Mrs Nata Chavez is on Keppra  Patient has had elevated liver function tests  Because the abnormalities were grade 3, patient was taken off the [id number] and has continued on protocol with Avastin only  Chemotherapy was held on the last cycle because of elevated LFTs  Patient presents for follow-up  Patient states feeling well, better than before  Mrs Nata Chavez has been using CBD oil from hemp and her neck pain and stiffness is less/better than before  No pulmonary issues  Appetite is good, weight is stable  Patient still likes to drink wine over the weekend  No fevers, chills or sweats  Activities are baseline  No headaches, blurred vision, dizziness or syncopal episodes  No recent seizures    No pain control issues  Easy bruising is the same as before, no bleeding  Review of Systems   Constitutional: Positive for fatigue  HENT: Negative  Neck stiffness is less/better   Eyes: Negative  Respiratory: Negative  Cardiovascular: Negative  Gastrointestinal: Negative  Endocrine: Negative  Genitourinary: Negative  Musculoskeletal: Positive for arthralgias  Negative for neck pain  Skin: Negative  Allergic/Immunologic: Negative  Neurological: Negative  Hematological: Bruises/bleeds easily  Psychiatric/Behavioral: Negative  All other systems reviewed and are negative       Patient Active Problem List   Diagnosis    Seizure ([facility] [address] )    Adenocarcinoma of lung, stage 4, right ([address] )    Mixed hyperlipidemia    Abnormal LFTs    Allergic rhinitis    Brain metastasis ([address] )    Brain tumor ([facility] [address] )    Encephalomalacia    Impaired fasting glucose    Insomnia    Lesion of temporal lobe    Non-small cell lung cancer ([facility] [address] )    Benign hypertension     Past Medical History:   Diagnosis Date    Allergic rhinitis     Alopecia     resolved 10/25/16    Anxiety     last assessed 10/4/16, resolved 10/25/16    Benign hypertension 2/1/2018    Benign neoplasm of skin of trunk 03/25/2008    last assessed 3/25/08    Benign neoplasm of skin of upper extremity and shoulder 03/25/2008    last assessed 3/25/08    Ear deformity, acquired     last assessed 10/13/14, resolved 3/12/15    Eczema     Hyperlipemia     Lung cancer ([facility] [address] )     Maintenance chemotherapy     Secondary cancer of brain ([facility] [address] )     Seizures ([facility] [address] )     Vertigo      Past Surgical History:   Procedure Laterality Date    APPENDECTOMY  1964    BRAIN TUMOR EXCISION      CENTRAL VENOUS CATHETER INSERTION Right     PORTACATH     Family History   Problem Relation Age of Onset    Diabetes Mother     Heart disease Mother     Lung cancer Father     Pancreatitis Brother     No Known Problems Brother      Social History     Social History    Marital status: /Civil Union     Spouse name: N/A    Number of children: 1    Years of education: N/A     Occupational History    Not on file  Social History Main Topics    Smoking status: Former Smoker    Smokeless tobacco: Never Used    Alcohol use Yes      Comment: occasionally / Social     Drug use: Yes     Types: Marijuana    Sexual activity: Yes     Other Topics Concern    Not on file     Social History Narrative    High school or GED     Lives independently with spouse        Current Outpatient Prescriptions:     ALPRAZolam (XANAX) 0 25 mg tablet, Take 1 tablet (0 25 mg total) by mouth 3 (three) times a day as needed for anxiety, Disp: 30 tablet, Rfl: 0    amLODIPine (NORVASC) 2 5 mg tablet, Take 1 tablet (2 5 mg total) by mouth daily, Disp: 90 tablet, Rfl: 1    atorvastatin (LIPITOR) 10 mg tablet, TAKE 1 TABLET BY MOUTH ONCE DAILY BEFORE BREAKFAST, Disp: 90 tablet, Rfl: 1    levETIRAcetam (KEPPRA XR) 500 MG 24 hr tablet, TAKE ONE TABLET BY MOUTH ONCE DAILY IN THE MORNING, Disp: 30 tablet, Rfl: 5    Omega-3 Fatty Acids (FISH OIL PO), Take 2 tablets by mouth , Disp: , Rfl:     ondansetron (ZOFRAN) 8 mg tablet, Take 1 tablet (8 mg total) by mouth 3 (three) times a day, Disp: 1 tablet, Rfl: 2    oxyCODONE (ROXICODONE) 5 mg immediate release tablet, Take 1 tablet (5 mg total) by mouth every 6 (six) hours as needed for moderate pain Max Daily Amount: 20 mg, Disp: 90 tablet, Rfl: 0    prochlorperazine (COMPAZINE) 10 mg tablet, Take 10 mg by mouth every 6 (six) hours as needed for nausea or vomiting, Disp: , Rfl:     Pseudoephedrine-Guaifenesin (MUCINEX D PO), Take by mouth, Disp: , Rfl:     ranitidine (ZANTAC) 75 MG tablet, Take 1 tablet by mouth daily, Disp: , Rfl:   No current facility-administered medications for this visit       Facility-Administered Medications Ordered in Other Visits:     alteplase (CATHFLO) injection 2 mg, 2 mg, Intracatheter, PRN, Ora Care, MD    sodium chloride 0 9 % infusion, 20 mL/hr, Intravenous, Continuous, Lukas Calloway MD    Allergies   Allergen Reactions    Iodinated Diagnostic Agents Anaphylaxis and Itching    Clindamycin     Clindamycin/Lincomycin        Vitals:    11/14/18 1018   BP: 128/70   Pulse: 94   Resp: 18   Temp: 97 8 °F (36 6 °C)   SpO2: 98%     Physical Exam   Constitutional: She is oriented to person, place, and time  She appears well-developed and well-nourished  Well-nourished female, no respiratory distress   HENT:   Head: Normocephalic and atraumatic  Right Ear: External ear normal    Left Ear: External ear normal    Nose: Nose normal    Mouth/Throat: Oropharyngeal exudate present  Oropharynx:  Mucosa moist and pink, no exudate, plates in place   Eyes: Pupils are equal, round, and reactive to light  Conjunctivae and EOM are normal    Neck: Normal range of motion  Neck supple  Supple, no JVD, no pain with palpation, good extension and flexion   Cardiovascular: Normal rate, regular rhythm, normal heart sounds and intact distal pulses  Pulmonary/Chest: Effort normal and breath sounds normal    Right anterior chest wall port in place, area clean and dry, lungs with good air entry bilaterally, few scattered rhonchi   Abdominal: Soft  Bowel sounds are normal    Soft, nontender, +bowel sounds, no hepatosplenomegaly   Musculoskeletal: Normal range of motion  Neurological: She is alert and oriented to person, place, and time  She has normal reflexes  Skin: Skin is warm  Skin is warm, moist, good color, few upper extremity purpura, no petechiae, no active bleeding   Psychiatric: She has a normal mood and affect   Her behavior is normal  Judgment and thought content normal    Extremities: no lower extremity edema, no cords, pulses are 1+  Lymphatics: no adenopathy in the neck, supraclavicular region, axilla and groin bilaterally    Performance Status: 1 - Symptomatic but completely ambulatory    Labs:    11/12/2018 WBC = 8 3 hemoglobin = 14 9 hematocrit = 43 platelet = 953 neutrophil = 58% BUN = 11 creatinine = 0 88 AST = 55 ALT = 58    10/22/2018 WBC = 9 1 hemoglobin = 14 1 hematocrit = 42 platelet = 273 neutrophil = 53% BUN = 12 creatinine = 1 08 AST = 34 ALT = 38 alkaline phosphatase = 69  10/01/2018 WBC = 6 9 hemoglobin = 14 8 hematocrit = 42 3 platelet = 285 neutrophil = 52% BUN = 13 creatinine = 0 95 calcium = 9 8 AST = 35 ALT = 42 alkaline phosphatase = 87 total bilirubin = 0 6 LDH = 135 magnesium = 2 1  09/10/2018 WBC = 6 2 hemoglobin = 14 6 hematocrit = 42 platelet = 279 neutrophil = 58% BUN = 10 creatinine = 0 87 AST = 60 ALT = 87  08/21/2018 BUN = 14 creatinine = 0 86 calcium = 9 3 AST = 28 ALT = 41 total bilirubin = 0 3 alkaline phosphatase = 89 WBC = 7 6 hemoglobin = 14 6 hematocrit = 43 platelet = 383 LDH = 126 phosphorus = 4 2 magnesium = 2 0  07/30/2018 WBC = 6 6 hemoglobin = 13 9 hematocrit = 39 1 platelet = 260 neutrophil = 61% BUN = 12 creatinine = 0 95 AST = 51 ALT = 56 alkaline phosphatase = 88 total bilirubin = 0 5 TSH = 2 310 LDH = 146    Imaging    10/18/2018 CT scan of the chest without contrast: IMPRESSION: Improved appearance of the left lung base infiltrate  Stable right cavitary lesion  No new findings    08/20/2018 CT scan of the chest and abdomen/pelvis without contrast    RECIST target lesion: Cavitary right upper lobe mass is unchanged, measuring 3 4 x 2 6 cm on series 3 image 15 (previously 3 5 x 2 5 cm)  Solid component along the medial aspect of the posterior wall is also unchanged, measuring 1 4 x 0 9 cm      LUNGS:  Stable cavitary right upper lobe mass, as above  No new nodules identified  There is new small patchy density in the posterior left base compatible with infiltrate  There is no tracheal or endobronchial lesion  IMPRESSION    Unchanged cavitary right upper lobe mass  No new metastatic disease    Interval development of mild left lower lobe infiltrate noted       06/18/2018 CT scan of the chest and abdomen/pelvis    Unchanged cavitary right upper lobe mass    No new metastatic disease  04/16/2018 CT scan of the chest and abdomen/pelvis    There is no significant interval change in size of the cystic or nodular components of the cavitary right upper lobe mass  No new metastatic lesions identified in the chest, abdomen or pelvis  1/22/18 CAT scan of the chest and abdomen/pelvis    RECIST MEASUREMENTS:  TARGET LESION:   1: Right upper lobe cavitary lung mass: The overall size of the lesion is 3 5 x 2 4 cm on image 14 of series 3, significantly decreased from 4 3 x 2 7 cm on previous examination  Solid component along the medial aspect of the posterior wall is not significantly changed from previous examination currently measuring 1 5 x 0 9 cm on image 14 of series 3 compared with 1 8 x 0 8 cm when measured using similar technique on previous   examination  Nodular solid component along the lateral aspect of posterior wall measures approximately 0 7 x 0 4 cm on image 13 of series 3, and when measured using similar technique is unchanged from previous examinations  There is interval decrease in size of the cystic portion of the cavitary right upper lobe mass however, solid components of this mass are not significantly changed from previous examination  Otherwise unchanged examination with no new metastatic lesions   identified in the chest, abdomen or pelvis  Pathology    [facility] results demonstrated no ALK gene rearrangement or deletion and negative for ROS1 rearrangement  No EGFR mutations were found also  Right temporal mass from September 6, 2015 demonstrated metastatic poorly differentiated non-small cell carcinoma  The tumor cells stained positive for CK 7, TTF-1 and Napsin and negative for CK 20, CK 5/6 andmucicarmine  Pathologist stated that the immunoprofile supported the primary lung non-small cell carcinoma, favor adenocarcinoma   The pathologist also stated that given the focal p40 staining, a squamous carcinoma could not be excluded

## 2018-11-15 ENCOUNTER — HOSPITAL ENCOUNTER (OUTPATIENT)
Dept: INFUSION CENTER | Facility: CLINIC | Age: 58
Discharge: HOME | End: 2018-11-15
Payer: COMMERCIAL

## 2018-11-15 VITALS
TEMPERATURE: 97.6 F | RESPIRATION RATE: 16 BRPM | DIASTOLIC BLOOD PRESSURE: 86 MMHG | SYSTOLIC BLOOD PRESSURE: 128 MMHG | WEIGHT: 182.98 LBS | OXYGEN SATURATION: 99 % | HEART RATE: 68 BPM | BODY MASS INDEX: 29.09 KG/M2

## 2018-11-15 PROCEDURE — 96413 CHEMO IV INFUSION 1 HR: CPT

## 2018-11-15 PROCEDURE — J9035Q0 INV BEVACIZUMAB 400MG/16ML 16 ML: Performed by: INTERNAL MEDICINE

## 2018-11-15 RX ADMIN — HEPARIN 300 UNITS: 100 SYRINGE at 15:25

## 2018-11-15 RX ADMIN — SODIUM CHLORIDE 20 ML/HR: 0.9 INJECTION, SOLUTION INTRAVENOUS at 14:10

## 2018-11-15 RX ADMIN — Medication 1254 MG: at 14:31

## 2018-11-28 DIAGNOSIS — C34.91 ADENOCARCINOMA OF LUNG, STAGE 4, RIGHT (HCC): Primary | ICD-10-CM

## 2018-11-30 DIAGNOSIS — C34.91 ADENOCARCINOMA OF LUNG, STAGE 4, RIGHT (HCC): Primary | ICD-10-CM

## 2018-12-04 LAB
ALBUMIN SERPL-MCNC: 4.3 G/DL (ref 3.5–5.5)
ALBUMIN/GLOB SERPL: 1.6 {RATIO} (ref 1.2–2.2)
ALP SERPL-CCNC: 96 IU/L (ref 39–117)
ALT SERPL-CCNC: 46 IU/L (ref 0–32)
APPEARANCE UR: CLEAR
AST SERPL-CCNC: 34 IU/L (ref 0–40)
BACTERIA URNS QL MICRO: NORMAL
BASOPHILS # BLD AUTO: 0.1 X10E3/UL (ref 0–0.2)
BASOPHILS NFR BLD AUTO: 1 %
BILIRUB SERPL-MCNC: 0.5 MG/DL (ref 0–1.2)
BILIRUB UR QL STRIP: NEGATIVE
BUN SERPL-MCNC: 12 MG/DL (ref 6–24)
BUN/CREAT SERPL: 13 (ref 9–23)
CALCIUM SERPL-MCNC: 9.6 MG/DL (ref 8.7–10.2)
CHLORIDE SERPL-SCNC: 100 MMOL/L (ref 96–106)
CO2 SERPL-SCNC: 21 MMOL/L (ref 20–29)
COLOR UR: YELLOW
CREAT SERPL-MCNC: 0.94 MG/DL (ref 0.57–1)
EOSINOPHIL # BLD AUTO: 0.2 X10E3/UL (ref 0–0.4)
EOSINOPHIL NFR BLD AUTO: 3 %
EPI CELLS #/AREA URNS HPF: NORMAL /HPF
ERYTHROCYTE [DISTWIDTH] IN BLOOD BY AUTOMATED COUNT: 13.3 % (ref 12.3–15.4)
GLOBULIN SER-MCNC: 2.7 G/DL (ref 1.5–4.5)
GLUCOSE SERPL-MCNC: 124 MG/DL (ref 65–99)
GLUCOSE UR QL: NEGATIVE
HCT VFR BLD AUTO: 40.9 % (ref 34–46.6)
HGB BLD-MCNC: 14 G/DL (ref 11.1–15.9)
HGB UR QL STRIP: NEGATIVE
IMM GRANULOCYTES # BLD: 0 X10E3/UL (ref 0–0.1)
IMM GRANULOCYTES NFR BLD: 0 %
KETONES UR QL STRIP: NEGATIVE
LDH SERPL-CCNC: 132 IU/L (ref 119–226)
LEUKOCYTE ESTERASE UR QL STRIP: ABNORMAL
LYMPHOCYTES # BLD AUTO: 2.4 X10E3/UL (ref 0.7–3.1)
LYMPHOCYTES NFR BLD AUTO: 31 %
MAGNESIUM SERPL-MCNC: 1.9 MG/DL (ref 1.6–2.3)
MCH RBC QN AUTO: 31.5 PG (ref 26.6–33)
MCHC RBC AUTO-ENTMCNC: 34.2 G/DL (ref 31.5–35.7)
MCV RBC AUTO: 92 FL (ref 79–97)
MICRO URNS: ABNORMAL
MONOCYTES # BLD AUTO: 0.6 X10E3/UL (ref 0.1–0.9)
MONOCYTES NFR BLD AUTO: 8 %
MUCOUS THREADS URNS QL MICRO: PRESENT
NEUTROPHILS # BLD AUTO: 4.5 X10E3/UL (ref 1.4–7)
NEUTROPHILS NFR BLD AUTO: 57 %
NITRITE UR QL STRIP: NEGATIVE
PH UR STRIP: 5.5 [PH] (ref 5–7.5)
PHOSPHATE SERPL-MCNC: 4.4 MG/DL (ref 2.5–4.5)
PLATELET # BLD AUTO: 269 X10E3/UL (ref 150–379)
POTASSIUM SERPL-SCNC: 4.5 MMOL/L (ref 3.5–5.2)
PROT SERPL-MCNC: 7 G/DL (ref 6–8.5)
PROT UR QL STRIP: NEGATIVE
RBC # BLD AUTO: 4.45 X10E6/UL (ref 3.77–5.28)
RBC #/AREA URNS HPF: NORMAL /HPF
SL AMB EGFR AFRICAN AMERICAN: 77 ML/MIN/1.73
SL AMB EGFR NON AFRICAN AMERICAN: 67 ML/MIN/1.73
SODIUM SERPL-SCNC: 138 MMOL/L (ref 134–144)
SP GR UR: 1.01 (ref 1–1.03)
T3FREE SERPL-MCNC: 2.5 PG/ML (ref 2–4.4)
TSH SERPL DL<=0.005 MIU/L-ACNC: 2.27 UIU/ML (ref 0.45–4.5)
UROBILINOGEN UR STRIP-ACNC: 0.2 EU/DL (ref 0.2–1)
WBC # BLD AUTO: 7.8 X10E3/UL (ref 3.4–10.8)
WBC #/AREA URNS HPF: NORMAL /HPF

## 2018-12-04 RX ORDER — SODIUM CHLORIDE 9 MG/ML
20 INJECTION, SOLUTION INTRAVENOUS CONTINUOUS
Status: DISCONTINUED | OUTPATIENT
Start: 2018-12-06 | End: 2018-12-09 | Stop reason: HOSPADM

## 2018-12-05 ENCOUNTER — OFFICE VISIT (OUTPATIENT)
Dept: HEMATOLOGY ONCOLOGY | Facility: MEDICAL CENTER | Age: 58
End: 2018-12-05
Payer: COMMERCIAL

## 2018-12-05 VITALS
WEIGHT: 182 LBS | BODY MASS INDEX: 29.25 KG/M2 | RESPIRATION RATE: 18 BRPM | DIASTOLIC BLOOD PRESSURE: 88 MMHG | SYSTOLIC BLOOD PRESSURE: 132 MMHG | HEIGHT: 66 IN | OXYGEN SATURATION: 98 % | HEART RATE: 94 BPM | TEMPERATURE: 97.9 F

## 2018-12-05 DIAGNOSIS — C34.91 ADENOCARCINOMA OF LUNG, STAGE 4, RIGHT (HCC): Primary | ICD-10-CM

## 2018-12-05 DIAGNOSIS — I10 BENIGN HYPERTENSION: ICD-10-CM

## 2018-12-05 PROCEDURE — 99213 OFFICE O/P EST LOW 20 MIN: CPT | Performed by: INTERNAL MEDICINE

## 2018-12-05 RX ORDER — AMLODIPINE BESYLATE 2.5 MG/1
TABLET ORAL
Qty: 30 TABLET | Refills: 3 | Status: SHIPPED | OUTPATIENT
Start: 2018-12-05 | End: 2019-04-08 | Stop reason: SDUPTHER

## 2018-12-05 NOTE — PROGRESS NOTES
Aristeo Falling  1960  [address] [address] [facility] [place] [facility] [id number]    DISCUSSION  SUMMARY:    24-year-old female with M1 non-small cell lung carcinoma/adenocarcinoma  Patient is on [facility] 12421-24 (phase III open label randomized study of [id number] [anti-PD-L1 antibody] in combination with carboplatin and paclitaxel +/- Avastin versus carboplatin and paclitaxel +/- Avastin in patients with stage IV chemotherapy naive non-small cell lung carcinoma)  Patient was randomized to receive all 4 medications  Mrs Colton Shirley completed the 6 cycles of treatment without significant toxicities  Patient had been on maintenance (study drug (atezolizumab) and avastin) - now only Avastin (atezolizumab was discontinued by the PI because of the elevated LFTs)  Issues:      1  Stage IV non-small cell lung carcinoma  Patient feels well and clinically there are no troubling signs  The recent CT scan did not demonstrate any evidence for progression and the prior left lower infiltrate had improved  Recent laboratory tests results are good/acceptable  Patient will continue with the Avastin, next dose is scheduled for tomorrow  Repeat CT scans are scheduled for approximately 3 weeks from now  Patient states having all required medications at home  Regimen  Bevacizumab 15 mg/kg IV every 3 weeks  Goal = prolongation of life    2  Elevated LFTs  This has been an issue for 1 5 years  Patient had been on Lipitor in the past, this was changed  Patient also drinks + alcohol  LFTs are okay/acceptable for treatment  3  Brain metastases status post resection and radiotherapy  Patient has been tapered off of Keppra  Neurology follow-up is ongoing  Patient knows to call Dr Jean Salinas if she has any additional seizures  4  Anxiety - stable  Patient has Xanax at home if necessary      Patient is to return in 3 weeks   Patient knows to call the hematology/oncology office if there are any other questions or concerns  Carefully review your medication list and verify that the list is accurate and up-to-date  Please call the hematology/oncology office if there are medications missing from the list, medications on the list that you are not currently taking or if there is a dosage or instruction that is different from how you're taking that medication  Patient goals and areas of care: continue with the Avastin  Patient is able to self-care   _____________________________________________________________________________________    Chief Complaint   Patient presents with    Follow-up     Metastatic non-small cell carcinoma     Advance Care Planning/Advance Directives: not discussed       Adenocarcinoma of lung, stage 4, right ([facility] [facility] )    9/5/2015 Initial Diagnosis     Patient was referred to the emergency room for evaluation of vertigo as [facility]  (patient reported )  Patient underwent the following pertinent imaging scans  MRI of the brain demonstrated a mass in the right superior all temporal gyrus with measurements of 4 2 x 3 3 x 3 6 cm with vasogenic edema and mass effect  There was a near uncal herniation noted  CT of the chest and abdomen/pelvis demonstrated a necrotic right upper lobe mass measuring 7 x 4 2 x 4 1 cm strongly suspicious for bronchogenic carcinoma  The mass contacts the posterior medial pleural surface and potentially contacts the right posterior tracheal border  No pathologic adenopathy was identified in no evidence of metastatic disease in the chest abdomen or pelvis  9/6/2015 Surgery     Non-small cell carcinoma of lung ([facility] [address] ) diagnosed from biopsy of right temporal mass  Pathology demonstrates poorly differentiated non-small cell carcinoma  Pathologist stated that the immunoprofile supported the primary lung non-small cell carcinoma favor adenocarcinoma    Squamous carcinoma could not be excluded, secondary to focal P 40 staining  Loli Mejia path report demonstrated no ALK gene rearrangement or dleation and negative ROS1 rearrangement, No EGFR mutations were found  Surgery completed by Dr Jony Jackson            - 10/15/2015 Radiation     SRT to brain 3000 cGY in 5 fractions  10/20/2015 -  Research Study Participant     [id number] Phase III open label randomized study of [id number] [Anti-PDL1 antibody] in combination with carboplatin and paclitaxel +/- Avastin versus Carboplatin and Paciltaxel +/- Avastin in patients with stage IV Chemotherapy  Naive non-small cell lung carcinoma  Patient was randomized to receive off for medications  11/30/2015 - 2/18/2016 Chemotherapy     Started Carboplatin, Paclitaxel, Avastin and PD-L1 (atezolizumab; study drug)  Completed 6 cycles with cycle 6 day 1 on 2/18/16         3/10/2016 -  Chemotherapy     Beginning cycle 7, maintenance cycle with Avastin and PD-L1 (Atezolizumab; study drug)         5/24/2017 Adverse Reaction     LFT elevation-persisted  Patient was taken off of study drug Atezolizumab, but continues on Avastin maintance  History of Present Illness:    59year old female recently diagnosed with IV lung cancer here for followup  Mrs Claudia Tineo began to have headaches last spring 2015  Patient was seen by her PCP and treated with antibiotics  Initially the symptoms got better the patient went on vacation  After returning from vacation, Mrs Claudia Tineo once again developed headaches  Patient was treated with a second round of antibiotics and then was diagnosed with vertigo  Patient was sent to the [address] [place] [address]  Although the specifics are not entirely clear, the therapist in the [address] [place] [address] sent the patient to the emergency room  A CAT scan of the brain demonstrated a brain lesion and patient was transferred to [place]  Patient was seen by Dr Jony Jackson and underwent resection demonstrating adenocarcinoma   Additional testing demonstrated a lung mass  Patient improved and was discharged  Patient completed SRT with Dr Wong Alcazar and Dr Juan Crockett  [facility] [address] (phase III open label randomized study of [id number] [anti-PD-L1 antibody] in combination with carboplatin and paclitaxel +/- Avastin versus carboplatin and paclitaxel +/- Avastin in patients with stage IV chemotherapy naÃ¯ve non-small cell lung carcinoma)  Mrs Zarina Ibarra was randomized to receive the anti-PD-L1 antibody with chemotherapy - patient completed 6 cycles and was placed on maintenance  Mrs Jin Apa previously suffered a tonic-clonic seizure and was seen in the emergency room  CAT scan of the head did not demonstrate any evidence of disease progression  The seizure was thought to be due to encephalomalacia  Since that time, there have been no seizure issues  Mrs Zarina Ibarra is on Keppra  Patient has had elevated liver function tests  Because the abnormalities were grade 3, patient was taken off the [id number] and has continued on protocol with Avastin only  Chemotherapy was held on the last cycle because of elevated LFTs  Patient presents for follow-up  Patient states feeling well, baseline  Appetite is good, weight is stable  Activities are baseline  No shortness of breath or dyspnea on exertion  No headaches, blurred vision, dizziness or any signs for seizure activity  Patient continues to use the CBD oil from hemp  No pain control issues  Easy bruising is the same as before  Patient states being happy with her present quality of life  Review of Systems   Constitutional: Positive for fatigue  HENT: Negative  Neck stiffness is less/better   Eyes: Negative  Respiratory: Negative  Cardiovascular: Negative  Gastrointestinal: Negative  Endocrine: Negative  Genitourinary: Negative  Musculoskeletal: Positive for arthralgias  Negative for neck pain  Skin: Negative  Allergic/Immunologic: Negative  Neurological: Negative  Hematological: Bruises/bleeds easily  Psychiatric/Behavioral: Negative  All other systems reviewed and are negative  Patient Active Problem List   Diagnosis    Seizure ([facility] [address] )    Adenocarcinoma of lung, stage 4, right ([facility] [address] [facility] )    Mixed hyperlipidemia    Abnormal LFTs    Allergic rhinitis    Brain metastasis ([facility] )    [facility]    Encephalomalacia    Impaired fasting glucose    Insomnia    Lesion of temporal lobe    Non-small cell lung cancer ([facility]    Benign hypertension     Past Medical History:   Diagnosis Date    Allergic rhinitis     Alopecia     resolved 10/25/16    Anxiety     last assessed 10/4/16, resolved 10/25/16    Benign hypertension 2/1/2018    Benign neoplasm of skin of trunk 03/25/2008    last assessed 3/25/08    Benign neoplasm of skin of upper extremity and shoulder 03/25/2008    last assessed 3/25/08    Ear deformity, acquired     last assessed 10/13/14, resolved 3/12/15    Eczema     Hyperlipemia     Lung cancer [facility]     Maintenance chemotherapy     Secondary cancer of brain ([facility]     Seizures ([facility] )     Vertigo      Past Surgical History:   Procedure Laterality Date    APPENDECTOMY  1964    BRAIN TUMOR EXCISION      CENTRAL VENOUS CATHETER INSERTION Right     PORTACATH     Family History   Problem Relation Age of Onset    Diabetes Mother     Heart disease Mother     Lung cancer Father     Pancreatitis Brother     No Known Problems Brother      Social History     Social History    Marital status: /Civil Union     Spouse name: N/A    Number of children: 1    Years of education: N/A     Occupational History    Not on file       Social History Main Topics    Smoking status: Former Smoker    Smokeless tobacco: Never Used    Alcohol use Yes      Comment: occasionally / Social     Drug use: Yes     Types: Marijuana    Sexual activity: Yes     Other Topics Concern    Not on file     Social History Narrative    High school or GED     Lives independently with spouse        Current Outpatient Prescriptions:     ALPRAZolam (XANAX) 0 25 mg tablet, Take 1 tablet (0 25 mg total) by mouth 3 (three) times a day as needed for anxiety, Disp: 30 tablet, Rfl: 0    amLODIPine (NORVASC) 2 5 mg tablet, Take 1 tablet (2 5 mg total) by mouth daily, Disp: 90 tablet, Rfl: 1    atorvastatin (LIPITOR) 10 mg tablet, TAKE 1 TABLET BY MOUTH ONCE DAILY BEFORE BREAKFAST, Disp: 90 tablet, Rfl: 1    Omega-3 Fatty Acids (FISH OIL PO), Take 2 tablets by mouth , Disp: , Rfl:     ondansetron (ZOFRAN) 8 mg tablet, Take 1 tablet (8 mg total) by mouth 3 (three) times a day, Disp: 1 tablet, Rfl: 2    oxyCODONE (ROXICODONE) 5 mg immediate release tablet, Take 1 tablet (5 mg total) by mouth every 6 (six) hours as needed for moderate pain Max Daily Amount: 20 mg, Disp: 90 tablet, Rfl: 0    prochlorperazine (COMPAZINE) 10 mg tablet, Take 10 mg by mouth every 6 (six) hours as needed for nausea or vomiting, Disp: , Rfl:     Pseudoephedrine-Guaifenesin (MUCINEX D PO), Take by mouth, Disp: , Rfl:     ranitidine (ZANTAC) 75 MG tablet, Take 1 tablet by mouth daily, Disp: , Rfl:   No current facility-administered medications for this visit  Facility-Administered Medications Ordered in Other Visits:     alteplase (CATHFLO) injection 2 mg, 2 mg, Intracatheter, PRN, Esme Blair MD    sodium chloride 0 9 % infusion, 20 mL/hr, Intravenous, Continuous, MD Gold Knapp ON 12/6/2018] sodium chloride 0 9 % infusion, 20 mL/hr, Intravenous, Continuous, Esme Blair MD    Allergies   Allergen Reactions    Iodinated Diagnostic Agents Anaphylaxis and Itching    Clindamycin     Clindamycin/Lincomycin        Vitals:    12/05/18 1034   BP: 132/88   Pulse: 94   Resp: 18   Temp: 97 9 °F (36 6 °C)   SpO2: 98%     Physical Exam   Constitutional: She is oriented to person, place, and time  She appears well-developed and well-nourished     Well-nourished female, no respiratory distress HENT:   Head: Normocephalic and atraumatic  Right Ear: External ear normal    Left Ear: External ear normal    Nose: Nose normal    Mouth/Throat: Oropharyngeal exudate present  Oropharynx:  Mucosa moist and pink, no exudate, plates in place   Eyes: Pupils are equal, round, and reactive to light  Conjunctivae and EOM are normal    Neck: Normal range of motion  Neck supple  Supple, no JVD, good range of motion   Cardiovascular: Normal rate, regular rhythm, normal heart sounds and intact distal pulses  Pulmonary/Chest: Effort normal and breath sounds normal    Right anterior chest wall port in place, area clean and dry, lungs with good air entry bilaterally, few scattered rhonchi   Abdominal: Soft  Bowel sounds are normal    Abdomen is soft, nontender, no hepatosplenomegaly, no rigidity or rebound, +bowel sounds   Musculoskeletal: Normal range of motion  Neurological: She is alert and oriented to person, place, and time  She has normal reflexes  Skin: Skin is warm  Good color, warm, moist, scattered few upper extremity purpura, no petechiae, no ecchymoses no active bleeding   Psychiatric: She has a normal mood and affect   Her behavior is normal  Judgment and thought content normal    Extremities: no lower extremity edema, no cords, pulses are 1+  Lymphatics: no adenopathy in the neck, supraclavicular region, axilla and groin bilaterally    Performance Status: 1 - Symptomatic but completely ambulatory    Labs:    12/03/2018 WBC = 7 8 hemoglobin = 14 hematocrit = 40 9 platelet = 178 neutrophil = 57% BUN = 12 creatinine = 0 94 AST = 34 ALT = 46 total bilirubin = 0 5 alkaline phosphatase = 96 TSH = 2 270 LDH = 132    11/12/2018 WBC = 8 3 hemoglobin = 14 9 hematocrit = 43 platelet = 130 neutrophil = 58% BUN = 11 creatinine = 0 88 AST = 55 ALT = 58  10/22/2018 WBC = 9 1 hemoglobin = 14 1 hematocrit = 42 platelet = 482 neutrophil = 53% BUN = 12 creatinine = 1 08 AST = 34 ALT = 38 alkaline phosphatase = 69  10/01/2018 WBC = 6 9 hemoglobin = 14 8 hematocrit = 42 3 platelet = 077 neutrophil = 52% BUN = 13 creatinine = 0 95 calcium = 9 8 AST = 35 ALT = 42 alkaline phosphatase = 87 total bilirubin = 0 6 LDH = 135 magnesium = 2 1  09/10/2018 WBC = 6 2 hemoglobin = 14 6 hematocrit = 42 platelet = 105 neutrophil = 58% BUN = 10 creatinine = 0 87 AST = 60 ALT = 87  08/21/2018 BUN = 14 creatinine = 0 86 calcium = 9 3 AST = 28 ALT = 41 total bilirubin = 0 3 alkaline phosphatase = 89 WBC = 7 6 hemoglobin = 14 6 hematocrit = 43 platelet = 114 LDH = 126 phosphorus = 4 2 magnesium = 2 0    Imaging    10/18/2018 CT scan of the chest without contrast: IMPRESSION: Improved appearance of the left lung base infiltrate  Stable right cavitary lesion  No new findings    08/20/2018 CT scan of the chest and abdomen/pelvis without contrast    RECIST target lesion: Cavitary right upper lobe mass is unchanged, measuring 3 4 x 2 6 cm on series 3 image 15 (previously 3 5 x 2 5 cm)  Solid component along the medial aspect of the posterior wall is also unchanged, measuring 1 4 x 0 9 cm      LUNGS:  Stable cavitary right upper lobe mass, as above  No new nodules identified  There is new small patchy density in the posterior left base compatible with infiltrate  There is no tracheal or endobronchial lesion  IMPRESSION    Unchanged cavitary right upper lobe mass  No new metastatic disease  Interval development of mild left lower lobe infiltrate noted  06/18/2018 CT scan of the chest and abdomen/pelvis    Unchanged cavitary right upper lobe mass    No new metastatic disease  04/16/2018 CT scan of the chest and abdomen/pelvis    There is no significant interval change in size of the cystic or nodular components of the cavitary right upper lobe mass  No new metastatic lesions identified in the chest, abdomen or pelvis      1/22/18 CAT scan of the chest and abdomen/pelvis    RECIST MEASUREMENTS:  TARGET LESION:   1: Right upper lobe cavitary lung mass: The overall size of the lesion is 3 5 x 2 4 cm on image 14 of series 3, significantly decreased from 4 3 x 2 7 cm on previous examination  Solid component along the medial aspect of the posterior wall is not significantly changed from previous examination currently measuring 1 5 x 0 9 cm on image 14 of series 3 compared with 1 8 x 0 8 cm when measured using similar technique on previous   examination  Nodular solid component along the lateral aspect of posterior wall measures approximately 0 7 x 0 4 cm on image 13 of series 3, and when measured using similar technique is unchanged from previous examinations  There is interval decrease in size of the cystic portion of the cavitary right upper lobe mass however, solid components of this mass are not significantly changed from previous examination  Otherwise unchanged examination with no new metastatic lesions   identified in the chest, abdomen or pelvis  Pathology    [facility] results demonstrated no ALK gene rearrangement or deletion and negative for ROS1 rearrangement  No EGFR mutations were found also  Right temporal mass from September 6, 2015 demonstrated metastatic poorly differentiated non-small cell carcinoma  The tumor cells stained positive for CK 7, TTF-1 and Napsin and negative for CK 20, CK 5/6 andmucicarmine  Pathologist stated that the immunoprofile supported the primary lung non-small cell carcinoma, favor adenocarcinoma  The pathologist also stated that given the focal p40 staining, a squamous carcinoma could not be excluded

## 2018-12-06 ENCOUNTER — HOSPITAL ENCOUNTER (OUTPATIENT)
Dept: INFUSION CENTER | Facility: CLINIC | Age: 58
Discharge: HOME | End: 2018-12-06
Payer: COMMERCIAL

## 2018-12-06 VITALS
SYSTOLIC BLOOD PRESSURE: 138 MMHG | TEMPERATURE: 98.2 F | RESPIRATION RATE: 18 BRPM | HEART RATE: 80 BPM | WEIGHT: 181.5 LBS | DIASTOLIC BLOOD PRESSURE: 86 MMHG | BODY MASS INDEX: 29.29 KG/M2

## 2018-12-06 DIAGNOSIS — C34.91 ADENOCARCINOMA OF LUNG, STAGE 4, RIGHT (HCC): Primary | ICD-10-CM

## 2018-12-06 PROCEDURE — 96413 CHEMO IV INFUSION 1 HR: CPT

## 2018-12-06 PROCEDURE — J9035Q0 INV BEVACIZUMAB 400MG/16ML 16 ML: Performed by: INTERNAL MEDICINE

## 2018-12-06 RX ADMIN — HEPARIN 300 UNITS: 100 SYRINGE at 15:40

## 2018-12-06 RX ADMIN — Medication 1254 MG: at 15:02

## 2018-12-06 RX ADMIN — SODIUM CHLORIDE 20 ML/HR: 0.9 INJECTION, SOLUTION INTRAVENOUS at 14:21

## 2018-12-06 NOTE — PLAN OF CARE
Problem: Potential for Falls  Goal: Patient will remain free of falls  INTERVENTIONS:  - Assess patient frequently for physical needs  -  Identify cognitive and physical deficits and behaviors that affect risk of falls    -  [place] fall precautions as indicated by assessment   - Educate patient/family on patient safety including physical limitations  - Instruct patient to call for assistance with activity based on assessment  - Modify environment to reduce risk of injury  - Consider OT/PT consult to assist with strengthening/mobility   Outcome: Progressing

## 2018-12-18 ENCOUNTER — TELEPHONE (OUTPATIENT)
Dept: NEUROLOGY | Facility: CLINIC | Age: 58
End: 2018-12-18

## 2018-12-18 NOTE — TELEPHONE ENCOUNTER
It's not likely from not being on keppra but if she feels it only started after d/c of keppra then it's possible  Neuropathy in her case is most likely from use of chemo prior

## 2018-12-18 NOTE — TELEPHONE ENCOUNTER
Patient oncologist wanted patient to call you due to her hands and feet every day  Please advise? She wants to know is this happening because she is not on the [address] anymore

## 2018-12-19 NOTE — TELEPHONE ENCOUNTER
Patient called back- she is unsure if symptoms are related to stopping Keppra  Muscle cramps have been more prominent in the last few weeks  Is there anything you could possible Rx for the cramps?

## 2018-12-19 NOTE — TELEPHONE ENCOUNTER
Spoke to her about muscle cramps  She has given up drinking dairy completely to loose weight  Asked her to take ca/mag supplements  Also asked her to take some vit D supplements

## 2018-12-20 ENCOUNTER — HOSPITAL ENCOUNTER (OUTPATIENT)
Dept: RADIOLOGY | Facility: HOSPITAL | Age: 58
Discharge: HOME | End: 2018-12-20
Attending: INTERNAL MEDICINE

## 2018-12-20 DIAGNOSIS — C34.91 ADENOCARCINOMA OF LUNG, STAGE 4, RIGHT (HCC): ICD-10-CM

## 2018-12-20 PROCEDURE — 71250 CT THORAX DX C-: CPT

## 2018-12-20 PROCEDURE — 74176 CT ABD & PELVIS W/O CONTRAST: CPT

## 2018-12-25 LAB
ALBUMIN SERPL-MCNC: 4.4 G/DL (ref 3.6–4.8)
ALBUMIN/GLOB SERPL: 1.7 {RATIO} (ref 1.2–2.2)
ALP SERPL-CCNC: 84 IU/L (ref 39–117)
ALT SERPL-CCNC: 54 IU/L (ref 0–32)
APPEARANCE UR: CLEAR
AST SERPL-CCNC: 50 IU/L (ref 0–40)
BACTERIA URNS QL MICRO: NORMAL
BASOPHILS # BLD AUTO: 0.1 X10E3/UL (ref 0–0.2)
BASOPHILS NFR BLD AUTO: 1 %
BILIRUB SERPL-MCNC: 0.4 MG/DL (ref 0–1.2)
BILIRUB UR QL STRIP: NEGATIVE
BUN SERPL-MCNC: 15 MG/DL (ref 8–27)
BUN/CREAT SERPL: 17 (ref 12–28)
CALCIUM SERPL-MCNC: 9.2 MG/DL (ref 8.7–10.3)
CHLORIDE SERPL-SCNC: 104 MMOL/L (ref 96–106)
CO2 SERPL-SCNC: 23 MMOL/L (ref 20–29)
COLOR UR: YELLOW
CREAT SERPL-MCNC: 0.87 MG/DL (ref 0.57–1)
EOSINOPHIL # BLD AUTO: 0.2 X10E3/UL (ref 0–0.4)
EOSINOPHIL NFR BLD AUTO: 2 %
EPI CELLS #/AREA URNS HPF: NORMAL /HPF
ERYTHROCYTE [DISTWIDTH] IN BLOOD BY AUTOMATED COUNT: 13.5 % (ref 12.3–15.4)
GLOBULIN SER-MCNC: 2.6 G/DL (ref 1.5–4.5)
GLUCOSE SERPL-MCNC: 116 MG/DL (ref 65–99)
GLUCOSE UR QL: NEGATIVE
HCT VFR BLD AUTO: 41.9 % (ref 34–46.6)
HGB BLD-MCNC: 14.8 G/DL (ref 11.1–15.9)
HGB UR QL STRIP: NEGATIVE
IMM GRANULOCYTES # BLD: 0 X10E3/UL (ref 0–0.1)
IMM GRANULOCYTES NFR BLD: 0 %
KETONES UR QL STRIP: NEGATIVE
LDH SERPL-CCNC: 164 IU/L (ref 119–226)
LEUKOCYTE ESTERASE UR QL STRIP: ABNORMAL
LYMPHOCYTES # BLD AUTO: 2 X10E3/UL (ref 0.7–3.1)
LYMPHOCYTES NFR BLD AUTO: 25 %
MAGNESIUM SERPL-MCNC: 2.3 MG/DL (ref 1.6–2.3)
MCH RBC QN AUTO: 31.4 PG (ref 26.6–33)
MCHC RBC AUTO-ENTMCNC: 35.3 G/DL (ref 31.5–35.7)
MCV RBC AUTO: 89 FL (ref 79–97)
MICRO URNS: ABNORMAL
MONOCYTES # BLD AUTO: 0.7 X10E3/UL (ref 0.1–0.9)
MONOCYTES NFR BLD AUTO: 9 %
NEUTROPHILS # BLD AUTO: 5 X10E3/UL (ref 1.4–7)
NEUTROPHILS NFR BLD AUTO: 63 %
NITRITE UR QL STRIP: NEGATIVE
PH UR STRIP: 7 [PH] (ref 5–7.5)
PHOSPHATE SERPL-MCNC: 4.1 MG/DL (ref 2.5–4.5)
PLATELET # BLD AUTO: 279 X10E3/UL (ref 150–379)
POTASSIUM SERPL-SCNC: 4.5 MMOL/L (ref 3.5–5.2)
PROT SERPL-MCNC: 7 G/DL (ref 6–8.5)
PROT UR QL STRIP: NEGATIVE
RBC # BLD AUTO: 4.72 X10E6/UL (ref 3.77–5.28)
RBC #/AREA URNS HPF: NORMAL /HPF
SL AMB EGFR AFRICAN AMERICAN: 84 ML/MIN/1.73
SL AMB EGFR NON AFRICAN AMERICAN: 73 ML/MIN/1.73
SODIUM SERPL-SCNC: 142 MMOL/L (ref 134–144)
SP GR UR: 1 (ref 1–1.03)
UROBILINOGEN UR STRIP-ACNC: 0.2 EU/DL (ref 0.2–1)
WBC # BLD AUTO: 7.9 X10E3/UL (ref 3.4–10.8)
WBC #/AREA URNS HPF: NORMAL /HPF

## 2018-12-26 ENCOUNTER — OFFICE VISIT (OUTPATIENT)
Dept: HEMATOLOGY ONCOLOGY | Facility: MEDICAL CENTER | Age: 58
End: 2018-12-26
Payer: COMMERCIAL

## 2018-12-26 VITALS
RESPIRATION RATE: 20 BRPM | HEART RATE: 97 BPM | DIASTOLIC BLOOD PRESSURE: 100 MMHG | WEIGHT: 182 LBS | OXYGEN SATURATION: 98 % | SYSTOLIC BLOOD PRESSURE: 160 MMHG | BODY MASS INDEX: 29.38 KG/M2 | TEMPERATURE: 98.6 F

## 2018-12-26 DIAGNOSIS — R25.2 MUSCLE CRAMPS: ICD-10-CM

## 2018-12-26 DIAGNOSIS — R79.89 ABNORMAL LFTS: ICD-10-CM

## 2018-12-26 DIAGNOSIS — C34.91 ADENOCARCINOMA OF LUNG, STAGE 4, RIGHT (HCC): Primary | ICD-10-CM

## 2018-12-26 PROCEDURE — 99214 OFFICE O/P EST MOD 30 MIN: CPT | Performed by: PHYSICIAN ASSISTANT

## 2018-12-26 RX ORDER — SODIUM CHLORIDE 9 MG/ML
20 INJECTION, SOLUTION INTRAVENOUS CONTINUOUS
Status: DISCONTINUED | OUTPATIENT
Start: 2018-12-27 | End: 2018-12-30 | Stop reason: HOSPADM

## 2018-12-26 NOTE — PROGRESS NOTES
Hematology/Oncology Outpatient Follow-up  Jessie Blackwell 59 y o  female [id number]    Date:  12/26/2018      Assessment and Plan:  1  Adenocarcinoma of lung, stage 4, right [facility])  Patient is on [facility] 00491-56 (phase III open label randomized study of [id number] [anti-PD-L1 antibody] in combination with carboplatin and paclitaxel +/- Avastin versus carboplatin and paclitaxel +/- Avastin in patients with stage IV chemotherapy naive non-small cell lung carcinoma)  Patient was randomized to receive all 4 medications  Mrs Kamla Ramsey completed the 6 cycles of treatment without significant toxicities  Patient had been on maintenance (study drug (atezolizumab) and avastin) - now only Avastin (atezolizumab was discontinued by the PI because of the elevated LFTs)  She had repeat imaging studies on 12/20/2018  This showed a stable right upper lobe 3 3 cm cavitary lesion  The left lower lobe opacity that was noted in August 2018 is noted to be similar to scan in October 2018, which at that time was improved compared to August   Follow-up CT scan in 3 months is suggested  Patient will be having repeat imaging studies in 9 weeks per protocol for clinical study  Patient will continue Avastin on clinical study every 3 weeks  Clinical trial nurse was present during the encounter today  2  Abnormal LFTs  Patient chronically has abnormal LFTs  She does drink alcohol  AST was 50, ALT 54, alkaline phosphatase and bilirubin normal       3  Muscle cramps  Patient states that over the past few weeks she has been having muscle cramping of her hands as well as feet  This is intermittent  Of the feet it occurs when she wakes up in the morning and stretching  She spoke with her neurologist who suggested calcium and magnesium supplements as well as vitamin-D    Magnesium and calcium were assessed on labs this week and were noted to be normal  Also, she states she was advised by her pharmacist tried drinking dairy products with food  She has been doing this  After patient's visit today, I called her and asked her to trial tonic water which contains quinine which sometimes can help with muscle cramping  She is to call our office if she has questions regarding this recommendation  She will continue to monitor muscle cramping call with progressive symptoms  HPI:       Adenocarcinoma of lung, stage 4, right ([facility] [address] )    9/5/2015 Initial Diagnosis     Patient was referred to the emergency room for evaluation of vertigo as [facility] from the [facility]  (patient reported )  Patient underwent the following pertinent imaging scans  MRI of the brain demonstrated a mass in the right superior all temporal gyrus with measurements of 4 2 x 3 3 x 3 6 cm with vasogenic edema and mass effect  There was a near uncal herniation noted  CT of the chest and abdomen/pelvis demonstrated a necrotic right upper lobe mass measuring 7 x 4 2 x 4 1 cm strongly suspicious for bronchogenic carcinoma  The mass contacts the posterior medial pleural surface and potentially contacts the right posterior tracheal border  No pathologic adenopathy was identified in no evidence of metastatic disease in the chest abdomen or pelvis  9/6/2015 Surgery     Non-small cell carcinoma of lung ([facility] [address] ) diagnosed from biopsy of right temporal mass  Pathology demonstrates poorly differentiated non-small cell carcinoma  Pathologist stated that the immunoprofile supported the primary lung non-small cell carcinoma favor adenocarcinoma  Squamous carcinoma could not be excluded, secondary to focal P 40 staining  Agustin Vázquez path report demonstrated no ALK gene rearrangement or dleation and negative ROS1 rearrangement, No EGFR mutations were found  Surgery completed by Dr Mary Beth Garcia            - 10/15/2015 Radiation     SRT to brain 3000 cGY in 5 fractions           10/20/2015 -  Research Study Participant     [id number] Phase III open label randomized study of [id number] [Anti-PDL1 antibody] in combination with carboplatin and paclitaxel +/- Avastin versus Carboplatin and Paciltaxel +/- Avastin in patients with stage IV Chemotherapy  Naive non-small cell lung carcinoma  Patient was randomized to receive off for medications  11/30/2015 - 2/18/2016 Chemotherapy     Started Carboplatin, Paclitaxel, Avastin and PD-L1 (atezolizumab; study drug)  Completed 6 cycles with cycle 6 day 1 on 2/18/16         3/10/2016 -  Chemotherapy     Beginning cycle 7, maintenance cycle with Avastin and PD-L1 (Atezolizumab; study drug)         5/24/2017 Adverse Reaction     LFT elevation-persisted  Patient was taken off of study drug Atezolizumab, but continues on Avastin maintance  Interval history:  Over the past few weeks patient has been having cramping of the toes and hands  She states it affects her toes in the AM when she stretches when she wakes up  ROS: Review of Systems   Constitutional: Negative for activity change, appetite change, fatigue and unexpected weight change  Respiratory: Negative for cough and shortness of breath  Cardiovascular: Negative for chest pain, palpitations and leg swelling  Gastrointestinal: Negative for abdominal pain, constipation, diarrhea, nausea and vomiting  Genitourinary: Negative for difficulty urinating and dysuria  Musculoskeletal: Negative for arthralgias  See HPI   Skin: Negative  Neurological: Negative for dizziness, weakness, light-headedness and headaches  Hematological: Bruises/bleeds easily  Psychiatric/Behavioral: Negative          Past Medical History:   Diagnosis Date    Allergic rhinitis     Alopecia     resolved 10/25/16    Anxiety     last assessed 10/4/16, resolved 10/25/16    Benign hypertension 2/1/2018    Benign neoplasm of skin of trunk 03/25/2008    last assessed 3/25/08    Benign neoplasm of skin of upper extremity and shoulder 03/25/2008    last assessed 3/25/08   [facility] deformity, acquired     last assessed 10/13/14, resolved 3/12/15    Eczema     Hyperlipemia     Lung cancer ([facility] [address] )     Maintenance chemotherapy     Secondary cancer of brain ([facility] [address] )     [facility]     Vertigo        Past Surgical History:   Procedure Laterality Date    APPENDECTOMY  1964    BRAIN TUMOR EXCISION      CENTRAL VENOUS CATHETER INSERTION Right     PORTACATH       Social History     Social History    Marital status: /Civil Union     Spouse name: N/A    Number of children: 1    Years of education: N/A     Social History Main Topics    Smoking status: Former Smoker    Smokeless tobacco: Never Used    Alcohol use Yes      Comment: occasionally / Social     Drug use: Yes     Types: Marijuana    Sexual activity: Yes     Other Topics Concern    None     Social History Narrative    High school or GED     Lives independently with spouse        Family History   Problem Relation Age of Onset    Diabetes Mother     Heart disease Mother     Lung cancer Father     Pancreatitis Brother     No Known Problems Brother        Allergies   Allergen Reactions    Iodinated Diagnostic Agents Anaphylaxis and Itching    Clindamycin     Clindamycin/Lincomycin          Current Outpatient Prescriptions:     ALPRAZolam (XANAX) 0 25 mg tablet, Take 1 tablet (0 25 mg total) by mouth 3 (three) times a day as needed for anxiety, Disp: 30 tablet, Rfl: 0    amLODIPine (NORVASC) 2 5 mg tablet, TAKE TWO (5 MG TOTAL) TABLETS BY MOUTH ONCE DAILY, Disp: 30 tablet, Rfl: 3    atorvastatin (LIPITOR) 10 mg tablet, TAKE 1 TABLET BY MOUTH ONCE DAILY BEFORE BREAKFAST, Disp: 90 tablet, Rfl: 1    Omega-3 Fatty Acids (FISH OIL PO), Take 2 tablets by mouth , Disp: , Rfl:     ondansetron (ZOFRAN) 8 mg tablet, Take 1 tablet (8 mg total) by mouth 3 (three) times a day, Disp: 1 tablet, Rfl: 2    oxyCODONE (ROXICODONE) 5 mg immediate release tablet, Take 1 tablet (5 mg total) by mouth every 6 (six) hours as needed for moderate pain Max Daily Amount: 20 mg, Disp: 90 tablet, Rfl: 0    prochlorperazine (COMPAZINE) 10 mg tablet, Take 10 mg by mouth every 6 (six) hours as needed for nausea or vomiting, Disp: , Rfl:     Pseudoephedrine-Guaifenesin (MUCINEX D PO), Take by mouth, Disp: , Rfl:     ranitidine (ZANTAC) 75 MG tablet, Take 1 tablet by mouth daily, Disp: , Rfl:   No current facility-administered medications for this visit  Facility-Administered Medications Ordered in Other Visits:     alteplase (CATHFLO) injection 2 mg, 2 mg, Intracatheter, PRN, Nissa Waterman MD    [START ON 12/27/2018] alteplase (CATHFLO) injection 2 mg, 2 mg, Intracatheter, PRN, Nissa Waterman MD    [START ON 12/27/2018] heparin lock flush 100 units/mL injection 300 Units, 300 Units, Intracatheter, PRN, Nissa Waterman MD    [START ON 12/27/2018] INV bevacizumab (INVESTIGATIONAL) 1,254 mg in sodium chloride 0 9 % 49 84 mL IVPB, 1,254 mg, Intravenous, Once, Nissa Waterman MD    sodium chloride 0 9 % infusion, 20 mL/hr, Intravenous, Continuous, Nissa Waterman MD  [facility] ON 12/27/2018] sodium chloride 0 9 % infusion, 20 mL/hr, Intravenous, Continuous, Nissa Waterman MD      Physical Exam:  /100 (BP Location: Right arm, Cuff Size: Standard)   Pulse 97   Temp 98 6 °F (37 °C) (Tympanic)   Resp 20   Wt 82 6 kg (182 lb)   LMP  (LMP Unknown)   SpO2 98%   BMI 29 38 kg/m²     Physical Exam   Constitutional: She is oriented to person, place, and time  She appears well-developed and well-nourished  No distress  HENT:   Head: Normocephalic and atraumatic  Eyes: Conjunctivae are normal  No scleral icterus  Neck: Normal range of motion  Neck supple  Cardiovascular: Normal rate, regular rhythm and normal heart sounds  No murmur heard  Pulmonary/Chest: Effort normal and breath sounds normal  No respiratory distress  Abdominal: Soft  There is no tenderness  Musculoskeletal: Normal range of motion  She exhibits no edema     Neurological: She is alert and oriented to person, place, and time  Skin: Skin is warm and dry  Small bruises on bilateral forearms    Psychiatric: She has a normal mood and affect  Repeat BP: 140/100     Labs:  Lab Results   Component Value Date    WBC 7 9 12/24/2018    HGB 14 8 12/24/2018    HCT 41 9 12/24/2018    MCV 89 12/24/2018     12/24/2018     Lab Results   Component Value Date     05/23/2017    K 4 5 12/24/2018     12/24/2018    CO2 23 12/24/2018    ANIONGAP 12 01/05/2016    BUN 15 12/24/2018    CREATININE 0 81 01/09/2018    GLUCOSE 108 (H) 05/23/2017    CALCIUM 9 5 05/23/2017    AST 35 05/23/2017    ALT 60 (H) 05/23/2017    ALKPHOS 90 05/23/2017    PROT 7 4 05/23/2017    BILITOT 0 5 05/23/2017    EGFR >60 0 10/17/2016     Patient voiced understanding and agreement in the above discussion  Aware to contact our office with questions/symptoms in the interim

## 2018-12-27 ENCOUNTER — HOSPITAL ENCOUNTER (OUTPATIENT)
Dept: INFUSION CENTER | Facility: CLINIC | Age: 58
Discharge: HOME | End: 2018-12-27
Payer: COMMERCIAL

## 2018-12-27 VITALS
HEART RATE: 80 BPM | HEIGHT: 67 IN | DIASTOLIC BLOOD PRESSURE: 74 MMHG | SYSTOLIC BLOOD PRESSURE: 118 MMHG | TEMPERATURE: 98.2 F | BODY MASS INDEX: 28.62 KG/M2 | WEIGHT: 182.32 LBS | RESPIRATION RATE: 20 BRPM

## 2018-12-27 PROCEDURE — J9035Q0 INV BEVACIZUMAB 400MG/16ML 16 ML: Performed by: INTERNAL MEDICINE

## 2018-12-27 PROCEDURE — 96413 CHEMO IV INFUSION 1 HR: CPT

## 2018-12-27 RX ADMIN — Medication 1254 MG: at 15:20

## 2018-12-27 RX ADMIN — SODIUM CHLORIDE 20 ML/HR: 0.9 INJECTION, SOLUTION INTRAVENOUS at 14:35

## 2018-12-27 NOTE — PLAN OF CARE
Problem: Potential for Falls  Goal: Patient will remain free of falls  INTERVENTIONS:  - Assess patient frequently for physical needs  -  Identify cognitive and physical deficits and behaviors that affect risk of falls    -  [place] fall precautions as indicated by assessment   - Educate patient/family on patient safety including physical limitations  - Instruct patient to call for assistance with activity based on assessment  - Modify environment to reduce risk of injury  - Consider OT/PT consult to assist with strengthening/mobility   Outcome: Progressing

## 2019-01-11 DIAGNOSIS — C34.91 ADENOCARCINOMA OF LUNG, STAGE 4, RIGHT (HCC): Primary | ICD-10-CM

## 2019-01-15 LAB
ALBUMIN SERPL-MCNC: 4.6 G/DL (ref 3.6–4.8)
ALBUMIN/GLOB SERPL: 1.7 {RATIO} (ref 1.2–2.2)
ALP SERPL-CCNC: 79 IU/L (ref 39–117)
ALT SERPL-CCNC: 57 IU/L (ref 0–32)
APPEARANCE UR: CLEAR
AST SERPL-CCNC: 47 IU/L (ref 0–40)
BASOPHILS # BLD AUTO: 0.1 X10E3/UL (ref 0–0.2)
BASOPHILS NFR BLD AUTO: 1 %
BILIRUB SERPL-MCNC: 0.5 MG/DL (ref 0–1.2)
BILIRUB UR QL STRIP: NEGATIVE
BUN SERPL-MCNC: 11 MG/DL (ref 8–27)
BUN/CREAT SERPL: 12 (ref 12–28)
CALCIUM SERPL-MCNC: 9.5 MG/DL (ref 8.7–10.3)
CHLORIDE SERPL-SCNC: 100 MMOL/L (ref 96–106)
CO2 SERPL-SCNC: 24 MMOL/L (ref 20–29)
COLOR UR: YELLOW
CREAT SERPL-MCNC: 0.92 MG/DL (ref 0.57–1)
EOSINOPHIL # BLD AUTO: 0.2 X10E3/UL (ref 0–0.4)
EOSINOPHIL NFR BLD AUTO: 3 %
ERYTHROCYTE [DISTWIDTH] IN BLOOD BY AUTOMATED COUNT: 13.7 % (ref 12.3–15.4)
GLOBULIN SER-MCNC: 2.7 G/DL (ref 1.5–4.5)
GLUCOSE SERPL-MCNC: 118 MG/DL (ref 65–99)
GLUCOSE UR QL: NEGATIVE
HCT VFR BLD AUTO: 43.3 % (ref 34–46.6)
HGB BLD-MCNC: 14.7 G/DL (ref 11.1–15.9)
HGB UR QL STRIP: NEGATIVE
IMM GRANULOCYTES # BLD: 0 X10E3/UL (ref 0–0.1)
IMM GRANULOCYTES NFR BLD: 0 %
KETONES UR QL STRIP: NEGATIVE
LDH SERPL-CCNC: 167 IU/L (ref 119–226)
LEUKOCYTE ESTERASE UR QL STRIP: NEGATIVE
LYMPHOCYTES # BLD AUTO: 2.7 X10E3/UL (ref 0.7–3.1)
LYMPHOCYTES NFR BLD AUTO: 33 %
MAGNESIUM SERPL-MCNC: 2.4 MG/DL (ref 1.6–2.3)
MCH RBC QN AUTO: 31.4 PG (ref 26.6–33)
MCHC RBC AUTO-ENTMCNC: 33.9 G/DL (ref 31.5–35.7)
MCV RBC AUTO: 93 FL (ref 79–97)
MICRO URNS: NORMAL
MONOCYTES # BLD AUTO: 0.4 X10E3/UL (ref 0.1–0.9)
MONOCYTES NFR BLD AUTO: 5 %
NEUTROPHILS # BLD AUTO: 4.6 X10E3/UL (ref 1.4–7)
NEUTROPHILS NFR BLD AUTO: 58 %
NITRITE UR QL STRIP: NEGATIVE
PH UR STRIP: 6 [PH] (ref 5–7.5)
PHOSPHATE SERPL-MCNC: 3.5 MG/DL (ref 2.5–4.5)
PLATELET # BLD AUTO: 304 X10E3/UL (ref 150–379)
POTASSIUM SERPL-SCNC: 4.2 MMOL/L (ref 3.5–5.2)
PROT SERPL-MCNC: 7.3 G/DL (ref 6–8.5)
PROT UR QL STRIP: NEGATIVE
RBC # BLD AUTO: 4.68 X10E6/UL (ref 3.77–5.28)
SL AMB EGFR AFRICAN AMERICAN: 78 ML/MIN/1.73
SL AMB EGFR NON AFRICAN AMERICAN: 68 ML/MIN/1.73
SODIUM SERPL-SCNC: 140 MMOL/L (ref 134–144)
SP GR UR: 1.01 (ref 1–1.03)
UROBILINOGEN UR STRIP-ACNC: 0.2 EU/DL (ref 0.2–1)
WBC # BLD AUTO: 8 X10E3/UL (ref 3.4–10.8)

## 2019-01-16 ENCOUNTER — OFFICE VISIT (OUTPATIENT)
Dept: HEMATOLOGY ONCOLOGY | Facility: MEDICAL CENTER | Age: 59
End: 2019-01-16
Payer: COMMERCIAL

## 2019-01-16 VITALS
BODY MASS INDEX: 28.72 KG/M2 | SYSTOLIC BLOOD PRESSURE: 144 MMHG | RESPIRATION RATE: 20 BRPM | HEIGHT: 67 IN | OXYGEN SATURATION: 98 % | WEIGHT: 183 LBS | HEART RATE: 88 BPM | TEMPERATURE: 97.4 F | DIASTOLIC BLOOD PRESSURE: 100 MMHG

## 2019-01-16 DIAGNOSIS — C34.91 ADENOCARCINOMA OF LUNG, STAGE 4, RIGHT (HCC): Primary | ICD-10-CM

## 2019-01-16 PROCEDURE — 99213 OFFICE O/P EST LOW 20 MIN: CPT | Performed by: INTERNAL MEDICINE

## 2019-01-16 RX ORDER — SODIUM CHLORIDE 9 MG/ML
20 INJECTION, SOLUTION INTRAVENOUS CONTINUOUS
Status: DISCONTINUED | OUTPATIENT
Start: 2019-01-17 | End: 2019-01-20 | Stop reason: HOSPADM

## 2019-01-16 NOTE — PROGRESS NOTES
Paul Childers  1960  [facility] [address] [facility] [place] [facility] [id number]    DISCUSSION  SUMMARY:    57-year-old female with M1 non-small cell lung carcinoma/adenocarcinoma  Patient is on [facility] 94716-74 (phase III open label randomized study of [id number] [anti-PD-L1 antibody] in combination with carboplatin and paclitaxel +/- Avastin versus carboplatin and paclitaxel +/- Avastin in patients with stage IV chemotherapy naive non-small cell lung carcinoma)  Patient was randomized to receive all 4 medications  Mrs Rod Lala completed the 6 cycles of treatment without significant toxicities  Patient had been on maintenance (study drug (atezolizumab) and avastin) - now only Avastin (atezolizumab was discontinued by the PI because of the elevated LFTs)  Issues:      1  Stage IV non-small cell lung carcinoma  Patient feels well and clinically there are no troubling signs  The December 2018 CT scan did not demonstrate any evidence for progression and the prior left lower infiltrate had improved  Recent laboratory tests results are good/acceptable  Patient will continue with the Avastin, next dose is scheduled for tomorrow  Patient states having all required medications at home  Regimen  Bevacizumab 15 mg/kg IV every 3 weeks  Goal = prolongation of life    2  Elevated LFTs, elevated magnesium  This has been an issue for 1 5 years  Patient had been on Lipitor in the past, this was changed  Patient also drinks + alcohol  LFTs are okay/acceptable for treatment  The magnesium level is very minimally elevated  This can be rechecked on the next routine blood draw  3  Brain metastases status post resection and radiotherapy  Patient has been tapered off of Keppra  Neurology follow-up is ongoing  Patient knows to call Dr Jeannie Cifuentes if she has any additional seizures  4  Anxiety - stable  Patient has Xanax at home if necessary      Patient is to return in 3 weeks   Patient knows to call the hematology/oncology office if there are any other questions or concerns  Carefully review your medication list and verify that the list is accurate and up-to-date  Please call the hematology/oncology office if there are medications missing from the list, medications on the list that you are not currently taking or if there is a dosage or instruction that is different from how you're taking that medication  Patient goals and areas of care: continue with the Avastin  Patient is able to self-care   ____________________________________________________________________________________________________    Chief Complaint   Patient presents with    Follow-up     Metastatic non-small cell lung carcinoma     Advance Care Planning/Advance Directives: not discussed       Adenocarcinoma of lung, stage 4, right ([facility] )    9/5/2015 Initial Diagnosis     Patient was referred to the emergency room for evaluation of vertigo as sent from the [facility]  (patient reported )  Patient underwent the following pertinent imaging scans  MRI of the brain demonstrated a mass in the right superior all temporal gyrus with measurements of 4 2 x 3 3 x 3 6 cm with vasogenic edema and mass effect  There was a near uncal herniation noted  CT of the chest and abdomen/pelvis demonstrated a necrotic right upper lobe mass measuring 7 x 4 2 x 4 1 cm strongly suspicious for bronchogenic carcinoma  The mass contacts the posterior medial pleural surface and potentially contacts the right posterior tracheal border  No pathologic adenopathy was identified in no evidence of metastatic disease in the chest abdomen or pelvis  9/6/2015 Surgery     Non-small cell carcinoma of lung ([facility] ) diagnosed from biopsy of right temporal mass  Pathology demonstrates poorly differentiated non-small cell carcinoma    Pathologist stated that the immunoprofile supported the primary lung non-small cell carcinoma favor adenocarcinoma  Squamous carcinoma could not be excluded, secondary to focal P 40 staining  [place] path report demonstrated no ALK gene rearrangement or dleation and negative ROS1 rearrangement, No EGFR mutations were found  Surgery completed by Dr Chelsea Perdue            - 10/15/2015 Radiation     SRT to brain 3000 cGY in 5 fractions  10/20/2015 -  Research Study Participant     [id number] Phase III open label randomized study of [id number] [Anti-PDL1 antibody] in combination with carboplatin and paclitaxel +/- Avastin versus Carboplatin and Paciltaxel +/- Avastin in patients with stage IV Chemotherapy  Naive non-small cell lung carcinoma  Patient was randomized to receive off for medications  11/30/2015 - 2/18/2016 Chemotherapy     Started Carboplatin, Paclitaxel, Avastin and PD-L1 (atezolizumab; study drug)  Completed 6 cycles with cycle 6 day 1 on 2/18/16         3/10/2016 -  Chemotherapy     Beginning cycle 7, maintenance cycle with Avastin and PD-L1 (Atezolizumab; study drug)         5/24/2017 Adverse Reaction     LFT elevation-persisted  Patient was taken off of study drug Atezolizumab, but continues on Avastin maintance  History of Present Illness:    59year old female recently diagnosed with IV lung cancer here for followup  Mrs Pietro Stahl began to have headaches last spring 2015  Patient was seen by her PCP and treated with antibiotics  Initially the symptoms got better the patient went on vacation  After returning from vacation, Mrs Pietro Stahl once again developed headaches  Patient was treated with a second round of antibiotics and then was diagnosed with vertigo  Patient was sent to the [address] [place] [address]  Although the specifics are not entirely clear, the therapist in the [address] [place] [address] sent the patient to the emergency room  A CAT scan of the brain demonstrated a brain lesion and patient was transferred to [facility]   Patient was seen by Dr Chelsea Perdue and underwent resection demonstrating adenocarcinoma  Additional testing demonstrated a lung mass  Patient improved and was discharged  Patient completed SRT with Dr Con Davidson and Dr Filomena Holcomb  Patient is on [facility] 20015-14 (phase III open label randomized study of [id number] [anti-PD-L1 antibody] in combination with carboplatin and paclitaxel +/- Avastin versus carboplatin and paclitaxel +/- Avastin in patients with stage IV chemotherapy naÃ¯ve non-small cell lung carcinoma)  Mrs Carol Wallace was randomized to receive the anti-PD-L1 antibody with chemotherapy - patient completed 6 cycles and was placed on maintenance  Mrs Ho Cates previously suffered a tonic-clonic seizure and was seen in the emergency room  CAT scan of the head did not demonstrate any evidence of disease progression  The seizure was thought to be due to encephalomalacia  Since that time, there have been no seizure issues  Mrs Carol Wallace is on Keppra  Patient has had elevated liver function tests  Because the abnormalities were grade 3, patient was taken off the [id number] and has continued on protocol with Avastin only  Chemotherapy was held on the last cycle because of elevated LFTs  Patient presents for follow-up  Patient states feeling okay, about the same as before  Fatigue is minimal, same as before  Activities are baseline  Patient has felt a little bit more depressed recently, using the Xanax sparingly  No headaches, blurred vision, dizziness or seizures  No shortness of breath or dyspnea on exertion  Dry cough is the same as before, no sputum or hemoptysis  No fevers, chills or sweats  No pain control issues  Patient continues to use the CBD oil from hemp - since using this, the neck stiffness is less/better  Review of Systems   Constitutional: Positive for fatigue  HENT: Negative  Neck stiffness is less/better   Eyes: Negative  Respiratory: Negative  Cardiovascular: Negative  Gastrointestinal: Negative      Endocrine: Negative  Genitourinary: Negative  Musculoskeletal: Positive for arthralgias  Negative for neck pain  Skin: Negative  Allergic/Immunologic: Negative  Neurological: Negative  Hematological: Bruises/bleeds easily  Psychiatric/Behavioral: Negative  All other systems reviewed and are negative  Patient Active Problem List   Diagnosis    Seizure ([facility] )    [facility], [facility]    Mixed hyperlipidemia    Abnormal LFTs    Allergic rhinitis    [facility]    [facility] tumor [facility]    Encephalomalacia    Impaired fasting glucose    Insomnia    Lesion of temporal lobe    Non-small cell lung cancer ([facility] )    Benign hypertension     Past Medical History:   Diagnosis Date    Allergic rhinitis     Alopecia     resolved 10/25/16    Anxiety     last assessed 10/4/16, resolved 10/25/16    Benign hypertension 2/1/2018    Benign neoplasm of skin of trunk 03/25/2008    last assessed 3/25/08    Benign neoplasm of skin of upper extremity and shoulder 03/25/2008    last assessed 3/25/08    Ear deformity, acquired     last assessed 10/13/14, resolved 3/12/15    Eczema     Hyperlipemia     Lung cancer ([place] [address] )     Maintenance chemotherapy     Secondary cancer of brain ([facility] )     Seizures ([facility] )     Vertigo      Past Surgical History:   Procedure Laterality Date    APPENDECTOMY  1964    BRAIN TUMOR EXCISION      CENTRAL VENOUS CATHETER INSERTION Right     PORTACATH     Family History   Problem Relation Age of Onset    Diabetes Mother     Heart disease Mother     Lung cancer Father     Pancreatitis Brother     No Known Problems Brother      Social History     Social History    Marital status: /Civil Union     Spouse name: N/A    Number of children: 1    Years of education: N/A     Occupational History    Not on file       Social History Main Topics    Smoking status: Former Smoker    Smokeless tobacco: Never Used    Alcohol use Yes      Comment: occasionally / Social     Drug use: Yes     Types: Marijuana    Sexual activity: Yes     Other Topics Concern    Not on file     Social History Narrative    High school or GED     Lives independently with spouse        Current Outpatient Prescriptions:     ALPRAZolam (XANAX) 0 25 mg tablet, Take 1 tablet (0 25 mg total) by mouth 3 (three) times a day as needed for anxiety, Disp: 30 tablet, Rfl: 0    amLODIPine (NORVASC) 2 5 mg tablet, TAKE TWO (5 MG TOTAL) TABLETS BY MOUTH ONCE DAILY, Disp: 30 tablet, Rfl: 3    atorvastatin (LIPITOR) 10 mg tablet, TAKE 1 TABLET BY MOUTH ONCE DAILY BEFORE BREAKFAST, Disp: 90 tablet, Rfl: 1    Omega-3 Fatty Acids (FISH OIL PO), Take 2 tablets by mouth , Disp: , Rfl:     ondansetron (ZOFRAN) 8 mg tablet, Take 1 tablet (8 mg total) by mouth 3 (three) times a day, Disp: 1 tablet, Rfl: 2    oxyCODONE (ROXICODONE) 5 mg immediate release tablet, Take 1 tablet (5 mg total) by mouth every 6 (six) hours as needed for moderate pain Max Daily Amount: 20 mg, Disp: 90 tablet, Rfl: 0    prochlorperazine (COMPAZINE) 10 mg tablet, Take 10 mg by mouth every 6 (six) hours as needed for nausea or vomiting, Disp: , Rfl:     Pseudoephedrine-Guaifenesin (MUCINEX D PO), Take by mouth, Disp: , Rfl:     ranitidine (ZANTAC) 75 MG tablet, Take 1 tablet by mouth daily, Disp: , Rfl:   No current facility-administered medications for this visit  Facility-Administered Medications Ordered in Other Visits:     alteplase (CATHFLO) injection 2 mg, 2 mg, Intracatheter, PRN, Yolanda Deleon MD    sodium chloride 0 9 % infusion, 20 mL/hr, Intravenous, Continuous, Yolanda Deleon MD    Allergies   Allergen Reactions    Iodinated Diagnostic Agents Anaphylaxis and Itching    Clindamycin     Clindamycin/Lincomycin        Vitals:    01/16/19 1100   BP: 144/100   Pulse: 88   Resp: 20   Temp: (!) 97 4 °F (36 3 °C)   SpO2: 98%     Physical Exam   Constitutional: She is oriented to person, place, and time   She appears well-developed and well-nourished  Well-nourished female, no respiratory distress   HENT:   Head: Normocephalic and atraumatic  Right Ear: External ear normal    Left Ear: External ear normal    Nose: Nose normal    Mouth/Throat: Oropharyngeal exudate present  Oropharynx:  Mucosa moist and pink, no exudate, plates in place   Eyes: Pupils are equal, round, and reactive to light  Conjunctivae and EOM are normal    Neck: Normal range of motion  Neck supple  Supple, no JVD, good range of motion   Cardiovascular: Normal rate, regular rhythm, normal heart sounds and intact distal pulses  Pulmonary/Chest: Effort normal and breath sounds normal    Right anterior chest wall port in place, area clean and dry, lungs with good air entry bilaterally, few scattered rhonchi   Abdominal: Soft  Bowel sounds are normal    Abdomen is soft, nontender, no hepatosplenomegaly, no rigidity or rebound, +bowel sounds   Musculoskeletal: Normal range of motion  Neurological: She is alert and oriented to person, place, and time  She has normal reflexes  Skin: Skin is warm  Good color, warm, moist, scattered few upper extremity purpura, no petechiae, no ecchymoses no active bleeding   Psychiatric: She has a normal mood and affect   Her behavior is normal  Judgment and thought content normal    Extremities: no lower extremity edema, no cords, pulses are 1+  Lymphatics: no adenopathy in the neck, supraclavicular region, axilla and groin bilaterally    Performance Status: 1 - Symptomatic but completely ambulatory    Labs    01/14/2019 WBC = 8 0 hemoglobin = 14 7 hematocrit = 43 3 platelet = 279 BUN = 11 creatinine = 0 92 AST = 47 ALT = 57 alkaline phosphatase = 79 total bilirubin = 0 5 LDH = 167    12/03/2018 WBC = 7 8 hemoglobin = 14 hematocrit = 40 9 platelet = 038 neutrophil = 57% BUN = 12 creatinine = 0 94 AST = 34 ALT = 46 total bilirubin = 0 5 alkaline phosphatase = 96 TSH = 2 270 LDH = 132  11/12/2018 WBC = 8 3 hemoglobin = 14 9 hematocrit = 43 platelet = 910 neutrophil = 58% BUN = 11 creatinine = 0 88 AST = 55 ALT = 58  10/22/2018 WBC = 9 1 hemoglobin = 14 1 hematocrit = 42 platelet = 242 neutrophil = 53% BUN = 12 creatinine = 1 08 AST = 34 ALT = 38 alkaline phosphatase = 69  10/01/2018 WBC = 6 9 hemoglobin = 14 8 hematocrit = 42 3 platelet = 037 neutrophil = 52% BUN = 13 creatinine = 0 95 calcium = 9 8 AST = 35 ALT = 42 alkaline phosphatase = 87 total bilirubin = 0 6 LDH = 135 magnesium = 2 1  09/10/2018 WBC = 6 2 hemoglobin = 14 6 hematocrit = 42 platelet = 532 neutrophil = 58% BUN = 10 creatinine = 0 87 AST = 60 ALT = 87  08/21/2018 BUN = 14 creatinine = 0 86 calcium = 9 3 AST = 28 ALT = 41 total bilirubin = 0 3 alkaline phosphatase = 89 WBC = 7 6 hemoglobin = 14 6 hematocrit = 43 platelet = 638 LDH = 126 phosphorus = 4 2 magnesium = 2 0    Imaging    12/20/2018 CT scan of the chest and abdomen/pelvis    1  Stable right upper lobe 3 3 cm cavitary lesion as described  2   Left lower lobe patchy groundglass opacities with associated mild peribronchial wall thickening, similar to October 18, 2018 but improved from August 20, 2018, likely related to a slowly resolving infectious/inflammatory process  Follow-up   short-term chest CT in 3 months is recommended to evaluate for resolution  3   No metastatic disease in the abdomen or pelvis  10/18/2018 CT scan of the chest without contrast: IMPRESSION: Improved appearance of the left lung base infiltrate  Stable right cavitary lesion  No new findings    08/20/2018 CT scan of the chest and abdomen/pelvis without contrast    RECIST target lesion: Cavitary right upper lobe mass is unchanged, measuring 3 4 x 2 6 cm on series 3 image 15 (previously 3 5 x 2 5 cm)  Solid component along the medial aspect of the posterior wall is also unchanged, measuring 1 4 x 0 9 cm      LUNGS:  Stable cavitary right upper lobe mass, as above  No new nodules identified    There is new small patchy density in the posterior left base compatible with infiltrate  There is no tracheal or endobronchial lesion  IMPRESSION    Unchanged cavitary right upper lobe mass  No new metastatic disease  Interval development of mild left lower lobe infiltrate noted  06/18/2018 CT scan of the chest and abdomen/pelvis    Unchanged cavitary right upper lobe mass    No new metastatic disease  04/16/2018 CT scan of the chest and abdomen/pelvis    There is no significant interval change in size of the cystic or nodular components of the cavitary right upper lobe mass  No new metastatic lesions identified in the chest, abdomen or pelvis  1/22/18 CAT scan of the chest and abdomen/pelvis    RECIST MEASUREMENTS:  TARGET LESION:   1: Right upper lobe cavitary lung mass: The overall size of the lesion is 3 5 x 2 4 cm on image 14 of series 3, significantly decreased from 4 3 x 2 7 cm on previous examination  Solid component along the medial aspect of the posterior wall is not significantly changed from previous examination currently measuring 1 5 x 0 9 cm on image 14 of series 3 compared with 1 8 x 0 8 cm when measured using similar technique on previous   examination  Nodular solid component along the lateral aspect of posterior wall measures approximately 0 7 x 0 4 cm on image 13 of series 3, and when measured using similar technique is unchanged from previous examinations  There is interval decrease in size of the cystic portion of the cavitary right upper lobe mass however, solid components of this mass are not significantly changed from previous examination  Otherwise unchanged examination with no new metastatic lesions   identified in the chest, abdomen or pelvis  Pathology    [facility] results demonstrated no ALK gene rearrangement or deletion and negative for ROS1 rearrangement  No EGFR mutations were found also       Right temporal mass from September 6, 2015 demonstrated metastatic poorly differentiated non-small cell carcinoma  The tumor cells stained positive for CK 7, TTF-1 and Napsin and negative for CK 20, CK 5/6 andmucicarmine  Pathologist stated that the immunoprofile supported the primary lung non-small cell carcinoma, favor adenocarcinoma  The pathologist also stated that given the focal p40 staining, a squamous carcinoma could not be excluded

## 2019-01-17 ENCOUNTER — HOSPITAL ENCOUNTER (OUTPATIENT)
Dept: INFUSION CENTER | Facility: CLINIC | Age: 59
Discharge: HOME | End: 2019-01-17
Payer: COMMERCIAL

## 2019-01-17 VITALS
SYSTOLIC BLOOD PRESSURE: 138 MMHG | TEMPERATURE: 97.9 F | DIASTOLIC BLOOD PRESSURE: 84 MMHG | HEIGHT: 67 IN | WEIGHT: 180.5 LBS | HEART RATE: 76 BPM | BODY MASS INDEX: 28.33 KG/M2 | RESPIRATION RATE: 16 BRPM | OXYGEN SATURATION: 96 %

## 2019-01-17 PROCEDURE — 96413 CHEMO IV INFUSION 1 HR: CPT

## 2019-01-17 PROCEDURE — J9035Q0 INV BEVACIZUMAB 400MG/16ML 16 ML: Performed by: INTERNAL MEDICINE

## 2019-01-17 RX ADMIN — Medication 1254 MG: at 15:08

## 2019-01-17 RX ADMIN — SODIUM CHLORIDE 20 ML/HR: 0.9 INJECTION, SOLUTION INTRAVENOUS at 14:26

## 2019-01-17 NOTE — PLAN OF CARE
Problem: Potential for Falls  Goal: Patient will remain free of falls  INTERVENTIONS:  - Assess patient frequently for physical needs  -  Identify cognitive and physical deficits and behaviors that affect risk of falls    -  [place] fall precautions as indicated by assessment   - Educate patient/family on patient safety including physical limitations  - Instruct patient to call for assistance with activity based on assessment  - Modify environment to reduce risk of injury  - Consider OT/PT consult to assist with strengthening/mobility   Outcome: Progressing

## 2019-01-17 NOTE — PROGRESS NOTES
Labs are within parameters to proceed -- pts manual BP is improved after having her rest a few minutes

## 2019-01-17 NOTE — PLAN OF CARE
Problem: Potential for Falls  Goal: Patient will remain free of falls  INTERVENTIONS:  - Assess patient frequently for physical needs  -  Identify cognitive and physical deficits and behaviors that affect risk of falls    -  [place] fall precautions as indicated by assessment   - Educate patient/family on patient safety including physical limitations  - Instruct patient to call for assistance with activity based on assessment  - Modify environment to reduce risk of injury  - Consider OT/PT consult to assist with strengthening/mobility   Outcome: Progressing

## 2019-01-29 ENCOUNTER — TELEPHONE (OUTPATIENT)
Dept: PAIN MEDICINE | Facility: CLINIC | Age: 59
End: 2019-01-29

## 2019-02-01 DIAGNOSIS — C34.90 NON-SMALL CELL LUNG CANCER WITH METASTASIS (HCC): Primary | ICD-10-CM

## 2019-02-05 LAB
ALBUMIN SERPL-MCNC: 4.3 G/DL (ref 3.6–4.8)
ALBUMIN/GLOB SERPL: 1.6 {RATIO} (ref 1.2–2.2)
ALP SERPL-CCNC: 68 IU/L (ref 39–117)
ALT SERPL-CCNC: 39 IU/L (ref 0–32)
APPEARANCE UR: CLEAR
AST SERPL-CCNC: 29 IU/L (ref 0–40)
BASOPHILS # BLD AUTO: 0.1 X10E3/UL (ref 0–0.2)
BASOPHILS NFR BLD AUTO: 1 %
BILIRUB SERPL-MCNC: 0.6 MG/DL (ref 0–1.2)
BILIRUB UR QL STRIP: NEGATIVE
BUN SERPL-MCNC: 19 MG/DL (ref 8–27)
BUN/CREAT SERPL: 20 (ref 12–28)
CALCIUM SERPL-MCNC: 10 MG/DL (ref 8.7–10.3)
CHLORIDE SERPL-SCNC: 100 MMOL/L (ref 96–106)
CO2 SERPL-SCNC: 22 MMOL/L (ref 20–29)
COLOR UR: YELLOW
CREAT SERPL-MCNC: 0.96 MG/DL (ref 0.57–1)
EOSINOPHIL # BLD AUTO: 0.2 X10E3/UL (ref 0–0.4)
EOSINOPHIL NFR BLD AUTO: 3 %
ERYTHROCYTE [DISTWIDTH] IN BLOOD BY AUTOMATED COUNT: 13.6 % (ref 12.3–15.4)
GLOBULIN SER-MCNC: 2.7 G/DL (ref 1.5–4.5)
GLUCOSE SERPL-MCNC: 143 MG/DL (ref 65–99)
GLUCOSE UR QL: NEGATIVE
HCT VFR BLD AUTO: 41.4 % (ref 34–46.6)
HGB BLD-MCNC: 14.3 G/DL (ref 11.1–15.9)
HGB UR QL STRIP: NEGATIVE
IMM GRANULOCYTES # BLD: 0 X10E3/UL (ref 0–0.1)
IMM GRANULOCYTES NFR BLD: 0 %
KETONES UR QL STRIP: NEGATIVE
LDH SERPL-CCNC: 134 IU/L (ref 119–226)
LEUKOCYTE ESTERASE UR QL STRIP: NEGATIVE
LYMPHOCYTES # BLD AUTO: 2.9 X10E3/UL (ref 0.7–3.1)
LYMPHOCYTES NFR BLD AUTO: 35 %
MAGNESIUM SERPL-MCNC: 2 MG/DL (ref 1.6–2.3)
MCH RBC QN AUTO: 30.6 PG (ref 26.6–33)
MCHC RBC AUTO-ENTMCNC: 34.5 G/DL (ref 31.5–35.7)
MCV RBC AUTO: 89 FL (ref 79–97)
MICRO URNS: NORMAL
MONOCYTES # BLD AUTO: 0.6 X10E3/UL (ref 0.1–0.9)
MONOCYTES NFR BLD AUTO: 7 %
NEUTROPHILS # BLD AUTO: 4.5 X10E3/UL (ref 1.4–7)
NEUTROPHILS NFR BLD AUTO: 54 %
NITRITE UR QL STRIP: NEGATIVE
PH UR STRIP: 5.5 [PH] (ref 5–7.5)
PHOSPHATE SERPL-MCNC: 3.8 MG/DL (ref 2.5–4.5)
PLATELET # BLD AUTO: 243 X10E3/UL (ref 150–379)
POTASSIUM SERPL-SCNC: 4.4 MMOL/L (ref 3.5–5.2)
PROT SERPL-MCNC: 7 G/DL (ref 6–8.5)
PROT UR QL STRIP: NEGATIVE
RBC # BLD AUTO: 4.67 X10E6/UL (ref 3.77–5.28)
SL AMB EGFR AFRICAN AMERICAN: 74 ML/MIN/1.73
SL AMB EGFR NON AFRICAN AMERICAN: 64 ML/MIN/1.73
SODIUM SERPL-SCNC: 138 MMOL/L (ref 134–144)
SP GR UR: 1.01 (ref 1–1.03)
UROBILINOGEN UR STRIP-ACNC: 0.2 EU/DL (ref 0.2–1)
WBC # BLD AUTO: 8.4 X10E3/UL (ref 3.4–10.8)

## 2019-02-06 ENCOUNTER — OFFICE VISIT (OUTPATIENT)
Dept: HEMATOLOGY ONCOLOGY | Facility: MEDICAL CENTER | Age: 59
End: 2019-02-06
Payer: COMMERCIAL

## 2019-02-06 VITALS
BODY MASS INDEX: 28.88 KG/M2 | RESPIRATION RATE: 16 BRPM | HEART RATE: 84 BPM | HEIGHT: 67 IN | DIASTOLIC BLOOD PRESSURE: 78 MMHG | OXYGEN SATURATION: 99 % | SYSTOLIC BLOOD PRESSURE: 140 MMHG | WEIGHT: 184 LBS | TEMPERATURE: 98.7 F

## 2019-02-06 DIAGNOSIS — C34.91 NON-SMALL CELL CANCER OF RIGHT LUNG (HCC): ICD-10-CM

## 2019-02-06 DIAGNOSIS — C34.91 ADENOCARCINOMA OF LUNG, STAGE 4, RIGHT (HCC): Primary | ICD-10-CM

## 2019-02-06 PROCEDURE — 99214 OFFICE O/P EST MOD 30 MIN: CPT | Performed by: INTERNAL MEDICINE

## 2019-02-06 RX ORDER — SODIUM CHLORIDE 9 MG/ML
20 INJECTION, SOLUTION INTRAVENOUS CONTINUOUS
Status: DISCONTINUED | OUTPATIENT
Start: 2019-02-07 | End: 2019-02-10 | Stop reason: HOSPADM

## 2019-02-06 NOTE — PROGRESS NOTES
Bronson Canas  1960  [facility]  [address] [place] [id number]    DISCUSSION  SUMMARY:    52-year-old female with M1 non-small cell lung carcinoma/adenocarcinoma  Patient is on [facility] 20015-14 (phase III open label randomized study of [id number] [anti-PD-L1 antibody] in combination with carboplatin and paclitaxel +/- Avastin versus carboplatin and paclitaxel +/- Avastin in patients with stage IV chemotherapy naive non-small cell lung carcinoma)  Patient was randomized to receive all 4 medications  Mrs Bettie Giles completed the 6 cycles of treatment without significant toxicities  Patient had been on maintenance (study drug (atezolizumab) and avastin) - now only Avastin (atezolizumab was discontinued by the PI because of the elevated LFTs)  Issues:      1  Stage IV non-small cell lung carcinoma  Clinically there are no concerning oncology signs  Recent laboratory test were good/acceptable; liver function tests are actually better than usual   Patient feels more run down/fatigued than usual   There are no obvious CNS findings  Patient has a follow-up appointment with Dr Angelic Ledesma this week  They can discuss next MRI/brain  As discussed previously, the December 2018 CT scans did not demonstrate any evidence of disease progression  The plan is to continue with the Avastin  We discussed continuing now; patient agrees  Mrs Bettie Giles will call if the fatigue worsens significantly  Regimen  Bevacizumab 15 mg/kg IV every 3 weeks  Goal = prolongation of life    2  Elevated LFTs, elevated magnesium  The most recent laboratory test results are good/acceptable  Surveillance is ongoing  3  Brain metastases status post resection and radiotherapy  Patient has been tapered off of Keppra  Patient will follow up with Neurology  As above, Mrs Bettie Giles is also to see radiation oncology  4  Anxiety - stable    Patient has Xanax at home if necessary  Patient is to return in 3 weeks   Patient knows to call the hematology/oncology office if there are any other questions or concerns  Carefully review your medication list and verify that the list is accurate and up-to-date  Please call the hematology/oncology office if there are medications missing from the list, medications on the list that you are not currently taking or if there is a dosage or instruction that is different from how you're taking that medication  Patient goals and areas of care: continue with the Avastin  Barriers to care:  None  Patient is able to self-care   ____________________________________________________________________________________________________    Chief Complaint   Patient presents with    Follow-up     Non-small cell lung carcinoma on maintenance Avastin     Advance Care Planning/Advance Directives: not discussed       Adenocarcinoma of lung, stage 4, right ([facility] )    9/5/2015 Initial Diagnosis     Patient was referred to the emergency room for evaluation of vertigo as sent from the [facility]  (patient reported )  Patient underwent the following pertinent imaging scans  MRI of the brain demonstrated a mass in the right superior all temporal gyrus with measurements of 4 2 x 3 3 x 3 6 cm with vasogenic edema and mass effect  There was a near uncal herniation noted  CT of the chest and abdomen/pelvis demonstrated a necrotic right upper lobe mass measuring 7 x 4 2 x 4 1 cm strongly suspicious for bronchogenic carcinoma  The mass contacts the posterior medial pleural surface and potentially contacts the right posterior tracheal border  No pathologic adenopathy was identified in no evidence of metastatic disease in the chest abdomen or pelvis  9/6/2015 Surgery     Non-small cell carcinoma of lung ([facility] ) diagnosed from biopsy of right temporal mass  Pathology demonstrates poorly differentiated non-small cell carcinoma    Pathologist stated that the immunoprofile supported the primary lung non-small cell carcinoma favor adenocarcinoma  Squamous carcinoma could not be excluded, secondary to focal P 40 staining  Laree Runner path report demonstrated no ALK gene rearrangement or dleation and negative ROS1 rearrangement, No EGFR mutations were found  Surgery completed by Dr Cady Navas            - 10/15/2015 Radiation     SRT to brain 3000 cGY in 5 fractions  10/20/2015 -  Research Study Participant     [id number] Phase III open label randomized study of [id number] [Anti-PDL1 antibody] in combination with carboplatin and paclitaxel +/- Avastin versus Carboplatin and Paciltaxel +/- Avastin in patients with stage IV Chemotherapy  Naive non-small cell lung carcinoma  Patient was randomized to receive off for medications  11/30/2015 - 2/18/2016 Chemotherapy     Started Carboplatin, Paclitaxel, Avastin and PD-L1 (atezolizumab; study drug)  Completed 6 cycles with cycle 6 day 1 on 2/18/16         3/10/2016 -  Chemotherapy     Beginning cycle 7, maintenance cycle with Avastin and PD-L1 (Atezolizumab; study drug)         5/24/2017 Adverse Reaction     LFT elevation-persisted  Patient was taken off of study drug Atezolizumab, but continues on Avastin maintance  History of Present Illness:    59year old female recently diagnosed with IV lung cancer here for followup  Mrs Helga Delaney began to have headaches last spring 2015  Patient was seen by her PCP and treated with antibiotics  Initially the symptoms got better the patient went on vacation  After returning from vacation, Mrs Helga Delaney once again developed headaches  Patient was treated with a second round of antibiotics and then was diagnosed with vertigo  Patient was sent to the [address] [place] [address]  Although the specifics are not entirely clear, the therapist in the [address] [place] [address] sent the patient to the emergency room   A CAT scan of the brain demonstrated a brain lesion and patient was transferred to [place]  Patient was seen by Dr Bhavik Ragsdale and underwent resection demonstrating adenocarcinoma  Additional testing demonstrated a lung mass  Patient improved and was discharged  Patient completed SRT with Dr Jazzy Orantes and Dr Constantino Ma  Patient is on [facility] 20015-14 (phase III open label randomized study of [id number] [anti-PD-L1 antibody] in combination with carboplatin and paclitaxel +/- Avastin versus carboplatin and paclitaxel +/- Avastin in patients with stage IV chemotherapy naÃ¯ve non-small cell lung carcinoma)  Mrs Gene Anderson was randomized to receive the anti-PD-L1 antibody with chemotherapy - patient completed 6 cycles and was placed on maintenance  Mrs Melvina Garcia previously suffered a tonic-clonic seizure and was seen in the emergency room  CAT scan of the head did not demonstrate any evidence of disease progression  The seizure was thought to be due to encephalomalacia  Since that time, there have been no seizure issues  Mrs Gene Anderson is on Keppra  Patient has had elevated liver function tests  Because the abnormalities were grade 3, patient was taken off the [id number] and has continued on protocol with Avastin only  Chemotherapy was held on the last cycle because of elevated LFTs  Patient presents for follow-up  Patient states feeling +/-, more fatigued than usual   Patient states that her activities have been decreased over the past week  No fevers, chills or sweats  No headaches, blurred vision or dizziness, no syncopal episodes  No nausea vomiting, no GI,  or GYN issues or bleeding  Patient states that she does is more tired than usual   No shortness of breath or dyspnea on exertion, no chest pain or pressure  Patient's  was diagnosed with the flu approximately 10 days ago  Mrs Gene Anderson states that she spent the last week sleeping on the couch  Review of Systems   Constitutional: Positive for fatigue  HENT: Negative           Neck stiffness is less/better   Eyes: Negative  Respiratory: Negative  Cardiovascular: Negative  Gastrointestinal: Negative  Endocrine: Negative  Genitourinary: Negative  Musculoskeletal: Positive for arthralgias  Negative for neck pain  Skin: Negative  Allergic/Immunologic: Negative  Neurological: Negative  Hematological: Bruises/bleeds easily  Psychiatric/Behavioral: Negative  All other systems reviewed and are negative  Patient Active Problem List   Diagnosis    [facility]    Adenocarcinoma of lung, stage 4, right ([facility] )    Mixed hyperlipidemia    Abnormal LFTs    Allergic rhinitis    Brain metastasis [facility]    Brain tumor [facility]    Encephalomalacia    Impaired fasting glucose    Insomnia    Lesion of temporal lobe    Non-small cell lung cancer ([facility]    Benign hypertension     Past Medical History:   Diagnosis Date    Allergic rhinitis     Alopecia     resolved 10/25/16    Anxiety     last assessed 10/4/16, resolved 10/25/16    Benign hypertension 2/1/2018    Benign neoplasm of skin of trunk 03/25/2008    last assessed 3/25/08    Benign neoplasm of skin of upper extremity and shoulder 03/25/2008    last assessed 3/25/08    Ear deformity, acquired     last assessed 10/13/14, resolved 3/12/15    Eczema     Hyperlipemia     Lung cancer [facility]     Maintenance chemotherapy     Secondary cancer of brain ([facility]     Seizures ([facility]     Vertigo      Past Surgical History:   Procedure Laterality Date    APPENDECTOMY  1964    BRAIN TUMOR EXCISION      CENTRAL VENOUS CATHETER INSERTION Right     PORTACATH     Family History   Problem Relation Age of Onset    Diabetes Mother     Heart disease Mother     Lung cancer Father     Pancreatitis Brother     No Known Problems Brother      Social History     Social History    Marital status: /Civil Union     Spouse name: N/A    Number of children: 1    Years of education: N/A     Occupational History    Not on file       Social History Main Topics    Smoking status: Former Smoker    Smokeless tobacco: Never Used    Alcohol use Yes      Comment: occasionally / Social     Drug use: Yes     Types: Marijuana    Sexual activity: Yes     Other Topics Concern    Not on file     Social History Narrative    High school or GED     Lives independently with spouse        Current Outpatient Prescriptions:     ALPRAZolam (XANAX) 0 25 mg tablet, Take 1 tablet (0 25 mg total) by mouth 3 (three) times a day as needed for anxiety, Disp: 30 tablet, Rfl: 0    amLODIPine (NORVASC) 2 5 mg tablet, TAKE TWO (5 MG TOTAL) TABLETS BY MOUTH ONCE DAILY, Disp: 30 tablet, Rfl: 3    atorvastatin (LIPITOR) 10 mg tablet, TAKE 1 TABLET BY MOUTH ONCE DAILY BEFORE BREAKFAST, Disp: 90 tablet, Rfl: 1    Omega-3 Fatty Acids (FISH OIL PO), Take 2 tablets by mouth , Disp: , Rfl:     ondansetron (ZOFRAN) 8 mg tablet, Take 1 tablet (8 mg total) by mouth 3 (three) times a day, Disp: 1 tablet, Rfl: 2    oxyCODONE (ROXICODONE) 5 mg immediate release tablet, Take 1 tablet (5 mg total) by mouth every 6 (six) hours as needed for moderate pain Max Daily Amount: 20 mg, Disp: 90 tablet, Rfl: 0    prochlorperazine (COMPAZINE) 10 mg tablet, Take 10 mg by mouth every 6 (six) hours as needed for nausea or vomiting, Disp: , Rfl:     Pseudoephedrine-Guaifenesin (MUCINEX D PO), Take by mouth, Disp: , Rfl:     ranitidine (ZANTAC) 75 MG tablet, Take 1 tablet by mouth daily, Disp: , Rfl:   No current facility-administered medications for this visit       Facility-Administered Medications Ordered in Other Visits:     alteplase (CATHFLO) injection 2 mg, 2 mg, Intracatheter, PRN, Madhavi Yeboah MD    sodium chloride 0 9 % infusion, 20 mL/hr, Intravenous, Continuous, Madhavi Yeboah MD    Allergies   Allergen Reactions    Iodinated Diagnostic Agents Anaphylaxis and Itching    Clindamycin     Clindamycin/Lincomycin        Vitals:    02/06/19 1020   BP: 140/78   Pulse: 84   Resp: 16   Temp: 98 7 °F (37 1 °C)   SpO2: 99%     Physical Exam   Constitutional: She is oriented to person, place, and time  She appears well-developed and well-nourished  Well-nourished female, no respiratory distress   HENT:   Head: Normocephalic and atraumatic  Right Ear: External ear normal    Left Ear: External ear normal    Nose: Nose normal    Mouth/Throat: No oropharyngeal exudate  Oropharynx:  Mucosa moist and pink, no exudate, plates in place   Eyes: Pupils are equal, round, and reactive to light  Conjunctivae and EOM are normal    Neck: Normal range of motion  Neck supple  Supple, no JVD, good range of motion   Cardiovascular: Normal rate, regular rhythm, normal heart sounds and intact distal pulses  Pulmonary/Chest: Effort normal and breath sounds normal    Right anterior chest wall port in place, area clean and dry, lungs with good air entry bilaterally, few scattered rhonchi   Abdominal: Soft  Bowel sounds are normal    Abdomen is soft, nontender, no hepatosplenomegaly, no rigidity or rebound, +bowel sounds   Musculoskeletal: Normal range of motion  Neurological: She is alert and oriented to person, place, and time  She has normal reflexes  Skin: Skin is warm  Good color, warm, moist, scattered few upper extremity purpura, no petechiae, no ecchymoses no active bleeding   Psychiatric: She has a normal mood and affect   Her behavior is normal  Judgment and thought content normal    Extremities: no lower extremity edema, no cords, pulses are 1+  Lymphatics: no adenopathy in the neck, supraclavicular region, axilla and groin bilaterally    Performance Status: 1 - Symptomatic but completely ambulatory    Labs    02/04/2019 WBC = 8 4 hemoglobin = 14 3 hematocrit = 41 4 platelet = 182 neutrophil = 54% BUN = 19 creatinine = 0 96 total protein = 7 0 AST = 29 ALT = 39 total protein = 0 6 alkaline phosphatase = 68 LDH = 130 for phosphorus = 3 4 magnesium = 2 0 urinalysis without occult blood    01/14/2019 WBC = 8 0 hemoglobin = 14 7 hematocrit = 43 3 platelet = 042 BUN = 11 creatinine = 0 92 AST = 47 ALT = 57 alkaline phosphatase = 79 total bilirubin = 0 5 LDH = 167  12/03/2018 WBC = 7 8 hemoglobin = 14 hematocrit = 40 9 platelet = 112 neutrophil = 57% BUN = 12 creatinine = 0 94 AST = 34 ALT = 46 total bilirubin = 0 5 alkaline phosphatase = 96 TSH = 2 270 LDH = 132  11/12/2018 WBC = 8 3 hemoglobin = 14 9 hematocrit = 43 platelet = 201 neutrophil = 58% BUN = 11 creatinine = 0 88 AST = 55 ALT = 58  10/22/2018 WBC = 9 1 hemoglobin = 14 1 hematocrit = 42 platelet = 276 neutrophil = 53% BUN = 12 creatinine = 1 08 AST = 34 ALT = 38 alkaline phosphatase = 69  10/01/2018 WBC = 6 9 hemoglobin = 14 8 hematocrit = 42 3 platelet = 572 neutrophil = 52% BUN = 13 creatinine = 0 95 calcium = 9 8 AST = 35 ALT = 42 alkaline phosphatase = 87 total bilirubin = 0 6 LDH = 135 magnesium = 2 1    Imaging    12/20/2018 CT scan of the chest and abdomen/pelvis    1  Stable right upper lobe 3 3 cm cavitary lesion as described  2   Left lower lobe patchy groundglass opacities with associated mild peribronchial wall thickening, similar to October 18, 2018 but improved from August 20, 2018, likely related to a slowly resolving infectious/inflammatory process  Follow-up   short-term chest CT in 3 months is recommended to evaluate for resolution  3   No metastatic disease in the abdomen or pelvis  10/18/2018 CT scan of the chest without contrast: IMPRESSION: Improved appearance of the left lung base infiltrate  Stable right cavitary lesion  No new findings    08/20/2018 CT scan of the chest and abdomen/pelvis without contrast    RECIST target lesion: Cavitary right upper lobe mass is unchanged, measuring 3 4 x 2 6 cm on series 3 image 15 (previously 3 5 x 2 5 cm)  Solid component along the medial aspect of the posterior wall is also unchanged, measuring 1 4 x 0 9 cm      LUNGS:  Stable cavitary right upper lobe mass, as above  No new nodules identified  There is new small patchy density in the posterior left base compatible with infiltrate  There is no tracheal or endobronchial lesion  IMPRESSION    Unchanged cavitary right upper lobe mass  No new metastatic disease  Interval development of mild left lower lobe infiltrate noted  06/18/2018 CT scan of the chest and abdomen/pelvis    Unchanged cavitary right upper lobe mass    No new metastatic disease  04/16/2018 CT scan of the chest and abdomen/pelvis    There is no significant interval change in size of the cystic or nodular components of the cavitary right upper lobe mass  No new metastatic lesions identified in the chest, abdomen or pelvis  1/22/18 CAT scan of the chest and abdomen/pelvis    RECIST MEASUREMENTS:  TARGET LESION:   1: Right upper lobe cavitary lung mass: The overall size of the lesion is 3 5 x 2 4 cm on image 14 of series 3, significantly decreased from 4 3 x 2 7 cm on previous examination  Solid component along the medial aspect of the posterior wall is not significantly changed from previous examination currently measuring 1 5 x 0 9 cm on image 14 of series 3 compared with 1 8 x 0 8 cm when measured using similar technique on previous   examination  Nodular solid component along the lateral aspect of posterior wall measures approximately 0 7 x 0 4 cm on image 13 of series 3, and when measured using similar technique is unchanged from previous examinations  There is interval decrease in size of the cystic portion of the cavitary right upper lobe mass however, solid components of this mass are not significantly changed from previous examination  Otherwise unchanged examination with no new metastatic lesions   identified in the chest, abdomen or pelvis  Pathology    [facility] results demonstrated no ALK gene rearrangement or deletion and negative for ROS1 rearrangement  No EGFR mutations were found also       Right temporal mass from September 6, 2015 demonstrated metastatic poorly differentiated non-small cell carcinoma  The tumor cells stained positive for CK 7, TTF-1 and Napsin and negative for CK 20, CK 5/6 andmucicarmine  Pathologist stated that the immunoprofile supported the primary lung non-small cell carcinoma, favor adenocarcinoma  The pathologist also stated that given the focal p40 staining, a squamous carcinoma could not be excluded

## 2019-02-07 ENCOUNTER — HOSPITAL ENCOUNTER (OUTPATIENT)
Dept: INFUSION CENTER | Facility: CLINIC | Age: 59
Discharge: HOME | End: 2019-02-07
Payer: COMMERCIAL

## 2019-02-07 VITALS
HEART RATE: 100 BPM | TEMPERATURE: 98.4 F | HEIGHT: 67 IN | BODY MASS INDEX: 28.41 KG/M2 | SYSTOLIC BLOOD PRESSURE: 134 MMHG | WEIGHT: 181 LBS | DIASTOLIC BLOOD PRESSURE: 90 MMHG | RESPIRATION RATE: 20 BRPM

## 2019-02-07 PROCEDURE — J9035Q0 INV BEVACIZUMAB 400MG/16ML 16 ML: Performed by: INTERNAL MEDICINE

## 2019-02-07 PROCEDURE — 96413 CHEMO IV INFUSION 1 HR: CPT

## 2019-02-07 RX ADMIN — Medication 1254 MG: at 15:19

## 2019-02-07 RX ADMIN — SODIUM CHLORIDE 20 ML/HR: 0.9 INJECTION, SOLUTION INTRAVENOUS at 14:45

## 2019-02-07 NOTE — PLAN OF CARE
Problem: Potential for Falls  Goal: Patient will remain free of falls  INTERVENTIONS:  - Assess patient frequently for physical needs  -  Identify cognitive and physical deficits and behaviors that affect risk of falls    -  [place] fall precautions as indicated by assessment   - Educate patient/family on patient safety including physical limitations  - Instruct patient to call for assistance with activity based on assessment  - Modify environment to reduce risk of injury  - Consider OT/PT consult to assist with strengthening/mobility   Outcome: Progressing

## 2019-02-07 NOTE — PROGRESS NOTES
Nurse arabella Francis of pts  Manual b/p of 134/90 & she stated per Dr Michi Gabriel pt  Is o k  To treat

## 2019-02-21 ENCOUNTER — HOSPITAL ENCOUNTER (OUTPATIENT)
Dept: RADIOLOGY | Facility: HOSPITAL | Age: 59
Discharge: HOME | End: 2019-02-21
Attending: INTERNAL MEDICINE

## 2019-02-21 DIAGNOSIS — C34.91 ADENOCARCINOMA OF LUNG, STAGE 4, RIGHT (HCC): ICD-10-CM

## 2019-02-21 DIAGNOSIS — C34.91 ADENOCARCINOMA OF LUNG, STAGE 4, RIGHT (HCC): Primary | ICD-10-CM

## 2019-02-21 PROCEDURE — 71250 CT THORAX DX C-: CPT

## 2019-02-21 PROCEDURE — 74176 CT ABD & PELVIS W/O CONTRAST: CPT

## 2019-02-26 LAB
ALBUMIN SERPL-MCNC: 4.4 G/DL (ref 3.6–4.8)
ALBUMIN SERPL-MCNC: 4.4 G/DL (ref 3.6–4.8)
ALBUMIN/GLOB SERPL: 1.6 {RATIO} (ref 1.2–2.2)
ALBUMIN/GLOB SERPL: 1.7 {RATIO} (ref 1.2–2.2)
ALP SERPL-CCNC: 79 IU/L (ref 39–117)
ALP SERPL-CCNC: 81 IU/L (ref 39–117)
ALT SERPL-CCNC: 54 IU/L (ref 0–32)
ALT SERPL-CCNC: 54 IU/L (ref 0–32)
APPEARANCE UR: CLEAR
AST SERPL-CCNC: 40 IU/L (ref 0–40)
AST SERPL-CCNC: 43 IU/L (ref 0–40)
BACTERIA URNS QL MICRO: ABNORMAL
BASOPHILS # BLD AUTO: 0.1 X10E3/UL (ref 0–0.2)
BASOPHILS # BLD AUTO: 0.1 X10E3/UL (ref 0–0.2)
BASOPHILS NFR BLD AUTO: 1 %
BASOPHILS NFR BLD AUTO: 1 %
BILIRUB SERPL-MCNC: 0.5 MG/DL (ref 0–1.2)
BILIRUB SERPL-MCNC: 0.6 MG/DL (ref 0–1.2)
BILIRUB UR QL STRIP: NEGATIVE
BUN SERPL-MCNC: 12 MG/DL (ref 8–27)
BUN SERPL-MCNC: 12 MG/DL (ref 8–27)
BUN/CREAT SERPL: 13 (ref 12–28)
BUN/CREAT SERPL: 13 (ref 12–28)
CALCIUM SERPL-MCNC: 9.6 MG/DL (ref 8.7–10.3)
CALCIUM SERPL-MCNC: 9.6 MG/DL (ref 8.7–10.3)
CASTS URNS MICRO: ABNORMAL
CASTS URNS QL MICRO: PRESENT /LPF
CHLORIDE SERPL-SCNC: 99 MMOL/L (ref 96–106)
CHLORIDE SERPL-SCNC: 99 MMOL/L (ref 96–106)
CHOLEST SERPL-MCNC: 175 MG/DL (ref 100–199)
CO2 SERPL-SCNC: 22 MMOL/L (ref 20–29)
CO2 SERPL-SCNC: 23 MMOL/L (ref 20–29)
COLOR UR: YELLOW
CREAT SERPL-MCNC: 0.91 MG/DL (ref 0.57–1)
CREAT SERPL-MCNC: 0.92 MG/DL (ref 0.57–1)
EOSINOPHIL # BLD AUTO: 0.1 X10E3/UL (ref 0–0.4)
EOSINOPHIL # BLD AUTO: 0.1 X10E3/UL (ref 0–0.4)
EOSINOPHIL NFR BLD AUTO: 2 %
EOSINOPHIL NFR BLD AUTO: 2 %
EPI CELLS #/AREA URNS HPF: ABNORMAL /HPF (ref 0–10)
ERYTHROCYTE [DISTWIDTH] IN BLOOD BY AUTOMATED COUNT: 13.2 % (ref 12.3–15.4)
ERYTHROCYTE [DISTWIDTH] IN BLOOD BY AUTOMATED COUNT: 13.9 % (ref 12.3–15.4)
GLOBULIN SER-MCNC: 2.6 G/DL (ref 1.5–4.5)
GLOBULIN SER-MCNC: 2.8 G/DL (ref 1.5–4.5)
GLUCOSE SERPL-MCNC: 112 MG/DL (ref 65–99)
GLUCOSE SERPL-MCNC: 113 MG/DL (ref 65–99)
GLUCOSE UR QL: NEGATIVE
HBA1C MFR BLD: 6.1 % (ref 4.8–5.6)
HCT VFR BLD AUTO: 42 % (ref 34–46.6)
HCT VFR BLD AUTO: 43.3 % (ref 34–46.6)
HDLC SERPL-MCNC: 51 MG/DL
HGB BLD-MCNC: 14.7 G/DL (ref 11.1–15.9)
HGB BLD-MCNC: 15.2 G/DL (ref 11.1–15.9)
HGB UR QL STRIP: NEGATIVE
IMM GRANULOCYTES # BLD: 0 X10E3/UL (ref 0–0.1)
IMM GRANULOCYTES # BLD: 0 X10E3/UL (ref 0–0.1)
IMM GRANULOCYTES NFR BLD: 0 %
IMM GRANULOCYTES NFR BLD: 0 %
KETONES UR QL STRIP: NEGATIVE
LABCORP COMMENT: NORMAL
LDH SERPL-CCNC: 142 IU/L (ref 119–226)
LDLC SERPL CALC-MCNC: 91 MG/DL (ref 0–99)
LEUKOCYTE ESTERASE UR QL STRIP: ABNORMAL
LYMPHOCYTES # BLD AUTO: 2.7 X10E3/UL (ref 0.7–3.1)
LYMPHOCYTES # BLD AUTO: 3 X10E3/UL (ref 0.7–3.1)
LYMPHOCYTES NFR BLD AUTO: 34 %
LYMPHOCYTES NFR BLD AUTO: 36 %
MAGNESIUM SERPL-MCNC: 1.9 MG/DL (ref 1.6–2.3)
MCH RBC QN AUTO: 32 PG (ref 26.6–33)
MCH RBC QN AUTO: 32.3 PG (ref 26.6–33)
MCHC RBC AUTO-ENTMCNC: 35 G/DL (ref 31.5–35.7)
MCHC RBC AUTO-ENTMCNC: 35.1 G/DL (ref 31.5–35.7)
MCV RBC AUTO: 92 FL (ref 79–97)
MCV RBC AUTO: 92 FL (ref 79–97)
MICRO URNS: ABNORMAL
MICRODELETION SYND BLD/T FISH: NORMAL
MONOCYTES # BLD AUTO: 0.5 X10E3/UL (ref 0.1–0.9)
MONOCYTES # BLD AUTO: 0.5 X10E3/UL (ref 0.1–0.9)
MONOCYTES NFR BLD AUTO: 6 %
MONOCYTES NFR BLD AUTO: 6 %
MUCOUS THREADS URNS QL MICRO: PRESENT
NEUTROPHILS # BLD AUTO: 4.6 X10E3/UL (ref 1.4–7)
NEUTROPHILS # BLD AUTO: 4.6 X10E3/UL (ref 1.4–7)
NEUTROPHILS NFR BLD AUTO: 55 %
NEUTROPHILS NFR BLD AUTO: 57 %
NITRITE UR QL STRIP: NEGATIVE
PH UR STRIP: 6 [PH] (ref 5–7.5)
PHOSPHATE SERPL-MCNC: 4.3 MG/DL (ref 2.5–4.5)
PLATELET # BLD AUTO: 273 X10E3/UL (ref 150–379)
PLATELET # BLD AUTO: 298 X10E3/UL (ref 150–379)
POTASSIUM SERPL-SCNC: 4.6 MMOL/L (ref 3.5–5.2)
POTASSIUM SERPL-SCNC: 5 MMOL/L (ref 3.5–5.2)
PROT SERPL-MCNC: 7 G/DL (ref 6–8.5)
PROT SERPL-MCNC: 7.2 G/DL (ref 6–8.5)
PROT UR QL STRIP: NEGATIVE
RBC # BLD AUTO: 4.59 X10E6/UL (ref 3.77–5.28)
RBC # BLD AUTO: 4.71 X10E6/UL (ref 3.77–5.28)
RBC #/AREA URNS HPF: ABNORMAL /HPF (ref 0–2)
SL AMB EGFR AFRICAN AMERICAN: 78 ML/MIN/1.73
SL AMB EGFR AFRICAN AMERICAN: 79 ML/MIN/1.73
SL AMB EGFR NON AFRICAN AMERICAN: 68 ML/MIN/1.73
SL AMB EGFR NON AFRICAN AMERICAN: 69 ML/MIN/1.73
SODIUM SERPL-SCNC: 136 MMOL/L (ref 134–144)
SODIUM SERPL-SCNC: 137 MMOL/L (ref 134–144)
SP GR UR: 1.01 (ref 1–1.03)
TRIGL SERPL-MCNC: 166 MG/DL (ref 0–149)
UROBILINOGEN UR STRIP-ACNC: 0.2 EU/DL (ref 0.2–1)
WBC # BLD AUTO: 7.9 X10E3/UL (ref 3.4–10.8)
WBC # BLD AUTO: 8.3 X10E3/UL (ref 3.4–10.8)
WBC #/AREA URNS HPF: ABNORMAL /HPF (ref 0–5)

## 2019-02-27 ENCOUNTER — OFFICE VISIT (OUTPATIENT)
Dept: HEMATOLOGY ONCOLOGY | Facility: MEDICAL CENTER | Age: 59
End: 2019-02-27
Payer: COMMERCIAL

## 2019-02-27 VITALS
OXYGEN SATURATION: 97 % | TEMPERATURE: 98.1 F | HEIGHT: 67 IN | RESPIRATION RATE: 16 BRPM | SYSTOLIC BLOOD PRESSURE: 128 MMHG | HEART RATE: 78 BPM | WEIGHT: 183 LBS | DIASTOLIC BLOOD PRESSURE: 80 MMHG | BODY MASS INDEX: 28.72 KG/M2

## 2019-02-27 DIAGNOSIS — C34.91 ADENOCARCINOMA OF LUNG, STAGE 4, RIGHT (HCC): Primary | ICD-10-CM

## 2019-02-27 PROCEDURE — 99213 OFFICE O/P EST LOW 20 MIN: CPT | Performed by: INTERNAL MEDICINE

## 2019-02-27 RX ORDER — ALPRAZOLAM 0.25 MG/1
0.25 TABLET ORAL 2 TIMES DAILY PRN
Qty: 30 TABLET | Refills: 0 | Status: SHIPPED | OUTPATIENT
Start: 2019-02-27 | End: 2019-04-30 | Stop reason: SDUPTHER

## 2019-02-27 RX ORDER — SODIUM CHLORIDE 9 MG/ML
20 INJECTION, SOLUTION INTRAVENOUS CONTINUOUS
Status: DISCONTINUED | OUTPATIENT
Start: 2019-02-28 | End: 2019-03-03 | Stop reason: HOSPADM

## 2019-02-27 NOTE — PROGRESS NOTES
Liberty Grace  1960  Leslie Liu [facility] [id number]    DISCUSSION  SUMMARY:    59-year-old female with M1 non-small cell lung carcinoma/adenocarcinoma  Patient is on [facility] 20015-14 (phase III open label randomized study of [id number] [anti-PD-L1 antibody] in combination with carboplatin and paclitaxel +/- Avastin versus carboplatin and paclitaxel +/- Avastin in patients with stage IV chemotherapy naive non-small cell lung carcinoma)  Patient was randomized to receive all 4 medications  Mrs Kobe Guillory completed the 6 cycles of treatment without significant toxicities  Patient had been on maintenance (study drug (atezolizumab) and avastin) - now only Avastin (atezolizumab was discontinued by the PI because of the elevated LFTs)  Issues:      1  Stage IV non-small cell lung carcinoma  Clinically there are no concerning oncology signs including no CNS signs  Recent laboratory test were good/acceptable; liver function tests are also okay/acceptable  Recent CT scans did not demonstrate any evidence of disease progression  The plan is to continue with Avastin  Regimen  Bevacizumab 15 mg/kg IV every 3 weeks  Goal = prolongation of life    2  Elevated LFTs, elevated magnesium  The most recent laboratory test results are good/acceptable  Surveillance is ongoing  3  Brain metastases status post resection and radiotherapy  Patient has been tapered off of Keppra  Patient will follow up with Neurology  As above, Mrs Kobe Guillory is also to see radiation oncology  4  Anxiety - stable  Xanax was renewed  Patient is to return in 3 weeks   Patient knows to call the hematology/oncology office if there are any other questions or concerns  Carefully review your medication list and verify that the list is accurate and up-to-date   Please call the hematology/oncology office if there are medications missing from the list, medications on the list that you are not currently taking or if there is a dosage or instruction that is different from how you're taking that medication  Patient goals and areas of care: continue with the Avastin  Barriers to care:  None  Patient is able to self-care   __________________________________________________________________________________________________    Chief Complaint   Patient presents with    Follow-up     Metastatic non-small cell lung carcinoma     Advance Care Planning/Advance Directives: not discussed       Adenocarcinoma of lung, stage 4, right ([facility] )    9/5/2015 Initial Diagnosis     Patient was referred to the emergency room for evaluation of vertigo as sent from the [facility]  (patient reported )  Patient underwent the following pertinent imaging scans  MRI of the brain demonstrated a mass in the right superior all temporal gyrus with measurements of 4 2 x 3 3 x 3 6 cm with vasogenic edema and mass effect  There was a near uncal herniation noted  CT of the chest and abdomen/pelvis demonstrated a necrotic right upper lobe mass measuring 7 x 4 2 x 4 1 cm strongly suspicious for bronchogenic carcinoma  The mass contacts the posterior medial pleural surface and potentially contacts the right posterior tracheal border  No pathologic adenopathy was identified in no evidence of metastatic disease in the chest abdomen or pelvis  9/6/2015 Surgery     [facility] diagnosed from biopsy of right temporal mass  Pathology demonstrates poorly differentiated non-small cell carcinoma  Pathologist stated that the immunoprofile supported the primary lung non-small cell carcinoma favor adenocarcinoma  Squamous carcinoma could not be excluded, secondary to focal P 40 staining  Maxime Mohan path report demonstrated no ALK gene rearrangement or dleation and negative ROS1 rearrangement, No EGFR mutations were found  Surgery completed by Dr Florence Radford             - 10/15/2015 Radiation     SRT to brain 3000 cGY in 5 fractions  10/20/2015 -  Research Study Participant     [id number] Phase III open label randomized study of [id number] [Anti-PDL1 antibody] in combination with carboplatin and paclitaxel +/- Avastin versus Carboplatin and Paciltaxel +/- Avastin in patients with stage IV Chemotherapy  Naive non-small cell lung carcinoma  Patient was randomized to receive off for medications  11/30/2015 - 2/18/2016 Chemotherapy     Started Carboplatin, Paclitaxel, Avastin and PD-L1 (atezolizumab; study drug)  Completed 6 cycles with cycle 6 day 1 on 2/18/16         3/10/2016 -  Chemotherapy     Beginning cycle 7, maintenance cycle with Avastin and PD-L1 (Atezolizumab; study drug)         5/24/2017 Adverse Reaction     LFT elevation-persisted  Patient was taken off of study drug Atezolizumab, but continues on Avastin maintance  History of Present Illness:    59year old female recently diagnosed with IV lung cancer here for followup  Mrs Helga Delaney began to have headaches last spring 2015  Patient was seen by her PCP and treated with antibiotics  Initially the symptoms got better the patient went on vacation  After returning from vacation, Mrs Helga Delaney once again developed headaches  Patient was treated with a second round of antibiotics and then was diagnosed with vertigo  Patient was sent to the [address] [place] [address]  Although the specifics are not entirely clear, the therapist in the [address] [place] [address] sent the patient to the emergency room  A CAT scan of the brain demonstrated a brain lesion and patient was transferred [facility]  Patient was seen by Dr Cady Navas and underwent resection demonstrating adenocarcinoma  Additional testing demonstrated a lung mass  Patient improved and was discharged  Patient completed SRT with Dr Jud Herr and Dr Sarah Vasquez        Patient is on [facility] 08084-70 (phase III open label randomized study of [id number] [anti-PD-L1 antibody] in combination with carboplatin and paclitaxel +/- Avastin versus carboplatin and paclitaxel +/- Avastin in patients with stage IV chemotherapy naÃ¯ve non-small cell lung carcinoma)  Mrs Maryanne Avila was randomized to receive the anti-PD-L1 antibody with chemotherapy - patient completed 6 cycles and was placed on maintenance  Mrs Laurie Carmen previously suffered a tonic-clonic seizure and was seen in the emergency room  CAT scan of the head did not demonstrate any evidence of disease progression  The seizure was thought to be due to encephalomalacia  Since that time, there have been no seizure issues  Mrs Maryanne Avila is on Keppra  Patient has had elevated liver function tests  Because the abnormalities were grade 3, patient was taken off the [id number] and has continued on protocol with Avastin only  Chemotherapy was held on the last cycle because of elevated LFTs  Patient presents for follow-up  Patient states feeling okay, baseline  Activities are about the same as before, possibly slightly better  No fevers, chills or sweats  No pain control issues  No headaches, blurred vision, dizziness or syncopal episodes  Appetite is good, weight is stable  No GI,  or GYN issues  No shortness of breath or dyspnea on exertion  No chest pain or pressure  Neck stiffness is minimal, generalized body aches are the same as before 2    Review of Systems   Constitutional: Positive for fatigue  HENT: Negative  Neck stiffness is less/better   Eyes: Negative  Respiratory: Negative  Cardiovascular: Negative  Gastrointestinal: Negative  Endocrine: Negative  Genitourinary: Negative  Musculoskeletal: Positive for arthralgias  Negative for neck pain  Skin: Negative  Allergic/Immunologic: Negative  Neurological: Negative  Hematological: Bruises/bleeds easily  Psychiatric/Behavioral: Negative  All other systems reviewed and are negative       Patient Active Problem List   Diagnosis    [facility]    Adenocarcinoma of lung, stage 4, right [facility]    Mixed hyperlipidemia    Abnormal LFTs    Allergic rhinitis    [facility]    Brain tumor ([facility]    Encephalomalacia    Impaired fasting glucose    Insomnia    Lesion of temporal lobe    Non-small cell lung cancer ([facility]    Benign hypertension     Past Medical History:   Diagnosis Date    Allergic rhinitis     Alopecia     resolved 10/25/16    Anxiety     last assessed 10/4/16, resolved 10/25/16    Benign hypertension 2/1/2018    Benign neoplasm of skin of trunk 03/25/2008    last assessed 3/25/08    Benign neoplasm of skin of upper extremity and shoulder 03/25/2008    last assessed 3/25/08    Ear deformity, acquired     last assessed 10/13/14, resolved 3/12/15    Eczema     Hyperlipemia     Lung cancer ([facility] )     Maintenance chemotherapy     Secondary cancer of brain ([facility] )     Seizures ([facility] )     Vertigo      Past Surgical History:   Procedure Laterality Date    APPENDECTOMY  1964    BRAIN TUMOR EXCISION      CENTRAL VENOUS CATHETER INSERTION Right     PORTACATH     Family History   Problem Relation Age of Onset    Diabetes Mother     Heart disease Mother     Lung cancer Father     Pancreatitis Brother     No Known Problems Brother      Social History     Socioeconomic History    Marital status: /Civil Union     Spouse name: Not on file    Number of children: 1    Years of education: Not on file    Highest education level: Not on file   Occupational History    Not on file   Social Needs    Financial resource strain: Not on file    Food insecurity:     Worry: Not on file     Inability: Not on file    Transportation needs:     Medical: Not on file     Non-medical: Not on file   Tobacco Use    Smoking status: Former Smoker    Smokeless tobacco: Never Used   Substance and Sexual Activity    Alcohol use: Yes     Comment: occasionally / Social     Drug use: Yes     Types: Marijuana    Sexual activity: Yes Lifestyle    Physical activity:     Days per week: Not on file     Minutes per session: Not on file    Stress: Not on file   Relationships    Social connections:     Talks on phone: Not on file     Gets together: Not on file     Attends [employer] witness service: Not on file     Active member of club or organization: Not on file     Attends meetings of clubs or organizations: Not on file     Relationship status: Not on file    Intimate partner violence:     Fear of current or ex partner: Not on file     Emotionally abused: Not on file     Physically abused: Not on file     Forced sexual activity: Not on file   Other Topics Concern    Not on file   Social History Narrative    High school or GED     Lives independently with spouse        Current Outpatient Medications:     ALPRAZolam (XANAX) 0 25 mg tablet, Take 1 tablet (0 25 mg total) by mouth 3 (three) times a day as needed for anxiety, Disp: 30 tablet, Rfl: 0    amLODIPine (NORVASC) 2 5 mg tablet, TAKE TWO (5 MG TOTAL) TABLETS BY MOUTH ONCE DAILY, Disp: 30 tablet, Rfl: 3    atorvastatin (LIPITOR) 10 mg tablet, TAKE 1 TABLET BY MOUTH ONCE DAILY BEFORE BREAKFAST, Disp: 90 tablet, Rfl: 1    Omega-3 Fatty Acids (FISH OIL PO), Take 2 tablets by mouth , Disp: , Rfl:     ondansetron (ZOFRAN) 8 mg tablet, Take 1 tablet (8 mg total) by mouth 3 (three) times a day, Disp: 1 tablet, Rfl: 2    oxyCODONE (ROXICODONE) 5 mg immediate release tablet, Take 1 tablet (5 mg total) by mouth every 6 (six) hours as needed for moderate pain Max Daily Amount: 20 mg, Disp: 90 tablet, Rfl: 0    prochlorperazine (COMPAZINE) 10 mg tablet, Take 10 mg by mouth every 6 (six) hours as needed for nausea or vomiting, Disp: , Rfl:     Pseudoephedrine-Guaifenesin (MUCINEX D PO), Take by mouth, Disp: , Rfl:     ranitidine (ZANTAC) 75 MG tablet, Take 1 tablet by mouth daily, Disp: , Rfl:   No current facility-administered medications for this visit       Facility-Administered Medications Ordered in Other Visits:     alteplase (CATHFLO) injection 2 mg, 2 mg, Intracatheter, PRN, Kody Norris MD    sodium chloride 0 9 % infusion, 20 mL/hr, Intravenous, Continuous, Kody Norris MD    Allergies   Allergen Reactions    Iodinated Diagnostic Agents Anaphylaxis and Itching    Clindamycin     Clindamycin/Lincomycin        Vitals:    02/27/19 1028   BP: 128/80   Pulse: 78   Resp: 16   Temp: 98 1 °F (36 7 °C)   SpO2: 97%     Physical Exam   Constitutional: She is oriented to person, place, and time  She appears well-developed and well-nourished  Well-nourished female, no respiratory distress   HENT:   Head: Normocephalic and atraumatic  Right Ear: External ear normal    Left Ear: External ear normal    Nose: Nose normal    Mouth/Throat: No oropharyngeal exudate  Oropharynx:  Mucosa moist and pink, no exudate, plates in place   Eyes: Pupils are equal, round, and reactive to light  Conjunctivae and EOM are normal    Neck: Normal range of motion  Neck supple  Supple, no JVD, good range of motion   Cardiovascular: Normal rate, regular rhythm, normal heart sounds and intact distal pulses  Pulmonary/Chest: Effort normal and breath sounds normal    Right anterior chest wall port in place, area clean and dry, lungs with good air entry bilaterally, few scattered rhonchi   Abdominal: Soft  Bowel sounds are normal    Abdomen is soft, nontender, no hepatosplenomegaly, no rigidity or rebound, +bowel sounds   Musculoskeletal: Normal range of motion  Neurological: She is alert and oriented to person, place, and time  She has normal reflexes  Skin: Skin is warm  Good color, warm, moist, scattered few upper extremity purpura, no petechiae, no ecchymoses no active bleeding   Psychiatric: She has a normal mood and affect   Her behavior is normal  Judgment and thought content normal    Extremities: no lower extremity edema, no cords, pulses are 1+  Lymphatics: no adenopathy in the neck, supraclavicular region, axilla and groin bilaterally    Performance Status: 1 - Symptomatic but completely ambulatory    Labs    02/25/2019 WBC = 7 9 hemoglobin = 15 2 hematocrit = 43 platelet = 382 neutrophil = 57% AST = 40 ALT = 54 alkaline phosphatase = 81 total bilirubin = 0 5 BUN = 12 creatinine = 0 91 LDH = 142    02/04/2019 WBC = 8 4 hemoglobin = 14 3 hematocrit = 41 4 platelet = 831 neutrophil = 54% BUN = 19 creatinine = 0 96 total protein = 7 0 AST = 29 ALT = 39 total protein = 0 6 alkaline phosphatase = 68 LDH = 130 for phosphorus = 3 4 magnesium = 2 0 urinalysis without occult blood  01/14/2019 WBC = 8 0 hemoglobin = 14 7 hematocrit = 43 3 platelet = 028 BUN = 11 creatinine = 0 92 AST = 47 ALT = 57 alkaline phosphatase = 79 total bilirubin = 0 5 LDH = 167  12/03/2018 WBC = 7 8 hemoglobin = 14 hematocrit = 40 9 platelet = 204 neutrophil = 57% BUN = 12 creatinine = 0 94 AST = 34 ALT = 46 total bilirubin = 0 5 alkaline phosphatase = 96 TSH = 2 270 LDH = 132  11/12/2018 WBC = 8 3 hemoglobin = 14 9 hematocrit = 43 platelet = 905 neutrophil = 58% BUN = 11 creatinine = 0 88 AST = 55 ALT = 58    Imaging    02/21/2019 CT scan of the chest and abdomen/pelvis    Limited evaluation of solid organs and vasculature without intravenous contrast   1   Stable cavitary target lesion in the right upper lobe  2   Stable left lower lobe patchy groundglass opacities with associated mild peribronchial wall thickening  This is again more likely chronic infectious or inflammatory in etiology  Continued surveillance on follow-up is recommended  3   No new metastatic lesions are found  12/20/2018 CT scan of the chest and abdomen/pelvis    1  Stable right upper lobe 3 3 cm cavitary lesion as described  2   Left lower lobe patchy groundglass opacities with associated mild peribronchial wall thickening, similar to October 18, 2018 but improved from August 20, 2018, likely related to a slowly resolving infectious/inflammatory process    Follow-up short-term chest CT in 3 months is recommended to evaluate for resolution  3   No metastatic disease in the abdomen or pelvis  10/18/2018 CT scan of the chest without contrast: IMPRESSION: Improved appearance of the left lung base infiltrate  Stable right cavitary lesion  No new findings    08/20/2018 CT scan of the chest and abdomen/pelvis without contrast    RECIST target lesion: Cavitary right upper lobe mass is unchanged, measuring 3 4 x 2 6 cm on series 3 image 15 (previously 3 5 x 2 5 cm)  Solid component along the medial aspect of the posterior wall is also unchanged, measuring 1 4 x 0 9 cm      LUNGS:  Stable cavitary right upper lobe mass, as above  No new nodules identified  There is new small patchy density in the posterior left base compatible with infiltrate  There is no tracheal or endobronchial lesion  IMPRESSION    Unchanged cavitary right upper lobe mass  No new metastatic disease  Interval development of mild left lower lobe infiltrate noted  06/18/2018 CT scan of the chest and abdomen/pelvis    Unchanged cavitary right upper lobe mass    No new metastatic disease  04/16/2018 CT scan of the chest and abdomen/pelvis    There is no significant interval change in size of the cystic or nodular components of the cavitary right upper lobe mass  No new metastatic lesions identified in the chest, abdomen or pelvis  1/22/18 CAT scan of the chest and abdomen/pelvis    RECIST MEASUREMENTS:  TARGET LESION:   1: Right upper lobe cavitary lung mass: The overall size of the lesion is 3 5 x 2 4 cm on image 14 of series 3, significantly decreased from 4 3 x 2 7 cm on previous examination  Solid component along the medial aspect of the posterior wall is not significantly changed from previous examination currently measuring 1 5 x 0 9 cm on image 14 of series 3 compared with 1 8 x 0 8 cm when measured using similar technique on previous   examination    Nodular solid component along the lateral aspect of posterior wall measures approximately 0 7 x 0 4 cm on image 13 of series 3, and when measured using similar technique is unchanged from previous examinations  There is interval decrease in size of the cystic portion of the cavitary right upper lobe mass however, solid components of this mass are not significantly changed from previous examination  Otherwise unchanged examination with no new metastatic lesions   identified in the chest, abdomen or pelvis  Pathology    [facility] results demonstrated no ALK gene rearrangement or deletion and negative for ROS1 rearrangement  No EGFR mutations were found also  Right temporal mass from September 6, 2015 demonstrated metastatic poorly differentiated non-small cell carcinoma  The tumor cells stained positive for CK 7, TTF-1 and Napsin and negative for CK 20, CK 5/6 andmucicarmine  Pathologist stated that the immunoprofile supported the primary lung non-small cell carcinoma, favor adenocarcinoma  The pathologist also stated that given the focal p40 staining, a squamous carcinoma could not be excluded

## 2019-02-28 ENCOUNTER — HOSPITAL ENCOUNTER (OUTPATIENT)
Dept: INFUSION CENTER | Facility: CLINIC | Age: 59
Discharge: HOME | End: 2019-02-28
Payer: COMMERCIAL

## 2019-02-28 VITALS
RESPIRATION RATE: 20 BRPM | HEIGHT: 67 IN | WEIGHT: 181 LBS | HEART RATE: 84 BPM | TEMPERATURE: 97.5 F | DIASTOLIC BLOOD PRESSURE: 86 MMHG | OXYGEN SATURATION: 97 % | BODY MASS INDEX: 28.41 KG/M2 | SYSTOLIC BLOOD PRESSURE: 136 MMHG

## 2019-02-28 PROCEDURE — 96413 CHEMO IV INFUSION 1 HR: CPT

## 2019-02-28 PROCEDURE — J9035Q0 INV BEVACIZUMAB 400MG/16ML 16 ML: Performed by: INTERNAL MEDICINE

## 2019-02-28 RX ADMIN — SODIUM CHLORIDE 20 ML/HR: 0.9 INJECTION, SOLUTION INTRAVENOUS at 13:45

## 2019-02-28 RX ADMIN — Medication 1254 MG: at 14:40

## 2019-03-07 ENCOUNTER — TELEPHONE (OUTPATIENT)
Dept: FAMILY MEDICINE CLINIC | Facility: CLINIC | Age: 59
End: 2019-03-07

## 2019-03-07 DIAGNOSIS — L30.9 DERMATITIS: Primary | ICD-10-CM

## 2019-03-07 NOTE — TELEPHONE ENCOUNTER
Pt called requesting a refill of halobetasol cream to be sent to [facility] [place]  [facility]

## 2019-03-11 DIAGNOSIS — E78.2 MIXED HYPERLIPIDEMIA: ICD-10-CM

## 2019-03-11 RX ORDER — ATORVASTATIN CALCIUM 10 MG/1
TABLET, FILM COATED ORAL
Qty: 90 TABLET | Refills: 0 | Status: SHIPPED | OUTPATIENT
Start: 2019-03-11 | End: 2019-06-11 | Stop reason: SDUPTHER

## 2019-03-14 ENCOUNTER — TELEPHONE (OUTPATIENT)
Dept: RADIATION ONCOLOGY | Facility: HOSPITAL | Age: 59
End: 2019-03-14

## 2019-03-14 DIAGNOSIS — C79.31 BRAIN METASTASIS (HCC): Primary | ICD-10-CM

## 2019-03-14 DIAGNOSIS — C34.91 ADENOCARCINOMA OF LUNG, STAGE 4, RIGHT (HCC): Primary | ICD-10-CM

## 2019-03-14 NOTE — TELEPHONE ENCOUNTER
Patient called to make Dr Angelic Ledesma that she was having sinus headaches and pressure between her eyes  She also has allergies  Rates her pain 4/10  Is taking Aleve with relief  She had cancelled her 1/2019 appointment due to the weather  Patient would like to have an MRI and a follow up appointment with you  Please advise if MRI can be ordered

## 2019-03-19 LAB
ALBUMIN SERPL-MCNC: 4.5 G/DL (ref 3.6–4.8)
ALBUMIN/GLOB SERPL: 1.6 {RATIO} (ref 1.2–2.2)
ALP SERPL-CCNC: 79 IU/L (ref 39–117)
ALT SERPL-CCNC: 50 IU/L (ref 0–32)
APPEARANCE UR: CLEAR
AST SERPL-CCNC: 42 IU/L (ref 0–40)
BACTERIA URNS QL MICRO: NORMAL
BASOPHILS # BLD AUTO: 0 X10E3/UL (ref 0–0.2)
BASOPHILS NFR BLD AUTO: 0 %
BILIRUB SERPL-MCNC: 0.4 MG/DL (ref 0–1.2)
BILIRUB UR QL STRIP: NEGATIVE
BUN SERPL-MCNC: 13 MG/DL (ref 8–27)
BUN/CREAT SERPL: 16 (ref 12–28)
CALCIUM SERPL-MCNC: 9.5 MG/DL (ref 8.7–10.3)
CHLORIDE SERPL-SCNC: 101 MMOL/L (ref 96–106)
CO2 SERPL-SCNC: 21 MMOL/L (ref 20–29)
COLOR UR: YELLOW
CREAT SERPL-MCNC: 0.83 MG/DL (ref 0.57–1)
EOSINOPHIL # BLD AUTO: 0.1 X10E3/UL (ref 0–0.4)
EOSINOPHIL NFR BLD AUTO: 1 %
EPI CELLS #/AREA URNS HPF: NORMAL /HPF (ref 0–10)
ERYTHROCYTE [DISTWIDTH] IN BLOOD BY AUTOMATED COUNT: 13.2 % (ref 12.3–15.4)
GLOBULIN SER-MCNC: 2.8 G/DL (ref 1.5–4.5)
GLUCOSE SERPL-MCNC: 105 MG/DL (ref 65–99)
GLUCOSE UR QL: NEGATIVE
HCT VFR BLD AUTO: 44.1 % (ref 34–46.6)
HGB BLD-MCNC: 15.2 G/DL (ref 11.1–15.9)
HGB UR QL STRIP: NEGATIVE
IMM GRANULOCYTES # BLD: 0 X10E3/UL (ref 0–0.1)
IMM GRANULOCYTES NFR BLD: 0 %
KETONES UR QL STRIP: NEGATIVE
LDH SERPL-CCNC: 156 IU/L (ref 119–226)
LEUKOCYTE ESTERASE UR QL STRIP: ABNORMAL
LYMPHOCYTES # BLD AUTO: 2.5 X10E3/UL (ref 0.7–3.1)
LYMPHOCYTES NFR BLD AUTO: 26 %
MAGNESIUM SERPL-MCNC: 1.8 MG/DL (ref 1.6–2.3)
MCH RBC QN AUTO: 31.3 PG (ref 26.6–33)
MCHC RBC AUTO-ENTMCNC: 34.5 G/DL (ref 31.5–35.7)
MCV RBC AUTO: 91 FL (ref 79–97)
MICRO URNS: ABNORMAL
MONOCYTES # BLD AUTO: 0.5 X10E3/UL (ref 0.1–0.9)
MONOCYTES NFR BLD AUTO: 6 %
MUCOUS THREADS URNS QL MICRO: PRESENT
NEUTROPHILS # BLD AUTO: 6.4 X10E3/UL (ref 1.4–7)
NEUTROPHILS NFR BLD AUTO: 67 %
NITRITE UR QL STRIP: NEGATIVE
PH UR STRIP: 5.5 [PH] (ref 5–7.5)
PHOSPHATE SERPL-MCNC: 3.8 MG/DL (ref 2.5–4.5)
PLATELET # BLD AUTO: 294 X10E3/UL (ref 150–379)
POTASSIUM SERPL-SCNC: 5.1 MMOL/L (ref 3.5–5.2)
PROT SERPL-MCNC: 7.3 G/DL (ref 6–8.5)
PROT UR QL STRIP: ABNORMAL
RBC # BLD AUTO: 4.86 X10E6/UL (ref 3.77–5.28)
RBC #/AREA URNS HPF: NORMAL /HPF (ref 0–2)
SL AMB EGFR AFRICAN AMERICAN: 89 ML/MIN/1.73
SL AMB EGFR NON AFRICAN AMERICAN: 77 ML/MIN/1.73
SODIUM SERPL-SCNC: 141 MMOL/L (ref 134–144)
SP GR UR: 1.01 (ref 1–1.03)
UROBILINOGEN UR STRIP-ACNC: 0.2 EU/DL (ref 0.2–1)
WBC # BLD AUTO: 9.6 X10E3/UL (ref 3.4–10.8)
WBC #/AREA URNS HPF: NORMAL /HPF (ref 0–5)

## 2019-03-20 ENCOUNTER — OFFICE VISIT (OUTPATIENT)
Dept: HEMATOLOGY ONCOLOGY | Facility: MEDICAL CENTER | Age: 59
End: 2019-03-20
Payer: COMMERCIAL

## 2019-03-20 VITALS
TEMPERATURE: 97.1 F | SYSTOLIC BLOOD PRESSURE: 152 MMHG | OXYGEN SATURATION: 98 % | WEIGHT: 183 LBS | HEIGHT: 67 IN | BODY MASS INDEX: 28.72 KG/M2 | DIASTOLIC BLOOD PRESSURE: 80 MMHG | RESPIRATION RATE: 18 BRPM | HEART RATE: 83 BPM

## 2019-03-20 DIAGNOSIS — C34.91 ADENOCARCINOMA OF LUNG, STAGE 4, RIGHT (HCC): Primary | ICD-10-CM

## 2019-03-20 PROCEDURE — 99213 OFFICE O/P EST LOW 20 MIN: CPT | Performed by: INTERNAL MEDICINE

## 2019-03-20 RX ORDER — SODIUM CHLORIDE 9 MG/ML
20 INJECTION, SOLUTION INTRAVENOUS CONTINUOUS
Status: DISCONTINUED | OUTPATIENT
Start: 2019-03-21 | End: 2019-03-24 | Stop reason: HOSPADM

## 2019-03-20 NOTE — PROGRESS NOTES
Li Stallworth  1960  [facility]  [address] [place] [id number]    DISCUSSION  SUMMARY:    22-year-old female with M1 non-small cell lung carcinoma/adenocarcinoma  Patient is on [facility] [address]15-14 (phase III open label randomized study of [id number] [anti-PD-L1 antibody] in combination with carboplatin and paclitaxel +/- Avastin versus carboplatin and paclitaxel +/- Avastin in patients with stage IV chemotherapy naive non-small cell lung carcinoma)  Patient was randomized to receive all 4 medications  Mrs Maryanne Avila completed the 6 cycles of treatment without significant toxicities  Patient had been on maintenance (study drug (atezolizumab) and avastin) - now only Avastin (atezolizumab was discontinued by the PI because of the elevated LFTs)  Issues:      1  Stage IV non-small cell lung carcinoma  Clinically there are no concerning oncology signs (see # 3 below)  Recent laboratory test were good/acceptable; liver function tests are also okay/acceptable  Recent CT scans did not demonstrate any evidence of disease progression  The plan is to continue with Avastin  Regimen  Bevacizumab 15 mg/kg IV every 3 weeks  Goal = prolongation of life    2  Elevated LFTs  The most recent laboratory test results are okay/acceptable for treatment  Surveillance is ongoing  3  Brain metastases status post resection and radiotherapy  Patient has been tapered off of Keppra  More recently patient has had increased headaches  MRI/brain has been ordered by Radiation Oncology  4  Anxiety - stable  Xanax was renewed  Patient is to return in 3 weeks   Patient knows to call the hematology/oncology office if there are any other questions or concerns  Carefully review your medication list and verify that the list is accurate and up-to-date   Please call the hematology/oncology office if there are medications missing from the list, medications on the list that you are not currently taking or if there is a dosage or instruction that is different from how you're taking that medication  Patient goals and areas of care: continue with the Avastin  Barriers to care:  None  Patient is able to self-care   __________________________________________________________________________________________________    Chief Complaint   Patient presents with    Follow-up     Metastatic non-small cell lung carcinoma     Advance Care Planning/Advance Directives: not discussed       Adenocarcinoma of lung, stage 4, right ([facility] )    9/5/2015 Initial Diagnosis     Patient was referred to the emergency room for evaluation of vertigo as sent from the balance center  (patient reported )  Patient underwent the following pertinent imaging scans  MRI of the brain demonstrated a mass in the right superior all temporal gyrus with measurements of 4 2 x 3 3 x 3 6 cm with vasogenic edema and mass effect  There was a near uncal herniation noted  CT of the chest and abdomen/pelvis demonstrated a necrotic right upper lobe mass measuring 7 x 4 2 x 4 1 cm strongly suspicious for bronchogenic carcinoma  The mass contacts the posterior medial pleural surface and potentially contacts the right posterior tracheal border  No pathologic adenopathy was identified in no evidence of metastatic disease in the chest abdomen or pelvis  9/6/2015 Surgery     Non-small cell carcinoma of lung ([facility] ) diagnosed from biopsy of right temporal mass  Pathology demonstrates poorly differentiated non-small cell carcinoma  Pathologist stated that the immunoprofile supported the primary lung non-small cell carcinoma favor adenocarcinoma  Squamous carcinoma could not be excluded, secondary to focal P 40 staining  Cathy Maya path report demonstrated no ALK gene rearrangement or dleation and negative ROS1 rearrangement, No EGFR mutations were found       Surgery completed by Dr Garth Barakat            - 10/15/2015 Radiation     SRT to brain 3000 cGY in 5 fractions  10/20/2015 -  Research Study Participant     [id number] Phase III open label randomized study of [id number] [Anti-PDL1 antibody] in combination with carboplatin and paclitaxel +/- Avastin versus Carboplatin and Paciltaxel +/- Avastin in patients with stage IV Chemotherapy  Naive non-small cell lung carcinoma  Patient was randomized to receive off for medications  11/30/2015 - 2/18/2016 Chemotherapy     Started Carboplatin, Paclitaxel, Avastin and PD-L1 (atezolizumab; study drug)  Completed 6 cycles with cycle 6 day 1 on 2/18/16         3/10/2016 -  Chemotherapy     Beginning cycle 7, maintenance cycle with Avastin and PD-L1 (Atezolizumab; study drug)         5/24/2017 Adverse Reaction     LFT elevation-persisted  Patient was taken off of study drug Atezolizumab, but continues on Avastin maintance  History of Present Illness:    59year old female recently diagnosed with IV lung cancer here for followup  Mrs Wanda Marin began to have headaches last spring 2015  Patient was seen by her PCP and treated with antibiotics  Initially the symptoms got better the patient went on vacation  After returning from vacation, Mrs Wanda Marin once again developed headaches  Patient was treated with a second round of antibiotics and then was diagnosed with vertigo  Patient was sent to the [address] [place] [address]  Although the specifics are not entirely clear, the therapist in the [address] [place] [address] sent the patient to the emergency room  A CAT scan of the brain demonstrated a brain lesion and patient was transferred to [place]  Patient was seen by Dr Satcey Little and underwent resection demonstrating adenocarcinoma  Additional testing demonstrated a lung mass  Patient improved and was discharged  Patient completed SRT with Dr Spencer Cisse and Dr Angelita Schaeffer        Patient is on [facility] [address]-77 (phase III open label randomized study of [id number] [anti-PD-L1 antibody] in combination with carboplatin and paclitaxel +/- Avastin versus carboplatin and paclitaxel +/- Avastin in patients with stage IV chemotherapy naÃ¯ve non-small cell lung carcinoma)  Mrs Bean Husbands was randomized to receive the anti-PD-L1 antibody with chemotherapy - patient completed 6 cycles and was placed on maintenance  Mrs Jessica Mello previously suffered a tonic-clonic seizure and was seen in the emergency room  CAT scan of the head did not demonstrate any evidence of disease progression  The seizure was thought to be due to encephalomalacia  Since that time, there have been no seizure issues  Mrs Bean Husbands is on Keppra  Patient has had elevated liver function tests  Because the abnormalities were grade 3, patient was taken off the [id number] and has continued on protocol with Avastin only  Patient returns for follow-up  Patient states feeling okay, baseline  Activities are about the same as before, possibly slightly better  No fevers, chills or sweats  No pain control issues  Appetite is good, weight is stable  No GI,  or GYN issues  No shortness of breath or dyspnea on exertion  No chest pain or pressure  Neck and shoulder stiffness is slightly more pronounced recently  Patient has also had a few more headaches unusual but no blurred vision, dizziness or syncopal episodes  No recent ENT issues, congestion or URIs  Review of Systems   Constitutional: Positive for fatigue  HENT: Negative  Neck stiffness    Eyes: Negative  Respiratory: Negative  Cardiovascular: Negative  Gastrointestinal: Negative  Endocrine: Negative  Genitourinary: Negative  Musculoskeletal: Positive for arthralgias  Negative for neck pain  Skin: Negative  Allergic/Immunologic: Negative  Neurological: Negative  Hematological: Bruises/bleeds easily  Psychiatric/Behavioral: Negative  All other systems reviewed and are negative       Patient Active Problem List   Diagnosis    [facility]ure [facility]    Adenocarcinoma of lung, stage 4, right [facility]    Mixed hyperlipidemia    Abnormal LFTs    Allergic rhinitis    [facility]    Brain tumor [facility]    Encephalomalacia    Impaired fasting glucose    Insomnia    Lesion of temporal lobe    Non-small cell lung cancer ([facility]    Benign hypertension     Past Medical History:   Diagnosis Date    Allergic rhinitis     Alopecia     resolved 10/25/16    Anxiety     last assessed 10/4/16, resolved 10/25/16    Benign hypertension 2/1/2018    Benign neoplasm of skin of trunk 03/25/2008    last assessed 3/25/08    Benign neoplasm of skin of upper extremity and shoulder 03/25/2008    last assessed 3/25/08    Ear deformity, acquired     last assessed 10/13/14, resolved 3/12/15    Eczema     Hyperlipemia     Lung cancer ([facility]     Maintenance chemotherapy     Secondary cancer of brain ([facility] )     Seizures ([facility] )     Vertigo      Past Surgical History:   Procedure Laterality Date    APPENDECTOMY  1964    BRAIN TUMOR EXCISION      CENTRAL VENOUS CATHETER INSERTION Right     PORTACATH     Family History   Problem Relation Age of Onset    Diabetes Mother     Heart disease Mother     Lung cancer Father     Pancreatitis Brother     No Known Problems Brother      Social History     Socioeconomic History    Marital status: /Civil Union     Spouse name: Not on file    Number of children: 1    Years of education: Not on file    Highest education level: Not on file   Occupational History    Not on file   Social Needs    Financial resource strain: Not on file    Food insecurity:     Worry: Not on file     Inability: Not on file    Transportation needs:     Medical: Not on file     Non-medical: Not on file   Tobacco Use    Smoking status: Former Smoker    Smokeless tobacco: Never Used   Substance and Sexual Activity    Alcohol use: Yes     Comment: occasionally / Social     Drug use: Yes     Types: Marijuana    Sexual activity: Yes Lifestyle    Physical activity:     Days per week: Not on file     Minutes per session: Not on file    Stress: Not on file   Relationships    Social connections:     Talks on phone: Not on file     Gets together: Not on file     Attends Ca[employer] service: Not on file     Active member of club or organization: Not on file     Attends meetings of clubs or organizations: Not on file     Relationship status: Not on file    Intimate partner violence:     Fear of current or ex partner: Not on file     Emotionally abused: Not on file     Physically abused: Not on file     Forced sexual activity: Not on file   Other Topics Concern    Not on file   Social History Narrative    High school or GED     Lives independently with spouse        Current Outpatient Medications:     ALPRAZolam (XANAX) 0 25 mg tablet, Take 1 tablet (0 25 mg total) by mouth 2 (two) times a day as needed for anxiety, Disp: 30 tablet, Rfl: 0    amLODIPine (NORVASC) 2 5 mg tablet, TAKE TWO (5 MG TOTAL) TABLETS BY MOUTH ONCE DAILY, Disp: 30 tablet, Rfl: 3    atorvastatin (LIPITOR) 10 mg tablet, TAKE 1 TABLET BY MOUTH ONCE DAILY BEFORE BREAKFAST, Disp: 90 tablet, Rfl: 0    halobetasol (ULTRAVATE) 0 05 % cream, Apply topically 2 (two) times a day, Disp: 50 g, Rfl: 0    Omega-3 Fatty Acids (FISH OIL PO), Take 2 tablets by mouth , Disp: , Rfl:     ondansetron (ZOFRAN) 8 mg tablet, Take 1 tablet (8 mg total) by mouth 3 (three) times a day, Disp: 1 tablet, Rfl: 2    oxyCODONE (ROXICODONE) 5 mg immediate release tablet, Take 1 tablet (5 mg total) by mouth every 6 (six) hours as needed for moderate pain Max Daily Amount: 20 mg, Disp: 90 tablet, Rfl: 0    prochlorperazine (COMPAZINE) 10 mg tablet, Take 10 mg by mouth every 6 (six) hours as needed for nausea or vomiting, Disp: , Rfl:     Pseudoephedrine-Guaifenesin (MUCINEX D PO), Take by mouth, Disp: , Rfl:     ranitidine (ZANTAC) 75 MG tablet, Take 1 tablet by mouth daily, Disp: , Rfl:   No current facility-administered medications for this visit  Facility-Administered Medications Ordered in Other Visits:     alteplase (CATHFLO) injection 2 mg, 2 mg, Intracatheter, PRN, Argelia Whelan MD    [START ON 3/21/2019] alteplase (CATHFLO) injection 2 mg, 2 mg, Intracatheter, PRN, Argelia Whelan MD    [START ON 3/21/2019] INV bevacizumab (INVESTIGATIONAL) 1,254 mg in sodium chloride 0 9 % 49 84 mL IVPB, 1,254 mg, Intravenous, Once, Argelia Whelan MD    sodium chloride 0 9 % infusion, 20 mL/hr, Intravenous, Continuous, Argelia Whelan MD  Galina Cade  [START ON 3/21/2019] sodium chloride 0 9 % infusion, 20 mL/hr, Intravenous, Continuous, Argelia Whelan MD    Allergies   Allergen Reactions    Iodinated Diagnostic Agents Anaphylaxis and Itching    Clindamycin     Clindamycin/Lincomycin        Vitals:    03/20/19 1052   BP: 152/80   Pulse: 83   Resp: 18   Temp: (!) 97 1 °F (36 2 °C)   SpO2: 98%     Physical Exam   Constitutional: She is oriented to person, place, and time  She appears well-developed and well-nourished  Well-nourished female, no respiratory distress   HENT:   Head: Normocephalic and atraumatic  Right Ear: External ear normal    Left Ear: External ear normal    Nose: Nose normal    Mouth/Throat: No oropharyngeal exudate  Oropharynx:  Mucosa moist and pink, no exudate, plates in place   Eyes: Pupils are equal, round, and reactive to light  Conjunctivae and EOM are normal    Neck: Normal range of motion  Neck supple  Supple, no JVD, good range of motion   Cardiovascular: Normal rate, regular rhythm, normal heart sounds and intact distal pulses  Pulmonary/Chest: Effort normal and breath sounds normal    Right anterior chest wall port in place, area clean and dry, lungs with good air entry bilaterally, few scattered rhonchi   Abdominal: Soft  Bowel sounds are normal    Abdomen is soft, nontender, no hepatosplenomegaly, no rigidity or rebound, +bowel sounds   Musculoskeletal: Normal range of motion  Neurological: She is alert and oriented to person, place, and time  She has normal reflexes  Skin: Skin is warm  Good color, warm, moist, scattered few upper extremity purpura, no petechiae, no ecchymoses no active bleeding   Psychiatric: She has a normal mood and affect  Her behavior is normal  Judgment and thought content normal    Extremities: no lower extremity edema, no cords, pulses are 1+  Lymphatics: no adenopathy in the neck, supraclavicular region, axilla and groin bilaterally    Performance Status: 1 - Symptomatic but completely ambulatory    Labs    03/18/2019 WBC = 9 6 hemoglobin = 15 2 hematocrit = 44 platelet = 980 neutrophil = 67% BUN = 13 creatinine = 0 83 calcium = 9 5 alkaline phosphatase = 79 AST = 42 ALT = 50 LDH = 156    02/25/2019 WBC = 7 9 hemoglobin = 15 2 hematocrit = 43 platelet = 500 neutrophil = 57% AST = 40 ALT = 54 alkaline phosphatase = 81 total bilirubin = 0 5 BUN = 12 creatinine = 0 91 LDH = 142  02/04/2019 WBC = 8 4 hemoglobin = 14 3 hematocrit = 41 4 platelet = 154 neutrophil = 54% BUN = 19 creatinine = 0 96 total protein = 7 0 AST = 29 ALT = 39 total protein = 0 6 alkaline phosphatase = 68 LDH = 130 for phosphorus = 3 4 magnesium = 2 0 urinalysis without occult blood  01/14/2019 WBC = 8 0 hemoglobin = 14 7 hematocrit = 43 3 platelet = 840 BUN = 11 creatinine = 0 92 AST = 47 ALT = 57 alkaline phosphatase = 79 total bilirubin = 0 5 LDH = 167  12/03/2018 WBC = 7 8 hemoglobin = 14 hematocrit = 40 9 platelet = 362 neutrophil = 57% BUN = 12 creatinine = 0 94 AST = 34 ALT = 46 total bilirubin = 0 5 alkaline phosphatase = 96 TSH = 2 270 LDH = 132    Imaging    02/21/2019 CT scan of the chest and abdomen/pelvis    Limited evaluation of solid organs and vasculature without intravenous contrast   1   Stable cavitary target lesion in the right upper lobe  2   Stable left lower lobe patchy groundglass opacities with associated mild peribronchial wall thickening    This is again more likely chronic infectious or inflammatory in etiology  Continued surveillance on follow-up is recommended  3   No new metastatic lesions are found  12/20/2018 CT scan of the chest and abdomen/pelvis    1  Stable right upper lobe 3 3 cm cavitary lesion as described  2   Left lower lobe patchy groundglass opacities with associated mild peribronchial wall thickening, similar to October 18, 2018 but improved from August 20, 2018, likely related to a slowly resolving infectious/inflammatory process  Follow-up   short-term chest CT in 3 months is recommended to evaluate for resolution  3   No metastatic disease in the abdomen or pelvis  10/18/2018 CT scan of the chest without contrast: IMPRESSION: Improved appearance of the left lung base infiltrate  Stable right cavitary lesion  No new findings    08/20/2018 CT scan of the chest and abdomen/pelvis without contrast    RECIST target lesion: Cavitary right upper lobe mass is unchanged, measuring 3 4 x 2 6 cm on series 3 image 15 (previously 3 5 x 2 5 cm)  Solid component along the medial aspect of the posterior wall is also unchanged, measuring 1 4 x 0 9 cm      LUNGS:  Stable cavitary right upper lobe mass, as above  No new nodules identified  There is new small patchy density in the posterior left base compatible with infiltrate  There is no tracheal or endobronchial lesion  IMPRESSION    Unchanged cavitary right upper lobe mass  No new metastatic disease  Interval development of mild left lower lobe infiltrate noted  06/18/2018 CT scan of the chest and abdomen/pelvis    Unchanged cavitary right upper lobe mass    No new metastatic disease  04/16/2018 CT scan of the chest and abdomen/pelvis    There is no significant interval change in size of the cystic or nodular components of the cavitary right upper lobe mass  No new metastatic lesions identified in the chest, abdomen or pelvis      1/22/18 CAT scan of the chest and abdomen/pelvis    RECIST MEASUREMENTS:  TARGET LESION:   1: Right upper lobe cavitary lung mass: The overall size of the lesion is 3 5 x 2 4 cm on image 14 of series 3, significantly decreased from 4 3 x 2 7 cm on previous examination  Solid component along the medial aspect of the posterior wall is not significantly changed from previous examination currently measuring 1 5 x 0 9 cm on image 14 of series 3 compared with 1 8 x 0 8 cm when measured using similar technique on previous   examination  Nodular solid component along the lateral aspect of posterior wall measures approximately 0 7 x 0 4 cm on image 13 of series 3, and when measured using similar technique is unchanged from previous examinations  There is interval decrease in size of the cystic portion of the cavitary right upper lobe mass however, solid components of this mass are not significantly changed from previous examination  Otherwise unchanged examination with no new metastatic lesions   identified in the chest, abdomen or pelvis  Pathology    [facility] results demonstrated no ALK gene rearrangement or deletion and negative for ROS1 rearrangement  No EGFR mutations were found also  Right temporal mass from September 6, 2015 demonstrated metastatic poorly differentiated non-small cell carcinoma  The tumor cells stained positive for CK 7, TTF-1 and Napsin and negative for CK 20, CK 5/6 andmucicarmine  Pathologist stated that the immunoprofile supported the primary lung non-small cell carcinoma, favor adenocarcinoma  The pathologist also stated that given the focal p40 staining, a squamous carcinoma could not be excluded

## 2019-03-21 ENCOUNTER — HOSPITAL ENCOUNTER (OUTPATIENT)
Dept: INFUSION CENTER | Facility: CLINIC | Age: 59
Discharge: HOME | End: 2019-03-21
Payer: COMMERCIAL

## 2019-03-21 VITALS
TEMPERATURE: 97.8 F | RESPIRATION RATE: 20 BRPM | SYSTOLIC BLOOD PRESSURE: 140 MMHG | BODY MASS INDEX: 28.51 KG/M2 | WEIGHT: 182 LBS | HEART RATE: 81 BPM | DIASTOLIC BLOOD PRESSURE: 90 MMHG | OXYGEN SATURATION: 97 %

## 2019-03-21 PROCEDURE — J9035Q0 INV BEVACIZUMAB 400MG/16ML 16 ML: Performed by: INTERNAL MEDICINE

## 2019-03-21 PROCEDURE — 96413 CHEMO IV INFUSION 1 HR: CPT

## 2019-03-21 RX ADMIN — SODIUM CHLORIDE 20 ML/HR: 0.9 INJECTION, SOLUTION INTRAVENOUS at 14:13

## 2019-03-21 RX ADMIN — Medication 1254 MG: at 14:51

## 2019-04-04 DIAGNOSIS — C34.91 ADENOCARCINOMA OF LUNG, STAGE 4, RIGHT (HCC): Primary | ICD-10-CM

## 2019-04-08 DIAGNOSIS — I10 BENIGN HYPERTENSION: ICD-10-CM

## 2019-04-08 RX ORDER — AMLODIPINE BESYLATE 2.5 MG/1
TABLET ORAL
Qty: 30 TABLET | Refills: 0 | Status: SHIPPED | OUTPATIENT
Start: 2019-04-08 | End: 2019-04-30 | Stop reason: SDUPTHER

## 2019-04-09 LAB
ALBUMIN SERPL-MCNC: 4.1 G/DL (ref 3.6–4.8)
ALBUMIN/GLOB SERPL: 1.6 {RATIO} (ref 1.2–2.2)
ALP SERPL-CCNC: 70 IU/L (ref 39–117)
ALT SERPL-CCNC: 35 IU/L (ref 0–32)
APPEARANCE UR: CLEAR
AST SERPL-CCNC: 33 IU/L (ref 0–40)
BACTERIA URNS QL MICRO: ABNORMAL
BASOPHILS # BLD AUTO: 0.1 X10E3/UL (ref 0–0.2)
BASOPHILS NFR BLD AUTO: 1 %
BILIRUB SERPL-MCNC: 0.4 MG/DL (ref 0–1.2)
BILIRUB UR QL STRIP: NEGATIVE
BUN SERPL-MCNC: 10 MG/DL (ref 8–27)
BUN/CREAT SERPL: 11 (ref 12–28)
CALCIUM SERPL-MCNC: 9.6 MG/DL (ref 8.7–10.3)
CHLORIDE SERPL-SCNC: 102 MMOL/L (ref 96–106)
CO2 SERPL-SCNC: 21 MMOL/L (ref 20–29)
COLOR UR: YELLOW
CREAT SERPL-MCNC: 0.95 MG/DL (ref 0.57–1)
EOSINOPHIL # BLD AUTO: 0.1 X10E3/UL (ref 0–0.4)
EOSINOPHIL NFR BLD AUTO: 1 %
EPI CELLS #/AREA URNS HPF: ABNORMAL /HPF (ref 0–10)
ERYTHROCYTE [DISTWIDTH] IN BLOOD BY AUTOMATED COUNT: 13.7 % (ref 12.3–15.4)
GLOBULIN SER-MCNC: 2.6 G/DL (ref 1.5–4.5)
GLUCOSE SERPL-MCNC: 141 MG/DL (ref 65–99)
GLUCOSE UR QL: NEGATIVE
HCT VFR BLD AUTO: 41.2 % (ref 34–46.6)
HGB BLD-MCNC: 13.9 G/DL (ref 11.1–15.9)
HGB UR QL STRIP: NEGATIVE
IMM GRANULOCYTES # BLD: 0 X10E3/UL (ref 0–0.1)
IMM GRANULOCYTES NFR BLD: 0 %
KETONES UR QL STRIP: NEGATIVE
LDH SERPL-CCNC: 147 IU/L (ref 119–226)
LEUKOCYTE ESTERASE UR QL STRIP: ABNORMAL
LYMPHOCYTES # BLD AUTO: 2.5 X10E3/UL (ref 0.7–3.1)
LYMPHOCYTES NFR BLD AUTO: 32 %
MAGNESIUM SERPL-MCNC: 1.8 MG/DL (ref 1.6–2.3)
MCH RBC QN AUTO: 31 PG (ref 26.6–33)
MCHC RBC AUTO-ENTMCNC: 33.7 G/DL (ref 31.5–35.7)
MCV RBC AUTO: 92 FL (ref 79–97)
MICRO URNS: ABNORMAL
MONOCYTES # BLD AUTO: 0.4 X10E3/UL (ref 0.1–0.9)
MONOCYTES NFR BLD AUTO: 5 %
MUCOUS THREADS URNS QL MICRO: PRESENT
NEUTROPHILS # BLD AUTO: 4.8 X10E3/UL (ref 1.4–7)
NEUTROPHILS NFR BLD AUTO: 61 %
NITRITE UR QL STRIP: NEGATIVE
PH UR STRIP: 5.5 [PH] (ref 5–7.5)
PHOSPHATE SERPL-MCNC: 3.6 MG/DL (ref 2.5–4.5)
PLATELET # BLD AUTO: 305 X10E3/UL (ref 150–379)
POTASSIUM SERPL-SCNC: 4.4 MMOL/L (ref 3.5–5.2)
PROT SERPL-MCNC: 6.7 G/DL (ref 6–8.5)
PROT UR QL STRIP: ABNORMAL
RBC # BLD AUTO: 4.48 X10E6/UL (ref 3.77–5.28)
RBC #/AREA URNS HPF: ABNORMAL /HPF (ref 0–2)
SL AMB EGFR AFRICAN AMERICAN: 75 ML/MIN/1.73
SL AMB EGFR NON AFRICAN AMERICAN: 65 ML/MIN/1.73
SODIUM SERPL-SCNC: 139 MMOL/L (ref 134–144)
SP GR UR: 1.02 (ref 1–1.03)
UROBILINOGEN UR STRIP-ACNC: 0.2 EU/DL (ref 0.2–1)
WBC # BLD AUTO: 7.9 X10E3/UL (ref 3.4–10.8)
WBC #/AREA URNS HPF: ABNORMAL /HPF (ref 0–5)

## 2019-04-10 ENCOUNTER — OFFICE VISIT (OUTPATIENT)
Dept: HEMATOLOGY ONCOLOGY | Facility: MEDICAL CENTER | Age: 59
End: 2019-04-10
Payer: COMMERCIAL

## 2019-04-10 VITALS
DIASTOLIC BLOOD PRESSURE: 90 MMHG | SYSTOLIC BLOOD PRESSURE: 154 MMHG | HEART RATE: 87 BPM | HEIGHT: 67 IN | OXYGEN SATURATION: 96 % | TEMPERATURE: 97.4 F | RESPIRATION RATE: 18 BRPM | WEIGHT: 180 LBS | BODY MASS INDEX: 28.25 KG/M2

## 2019-04-10 DIAGNOSIS — C34.91 ADENOCARCINOMA OF LUNG, STAGE 4, RIGHT (HCC): Primary | ICD-10-CM

## 2019-04-10 PROCEDURE — 99213 OFFICE O/P EST LOW 20 MIN: CPT | Performed by: INTERNAL MEDICINE

## 2019-04-10 RX ORDER — SODIUM CHLORIDE 9 MG/ML
20 INJECTION, SOLUTION INTRAVENOUS CONTINUOUS
Status: DISCONTINUED | OUTPATIENT
Start: 2019-04-11 | End: 2019-04-12 | Stop reason: HOSPADM

## 2019-04-11 ENCOUNTER — HOSPITAL ENCOUNTER (OUTPATIENT)
Dept: INFUSION CENTER | Facility: CLINIC | Age: 59
Discharge: HOME | End: 2019-04-11
Payer: COMMERCIAL

## 2019-04-11 VITALS
RESPIRATION RATE: 16 BRPM | BODY MASS INDEX: 28.25 KG/M2 | DIASTOLIC BLOOD PRESSURE: 78 MMHG | OXYGEN SATURATION: 97 % | HEART RATE: 71 BPM | TEMPERATURE: 97.6 F | WEIGHT: 180 LBS | SYSTOLIC BLOOD PRESSURE: 136 MMHG | HEIGHT: 67 IN

## 2019-04-11 PROCEDURE — 96413 CHEMO IV INFUSION 1 HR: CPT

## 2019-04-11 PROCEDURE — J9035Q0 INV BEVACIZUMAB 400MG/16ML 16 ML: Performed by: INTERNAL MEDICINE

## 2019-04-11 RX ADMIN — Medication 1254 MG: at 14:25

## 2019-04-11 RX ADMIN — SODIUM CHLORIDE 20 ML/HR: 0.9 INJECTION, SOLUTION INTRAVENOUS at 13:51

## 2019-04-11 NOTE — PROGRESS NOTES
Pt to clinic for clinical trial drug bevacizumab, pt offers no complaints at this time, will continue to monitor

## 2019-04-22 ENCOUNTER — HOSPITAL ENCOUNTER (OUTPATIENT)
Dept: MRI IMAGING | Facility: HOSPITAL | Age: 59
Discharge: HOME | End: 2019-04-22
Attending: RADIOLOGY
Payer: COMMERCIAL

## 2019-04-22 DIAGNOSIS — C79.31 BRAIN METASTASIS (HCC): ICD-10-CM

## 2019-04-22 PROCEDURE — 70553 MRI BRAIN STEM W/O & W/DYE: CPT

## 2019-04-22 PROCEDURE — A9585 GADOBUTROL INJECTION: HCPCS | Performed by: RADIOLOGY

## 2019-04-22 RX ADMIN — GADOBUTROL 8 ML: 604.72 INJECTION INTRAVENOUS at 12:55

## 2019-04-24 ENCOUNTER — CLINICAL SUPPORT (OUTPATIENT)
Dept: RADIATION ONCOLOGY | Facility: HOSPITAL | Age: 59
End: 2019-04-24
Attending: RADIOLOGY
Payer: COMMERCIAL

## 2019-04-24 VITALS
HEART RATE: 86 BPM | HEIGHT: 67 IN | RESPIRATION RATE: 16 BRPM | WEIGHT: 181 LBS | BODY MASS INDEX: 28.41 KG/M2 | SYSTOLIC BLOOD PRESSURE: 138 MMHG | DIASTOLIC BLOOD PRESSURE: 90 MMHG

## 2019-04-24 DIAGNOSIS — C34.91 ADENOCARCINOMA OF LUNG, STAGE 4, RIGHT (HCC): Primary | ICD-10-CM

## 2019-04-24 PROCEDURE — 99214 OFFICE O/P EST MOD 30 MIN: CPT | Performed by: RADIOLOGY

## 2019-04-24 PROCEDURE — G0463 HOSPITAL OUTPT CLINIC VISIT: HCPCS | Performed by: RADIOLOGY

## 2019-04-26 ENCOUNTER — HOSPITAL ENCOUNTER (OUTPATIENT)
Dept: RADIOLOGY | Facility: HOSPITAL | Age: 59
Discharge: HOME | End: 2019-04-26
Attending: INTERNAL MEDICINE

## 2019-04-26 DIAGNOSIS — C34.91 ADENOCARCINOMA OF LUNG, STAGE 4, RIGHT (HCC): ICD-10-CM

## 2019-04-26 PROCEDURE — 71250 CT THORAX DX C-: CPT

## 2019-04-26 PROCEDURE — 74176 CT ABD & PELVIS W/O CONTRAST: CPT

## 2019-04-29 DIAGNOSIS — C34.91 ADENOCARCINOMA OF LUNG, STAGE 4, RIGHT (HCC): Primary | ICD-10-CM

## 2019-04-29 LAB
ALBUMIN SERPL-MCNC: 4.5 G/DL (ref 3.6–4.8)
ALBUMIN/GLOB SERPL: 1.7 {RATIO} (ref 1.2–2.2)
ALP SERPL-CCNC: 76 IU/L (ref 39–117)
ALT SERPL-CCNC: 41 IU/L (ref 0–32)
AST SERPL-CCNC: 34 IU/L (ref 0–40)
BASOPHILS # BLD AUTO: 0.1 X10E3/UL (ref 0–0.2)
BASOPHILS NFR BLD AUTO: 1 %
BILIRUB SERPL-MCNC: 0.5 MG/DL (ref 0–1.2)
BUN SERPL-MCNC: 16 MG/DL (ref 8–27)
BUN/CREAT SERPL: 17 (ref 12–28)
CALCIUM SERPL-MCNC: 9.8 MG/DL (ref 8.7–10.3)
CHLORIDE SERPL-SCNC: 100 MMOL/L (ref 96–106)
CO2 SERPL-SCNC: 22 MMOL/L (ref 20–29)
CREAT SERPL-MCNC: 0.92 MG/DL (ref 0.57–1)
EOSINOPHIL # BLD AUTO: 0.2 X10E3/UL (ref 0–0.4)
EOSINOPHIL NFR BLD AUTO: 2 %
ERYTHROCYTE [DISTWIDTH] IN BLOOD BY AUTOMATED COUNT: 13.2 % (ref 12.3–15.4)
GLOBULIN SER-MCNC: 2.6 G/DL (ref 1.5–4.5)
GLUCOSE SERPL-MCNC: 130 MG/DL (ref 65–99)
HCT VFR BLD AUTO: 43.9 % (ref 34–46.6)
HGB BLD-MCNC: 15.1 G/DL (ref 11.1–15.9)
IMM GRANULOCYTES # BLD: 0 X10E3/UL (ref 0–0.1)
IMM GRANULOCYTES NFR BLD: 0 %
LDH SERPL-CCNC: 143 IU/L (ref 119–226)
LYMPHOCYTES # BLD AUTO: 2.5 X10E3/UL (ref 0.7–3.1)
LYMPHOCYTES NFR BLD AUTO: 31 %
MAGNESIUM SERPL-MCNC: 2.2 MG/DL (ref 1.6–2.3)
MCH RBC QN AUTO: 31.3 PG (ref 26.6–33)
MCHC RBC AUTO-ENTMCNC: 34.4 G/DL (ref 31.5–35.7)
MCV RBC AUTO: 91 FL (ref 79–97)
MONOCYTES # BLD AUTO: 0.5 X10E3/UL (ref 0.1–0.9)
MONOCYTES NFR BLD AUTO: 6 %
NEUTROPHILS # BLD AUTO: 5 X10E3/UL (ref 1.4–7)
NEUTROPHILS NFR BLD AUTO: 60 %
PHOSPHATE SERPL-MCNC: 4.1 MG/DL (ref 2.5–4.5)
PLATELET # BLD AUTO: 315 X10E3/UL (ref 150–379)
POTASSIUM SERPL-SCNC: 4.6 MMOL/L (ref 3.5–5.2)
PROT SERPL-MCNC: 7.1 G/DL (ref 6–8.5)
RBC # BLD AUTO: 4.82 X10E6/UL (ref 3.77–5.28)
SL AMB EGFR AFRICAN AMERICAN: 78 ML/MIN/1.73
SL AMB EGFR NON AFRICAN AMERICAN: 68 ML/MIN/1.73
SODIUM SERPL-SCNC: 137 MMOL/L (ref 134–144)
WBC # BLD AUTO: 8.2 X10E3/UL (ref 3.4–10.8)

## 2019-04-30 ENCOUNTER — OFFICE VISIT (OUTPATIENT)
Dept: FAMILY MEDICINE CLINIC | Facility: CLINIC | Age: 59
End: 2019-04-30
Payer: COMMERCIAL

## 2019-04-30 ENCOUNTER — OFFICE VISIT (OUTPATIENT)
Dept: HEMATOLOGY ONCOLOGY | Facility: MEDICAL CENTER | Age: 59
End: 2019-04-30
Payer: COMMERCIAL

## 2019-04-30 VITALS
DIASTOLIC BLOOD PRESSURE: 94 MMHG | SYSTOLIC BLOOD PRESSURE: 162 MMHG | RESPIRATION RATE: 16 BRPM | BODY MASS INDEX: 27.94 KG/M2 | HEART RATE: 84 BPM | WEIGHT: 178 LBS | HEIGHT: 67 IN | TEMPERATURE: 98.2 F

## 2019-04-30 VITALS
BODY MASS INDEX: 27.88 KG/M2 | RESPIRATION RATE: 18 BRPM | TEMPERATURE: 98.4 F | OXYGEN SATURATION: 96 % | SYSTOLIC BLOOD PRESSURE: 160 MMHG | HEART RATE: 93 BPM | HEIGHT: 67 IN | WEIGHT: 177.6 LBS | DIASTOLIC BLOOD PRESSURE: 110 MMHG

## 2019-04-30 DIAGNOSIS — C34.91 ADENOCARCINOMA OF LUNG, STAGE 4, RIGHT (HCC): ICD-10-CM

## 2019-04-30 DIAGNOSIS — Z12.31 SCREENING MAMMOGRAM, ENCOUNTER FOR: ICD-10-CM

## 2019-04-30 DIAGNOSIS — I10 BENIGN HYPERTENSION: Primary | ICD-10-CM

## 2019-04-30 DIAGNOSIS — C34.91 ADENOCARCINOMA OF LUNG, STAGE 4, RIGHT (HCC): Primary | ICD-10-CM

## 2019-04-30 DIAGNOSIS — R73.01 IMPAIRED FASTING GLUCOSE: ICD-10-CM

## 2019-04-30 DIAGNOSIS — E78.2 MIXED HYPERLIPIDEMIA: ICD-10-CM

## 2019-04-30 PROBLEM — J32.0 CHRONIC MAXILLARY SINUSITIS: Status: ACTIVE | Noted: 2019-04-30

## 2019-04-30 LAB
APPEARANCE UR: CLEAR
BACTERIA URNS QL MICRO: ABNORMAL
BILIRUB UR QL STRIP: NEGATIVE
COLOR UR: YELLOW
EPI CELLS #/AREA URNS HPF: ABNORMAL /HPF (ref 0–10)
GLUCOSE UR QL: NEGATIVE
HGB UR QL STRIP: NEGATIVE
KETONES UR QL STRIP: NEGATIVE
LEUKOCYTE ESTERASE UR QL STRIP: ABNORMAL
MICRO URNS: ABNORMAL
MUCOUS THREADS URNS QL MICRO: PRESENT
NITRITE UR QL STRIP: NEGATIVE
PH UR STRIP: 6.5 [PH] (ref 5–7.5)
PROT UR QL STRIP: NEGATIVE
RBC #/AREA URNS HPF: ABNORMAL /HPF (ref 0–2)
SP GR UR: 1.01 (ref 1–1.03)
UROBILINOGEN UR STRIP-ACNC: 1 EU/DL (ref 0.2–1)
WBC #/AREA URNS HPF: ABNORMAL /HPF (ref 0–5)

## 2019-04-30 PROCEDURE — 99214 OFFICE O/P EST MOD 30 MIN: CPT | Performed by: FAMILY MEDICINE

## 2019-04-30 PROCEDURE — 99213 OFFICE O/P EST LOW 20 MIN: CPT | Performed by: INTERNAL MEDICINE

## 2019-04-30 RX ORDER — OXYCODONE HYDROCHLORIDE 5 MG/1
5 TABLET ORAL EVERY 6 HOURS PRN
Qty: 90 TABLET | Refills: 0 | Status: SHIPPED | OUTPATIENT
Start: 2019-04-30 | End: 2019-08-13 | Stop reason: SDUPTHER

## 2019-04-30 RX ORDER — AMLODIPINE BESYLATE 5 MG/1
5 TABLET ORAL DAILY
Qty: 30 TABLET | Refills: 3 | Status: SHIPPED | OUTPATIENT
Start: 2019-04-30 | End: 2019-09-15 | Stop reason: SDUPTHER

## 2019-04-30 RX ORDER — ALPRAZOLAM 0.25 MG/1
0.25 TABLET ORAL 2 TIMES DAILY PRN
Qty: 30 TABLET | Refills: 0 | Status: SHIPPED | OUTPATIENT
Start: 2019-04-30 | End: 2019-08-13 | Stop reason: SDUPTHER

## 2019-05-01 RX ORDER — SODIUM CHLORIDE 9 MG/ML
20 INJECTION, SOLUTION INTRAVENOUS CONTINUOUS
Status: DISCONTINUED | OUTPATIENT
Start: 2019-05-02 | End: 2019-05-05 | Stop reason: HOSPADM

## 2019-05-02 ENCOUNTER — TELEPHONE (OUTPATIENT)
Dept: FAMILY MEDICINE CLINIC | Facility: CLINIC | Age: 59
End: 2019-05-02

## 2019-05-02 ENCOUNTER — HOSPITAL ENCOUNTER (OUTPATIENT)
Dept: INFUSION CENTER | Facility: CLINIC | Age: 59
Discharge: HOME | End: 2019-05-02
Payer: COMMERCIAL

## 2019-05-02 VITALS
DIASTOLIC BLOOD PRESSURE: 88 MMHG | WEIGHT: 178.35 LBS | HEART RATE: 74 BPM | HEIGHT: 67 IN | TEMPERATURE: 97.7 F | RESPIRATION RATE: 18 BRPM | SYSTOLIC BLOOD PRESSURE: 150 MMHG | BODY MASS INDEX: 27.99 KG/M2

## 2019-05-02 DIAGNOSIS — J32.0 CHRONIC MAXILLARY SINUSITIS: Primary | ICD-10-CM

## 2019-05-02 PROCEDURE — J9035Q0 INV BEVACIZUMAB 400MG/16ML 16 ML: Performed by: INTERNAL MEDICINE

## 2019-05-02 PROCEDURE — 96413 CHEMO IV INFUSION 1 HR: CPT

## 2019-05-02 RX ORDER — AMOXICILLIN 875 MG/1
875 TABLET, COATED ORAL 2 TIMES DAILY
Qty: 14 TABLET | Refills: 0 | Status: SHIPPED | OUTPATIENT
Start: 2019-05-02 | End: 2019-05-09

## 2019-05-02 RX ADMIN — Medication 1254 MG: at 14:43

## 2019-05-02 RX ADMIN — SODIUM CHLORIDE 20 ML/HR: 0.9 INJECTION, SOLUTION INTRAVENOUS at 13:50

## 2019-05-02 NOTE — PROGRESS NOTES
Patient arrived for Avastin study drug  Offers no complaints  Patients bp 150/88  Heather Gutierrez, research nurse notified  OK for treatment today

## 2019-05-02 NOTE — PROGRESS NOTES
Patient tolerated treatment today without issues   Patient verified upcoming appointments and declined AVS

## 2019-05-02 NOTE — PLAN OF CARE
Problem: Potential for Falls  Goal: Patient will remain free of falls  Description  INTERVENTIONS:  - Assess patient frequently for physical needs  -  Identify cognitive and physical deficits and behaviors that affect risk of falls    -  [place] fall precautions as indicated by assessment   - Educate patient/family on patient safety including physical limitations  - Instruct patient to call for assistance with activity based on assessment  - Modify environment to reduce risk of injury  - Consider OT/PT consult to assist with strengthening/mobility  Outcome: Progressing

## 2019-05-06 ENCOUNTER — DOCUMENTATION (OUTPATIENT)
Dept: CARDIAC SURGERY | Facility: CLINIC | Age: 59
End: 2019-05-06

## 2019-05-13 DIAGNOSIS — C34.91 ADENOCARCINOMA OF LUNG, STAGE 4, RIGHT (HCC): Primary | ICD-10-CM

## 2019-05-13 DIAGNOSIS — R79.89 ABNORMAL LFTS: ICD-10-CM

## 2019-05-17 ENCOUNTER — DOCUMENTATION (OUTPATIENT)
Dept: HEMATOLOGY ONCOLOGY | Facility: MEDICAL CENTER | Age: 59
End: 2019-05-17

## 2019-05-21 LAB
ALBUMIN SERPL-MCNC: 4.4 G/DL (ref 3.6–4.8)
ALBUMIN/GLOB SERPL: 1.8 {RATIO} (ref 1.2–2.2)
ALP SERPL-CCNC: 60 IU/L (ref 39–117)
ALT SERPL-CCNC: 30 IU/L (ref 0–32)
APPEARANCE UR: CLEAR
AST SERPL-CCNC: 27 IU/L (ref 0–40)
BACTERIA URNS QL MICRO: ABNORMAL
BASOPHILS # BLD AUTO: 0.1 X10E3/UL (ref 0–0.2)
BASOPHILS NFR BLD AUTO: 1 %
BILIRUB SERPL-MCNC: 0.6 MG/DL (ref 0–1.2)
BILIRUB UR QL STRIP: NEGATIVE
BUN SERPL-MCNC: 12 MG/DL (ref 8–27)
BUN/CREAT SERPL: 12 (ref 12–28)
CALCIUM SERPL-MCNC: 9.8 MG/DL (ref 8.7–10.3)
CHLORIDE SERPL-SCNC: 103 MMOL/L (ref 96–106)
CO2 SERPL-SCNC: 20 MMOL/L (ref 20–29)
COLOR UR: YELLOW
CREAT SERPL-MCNC: 0.97 MG/DL (ref 0.57–1)
EOSINOPHIL # BLD AUTO: 0.2 X10E3/UL (ref 0–0.4)
EOSINOPHIL NFR BLD AUTO: 2 %
EPI CELLS #/AREA URNS HPF: ABNORMAL /HPF (ref 0–10)
ERYTHROCYTE [DISTWIDTH] IN BLOOD BY AUTOMATED COUNT: 14 % (ref 12.3–15.4)
GLOBULIN SER-MCNC: 2.4 G/DL (ref 1.5–4.5)
GLUCOSE SERPL-MCNC: 147 MG/DL (ref 65–99)
GLUCOSE UR QL: NEGATIVE
HCT VFR BLD AUTO: 43.2 % (ref 34–46.6)
HGB BLD-MCNC: 14.8 G/DL (ref 11.1–15.9)
HGB UR QL STRIP: NEGATIVE
IMM GRANULOCYTES # BLD: 0 X10E3/UL (ref 0–0.1)
IMM GRANULOCYTES NFR BLD: 0 %
KETONES UR QL STRIP: NEGATIVE
LDH SERPL-CCNC: 130 IU/L (ref 119–226)
LEUKOCYTE ESTERASE UR QL STRIP: ABNORMAL
LYMPHOCYTES # BLD AUTO: 2.5 X10E3/UL (ref 0.7–3.1)
LYMPHOCYTES NFR BLD AUTO: 35 %
MAGNESIUM SERPL-MCNC: 1.8 MG/DL (ref 1.6–2.3)
MCH RBC QN AUTO: 31.3 PG (ref 26.6–33)
MCHC RBC AUTO-ENTMCNC: 34.3 G/DL (ref 31.5–35.7)
MCV RBC AUTO: 91 FL (ref 79–97)
MICRO URNS: ABNORMAL
MONOCYTES # BLD AUTO: 0.4 X10E3/UL (ref 0.1–0.9)
MONOCYTES NFR BLD AUTO: 5 %
MUCOUS THREADS URNS QL MICRO: PRESENT
NEUTROPHILS # BLD AUTO: 4 X10E3/UL (ref 1.4–7)
NEUTROPHILS NFR BLD AUTO: 57 %
NITRITE UR QL STRIP: NEGATIVE
PH UR STRIP: 5.5 [PH] (ref 5–7.5)
PHOSPHATE SERPL-MCNC: 4.1 MG/DL (ref 2.5–4.5)
PLATELET # BLD AUTO: 259 X10E3/UL (ref 150–450)
POTASSIUM SERPL-SCNC: 4.5 MMOL/L (ref 3.5–5.2)
PROT SERPL-MCNC: 6.8 G/DL (ref 6–8.5)
PROT UR QL STRIP: NEGATIVE
RBC # BLD AUTO: 4.73 X10E6/UL (ref 3.77–5.28)
RBC #/AREA URNS HPF: ABNORMAL /HPF (ref 0–2)
SL AMB EGFR AFRICAN AMERICAN: 73 ML/MIN/1.73
SL AMB EGFR NON AFRICAN AMERICAN: 64 ML/MIN/1.73
SODIUM SERPL-SCNC: 137 MMOL/L (ref 134–144)
SP GR UR: 1.01 (ref 1–1.03)
TSH SERPL DL<=0.005 MIU/L-ACNC: 2.6 UIU/ML (ref 0.45–4.5)
UROBILINOGEN UR STRIP-ACNC: 0.2 EU/DL (ref 0.2–1)
WBC # BLD AUTO: 7.1 X10E3/UL (ref 3.4–10.8)
WBC #/AREA URNS HPF: ABNORMAL /HPF (ref 0–5)

## 2019-05-22 ENCOUNTER — OFFICE VISIT (OUTPATIENT)
Dept: HEMATOLOGY ONCOLOGY | Facility: MEDICAL CENTER | Age: 59
End: 2019-05-22
Payer: COMMERCIAL

## 2019-05-22 VITALS
SYSTOLIC BLOOD PRESSURE: 150 MMHG | HEART RATE: 90 BPM | DIASTOLIC BLOOD PRESSURE: 82 MMHG | HEIGHT: 67 IN | TEMPERATURE: 97.6 F | RESPIRATION RATE: 18 BRPM | OXYGEN SATURATION: 96 % | WEIGHT: 177 LBS | BODY MASS INDEX: 27.78 KG/M2

## 2019-05-22 DIAGNOSIS — C34.91 ADENOCARCINOMA OF LUNG, STAGE 4, RIGHT (HCC): Primary | ICD-10-CM

## 2019-05-22 PROCEDURE — 99213 OFFICE O/P EST LOW 20 MIN: CPT | Performed by: INTERNAL MEDICINE

## 2019-05-22 RX ORDER — SODIUM CHLORIDE 9 MG/ML
20 INJECTION, SOLUTION INTRAVENOUS ONCE
Status: CANCELLED | OUTPATIENT
Start: 2019-05-23

## 2019-05-23 ENCOUNTER — HOSPITAL ENCOUNTER (OUTPATIENT)
Dept: INFUSION CENTER | Facility: CLINIC | Age: 59
Discharge: HOME | End: 2019-05-23
Payer: COMMERCIAL

## 2019-05-23 VITALS
RESPIRATION RATE: 18 BRPM | TEMPERATURE: 98.1 F | HEIGHT: 67 IN | OXYGEN SATURATION: 98 % | WEIGHT: 176.5 LBS | DIASTOLIC BLOOD PRESSURE: 76 MMHG | BODY MASS INDEX: 27.7 KG/M2 | HEART RATE: 78 BPM | SYSTOLIC BLOOD PRESSURE: 124 MMHG

## 2019-05-23 DIAGNOSIS — C34.91 ADENOCARCINOMA OF LUNG, STAGE 4, RIGHT (HCC): Primary | ICD-10-CM

## 2019-05-23 PROCEDURE — J9035Q0 INV BEVACIZUMAB 400MG/16ML 16 ML: Performed by: INTERNAL MEDICINE

## 2019-05-23 PROCEDURE — 96413 CHEMO IV INFUSION 1 HR: CPT

## 2019-05-23 RX ORDER — SODIUM CHLORIDE 9 MG/ML
20 INJECTION, SOLUTION INTRAVENOUS ONCE
Status: COMPLETED | OUTPATIENT
Start: 2019-05-23 | End: 2019-05-23

## 2019-05-23 RX ADMIN — SODIUM CHLORIDE 20 ML/HR: 0.9 INJECTION, SOLUTION INTRAVENOUS at 14:11

## 2019-05-23 RX ADMIN — Medication 1254 MG: at 14:56

## 2019-05-23 NOTE — PATIENT INSTRUCTIONS
May 2019      Eduardo Monday Tuesday Wednesday Thursday Friday Saturday                  1     2    INF CHEMO-CLINICAL TRIAL 2 HR   1:30 PM   (120 min )   AN INF CHAIR [address] 3     4                5     6     7     8     9     10     11                12     13     14     15     16     17     18                19     20     21     22    FOLLOW UP PG   8:45 AM   (30 min )   Rylie Hutchinson MD   [facility] [place] [facility]    INF CHEMO-CLINICAL TRIAL 2 HR   1:30 PM   (120 min )   AN INF CHAIR [address] 24     25           Cycle 61, Day 1     26     27     28     29     30    PULM PFT   3:00 PM   (75 min )   [facility] RO[facility] 01   [address] [facility] 31    NM PET CT SKULL TO THIGH   1:00 PM   (120 min )   BE NM SLN PET  4   [facility]                      Treatment Details       5/23/2019 - Cycle 61, Day 1      Chemotherapy: ONCBCN PROVIDER COMMUNICATION8, INV BEVACIZUMAB IVPB        June 2019 Sunday Monday Tuesday Wednesday Thursday Friday Saturday                                 1                2     3     4     5     6     7     8                9     10     11     12    FOLLOW UP PG   8:45 AM   (20 min )   Rylie Hutchinson MD   [facility] [place] [facility]    INF CHEMO-CLINICAL TRIAL 2 HR   1:30 PM   (120 min )   AN INF CHAIR [address] 14     15           Cycle 62, Day 1     16     17     18     19     20     21     22                23     24     25     26     27     28     29                30                                                   Treatment Details       6/13/2019 - Cycle 62, Day 1      Chemotherapy: ONCBCN PROVIDER COMMUNICATION8, INV BEVACIZUMAB IVPB

## 2019-05-30 ENCOUNTER — HOSPITAL ENCOUNTER (OUTPATIENT)
Dept: PULMONOLOGY | Facility: HOSPITAL | Age: 59
Discharge: HOME | End: 2019-05-30
Attending: INTERNAL MEDICINE
Payer: COMMERCIAL

## 2019-05-30 DIAGNOSIS — C34.91 ADENOCARCINOMA OF LUNG, STAGE 4, RIGHT (HCC): ICD-10-CM

## 2019-05-30 PROCEDURE — 94726 PLETHYSMOGRAPHY LUNG VOLUMES: CPT

## 2019-05-30 PROCEDURE — 94760 N-INVAS EAR/PLS OXIMETRY 1: CPT

## 2019-05-30 PROCEDURE — 94060 EVALUATION OF WHEEZING: CPT | Performed by: INTERNAL MEDICINE

## 2019-05-30 PROCEDURE — 94726 PLETHYSMOGRAPHY LUNG VOLUMES: CPT | Performed by: INTERNAL MEDICINE

## 2019-05-30 PROCEDURE — 94060 EVALUATION OF WHEEZING: CPT

## 2019-05-30 PROCEDURE — 94729 DIFFUSING CAPACITY: CPT

## 2019-05-30 PROCEDURE — 94729 DIFFUSING CAPACITY: CPT | Performed by: INTERNAL MEDICINE

## 2019-05-30 RX ORDER — ALBUTEROL SULFATE 2.5 MG/3ML
2.5 SOLUTION RESPIRATORY (INHALATION) ONCE
Status: DISCONTINUED | OUTPATIENT
Start: 2019-05-30 | End: 2019-06-03 | Stop reason: HOSPADM

## 2019-05-31 ENCOUNTER — HOSPITAL ENCOUNTER (OUTPATIENT)
Dept: RADIOLOGY | Age: 59
Discharge: HOME | End: 2019-05-31
Payer: COMMERCIAL

## 2019-05-31 DIAGNOSIS — C34.91 ADENOCARCINOMA OF LUNG, STAGE 4, RIGHT (HCC): ICD-10-CM

## 2019-05-31 LAB — GLUCOSE SERPL-MCNC: 101 MG/DL (ref 65–140)

## 2019-05-31 PROCEDURE — A9552 F18 FDG: HCPCS

## 2019-05-31 PROCEDURE — 82948 REAGENT STRIP/BLOOD GLUCOSE: CPT

## 2019-05-31 PROCEDURE — 78815 PET IMAGE W/CT SKULL-THIGH: CPT

## 2019-06-07 DIAGNOSIS — C34.91 ADENOCARCINOMA OF LUNG, STAGE 4, RIGHT (HCC): ICD-10-CM

## 2019-06-11 DIAGNOSIS — E78.2 MIXED HYPERLIPIDEMIA: ICD-10-CM

## 2019-06-12 ENCOUNTER — OFFICE VISIT (OUTPATIENT)
Dept: HEMATOLOGY ONCOLOGY | Facility: MEDICAL CENTER | Age: 59
End: 2019-06-12
Payer: COMMERCIAL

## 2019-06-12 VITALS
WEIGHT: 177 LBS | DIASTOLIC BLOOD PRESSURE: 90 MMHG | TEMPERATURE: 96.3 F | SYSTOLIC BLOOD PRESSURE: 152 MMHG | HEART RATE: 87 BPM | BODY MASS INDEX: 27.78 KG/M2 | HEIGHT: 67 IN | OXYGEN SATURATION: 96 % | RESPIRATION RATE: 18 BRPM

## 2019-06-12 DIAGNOSIS — C34.91 ADENOCARCINOMA OF LUNG, STAGE 4, RIGHT (HCC): Primary | ICD-10-CM

## 2019-06-12 DIAGNOSIS — E78.2 MIXED HYPERLIPIDEMIA: ICD-10-CM

## 2019-06-12 LAB
ALBUMIN SERPL-MCNC: 4.2 G/DL (ref 3.6–4.8)
ALBUMIN/GLOB SERPL: 1.6 {RATIO} (ref 1.2–2.2)
ALP SERPL-CCNC: 67 IU/L (ref 39–117)
ALT SERPL-CCNC: 34 IU/L (ref 0–32)
APPEARANCE UR: CLEAR
AST SERPL-CCNC: 33 IU/L (ref 0–40)
BACTERIA URNS QL MICRO: NORMAL
BASOPHILS # BLD AUTO: 0 X10E3/UL (ref 0–0.2)
BASOPHILS NFR BLD AUTO: 1 %
BILIRUB SERPL-MCNC: 0.5 MG/DL (ref 0–1.2)
BILIRUB UR QL STRIP: NEGATIVE
BUN SERPL-MCNC: 13 MG/DL (ref 8–27)
BUN/CREAT SERPL: 15 (ref 12–28)
CALCIUM SERPL-MCNC: 9.4 MG/DL (ref 8.7–10.3)
CHLORIDE SERPL-SCNC: 101 MMOL/L (ref 96–106)
CO2 SERPL-SCNC: 19 MMOL/L (ref 20–29)
COLOR UR: YELLOW
CREAT SERPL-MCNC: 0.87 MG/DL (ref 0.57–1)
EOSINOPHIL # BLD AUTO: 0.1 X10E3/UL (ref 0–0.4)
EOSINOPHIL NFR BLD AUTO: 2 %
EPI CELLS #/AREA URNS HPF: NORMAL /HPF (ref 0–10)
ERYTHROCYTE [DISTWIDTH] IN BLOOD BY AUTOMATED COUNT: 13.6 % (ref 12.3–15.4)
GLOBULIN SER-MCNC: 2.6 G/DL (ref 1.5–4.5)
GLUCOSE SERPL-MCNC: 149 MG/DL (ref 65–99)
GLUCOSE UR QL: NEGATIVE
HCT VFR BLD AUTO: 43.8 % (ref 34–46.6)
HGB BLD-MCNC: 15 G/DL (ref 11.1–15.9)
HGB UR QL STRIP: NEGATIVE
IMM GRANULOCYTES # BLD: 0 X10E3/UL (ref 0–0.1)
IMM GRANULOCYTES NFR BLD: 0 %
KETONES UR QL STRIP: NEGATIVE
LDH SERPL-CCNC: 130 IU/L (ref 119–226)
LEUKOCYTE ESTERASE UR QL STRIP: NEGATIVE
LYMPHOCYTES # BLD AUTO: 2.7 X10E3/UL (ref 0.7–3.1)
LYMPHOCYTES NFR BLD AUTO: 42 %
MAGNESIUM SERPL-MCNC: 2 MG/DL (ref 1.6–2.3)
MCH RBC QN AUTO: 31.4 PG (ref 26.6–33)
MCHC RBC AUTO-ENTMCNC: 34.2 G/DL (ref 31.5–35.7)
MCV RBC AUTO: 92 FL (ref 79–97)
MICRO URNS: ABNORMAL
MONOCYTES # BLD AUTO: 0.4 X10E3/UL (ref 0.1–0.9)
MONOCYTES NFR BLD AUTO: 6 %
MUCOUS THREADS URNS QL MICRO: PRESENT
NEUTROPHILS # BLD AUTO: 3.2 X10E3/UL (ref 1.4–7)
NEUTROPHILS NFR BLD AUTO: 49 %
NITRITE UR QL STRIP: NEGATIVE
PH UR STRIP: 6 [PH] (ref 5–7.5)
PHOSPHATE SERPL-MCNC: 3.1 MG/DL (ref 2.5–4.5)
PLATELET # BLD AUTO: 272 X10E3/UL (ref 150–450)
POTASSIUM SERPL-SCNC: 4.1 MMOL/L (ref 3.5–5.2)
PROT SERPL-MCNC: 6.8 G/DL (ref 6–8.5)
PROT UR QL STRIP: ABNORMAL
RBC # BLD AUTO: 4.77 X10E6/UL (ref 3.77–5.28)
RBC #/AREA URNS HPF: NORMAL /HPF (ref 0–2)
SL AMB EGFR AFRICAN AMERICAN: 84 ML/MIN/1.73
SL AMB EGFR NON AFRICAN AMERICAN: 73 ML/MIN/1.73
SODIUM SERPL-SCNC: 137 MMOL/L (ref 134–144)
SP GR UR: 1.02 (ref 1–1.03)
UROBILINOGEN UR STRIP-ACNC: 0.2 EU/DL (ref 0.2–1)
WBC # BLD AUTO: 6.4 X10E3/UL (ref 3.4–10.8)
WBC #/AREA URNS HPF: NORMAL /HPF (ref 0–5)

## 2019-06-12 PROCEDURE — 99213 OFFICE O/P EST LOW 20 MIN: CPT | Performed by: INTERNAL MEDICINE

## 2019-06-12 RX ORDER — ATORVASTATIN CALCIUM 10 MG/1
TABLET, FILM COATED ORAL
Qty: 90 TABLET | Refills: 1 | Status: SHIPPED | OUTPATIENT
Start: 2019-06-12 | End: 2019-06-12 | Stop reason: SDUPTHER

## 2019-06-12 RX ORDER — ATORVASTATIN CALCIUM 10 MG/1
10 TABLET, FILM COATED ORAL DAILY
Qty: 90 TABLET | Refills: 1 | Status: SHIPPED | OUTPATIENT
Start: 2019-06-12 | End: 2019-12-12 | Stop reason: SDUPTHER

## 2019-06-13 ENCOUNTER — HOSPITAL ENCOUNTER (OUTPATIENT)
Dept: INFUSION CENTER | Facility: CLINIC | Age: 59
Discharge: HOME | End: 2019-06-13
Payer: COMMERCIAL

## 2019-06-13 VITALS
SYSTOLIC BLOOD PRESSURE: 136 MMHG | WEIGHT: 175 LBS | HEART RATE: 74 BPM | RESPIRATION RATE: 14 BRPM | TEMPERATURE: 97.6 F | HEIGHT: 67 IN | OXYGEN SATURATION: 96 % | DIASTOLIC BLOOD PRESSURE: 82 MMHG | BODY MASS INDEX: 27.47 KG/M2

## 2019-06-13 DIAGNOSIS — C34.91 ADENOCARCINOMA OF LUNG, STAGE 4, RIGHT (HCC): Primary | ICD-10-CM

## 2019-06-13 PROCEDURE — J9035Q0 INV BEVACIZUMAB 400MG/16ML 16 ML: Performed by: INTERNAL MEDICINE

## 2019-06-13 PROCEDURE — 96413 CHEMO IV INFUSION 1 HR: CPT

## 2019-06-13 RX ORDER — SODIUM CHLORIDE 9 MG/ML
20 INJECTION, SOLUTION INTRAVENOUS ONCE
Status: COMPLETED | OUTPATIENT
Start: 2019-06-13 | End: 2019-06-13

## 2019-06-13 RX ORDER — SODIUM CHLORIDE 9 MG/ML
20 INJECTION, SOLUTION INTRAVENOUS ONCE
Status: CANCELLED | OUTPATIENT
Start: 2019-06-13

## 2019-06-13 RX ADMIN — Medication 1254 MG: at 14:45

## 2019-06-13 RX ADMIN — SODIUM CHLORIDE 20 ML/HR: 0.9 INJECTION, SOLUTION INTRAVENOUS at 14:30

## 2019-06-13 NOTE — PROGRESS NOTES
Patient is here for chemo  she offers no complaints at this time  Per Jerry Patton Rn clinical trials patient is ok for treatment  She is aware of patients +1 protein in her urine and stated that is normal for the patient   BP meets parameters as well

## 2019-06-26 DIAGNOSIS — C34.91 ADENOCARCINOMA OF LUNG, STAGE 4, RIGHT (HCC): Primary | ICD-10-CM

## 2019-07-02 ENCOUNTER — OFFICE VISIT (OUTPATIENT)
Dept: CARDIAC SURGERY | Facility: CLINIC | Age: 59
End: 2019-07-02
Payer: COMMERCIAL

## 2019-07-02 ENCOUNTER — DOCUMENTATION (OUTPATIENT)
Dept: CARDIAC SURGERY | Facility: CLINIC | Age: 59
End: 2019-07-02

## 2019-07-02 VITALS
WEIGHT: 171.8 LBS | SYSTOLIC BLOOD PRESSURE: 159 MMHG | OXYGEN SATURATION: 96 % | DIASTOLIC BLOOD PRESSURE: 74 MMHG | HEIGHT: 67 IN | TEMPERATURE: 98.1 F | BODY MASS INDEX: 26.97 KG/M2 | HEART RATE: 93 BPM

## 2019-07-02 DIAGNOSIS — C34.91 ADENOCARCINOMA OF LUNG, STAGE 4, RIGHT (HCC): Primary | ICD-10-CM

## 2019-07-02 LAB
ALBUMIN SERPL-MCNC: 4.4 G/DL (ref 3.6–4.8)
ALBUMIN/GLOB SERPL: 1.6 {RATIO} (ref 1.2–2.2)
ALP SERPL-CCNC: 66 IU/L (ref 39–117)
ALT SERPL-CCNC: 27 IU/L (ref 0–32)
APPEARANCE UR: CLEAR
AST SERPL-CCNC: 28 IU/L (ref 0–40)
BASOPHILS # BLD AUTO: 0.1 X10E3/UL (ref 0–0.2)
BASOPHILS NFR BLD AUTO: 1 %
BILIRUB SERPL-MCNC: 0.6 MG/DL (ref 0–1.2)
BILIRUB UR QL STRIP: NEGATIVE
BUN SERPL-MCNC: 13 MG/DL (ref 8–27)
BUN/CREAT SERPL: 14 (ref 12–28)
CALCIUM SERPL-MCNC: 10.1 MG/DL (ref 8.7–10.3)
CHLORIDE SERPL-SCNC: 98 MMOL/L (ref 96–106)
CO2 SERPL-SCNC: 24 MMOL/L (ref 20–29)
COLOR UR: YELLOW
CREAT SERPL-MCNC: 0.95 MG/DL (ref 0.57–1)
EOSINOPHIL # BLD AUTO: 0.1 X10E3/UL (ref 0–0.4)
EOSINOPHIL NFR BLD AUTO: 2 %
ERYTHROCYTE [DISTWIDTH] IN BLOOD BY AUTOMATED COUNT: 13.6 % (ref 12.3–15.4)
GLOBULIN SER-MCNC: 2.7 G/DL (ref 1.5–4.5)
GLUCOSE SERPL-MCNC: 137 MG/DL (ref 65–99)
GLUCOSE UR QL: NEGATIVE
HCT VFR BLD AUTO: 44.6 % (ref 34–46.6)
HGB BLD-MCNC: 15 G/DL (ref 11.1–15.9)
HGB UR QL STRIP: NEGATIVE
IMM GRANULOCYTES # BLD: 0 X10E3/UL (ref 0–0.1)
IMM GRANULOCYTES NFR BLD: 0 %
KETONES UR QL STRIP: NEGATIVE
LDH SERPL-CCNC: 139 IU/L (ref 119–226)
LEUKOCYTE ESTERASE UR QL STRIP: NEGATIVE
LYMPHOCYTES # BLD AUTO: 2.7 X10E3/UL (ref 0.7–3.1)
LYMPHOCYTES NFR BLD AUTO: 37 %
MAGNESIUM SERPL-MCNC: 2 MG/DL (ref 1.6–2.3)
MCH RBC QN AUTO: 31.2 PG (ref 26.6–33)
MCHC RBC AUTO-ENTMCNC: 33.6 G/DL (ref 31.5–35.7)
MCV RBC AUTO: 93 FL (ref 79–97)
MICRO URNS: NORMAL
MONOCYTES # BLD AUTO: 0.5 X10E3/UL (ref 0.1–0.9)
MONOCYTES NFR BLD AUTO: 7 %
NEUTROPHILS # BLD AUTO: 3.9 X10E3/UL (ref 1.4–7)
NEUTROPHILS NFR BLD AUTO: 53 %
NITRITE UR QL STRIP: NEGATIVE
PH UR STRIP: 6.5 [PH] (ref 5–7.5)
PHOSPHATE SERPL-MCNC: 4.4 MG/DL (ref 2.5–4.5)
PLATELET # BLD AUTO: 294 X10E3/UL (ref 150–450)
POTASSIUM SERPL-SCNC: 5.1 MMOL/L (ref 3.5–5.2)
PROT SERPL-MCNC: 7.1 G/DL (ref 6–8.5)
PROT UR QL STRIP: NEGATIVE
RBC # BLD AUTO: 4.81 X10E6/UL (ref 3.77–5.28)
SL AMB EGFR AFRICAN AMERICAN: 75 ML/MIN/1.73
SL AMB EGFR NON AFRICAN AMERICAN: 65 ML/MIN/1.73
SODIUM SERPL-SCNC: 136 MMOL/L (ref 134–144)
SP GR UR: 1.01 (ref 1–1.03)
UROBILINOGEN UR STRIP-ACNC: 0.2 EU/DL (ref 0.2–1)
WBC # BLD AUTO: 7.3 X10E3/UL (ref 3.4–10.8)

## 2019-07-02 PROCEDURE — 99244 OFF/OP CNSLTJ NEW/EST MOD 40: CPT | Performed by: THORACIC SURGERY (CARDIOTHORACIC VASCULAR SURGERY)

## 2019-07-02 NOTE — PROGRESS NOTES
I introduced myself to Karyn Rivas and her  and was present during her visit with Dr Stiven Monte this afternoon  She had no needs at this time  She has f/u with Dr Macrina Vila tomorrow  After her discussion with Dr Stiven Monte, she plans to speak with Dr Macrina Vila about the option of stopping Avastin and f/u closely with repeat CT scans

## 2019-07-02 NOTE — LETTER
July 2, 2019     Ana Ley, DO  [address] [facility] [place] [address]    Patient: Esther Arteaga   YOB: 1960   Date of Visit: 7/2/2019       Dear Dr Heydi Ren: Thank you for referring Farshad Manzano to me for evaluation  Below are my notes for this consultation  If you have questions, please do not hesitate to call me  I look forward to following your patient along with you  Sincerely,        Hamilton Cabrera MD        CC: MD Amy Nielson MD Pincus Pinal, MD  7/2/2019  5:30 PM  Sign at close encounter  Thoracic Consult  Assessment/Plan:    Adenocarcinoma of lung, stage 4, right [facility])  Mrs Kalia Figueroa has had a remarkable post treatment course for her stage IV lung cancer  Normally, after resection of isolated brain metastases, we administer 3-4 cycles of chemotherapy and then consider pulmonary resection  In this case, she has gone approximately 4 years on treatment without evidence of further metastatic disease  The fact that her right upper lobe lung mass has largely disappeared and that the slightly thickened posterior border of the cavity does not have any PET activity makes me wonder whether there is any active disease present  We discussed options that included a right upper lobectomy to remove the site of her previous disease verses stopping her a vast in and watching her right upper lobe carefully with every 3 months CT scans at least initially  I think it is pretty reasonable to watch this lesion off of chemotherapy  Hopefully, she is only left with scar and has been cured  If the right upper lobe lesion was to show any growth with close interval follow-up we could perform lobectomy at that point  She certainly is a reasonable lobectomy candidate from a functional and pulmonary function standpoint  The patient was in complete agreement with this plan         Diagnoses and all orders for this visit:    Adenocarcinoma of lung, stage 4, right [facility])          Thoracic History   Diagnosis: Stage IV lung cancer   Procedures/Surgeries: Craniotomy with resection of tumor   Pathology: Non-small cell lung cancer   Adjuvant Therapy: See HPI      Subjective:    Patient ID: Darlis Lesch is a 59 y o  female  Mrs Jose Miguel Burr is a 78-year-old who was referred to me by Dr Lawyer Bloom for consultation regarding stage IV lung cancer  She initially presented in 2015 with headaches and was found to have intracranial tumors  She underwent a craniotomy and resection and the pathology was consistent with non-small cell lung cancer  The exact type was unclear as it had both some histology consistent with adenocarcinoma but also had some P 40 staining suggesting possible squamous cell cancer  She was found to have a large right upper lobe lung mass that was slightly cavitary  After her metastatic brain tumors were resected she underwent adjuvant SRT and then began chemotherapy  She beganStarted Carboplatin, Paclitaxel, Avastin and PD-L1 (atezolizumab; study drug)  Completed 6 cycles with cycle 6 day 1 on 2/18/16  Beginning cycle 7, maintenance cycle with Avastin and PD-L1 (Atezolizumab; study drug)  She was taken off of study drug Atezolizumab, but continues on Avastin maintance  She has done very well  She denies any unexplained weight loss, new headaches or blurry vision, new bone or joint pains, or new numbness or weakness in any extremity  She denies cough, purulent sputum production, hemoptysis, or chest pain  She has been developing very thin friable skin which has been attributed to her chronic Avastin usage  She underwent a recent chest CT scan which demonstrates that her right upper lobe lung tumor is now essentially a cavity with a slight rim posteriorly that is somewhat spiculated  A PET-CT scan did not demonstrate any evidence of hypermetabolism within this mass or within any hilar mediastinal lymph node    There was no sign of distant metastatic disease  Her brain MRIs have been stable and do not demonstrate metastatic disease  She was presented at the lung cancer multi disciplinary conference and the group felt thoracic surgery consultation was important to discuss whether her right upper lobe should be resected        The following portions of the patient's history were reviewed and updated as appropriate: allergies, current medications, past family history, past medical history, past social history, past surgical history and problem list     Past Medical History:   Diagnosis Date    Adenocarcinoma of right lung, stage 4 ([facility] )     Allergic rhinitis     Alopecia     resolved 10/25/16    Anxiety     last assessed 10/4/16, resolved 10/25/16    Benign hypertension 2/1/2018    Benign neoplasm of skin of trunk 03/25/2008    last assessed 3/25/08    Benign neoplasm of skin of upper extremity and shoulder 03/25/2008    last assessed 3/25/08    Ear deformity, acquired     last assessed 10/13/14, resolved 3/12/15    Eczema     Hyperlipemia     Maintenance chemotherapy     Secondary cancer of brain ([facility] [facility] )     [facility]     Vertigo       Past Surgical History:   Procedure Laterality Date    APPENDECTOMY  1964    BRAIN TUMOR EXCISION      CENTRAL VENOUS CATHETER INSERTION Right     PORTACATH      Family History   Problem Relation Age of Onset    Diabetes Mother     Heart disease Mother     Lung cancer Father 79        Smoker     Pancreatitis Brother     No Known Problems Brother       Social History     Socioeconomic History    Marital status: /Civil Union     Spouse name: Not on file    Number of children: 1    Years of education: Not on file    Highest education level: Not on file   Occupational History    Not on file   Social Needs    Financial resource strain: Not on file    Food insecurity:     Worry: Not on file     Inability: Not on file    Transportation needs:     Medical: Not on file     Non-medical: Not on file Tobacco Use    Smoking status: Former Smoker     Packs/day: 1 00     Years: 47 00     Pack years: 47 00     Types: Cigarettes     Last attempt to quit: 2015     Years since quittin 5    Smokeless tobacco: Never Used   Substance and Sexual Activity    Alcohol use: Yes     Frequency: Monthly or less     Drinks per session: 1 or 2     Binge frequency: Never     Comment: occasionally / Social     Drug use: Yes     Types: Marijuana    Sexual activity: Yes   Lifestyle    Physical activity:     Days per week: Not on file     Minutes per session: Not on file    Stress: Not on file   Relationships    Social connections:     Talks on phone: Not on file     Gets together: Not on file     Attends Rastafarian service: Not on file     Active member of club or organization: Not on file     Attends meetings of clubs or organizations: Not on file     Relationship status: Not on file    Intimate partner violence:     Fear of current or ex partner: Not on file     Emotionally abused: Not on file     Physically abused: Not on file     Forced sexual activity: Not on file   Other Topics Concern    Not on file   Social History Narrative    High school or GED     Lives independently with spouse       Review of Systems   Constitutional: Negative for activity change, appetite change, chills, fever and unexpected weight change  HENT: Negative for voice change  Respiratory: Negative for cough, shortness of breath and wheezing  Cardiovascular: Negative for chest pain, palpitations and leg swelling  Gastrointestinal: Negative for abdominal pain, constipation, diarrhea, nausea and vomiting  Musculoskeletal: Negative for arthralgias and myalgias  Skin: Positive for wound  Negative for rash  Paper-thin skin on forearms   Neurological: Negative for dizziness, seizures, weakness, numbness and headaches  Hematological: Negative for adenopathy  Bruises/bleeds easily  Psychiatric/Behavioral: Negative for confusion  Objective:   Physical Exam   Constitutional: She is oriented to person, place, and time  She appears well-developed and well-nourished  HENT:   Head: Normocephalic and atraumatic  Mouth/Throat: No oropharyngeal exudate  Eyes: Pupils are equal, round, and reactive to light  Conjunctivae and EOM are normal  No scleral icterus  Neck: Normal range of motion  Neck supple  No tracheal deviation present  No thyromegaly present  Cardiovascular: Normal rate, regular rhythm and normal heart sounds  Pulmonary/Chest: Effort normal and breath sounds normal  No respiratory distress  She has no wheezes  Abdominal: Soft  She exhibits no distension  There is no tenderness  Musculoskeletal: Normal range of motion  Lymphadenopathy:     She has no cervical adenopathy  Neurological: She is alert and oriented to person, place, and time  Skin: Skin is warm and dry  Bilateral forearm bruises and skin tears   Psychiatric: She has a normal mood and affect  Her behavior is normal  Judgment and thought content normal    /74 (BP Location: Left arm, Patient Position: Sitting, Cuff Size: Adult)   Pulse 93   Temp 98 1 °F (36 7 °C)   Ht 5' 7" (1 702 m)   Wt 77 9 kg (171 lb 12 8 oz)   LMP  (LMP Unknown)   SpO2 96%   BMI 26 91 kg/m²          Ct Chest Abdomen Pelvis Wo Contrast    Result Date: 4/26/2019  Narrative CT CHEST, ABDOMEN AND PELVIS WITHOUT IV CONTRAST INDICATION:   C34 91: Malignant neoplasm of unspecified part of right bronchus or lung  COMPARISON:  CT chest, abdomen and pelvis on 2/21/2019  TECHNIQUE: CT examination of the chest, abdomen and pelvis was performed without intravenous contrast   Axial, sagittal, and coronal 2D reformatted images were created from the source data and submitted for interpretation  Radiation dose length product (DLP) for this visit:  591 1 mGy-cm     This examination, like all CT scans performed in the [facility], was performed utilizing techniques to minimize radiation dose exposure, including the use of iterative reconstruction and automated exposure control  Enteric contrast was administered  FINDINGS: RECIST target lesion: Cavitary right upper lobe mass is stable measuring 3 1 x 2 6 cm on image 30 of series 3, previously measuring 3 1 x 2 6 cm  Solid component along the posterior wall is unchanged, measuring 2 2 x 1 1 cm on image 30 of series 3, previously measuring 2 2 x 1 1 cm  CHEST LUNGS:  Stable cavitary right upper lobe mass as described above  Stable patchy groundglass opacities in the left lower lobe, similar to the prior exam from 2/21/2019, but improved from 8/20/2018  Persistent mild peribronchial thickening noted in the perihilar left lower lobe  Mild left apical paraseptal emphysema  There is no tracheal or endobronchial lesion  PLEURA:  Unremarkable  HEART/GREAT VESSELS:  Unremarkable for patient's age  MEDIASTINUM AND ANGEL:  Unremarkable  CHEST WALL AND LOWER NECK:   Right chest wall Port-A-Cath with its tip in the cavoatrial region  ABDOMEN LIVER/BILIARY TREE:  Unremarkable  GALLBLADDER:  No calcified gallstones  No pericholecystic inflammatory change  SPLEEN:  Unremarkable  PANCREAS:  Unremarkable  ADRENAL GLANDS:  Unremarkable  KIDNEYS/URETERS:  Unremarkable  No hydronephrosis  STOMACH AND BOWEL:  Stable 11 mm fat attenuation lesion in the 2nd portion of the duodenum compatible with a lipoma  Diverticulosis coli  No findings to suggest diverticulitis  APPENDIX:  No findings to suggest appendicitis  ABDOMINOPELVIC CAVITY:  No ascites or free intraperitoneal air  No lymphadenopathy  VESSELS:  Atherosclerotic changes are present  No evidence of aneurysm  PELVIS REPRODUCTIVE ORGANS: Stable punctate calcification in the uterine fundus  Uterus is otherwise unremarkable  No suspicious adnexal mass  URINARY BLADDER:  Unremarkable  ABDOMINAL WALL/INGUINAL REGIONS:  Unremarkable   OSSEOUS STRUCTURES:  No acute fracture or destructive osseous lesion  Degenerative changes in the lumbar spine  Impression 1  Stable cavitary target lesion in the right upper lobe  No new site of metastasis  2   Stable left lower lobe patchy groundglass opacities with associated mild peribronchial thickening likely due to a chronic infectious or inflammatory process  Continued surveillance on follow-up is recommended  Workstation performed: [id number]        Nm Pet Ct Skull Base To Mid Thigh    Result Date: 6/3/2019  Narrative PET/CT SCAN INDICATION: History of metastatic lung cancer  Restaging post chemotherapy  C34 91: Malignant neoplasm of unspecified part of right bronchus or lung MODIFIER: PS COMPARISON: CT chest abdomen pelvis 4/26/2019, MRI brain 4/22/2019, PET/CT 10/8/2015 and additional priors CELL TYPE:  metastatic, poorly differentiated non-small cell carcinoma (R temporal mass biopsy 9/6/15) TECHNIQUE:   8 3 mCi F-18-FDG administered IV  Multiplanar attenuation corrected and non attenuation corrected PET images are available for interpretation, and contiguous, low dose, axial CT sections were obtained from the skull vertex through the femurs    Intravenous contrast material was not utilized  This examination, like all CT scans performed in the [facility], was performed utilizing techniques to minimize radiation dose exposure, including the use of iterative reconstruction and automated exposure control  Fasting serum glucose: 101 mg/dl FINDINGS: VISUALIZED BRAIN:   No acute abnormalities are seen  Prior right temporal craniotomy  Patchy photopenia in the right temporal lobe likely related to posttreatment changes  HEAD/NECK:   There is a physiologic distribution of FDG  No FDG avid cervical adenopathy is seen  CT images: Paranasal sinus disease noted bilaterally  CHEST:   No significant FDG uptake in the right upper lobe cavitary lesion  SUV max here is 1 1  This measures 3 0 x 1 7 cm    Possible eccentric wall thickening along the posterior aspect  Margins are spiculated  No suspicious foci of FDG uptake in the mediastinal or perihilar regions  Stable patchy groundglass opacity in the left lower lobe posteriorly, with mild FDG uptake, SUV max of 2 4  Peribronchial thickening better identified on prior chest CT  This is likely infectious or inflammatory  This has been present since the 8/20/2018 CT  Linear FDG uptake in the esophagus may be physiologic or related to reflux  CT images: Right-sided Mediport line tip terminates in the right atrium  ABDOMEN:   No FDG avid soft tissue lesions are seen  CT images: Small lipoma suggested in the 2nd portion of the duodenum  Colonic diverticulosis  PELVIS: No FDG avid soft tissue lesions are seen  CT images: Tiny punctate calcification at the uterus  OSSEOUS STRUCTURES: No FDG avid lesions are seen  CT images: Multilevel degenerative spurring of the spine  Impression 1  No findings suspicious for hypermetabolic malignancy  2  Mild patchy FDG uptake in the left lower lobe patchy groundglass opacities with associated peribronchial thickening  This is likely infectious or inflammatory given the appearance, however, given that this has been present since the 8/20/2018 CT, consider follow-up with bronchoscopy   Workstation performed: [id number]

## 2019-07-02 NOTE — PROGRESS NOTES
Thoracic Consult  Assessment/Plan:    Adenocarcinoma of lung, stage 4, right [facility])  Mrs Kenroy Cordova has had a remarkable post treatment course for her stage IV lung cancer  Normally, after resection of isolated brain metastases, we administer 3-4 cycles of chemotherapy and then consider pulmonary resection  In this case, she has gone approximately 4 years on treatment without evidence of further metastatic disease  The fact that her right upper lobe lung mass has largely disappeared and that the slightly thickened posterior border of the cavity does not have any PET activity makes me wonder whether there is any active disease present  We discussed options that included a right upper lobectomy to remove the site of her previous disease verses stopping her a vast in and watching her right upper lobe carefully with every 3 months CT scans at least initially  I think it is pretty reasonable to watch this lesion off of chemotherapy  Hopefully, she is only left with scar and has been cured  If the right upper lobe lesion was to show any growth with close interval follow-up we could perform lobectomy at that point  She certainly is a reasonable lobectomy candidate from a functional and pulmonary function standpoint  The patient was in complete agreement with this plan  Diagnoses and all orders for this visit:    Adenocarcinoma of lung, stage 4, right ([facility] [address] )          Thoracic History   Diagnosis: Stage IV lung cancer   Procedures/Surgeries: Craniotomy with resection of tumor   Pathology: Non-small cell lung cancer   Adjuvant Therapy: See HPI      Subjective:    Patient ID: Nirav Nichole is a 59 y o  female  Mrs Kenroy Cordova is a 53-year-old who was referred to me by Dr Isauro Aguilar for consultation regarding stage IV lung cancer  She initially presented in 2015 with headaches and was found to have intracranial tumors    She underwent a craniotomy and resection and the pathology was consistent with non-small cell lung cancer  The exact type was unclear as it had both some histology consistent with adenocarcinoma but also had some P 40 staining suggesting possible squamous cell cancer  She was found to have a large right upper lobe lung mass that was slightly cavitary  After her metastatic brain tumors were resected she underwent adjuvant SRT and then began chemotherapy  She beganStarted Carboplatin, Paclitaxel, Avastin and PD-L1 (atezolizumab; study drug)  Completed 6 cycles with cycle 6 day 1 on 2/18/16  Beginning cycle 7, maintenance cycle with Avastin and PD-L1 (Atezolizumab; study drug)  She was taken off of study drug Atezolizumab, but continues on Avastin maintance  She has done very well  She denies any unexplained weight loss, new headaches or blurry vision, new bone or joint pains, or new numbness or weakness in any extremity  She denies cough, purulent sputum production, hemoptysis, or chest pain  She has been developing very thin friable skin which has been attributed to her chronic Avastin usage  She underwent a recent chest CT scan which demonstrates that her right upper lobe lung tumor is now essentially a cavity with a slight rim posteriorly that is somewhat spiculated  A PET-CT scan did not demonstrate any evidence of hypermetabolism within this mass or within any hilar mediastinal lymph node  There was no sign of distant metastatic disease  Her brain MRIs have been stable and do not demonstrate metastatic disease  She was presented at the lung cancer multi disciplinary conference and the group felt thoracic surgery consultation was important to discuss whether her right upper lobe should be resected        The following portions of the patient's history were reviewed and updated as appropriate: allergies, current medications, past family history, past medical history, past social history, past surgical history and problem list     Past Medical History:   Diagnosis Date    Adenocarcinoma of right lung, stage 4 (HCC)     Allergic rhinitis     Alopecia     resolved 10/25/16    Anxiety     last assessed 10/4/16, resolved 10/25/16    Benign hypertension 2018    Benign neoplasm of skin of trunk 2008    last assessed 3/25/08    Benign neoplasm of skin of upper extremity and shoulder 2008    last assessed 3/25/08    Ear deformity, acquired     last assessed 10/13/14, resolved 3/12/15    Eczema     Hyperlipemia     Maintenance chemotherapy     Secondary cancer of brain ([facility] [place] [address] [facility]     Seizures ([facility] [place] [address] )     Vertigo       Past Surgical History:   Procedure Laterality Date    APPENDECTOMY  1964    BRAIN TUMOR EXCISION      CENTRAL VENOUS CATHETER INSERTION Right     PORTACATH      Family History   Problem Relation Age of Onset    Diabetes Mother     Heart disease Mother     Lung cancer Father 79        Smoker     Pancreatitis Brother     No Known Problems Brother       Social History     Socioeconomic History    Marital status: /Civil Union     Spouse name: Not on file    Number of children: 1    Years of education: Not on file    Highest education level: Not on file   Occupational History    Not on file   Social Needs    Financial resource strain: Not on file    Food insecurity:     Worry: Not on file     Inability: Not on file    Transportation needs:     Medical: Not on file     Non-medical: Not on file   Tobacco Use    Smoking status: Former Smoker     Packs/day: 1 00     Years: 47 00     Pack years: 47 00     Types: Cigarettes     Last attempt to quit: 2015     Years since quittin 5    Smokeless tobacco: Never Used   Substance and Sexual Activity    Alcohol use: Yes     Frequency: Monthly or less     Drinks per session: 1 or 2     Binge frequency: Never     Comment: occasionally / Social     Drug use: Yes     Types: Marijuana    Sexual activity: Yes   Lifestyle    Physical activity:     Days per week: Not on file     Minutes per session: Not on file    Stress: Not on file   Relationships    Social connections:     Talks on phone: Not on file     Gets together: Not on file     Attends Synagogue service: Not on file     Active member of club or organization: Not on file     Attends meetings of clubs or organizations: Not on file     Relationship status: Not on file    Intimate partner violence:     Fear of current or ex partner: Not on file     Emotionally abused: Not on file     Physically abused: Not on file     Forced sexual activity: Not on file   Other Topics Concern    Not on file   Social History Narrative    High school or GED     Lives independently with spouse       Review of Systems   Constitutional: Negative for activity change, appetite change, chills, fever and unexpected weight change  HENT: Negative for voice change  Respiratory: Negative for cough, shortness of breath and wheezing  Cardiovascular: Negative for chest pain, palpitations and leg swelling  Gastrointestinal: Negative for abdominal pain, constipation, diarrhea, nausea and vomiting  Musculoskeletal: Negative for arthralgias and myalgias  Skin: Positive for wound  Negative for rash  Paper-thin skin on forearms   Neurological: Negative for dizziness, seizures, weakness, numbness and headaches  Hematological: Negative for adenopathy  Bruises/bleeds easily  Psychiatric/Behavioral: Negative for confusion  Objective:   Physical Exam   Constitutional: She is oriented to person, place, and time  She appears well-developed and well-nourished  HENT:   Head: Normocephalic and atraumatic  Mouth/Throat: No oropharyngeal exudate  Eyes: Pupils are equal, round, and reactive to light  Conjunctivae and EOM are normal  No scleral icterus  Neck: Normal range of motion  Neck supple  No tracheal deviation present  No thyromegaly present  Cardiovascular: Normal rate, regular rhythm and normal heart sounds     Pulmonary/Chest: Effort normal and breath sounds normal  No respiratory distress  She has no wheezes  Abdominal: Soft  She exhibits no distension  There is no tenderness  Musculoskeletal: Normal range of motion  Lymphadenopathy:     She has no cervical adenopathy  Neurological: She is alert and oriented to person, place, and time  Skin: Skin is warm and dry  Bilateral forearm bruises and skin tears   Psychiatric: She has a normal mood and affect  Her behavior is normal  Judgment and thought content normal    /74 (BP Location: Left arm, Patient Position: Sitting, Cuff Size: Adult)   Pulse 93   Temp 98 1 °F (36 7 °C)   Ht 5' 7" (1 702 m)   Wt 77 9 kg (171 lb 12 8 oz)   LMP  (LMP Unknown)   SpO2 96%   BMI 26 91 kg/m²         Ct Chest Abdomen Pelvis Wo Contrast    Result Date: 4/26/2019  Narrative CT CHEST, ABDOMEN AND PELVIS WITHOUT IV CONTRAST INDICATION:   C34 91: Malignant neoplasm of unspecified part of right bronchus or lung  COMPARISON:  CT chest, abdomen and pelvis on 2/21/2019  TECHNIQUE: CT examination of the chest, abdomen and pelvis was performed without intravenous contrast   Axial, sagittal, and coronal 2D reformatted images were created from the source data and submitted for interpretation  Radiation dose length product (DLP) for this visit:  591 1 mGy-cm   This examination, like all CT scans performed in the [facility], was performed utilizing techniques to minimize radiation dose exposure, including the use of iterative reconstruction and automated exposure control  Enteric contrast was administered  FINDINGS: RECIST target lesion: Cavitary right upper lobe mass is stable measuring 3 1 x 2 6 cm on image 30 of series 3, previously measuring 3 1 x 2 6 cm  Solid component along the posterior wall is unchanged, measuring 2 2 x 1 1 cm on image 30 of series 3, previously measuring 2 2 x 1 1 cm  CHEST LUNGS:  Stable cavitary right upper lobe mass as described above    Stable patchy groundglass opacities in the left lower lobe, similar to the prior exam from 2/21/2019, but improved from 8/20/2018  Persistent mild peribronchial thickening noted in the perihilar left lower lobe  Mild left apical paraseptal emphysema  There is no tracheal or endobronchial lesion  PLEURA:  Unremarkable  HEART/GREAT VESSELS:  Unremarkable for patient's age  MEDIASTINUM AND ANGEL:  Unremarkable  CHEST WALL AND LOWER NECK:   Right chest wall Port-A-Cath with its tip in the cavoatrial region  ABDOMEN LIVER/BILIARY TREE:  Unremarkable  GALLBLADDER:  No calcified gallstones  No pericholecystic inflammatory change  SPLEEN:  Unremarkable  PANCREAS:  Unremarkable  ADRENAL GLANDS:  Unremarkable  KIDNEYS/URETERS:  Unremarkable  No hydronephrosis  STOMACH AND BOWEL:  Stable 11 mm fat attenuation lesion in the 2nd portion of the duodenum compatible with a lipoma  Diverticulosis coli  No findings to suggest diverticulitis  APPENDIX:  No findings to suggest appendicitis  ABDOMINOPELVIC CAVITY:  No ascites or free intraperitoneal air  No lymphadenopathy  VESSELS:  Atherosclerotic changes are present  No evidence of aneurysm  PELVIS REPRODUCTIVE ORGANS: Stable punctate calcification in the uterine fundus  Uterus is otherwise unremarkable  No suspicious adnexal mass  URINARY BLADDER:  Unremarkable  ABDOMINAL WALL/INGUINAL REGIONS:  Unremarkable  OSSEOUS STRUCTURES:  No acute fracture or destructive osseous lesion  Degenerative changes in the lumbar spine  Impression 1  Stable cavitary target lesion in the right upper lobe  No new site of metastasis  2   Stable left lower lobe patchy groundglass opacities with associated mild peribronchial thickening likely due to a chronic infectious or inflammatory process  Continued surveillance on follow-up is recommended  Workstation performed: [id number]        Nm Pet Ct Skull Base To Mid Thigh    Result Date: 6/3/2019  Narrative PET/CT SCAN INDICATION: History of metastatic lung cancer  Restaging post chemotherapy  C34 91: Malignant neoplasm of unspecified part of right bronchus or lung MODIFIER: PS COMPARISON: CT chest abdomen pelvis 4/26/2019, MRI brain 4/22/2019, PET/CT 10/8/2015 and additional priors CELL TYPE:  metastatic, poorly differentiated non-small cell carcinoma (R temporal mass biopsy 9/6/15) TECHNIQUE:   8 3 mCi F-18-FDG administered IV  Multiplanar attenuation corrected and non attenuation corrected PET images are available for interpretation, and contiguous, low dose, axial CT sections were obtained from the skull vertex through the femurs    Intravenous contrast material was not utilized  This examination, like all CT scans performed in the [facility], was performed utilizing techniques to minimize radiation dose exposure, including the use of iterative reconstruction and automated exposure control  Fasting serum glucose: 101 mg/dl FINDINGS: VISUALIZED BRAIN:   No acute abnormalities are seen  Prior right temporal craniotomy  Patchy photopenia in the right temporal lobe likely related to posttreatment changes  HEAD/NECK:   There is a physiologic distribution of FDG  No FDG avid cervical adenopathy is seen  CT images: Paranasal sinus disease noted bilaterally  CHEST:   No significant FDG uptake in the right upper lobe cavitary lesion  SUV max here is 1 1  This measures 3 0 x 1 7 cm  Possible eccentric wall thickening along the posterior aspect  Margins are spiculated  No suspicious foci of FDG uptake in the mediastinal or perihilar regions  Stable patchy groundglass opacity in the left lower lobe posteriorly, with mild FDG uptake, SUV max of 2 4  Peribronchial thickening better identified on prior chest CT  This is likely infectious or inflammatory  This has been present since the 8/20/2018 CT  Linear FDG uptake in the esophagus may be physiologic or related to reflux  CT images: Right-sided Mediport line tip terminates in the right atrium    ABDOMEN:   No FDG avid soft tissue lesions are seen  CT images: Small lipoma suggested in the 2nd portion of the duodenum  Colonic diverticulosis  PELVIS: No FDG avid soft tissue lesions are seen  CT images: Tiny punctate calcification at the uterus  OSSEOUS STRUCTURES: No FDG avid lesions are seen  CT images: Multilevel degenerative spurring of the spine  Impression 1  No findings suspicious for hypermetabolic malignancy  2  Mild patchy FDG uptake in the left lower lobe patchy groundglass opacities with associated peribronchial thickening  This is likely infectious or inflammatory given the appearance, however, given that this has been present since the 8/20/2018 CT, consider follow-up with bronchoscopy   Workstation performed: [id number]

## 2019-07-02 NOTE — ASSESSMENT & PLAN NOTE
Mrs Juan West has had a remarkable post treatment course for her stage IV lung cancer  Normally, after resection of isolated brain metastases, we administer 3-4 cycles of chemotherapy and then consider pulmonary resection  In this case, she has gone approximately 4 years on treatment without evidence of further metastatic disease  The fact that her right upper lobe lung mass has largely disappeared and that the slightly thickened posterior border of the cavity does not have any PET activity makes me wonder whether there is any active disease present  We discussed options that included a right upper lobectomy to remove the site of her previous disease verses stopping her a vast in and watching her right upper lobe carefully with every 3 months CT scans at least initially  I think it is pretty reasonable to watch this lesion off of chemotherapy  Hopefully, she is only left with scar and has been cured  If the right upper lobe lesion was to show any growth with close interval follow-up we could perform lobectomy at that point  She certainly is a reasonable lobectomy candidate from a functional and pulmonary function standpoint  The patient was in complete agreement with this plan

## 2019-07-03 ENCOUNTER — OFFICE VISIT (OUTPATIENT)
Dept: HEMATOLOGY ONCOLOGY | Facility: MEDICAL CENTER | Age: 59
End: 2019-07-03
Payer: COMMERCIAL

## 2019-07-03 VITALS
WEIGHT: 171 LBS | HEART RATE: 75 BPM | BODY MASS INDEX: 26.84 KG/M2 | HEIGHT: 67 IN | TEMPERATURE: 97.3 F | RESPIRATION RATE: 18 BRPM | SYSTOLIC BLOOD PRESSURE: 148 MMHG | DIASTOLIC BLOOD PRESSURE: 84 MMHG | OXYGEN SATURATION: 95 %

## 2019-07-03 DIAGNOSIS — C34.91 ADENOCARCINOMA OF LUNG, STAGE 4, RIGHT (HCC): Primary | ICD-10-CM

## 2019-07-03 PROCEDURE — 99213 OFFICE O/P EST LOW 20 MIN: CPT | Performed by: INTERNAL MEDICINE

## 2019-07-03 NOTE — PROGRESS NOTES
Radha Flow  1960  [address] [address] [facility] [facility] [id number]    DISCUSSION  SUMMARY:    56-year-old female with M1 non-small cell lung carcinoma/adenocarcinoma  Patient is on [facility] 32689-37 (phase III open label randomized study of [id number] [anti-PD-L1 antibody] in combination with carboplatin and paclitaxel +/- Avastin versus carboplatin and paclitaxel +/- Avastin in patients with stage IV chemotherapy naive non-small cell lung carcinoma)  Patient was randomized to receive all 4 medications  Mrs Venice Lanes completed the 6 cycles of treatment without significant toxicities  Patient had been on maintenance (study drug (atezolizumab) and avastin) - now only Avastin (atezolizumab was discontinued by the PI because of the elevated LFTs)  Issues:      1  Stage IV non-small cell lung carcinoma  Clinically there are no concerning oncology signs  Patient feels well, baseline  Bruising is the same as before  Recent PET-CT did not demonstrate any evidence for disease progression, positive mediastinal lymph nodes, distant metastases etc   Recent PFTs were also good/acceptable  Mrs Venice Lanes was seen by by thoracic surgery - options were discussed  The plan is to discontinue the Avastin and repeat the CT scans in approximately 3 months (because of this, patient will come off protocol, protocol nurse in attendance in the office visit today)  As long as there is no evidence of recurrence, the plan will be for continued surveillance  If there is any evidence for recurrence, patient may be a candidate for resection and then restarting treatment  Mrs Venice Lanes will begin routine port flushes  Patient is to return here in 6 weeks  Patient and Dr Iris Jameson may also revisit removing the original lesion even if there is no evidence of regrowth/recurrence      As discussed previously, the information below comes from the recent thoracic conference  Mason Nima was discussed at [address]  Her recent and previous imaging studies were reviewed  The group recommends obtaining a PET Scan, PFTs, and referral to Thoracic Surgery for surgical evaluation   If PFTs are poor and PET scan shows negative montse involvement, consider SBRT; If PFTs are good and PET scan shows negative montse involvement, consider surgery; If she has distant disease or montse involvement, continue with systemic therapy  Thoracic Surgery noted that Avastin would need to be stopped 6 weeks before surgery and held for 4 weeks after surgery      Bevacizumab 15 mg/kg IV every 3 weeks (now discontinued)  Goal = prolongation of life    2  Elevated LFTs  The most recent values are within normal limits  This is more of a non issue now that patient is off treatment  3  Brain metastases status post resection and radiotherapy  Patient has been tapered off of Keppra  There have been no recent CNS issues  Recent MRI/brain did not demonstrate any evidence for recurrence  Patient will follow up with Radiation Oncology as directed  4  Anxiety - stable  Patient has Xanax as needed  Patient is to return in 6 weeks   Patient knows to call the hematology/oncology office if there are any other questions or concerns  Carefully review your medication list and verify that the list is accurate and up-to-date  Please call the hematology/oncology office if there are medications missing from the list, medications on the list that you are not currently taking or if there is a dosage or instruction that is different from how you're taking that medication      Patient goals and areas of care:  Start surveillance  Barriers to care:  None  Patient is able to self-care   __________________________________________________________________________________________________    Chief Complaint   Patient presents with    Follow-up     Metastatic non-small cell lung carcinoma     Advance Care Planning/Advance Directives: not discussed       Adenocarcinoma of lung, stage 4, right ([facility] [address] )    9/5/2015 Initial Diagnosis     Patient was referred to the emergency room for evaluation of vertigo as sent from the [facility]  (patient reported )  Patient underwent the following pertinent imaging scans  MRI of the brain demonstrated a mass in the right superior all temporal gyrus with measurements of 4 2 x 3 3 x 3 6 cm with vasogenic edema and mass effect  There was a near uncal herniation noted  CT of the chest and abdomen/pelvis demonstrated a necrotic right upper lobe mass measuring 7 x 4 2 x 4 1 cm strongly suspicious for bronchogenic carcinoma  The mass contacts the posterior medial pleural surface and potentially contacts the right posterior tracheal border  No pathologic adenopathy was identified in no evidence of metastatic disease in the chest abdomen or pelvis  9/6/2015 Surgery     Non-small cell carcinoma of lung ([facility] ) diagnosed from biopsy of right temporal mass  Pathology demonstrates poorly differentiated non-small cell carcinoma  Pathologist stated that the immunoprofile supported the primary lung non-small cell carcinoma favor adenocarcinoma  Squamous carcinoma could not be excluded, secondary to focal P 40 staining  Pattie Christensen path report demonstrated no ALK gene rearrangement or dleation and negative ROS1 rearrangement, No EGFR mutations were found  Surgery completed by Dr Libby Smith         10/7/2015 - 10/16/2015 Radiation     A dose of 3000 cGy in 5 fractions delivered every other day was delivered to the resection cavity (single right superior temporal intracranial metastasis) Dr Gustavo Naik      10/20/2015 -  Research Study Participant     [id number] Phase III open label randomized study of [id number] [Anti-PDL1 antibody] in combination with carboplatin and paclitaxel +/- Avastin versus Carboplatin and Paciltaxel +/- Avastin in patients with stage IV Chemotherapy  Naive non-small cell lung carcinoma  Patient was randomized to receive off for medications  11/30/2015 - 2/18/2016 Chemotherapy     Started Carboplatin, Paclitaxel, Avastin and PD-L1 (atezolizumab; study drug)  Completed 6 cycles with cycle 6 day 1 on 2/18/16      3/10/2016 -  Chemotherapy     Beginning cycle 7, maintenance cycle with Avastin and PD-L1 (Atezolizumab; study drug)      5/24/2017 Adverse Reaction     LFT elevation-persisted  Patient was taken off of study drug Atezolizumab, but continues on Avastin maintance  5/23/2019 -  Chemotherapy     [No matching medication found in this treatment plan]       History of Present Illness:    59year old female recently diagnosed with IV lung cancer here for followup  Mrs Jose A Alonso began to have headaches last spring 2015  Patient was seen by her PCP and treated with antibiotics  Initially the symptoms got better the patient went on vacation  After returning from vacation, Mrs Jose A Alonso once again developed headaches  Patient was treated with a second round of antibiotics and then was diagnosed with vertigo  Patient was sent to the [address] [place] [address]  Although the specifics are not entirely clear, the therapist in the [address] [place] [address] sent the patient to the emergency room  A CAT scan of the brain demonstrated a brain lesion and patient was transferred to [place]  Patient was seen by Dr Robert Talbert and underwent resection demonstrating adenocarcinoma  Additional testing demonstrated a lung mass  Patient improved and was discharged  Patient completed SRT with Dr Priyank Bowie and Dr Kate San  [facility] (phase III open label randomized study of [id number] [anti-PD-L1 antibody] in combination with carboplatin and paclitaxel +/- Avastin versus carboplatin and paclitaxel +/- Avastin in patients with stage IV chemotherapy naÃ¯ve non-small cell lung carcinoma)  Mrs Jose A Alonso was randomized to receive the anti-PD-L1 antibody with chemotherapy - patient completed 6 cycles and was placed on maintenance  Mrs Art Varghese previously suffered a tonic-clonic seizure and was seen in the emergency room  CAT scan of the head did not demonstrate any evidence of disease progression  The seizure was thought to be due to encephalomalacia  Since that time, there have been no seizure issues  Mrs Kenroy Cordova is on Keppra  Patient has had elevated liver function tests  Because the abnormalities were grade 3, patient was taken off the [id number] and has continued on protocol with Avastin only  Patient returns for follow-up  Patient states feeling okay, about the same as before  Bruising is the same as before  Fatigue is the same as before  Appetite is good, weight is stable  Activities are baseline  No chest pain or pressure, no shortness of breath or dyspnea on exertion  Review of Systems   Constitutional: Positive for fatigue  HENT: Negative  Eyes: Negative  Respiratory: Negative  Cardiovascular: Negative  Gastrointestinal: Negative  Endocrine: Negative  Genitourinary: Negative  Musculoskeletal: Positive for arthralgias  Negative for neck pain  Skin: Negative  Allergic/Immunologic: Negative  Neurological: Negative  Hematological: Does not bruise/bleed easily  Bruises easily   Psychiatric/Behavioral: Negative  All other systems reviewed and are negative       Patient Active Problem List   Diagnosis    Seizure ([address] )    Adenocarcinoma of lung, stage 4, right ([address] )    Mixed hyperlipidemia    Abnormal LFTs    Allergic rhinitis    Brain metastasis ([address]    Brain tumor ([address]    Encephalomalacia    Impaired fasting glucose    Insomnia    Lesion of temporal lobe    Non-small cell lung cancer ([address] )    Benign hypertension    Chronic maxillary sinusitis     Past Medical History:   Diagnosis Date    Adenocarcinoma of right lung, stage 4 ([address] )     Allergic rhinitis     Alopecia     resolved 10/25/16  Anxiety     last assessed 10/4/16, resolved 10/25/16    Benign hypertension 2018    Benign neoplasm of skin of trunk 2008    last assessed 3/25/08    Benign neoplasm of skin of upper extremity and shoulder 2008    last assessed 3/25/08    Ear deformity, acquired     last assessed 10/13/14, resolved 3/12/15    Eczema     Hyperlipemia     Maintenance chemotherapy     Secondary cancer [facility] brain ([facility] [address] )     Seizures ([facility] [address] )     Vertigo      Past Surgical History:   Procedure Laterality Date    APPENDECTOMY  1964    BRAIN TUMOR EXCISION      CENTRAL VENOUS CATHETER INSERTION Right     PORTACATH     Family History   Problem Relation Age of Onset    Diabetes Mother     Heart disease Mother     Lung cancer Father 79        Smoker     Pancreatitis Brother     No Known Problems Brother      Social History     Socioeconomic History    Marital status: /Civil Union     Spouse name: Not on file    Number of children: 1    Years of education: Not on file    Highest education level: Not on file   Occupational History    Not on file   Social Needs    Financial resource strain: Not on file    Food insecurity:     Worry: Not on file     Inability: Not on file    Transportation needs:     Medical: Not on file     Non-medical: Not on file   Tobacco Use    Smoking status: Former Smoker     Packs/day: 1 00     Years: 47 00     Pack years: 47 00     Types: Cigarettes     Last attempt to quit: 2015     Years since quittin 5    Smokeless tobacco: Never Used   Substance and Sexual Activity    Alcohol use: Yes     Frequency: Monthly or less     Drinks per session: 1 or 2     Binge frequency: Never     Comment: occasionally / Social     Drug use: Yes     Types: Marijuana    Sexual activity: Yes   Lifestyle    Physical activity:     Days per week: Not on file     Minutes per session: Not on file    Stress: Not on file   Relationships    Social connections:     Talks on phone: Not on file     Gets together: Not on file     Attends Sikh service: Not on file     Active member of club or organization: Not on file     Attends meetings of clubs or organizations: Not on file     Relationship status: Not on file    Intimate partner violence:     Fear of current or ex partner: Not on file     Emotionally abused: Not on file     Physically abused: Not on file     Forced sexual activity: Not on file   Other Topics Concern    Not on file   Social History Narrative    High school or GED     Lives independently with spouse        Current Outpatient Medications:     ALPRAZolam (XANAX) 0 25 mg tablet, Take 1 tablet (0 25 mg total) by mouth 2 (two) times a day as needed for anxiety, Disp: 30 tablet, Rfl: 0    amLODIPine (NORVASC) 5 mg tablet, Take 1 tablet (5 mg total) by mouth daily, Disp: 30 tablet, Rfl: 3    atorvastatin (LIPITOR) 10 mg tablet, Take 1 tablet (10 mg total) by mouth daily, Disp: 90 tablet, Rfl: 1    halobetasol (ULTRAVATE) 0 05 % cream, Apply topically 2 (two) times a day, Disp: 50 g, Rfl: 0    Omega-3 Fatty Acids (FISH OIL PO), Take 2 tablets by mouth , Disp: , Rfl:     oxyCODONE (ROXICODONE) 5 mg immediate release tablet, Take 1 tablet (5 mg total) by mouth every 6 (six) hours as needed for moderate painMax Daily Amount: 20 mg, Disp: 90 tablet, Rfl: 0    Pseudoephedrine-Guaifenesin (MUCINEX D PO), Take by mouth, Disp: , Rfl:     ranitidine (ZANTAC) 75 MG tablet, Take 1 tablet by mouth daily, Disp: , Rfl:     ondansetron (ZOFRAN) 8 mg tablet, Take 1 tablet (8 mg total) by mouth 3 (three) times a day (Patient not taking: Reported on 7/3/2019), Disp: 1 tablet, Rfl: 2    prochlorperazine (COMPAZINE) 10 mg tablet, Take 10 mg by mouth every 6 (six) hours as needed for nausea or vomiting, Disp: , Rfl:   No current facility-administered medications for this visit       Facility-Administered Medications Ordered in Other Visits:     alteplase (CATHFLO) injection 2 mg, 2 mg, Intracatheter, PRN, Reyes Briceno MD    sodium chloride 0 9 % infusion, 20 mL/hr, Intravenous, Continuous, Reyes Briceno MD    Allergies   Allergen Reactions    Iodinated Diagnostic Agents Anaphylaxis and Itching    Clindamycin     Clindamycin/Lincomycin        Vitals:    07/03/19 1201   BP: 148/84   Pulse: 75   Resp: 18   Temp: (!) 97 3 °F (36 3 °C)   SpO2: 95%     Physical Exam   Constitutional: She is oriented to person, place, and time  She appears well-developed and well-nourished  Well-nourished female, no respiratory distress   HENT:   Head: Normocephalic and atraumatic  Right Ear: External ear normal    Left Ear: External ear normal    Nose: Nose normal    Mouth/Throat: No oropharyngeal exudate  Oropharynx:  Mucosa moist and pink, no exudate, plates in place   Eyes: Pupils are equal, round, and reactive to light  Conjunctivae and EOM are normal    Neck: Normal range of motion  Neck supple  Neck is supple, no JVD, good range of motion, no pain or tenderness with movement   Cardiovascular: Normal rate, regular rhythm, normal heart sounds and intact distal pulses  Pulmonary/Chest: Effort normal and breath sounds normal    Right anterior chest wall port in place, area clean and dry, lungs with good air entry bilaterally, few scattered rhonchi   Abdominal: Soft  Bowel sounds are normal    Abdomen is soft, nontender, no hepatosplenomegaly, no rigidity or rebound, +bowel sounds   Musculoskeletal: Normal range of motion  Neurological: She is alert and oriented to person, place, and time  She has normal reflexes  Skin: Skin is warm  Warm, moist, good color, no petechiae, upper extremity ecchymoses and purpura more pronounced than usual (patient has been working in her garden)   Psychiatric: She has a normal mood and affect   Her behavior is normal  Judgment and thought content normal    Extremities: no lower extremity edema, no cords, pulses are 1+  Lymphatics: no adenopathy in the neck, supraclavicular region, axilla and groin bilaterally    Performance Status: 1 - Symptomatic but completely ambulatory    Labs    07/01/2019 WBC = 7 3 hemoglobin = 15 hematocrit = 44 6 platelet = 146 neutrophil = 53% BUN = 13 creatinine = 0 95 LFTs WNL    05/20/2019 WBC = 7 1 hemoglobin = 14 8 hematocrit = 43 platelet = 722 neutrophil = 57% BUN = 12 creatinine = 0 97 LFTs WNL LDH = 130  04/29/2019 WBC = 8 2 hemoglobin = 15 1 hematocrit = 44 platelet = 445 neutrophil = 60% BUN = 16 creatinine = 0 92 calcium = 9 8 alkaline phosphatase = 76 LDH = 143 AST = 34 ALT = 41  04/08/2019 WBC = 7 9 hemoglobin = 13 9 hematocrit = 41 2 platelet = 691 neutrophil = 61% BUN = 10 creatinine = 0 95 calcium = 9 6 phosphorus = 3 6 LDH = 147 magnesium = 1 8 AST = 33 ALT = 35 alkaline phosphatase = 70 total bilirubin = 0 4    Procedures    05/30/2019 complete pulmonary function test    PFT Interpretation     Quality of Data:  The patient's efforts are acceptable and reproducible      Test Performed:  Complete pulmonary function tests, including spirometry with and without bronchodilator, measurement of lung volume, and diffusing capacity      Spirometry Interpretation:  Spirometry is within normal limits      Flow Volume Loops:  Flow-Volume loops are normal      Lung Volumes:  Plethysmographic lung volumes are within normal limits      Diffusing Capacity:  Minimal reduction in diffusion capacity likely clinically insignificant     Conclusions: An isolated reduction in Diffusing Capacity is present and may represent Early fibrotic or pulmonary vascular disease  Clinical correlation is recommended  Imaging    05/31/2019 PET-CT    1  No findings suspicious for hypermetabolic malignancy  2  Mild patchy FDG uptake in the left lower lobe patchy groundglass opacities with associated peribronchial thickening    This is likely infectious or inflammatory given the appearance, however, given that this has been present since the 8/20/2018 CT, consider follow-up with bronchoscopy  04/26/2019 CT scan of the chest and abdomen/pelvis    1  Stable cavitary target lesion in the right upper lobe  No new site of metastasis  2   Stable left lower lobe patchy groundglass opacities with associated mild peribronchial thickening likely due to a chronic infectious or inflammatory process  Continued surveillance on follow-up is recommended  04/22/2019 MRI brain    1  Stable treatment related changes in the lateral aspect of the right temporal lobe without evidence of recurrent or progressive metastatic disease  2   No acute infarction, intracranial hemorrhage or enhancing mass lesion  3   Mild scattered white matter disease likely mild, chronic microangiopathy and/or treatment related changes are stable  4   Moderate ethmoidal and maxillary sinus disease redemonstrated  02/21/2019 CT scan of the chest and abdomen/pelvis    Limited evaluation of solid organs and vasculature without intravenous contrast   1   Stable cavitary target lesion in the right upper lobe  2   Stable left lower lobe patchy groundglass opacities with associated mild peribronchial wall thickening  This is again more likely chronic infectious or inflammatory in etiology  Continued surveillance on follow-up is recommended  3   No new metastatic lesions are found  12/20/2018 CT scan of the chest and abdomen/pelvis    1  Stable right upper lobe 3 3 cm cavitary lesion as described  2   Left lower lobe patchy groundglass opacities with associated mild peribronchial wall thickening, similar to October 18, 2018 but improved from August 20, 2018, likely related to a slowly resolving infectious/inflammatory process  Follow-up   short-term chest CT in 3 months is recommended to evaluate for resolution  3   No metastatic disease in the abdomen or pelvis  10/18/2018 CT scan of the chest without contrast: IMPRESSION: Improved appearance of the left lung base infiltrate    Stable right cavitary lesion  No new findings    08/20/2018 CT scan of the chest and abdomen/pelvis without contrast    RECIST target lesion: Cavitary right upper lobe mass is unchanged, measuring 3 4 x 2 6 cm on series 3 image 15 (previously 3 5 x 2 5 cm)  Solid component along the medial aspect of the posterior wall is also unchanged, measuring 1 4 x 0 9 cm      LUNGS:  Stable cavitary right upper lobe mass, as above  No new nodules identified  There is new small patchy density in the posterior left base compatible with infiltrate  There is no tracheal or endobronchial lesion  IMPRESSION    Unchanged cavitary right upper lobe mass  No new metastatic disease  Interval development of mild left lower lobe infiltrate noted  06/18/2018 CT scan of the chest and abdomen/pelvis    Unchanged cavitary right upper lobe mass    No new metastatic disease  04/16/2018 CT scan of the chest and abdomen/pelvis    There is no significant interval change in size of the cystic or nodular components of the cavitary right upper lobe mass  No new metastatic lesions identified in the chest, abdomen or pelvis  1/22/18 CAT scan of the chest and abdomen/pelvis    RECIST MEASUREMENTS:  TARGET LESION:   1: Right upper lobe cavitary lung mass: The overall size of the lesion is 3 5 x 2 4 cm on image 14 of series 3, significantly decreased from 4 3 x 2 7 cm on previous examination  Solid component along the medial aspect of the posterior wall is not significantly changed from previous examination currently measuring 1 5 x 0 9 cm on image 14 of series 3 compared with 1 8 x 0 8 cm when measured using similar technique on previous   examination  Nodular solid component along the lateral aspect of posterior wall measures approximately 0 7 x 0 4 cm on image 13 of series 3, and when measured using similar technique is unchanged from previous examinations      There is interval decrease in size of the cystic portion of the cavitary right upper lobe mass however, solid components of this mass are not significantly changed from previous examination  Otherwise unchanged examination with no new metastatic lesions   identified in the chest, abdomen or pelvis  Pathology    [facility] results demonstrated no ALK gene rearrangement or deletion and negative for ROS1 rearrangement  No EGFR mutations were found also  Right temporal mass from September 6, 2015 demonstrated metastatic poorly differentiated non-small cell carcinoma  The tumor cells stained positive for CK 7, TTF-1 and Napsin and negative for CK 20, CK 5/6 andmucicarmine  Pathologist stated that the immunoprofile supported the primary lung non-small cell carcinoma, favor adenocarcinoma  The pathologist also stated that given the focal p40 staining, a squamous carcinoma could not be excluded

## 2019-07-05 ENCOUNTER — HOSPITAL ENCOUNTER (OUTPATIENT)
Dept: INFUSION CENTER | Facility: CLINIC | Age: 59
End: 2019-07-05

## 2019-07-06 LAB
APTT PPP: 30 SEC (ref 24–33)
TSH SERPL DL<=0.005 MIU/L-ACNC: 2.37 UIU/ML (ref 0.45–4.5)

## 2019-07-09 DIAGNOSIS — C34.91 ADENOCARCINOMA OF LUNG, STAGE 4, RIGHT (HCC): Primary | ICD-10-CM

## 2019-07-11 ENCOUNTER — HOSPITAL ENCOUNTER (OUTPATIENT)
Dept: INFUSION CENTER | Facility: HOSPITAL | Age: 59
Discharge: HOME | End: 2019-07-11

## 2019-07-11 DIAGNOSIS — C34.91 ADENOCARCINOMA OF LUNG, STAGE 4, RIGHT (HCC): Primary | ICD-10-CM

## 2019-07-15 DIAGNOSIS — L30.9 DERMATITIS: ICD-10-CM

## 2019-08-08 ENCOUNTER — HOSPITAL ENCOUNTER (OUTPATIENT)
Dept: INFUSION CENTER | Facility: HOSPITAL | Age: 59
Discharge: HOME | End: 2019-08-08
Payer: COMMERCIAL

## 2019-08-08 DIAGNOSIS — C34.91 ADENOCARCINOMA OF LUNG, STAGE 4, RIGHT (HCC): Primary | ICD-10-CM

## 2019-08-08 PROCEDURE — 96523 IRRIG DRUG DELIVERY DEVICE: CPT

## 2019-08-13 DIAGNOSIS — C34.91 ADENOCARCINOMA OF LUNG, STAGE 4, RIGHT (HCC): ICD-10-CM

## 2019-08-13 LAB
ALBUMIN SERPL-MCNC: 4.6 G/DL (ref 3.6–4.8)
ALBUMIN/GLOB SERPL: 2.1 {RATIO} (ref 1.2–2.2)
ALP SERPL-CCNC: 63 IU/L (ref 39–117)
ALT SERPL-CCNC: 28 IU/L (ref 0–32)
AST SERPL-CCNC: 30 IU/L (ref 0–40)
BASOPHILS # BLD AUTO: 0 X10E3/UL (ref 0–0.2)
BASOPHILS NFR BLD AUTO: 0 %
BILIRUB SERPL-MCNC: 0.5 MG/DL (ref 0–1.2)
BUN SERPL-MCNC: 15 MG/DL (ref 8–27)
BUN/CREAT SERPL: 18 (ref 12–28)
CALCIUM SERPL-MCNC: 9.7 MG/DL (ref 8.7–10.3)
CHLORIDE SERPL-SCNC: 101 MMOL/L (ref 96–106)
CO2 SERPL-SCNC: 22 MMOL/L (ref 20–29)
CREAT SERPL-MCNC: 0.85 MG/DL (ref 0.57–1)
EOSINOPHIL # BLD AUTO: 0.2 X10E3/UL (ref 0–0.4)
EOSINOPHIL NFR BLD AUTO: 3 %
ERYTHROCYTE [DISTWIDTH] IN BLOOD BY AUTOMATED COUNT: 14.1 % (ref 12.3–15.4)
GLOBULIN SER-MCNC: 2.2 G/DL (ref 1.5–4.5)
GLUCOSE SERPL-MCNC: 147 MG/DL (ref 65–99)
HCT VFR BLD AUTO: 43.1 % (ref 34–46.6)
HGB BLD-MCNC: 14.4 G/DL (ref 11.1–15.9)
IMM GRANULOCYTES # BLD: 0 X10E3/UL (ref 0–0.1)
IMM GRANULOCYTES NFR BLD: 0 %
LYMPHOCYTES # BLD AUTO: 2.7 X10E3/UL (ref 0.7–3.1)
LYMPHOCYTES NFR BLD AUTO: 38 %
MCH RBC QN AUTO: 30.8 PG (ref 26.6–33)
MCHC RBC AUTO-ENTMCNC: 33.4 G/DL (ref 31.5–35.7)
MCV RBC AUTO: 92 FL (ref 79–97)
MONOCYTES # BLD AUTO: 0.6 X10E3/UL (ref 0.1–0.9)
MONOCYTES NFR BLD AUTO: 8 %
NEUTROPHILS # BLD AUTO: 3.6 X10E3/UL (ref 1.4–7)
NEUTROPHILS NFR BLD AUTO: 51 %
PLATELET # BLD AUTO: 267 X10E3/UL (ref 150–450)
POTASSIUM SERPL-SCNC: 4.5 MMOL/L (ref 3.5–5.2)
PROT SERPL-MCNC: 6.8 G/DL (ref 6–8.5)
RBC # BLD AUTO: 4.68 X10E6/UL (ref 3.77–5.28)
SL AMB EGFR AFRICAN AMERICAN: 86 ML/MIN/1.73
SL AMB EGFR NON AFRICAN AMERICAN: 75 ML/MIN/1.73
SODIUM SERPL-SCNC: 138 MMOL/L (ref 134–144)
WBC # BLD AUTO: 7 X10E3/UL (ref 3.4–10.8)

## 2019-08-13 RX ORDER — OXYCODONE HYDROCHLORIDE 5 MG/1
5 TABLET ORAL EVERY 6 HOURS PRN
Qty: 90 TABLET | Refills: 0 | Status: SHIPPED | OUTPATIENT
Start: 2019-08-13 | End: 2021-02-22 | Stop reason: ALTCHOICE

## 2019-08-13 RX ORDER — ALPRAZOLAM 0.25 MG/1
0.25 TABLET ORAL 2 TIMES DAILY PRN
Qty: 30 TABLET | Refills: 0 | Status: SHIPPED | OUTPATIENT
Start: 2019-08-13

## 2019-08-14 ENCOUNTER — OFFICE VISIT (OUTPATIENT)
Dept: HEMATOLOGY ONCOLOGY | Facility: MEDICAL CENTER | Age: 59
End: 2019-08-14
Payer: COMMERCIAL

## 2019-08-14 VITALS
RESPIRATION RATE: 18 BRPM | SYSTOLIC BLOOD PRESSURE: 144 MMHG | DIASTOLIC BLOOD PRESSURE: 88 MMHG | TEMPERATURE: 96.9 F | BODY MASS INDEX: 27.31 KG/M2 | HEART RATE: 87 BPM | HEIGHT: 67 IN | OXYGEN SATURATION: 97 % | WEIGHT: 174 LBS

## 2019-08-14 DIAGNOSIS — C34.91 ADENOCARCINOMA OF LUNG, STAGE 4, RIGHT (HCC): Primary | ICD-10-CM

## 2019-08-14 PROCEDURE — 99213 OFFICE O/P EST LOW 20 MIN: CPT | Performed by: INTERNAL MEDICINE

## 2019-08-14 NOTE — PROGRESS NOTES
Galilea Dela Cruz  1960  [facility]  [address] [facility] [place] [address]    DISCUSSION  SUMMARY:    42-year-old female with M1 non-small cell lung carcinoma/adenocarcinoma  Patient is on [facility] 60538-13 (phase III open label randomized study of [id number] [anti-PD-L1 antibody] in combination with carboplatin and paclitaxel +/- Avastin versus carboplatin and paclitaxel +/- Avastin in patients with stage IV chemotherapy naive non-small cell lung carcinoma)  Patient was randomized to receive all 4 medications  Mrs Sunitha Che completed the 6 cycles of treatment without significant toxicities  Patient had been on maintenance (study drug (atezolizumab) and avastin) - subsequently only Avastin  Patient was on treatment for approximately 4 years  Issues:     Stage IV non-small cell lung carcinoma  As discussed previously, recent workup did not demonstrate any evidence of residual or recurrent disease  Patient is off the Avastin now  Mrs Sunitha Che feels well and clinically there are no concerning signs  Repeat CT scans are scheduled for approximately 2 weeks  Patient will return in 6 weeks  Recent blood work was good/acceptable (see glucose below) before  The plan is to continue with surveillance  Patient will follow up with Radiation Oncology as directed  As discussed previously, as long as there is no evidence of recurrence, the plan will be for continued surveillance  If there is any evidence for recurrence, patient may be a candidate for resection and then restarting treatment  Mrs Sunitha Che will continue with routine port flushes  Patient is to return here in 6 weeks  Patient and Dr France Rene may also revisit removing the original lesion even if there is no evidence of regrowth/recurrence  As discussed previously, the information below comes from the recent thoracic conference      Arline Wan was discussed at today's Thoracic Oncology Multidisciplinary Tumor Conference  Her recent and previous imaging studies were reviewed  The group recommends obtaining a PET Scan, PFTs, and referral to Thoracic Surgery for surgical evaluation   If PFTs are poor and PET scan shows negative montse involvement, consider SBRT; If PFTs are good and PET scan shows negative montse involvement, consider surgery; If she has distant disease or montse involvement, continue with systemic therapy  Thoracic Surgery noted that Avastin would need to be stopped 6 weeks before surgery and held for 4 weeks after surgery      Bevacizumab 15 mg/kg IV every 3 weeks (now discontinued)  Goal = prolongation of life    Brain metastases status post resection and radiotherapy  Patient has been tapered off of Keppra  There have been no recent CNS issues  Recent MRI/brain did not demonstrate any evidence for recurrence  Patient will follow up with Radiation Oncology as directed  Anxiety - stable  Patient has an Xanax as needed  Medication refills  Patient has been on Xanax for anxiety and Percocet for shoulder pain  The shoulder pain was thought to be due to the chemotherapeutic agents  Mrs Samir Carbajal understands that I cannot continue to write for Xanax and Percocet for her now that she is off all chemotherapy  Patient was given 1 additional set of prescriptions today to carry her through  Patient needs to speak to her PCP about this moving forward  Patient is to return in 6 weeks   Patient knows to call the hematology/oncology office if there are any other questions or concerns  Carefully review your medication list and verify that the list is accurate and up-to-date  Please call the hematology/oncology office if there are medications missing from the list, medications on the list that you are not currently taking or if there is a dosage or instruction that is different from how you're taking that medication      Patient goals and areas of care:  Continue with surveillance  Barriers to care:  None  Patient is able to self-care   __________________________________________________________________________________________________    Chief Complaint   Patient presents with    Follow-up     Stage IV non-small cell lung carcinoma on surveillance     Advance Care Planning/Advance Directives: not discussed       Adenocarcinoma of lung, stage 4, right ([facility] [address] )    9/5/2015 Initial Diagnosis     Patient was referred to the emergency room for evaluation of vertigo as sent from the [facility]  (patient reported )  Patient underwent the following pertinent imaging scans  MRI of the brain demonstrated a mass in the right superior all temporal gyrus with measurements of 4 2 x 3 3 x 3 6 cm with vasogenic edema and mass effect  There was a near uncal herniation noted  CT of the chest and abdomen/pelvis demonstrated a necrotic right upper lobe mass measuring 7 x 4 2 x 4 1 cm strongly suspicious for bronchogenic carcinoma  The mass contacts the posterior medial pleural surface and potentially contacts the right posterior tracheal border  No pathologic adenopathy was identified in no evidence of metastatic disease in the chest abdomen or pelvis  9/6/2015 Surgery     Non-small cell carcinoma of lung ([facility] ) diagnosed from biopsy of right temporal mass  Pathology demonstrates poorly differentiated non-small cell carcinoma  Pathologist stated that the immunoprofile supported the primary lung non-small cell carcinoma favor adenocarcinoma  Squamous carcinoma could not be excluded, secondary to focal P 40 staining  [facility] path report demonstrated no ALK gene rearrangement or dleation and negative ROS1 rearrangement, No EGFR mutations were found  Surgery completed by Dr Love Amel         10/7/2015 - 10/16/2015 Radiation     A dose of 3000 cGy in 5 fractions delivered every other day was delivered to the resection cavity (single right superior temporal intracranial metastasis)  Andolino      10/20/2015 -  Research Study Participant     [id number] Phase III open label randomized study of [id number] [Anti-PDL1 antibody] in combination with carboplatin and paclitaxel +/- Avastin versus Carboplatin and Paciltaxel +/- Avastin in patients with stage IV Chemotherapy  Naive non-small cell lung carcinoma  Patient was randomized to receive off for medications  11/30/2015 - 2/18/2016 Chemotherapy     Started Carboplatin, Paclitaxel, Avastin and PD-L1 (atezolizumab; study drug)  Completed 6 cycles with cycle 6 day 1 on 2/18/16      3/10/2016 -  Chemotherapy     Beginning cycle 7, maintenance cycle with Avastin and PD-L1 (Atezolizumab; study drug)      5/24/2017 Adverse Reaction     LFT elevation-persisted  Patient was taken off of study drug Atezolizumab, but continues on Avastin maintance  5/23/2019 - 7/3/2019 Chemotherapy     [No matching medication found in this treatment plan]       History of Present Illness:    59year old female recently diagnosed with IV lung cancer here for followup  Mrs Jo Rios began to have headaches last spring 2015  Patient was seen by her PCP and treated with antibiotics  Initially the symptoms got better the patient went on vacation  After returning from vacation, Mrs Jo Rios once again developed headaches  Patient was treated with a second round of antibiotics and then was diagnosed with vertigo  Patient was sent to the [address] [place] [address]  Although the specifics are not entirely clear, the therapist in the [address] [place] [address] sent the patient to the emergency room  A CAT scan of the brain demonstrated a brain lesion and patient was transferred to [place]  Patient was seen by Dr Nehal Burrell and underwent resection demonstrating adenocarcinoma  Additional testing demonstrated a lung mass  Patient improved and was discharged  Patient completed SRT with Dr Monica Rodriguez and Dr Samantha Alvarado        [facility] [id number] (phase III open label randomized study of [id number] [anti-PD-L1 antibody] in combination with carboplatin and paclitaxel +/- Avastin versus carboplatin and paclitaxel +/- Avastin in patients with stage IV chemotherapy naÃ¯ve non-small cell lung carcinoma)  Mrs Riya Toro was randomized to receive the anti-PD-L1 antibody with chemotherapy - patient completed 6 cycles and was placed on maintenance  Mrs Bard Vásquez previously suffered a tonic-clonic seizure and was seen in the emergency room  CAT scan of the head did not demonstrate any evidence of disease progression  The seizure was thought to be due to encephalomalacia  Since that time, there have been no seizure issues  Mrs Riya Toro had been on Keppra - this has been tapered off  After long discussions and consultation with the thoracic Working group, patient was taken off of all treatments approximately 6 weeks ago  The plan is surveillance  Patient states feeling well, better than before  Bruising on the upper extremities is slightly less/better than before, no bleeding issues  No fevers, chills or sweats  No shortness of breath or dyspnea on exertion  No cough, sputum or hemoptysis  No headaches, blurred vision or dizziness  No recent seizures  Appetite is good, weight is stable  No other GI,  or GYN issues  Patient states being very happy with her quality of life  Mrs Riya Toro still has occasional neck and shoulder pain, Percocets have been effective  Review of Systems   Constitutional: Positive for fatigue  HENT: Negative  Eyes: Negative  Respiratory: Negative  Cardiovascular: Negative  Gastrointestinal: Negative  Endocrine: Negative  Genitourinary: Negative  Musculoskeletal: Positive for arthralgias  Negative for neck pain  Skin: Negative  Allergic/Immunologic: Negative  Neurological: Negative  Hematological: Does not bruise/bleed easily  Bruises easily   Psychiatric/Behavioral: Negative  All other systems reviewed and are negative  Patient Active Problem List   Diagnosis    [facility]    [facility], stage [facility]    Mixed hyperlipidemia    Abnormal LFTs    Allergic rhinitis    [facility]    [facility] tumor [facility]    Encephalomalacia    Impaired fasting glucose    Insomnia    Lesion of temporal lobe    Non-small cell lung cancer [facility]    Benign hypertension    Chronic maxillary sinusitis     Past Medical History:   Diagnosis Date    Adenocarcinoma of right lung, stage 4 ([facility] [address] )     Allergic rhinitis     Alopecia     resolved 10/25/16    Anxiety     last assessed 10/4/16, resolved 10/25/16    Benign hypertension 2/1/2018    Benign neoplasm of skin of trunk 03/25/2008    last assessed 3/25/08    Benign neoplasm of skin of upper extremity and shoulder 03/25/2008    last assessed 3/25/08    Ear deformity, acquired     last assessed 10/13/14, resolved 3/12/15    Eczema     Hyperlipemia     Maintenance chemotherapy     [facility]     Seizures ([facility]     Vertigo      Past Surgical History:   Procedure Laterality Date    APPENDECTOMY  1964    BRAIN TUMOR EXCISION      CENTRAL VENOUS CATHETER INSERTION Right     PORTACATH     Family History   Problem Relation Age of Onset    Diabetes Mother     Heart disease Mother     Lung cancer Father 79        Smoker     Pancreatitis Brother     No Known Problems Brother      Social History     Socioeconomic History    Marital status: /Civil Union     Spouse name: Not on file    Number of children: 1    Years of education: Not on file    Highest education level: Not on file   Occupational History    Not on file   Social Needs    Financial resource strain: Not on file    Food insecurity:     Worry: Not on file     Inability: Not on file    Transportation needs:     Medical: Not on file     Non-medical: Not on file   Tobacco Use    Smoking status: Former Smoker     Packs/day: 1 00     Years: 47 00     Pack years: 47 00 Types: Cigarettes     Last attempt to quit: 2015     Years since quittin 6    Smokeless tobacco: Never Used   Substance and Sexual Activity    Alcohol use: Yes     Frequency: Monthly or less     Drinks per session: 1 or 2     Binge frequency: Never     Comment: occasionally / Social     Drug use: Yes     Types: Marijuana    Sexual activity: Yes   Lifestyle    Physical activity:     Days per week: Not on file     Minutes per session: Not on file    Stress: Not on file   Relationships    Social connections:     Talks on phone: Not on file     Gets together: Not on file     Attends Mandaeism service: Not on file     Active member of club or organization: Not on file     Attends meetings of clubs or organizations: Not on file     Relationship status: Not on file    Intimate partner violence:     Fear of current or ex partner: Not on file     Emotionally abused: Not on file     Physically abused: Not on file     Forced sexual activity: Not on file   Other Topics Concern    Not on file   Social History Narrative    High school or GED     Lives independently with spouse        Current Outpatient Medications:     ALPRAZolam (XANAX) 0 25 mg tablet, Take 1 tablet (0 25 mg total) by mouth 2 (two) times a day as needed for anxiety, Disp: 30 tablet, Rfl: 0    amLODIPine (NORVASC) 5 mg tablet, Take 1 tablet (5 mg total) by mouth daily, Disp: 30 tablet, Rfl: 3    atorvastatin (LIPITOR) 10 mg tablet, Take 1 tablet (10 mg total) by mouth daily, Disp: 90 tablet, Rfl: 1    halobetasol (ULTRAVATE) 0 05 % cream, APPLY  CREAM TOPICALLY TWICE DAILY, Disp: 50 g, Rfl: 1    Omega-3 Fatty Acids (FISH OIL PO), Take 2 tablets by mouth , Disp: , Rfl:     oxyCODONE (ROXICODONE) 5 mg immediate release tablet, Take 1 tablet (5 mg total) by mouth every 6 (six) hours as needed for moderate painMax Daily Amount: 20 mg, Disp: 90 tablet, Rfl: 0    prochlorperazine (COMPAZINE) 10 mg tablet, Take 10 mg by mouth every 6 (six) hours as needed for nausea or vomiting, Disp: , Rfl:     Pseudoephedrine-Guaifenesin (MUCINEX D PO), Take by mouth, Disp: , Rfl:     ranitidine (ZANTAC) 75 MG tablet, Take 1 tablet by mouth daily, Disp: , Rfl:     ondansetron (ZOFRAN) 8 mg tablet, Take 1 tablet (8 mg total) by mouth 3 (three) times a day (Patient not taking: Reported on 7/3/2019), Disp: 1 tablet, Rfl: 2  No current facility-administered medications for this visit  Facility-Administered Medications Ordered in Other Visits:     alteplase (CATHFLO) injection 2 mg, 2 mg, Intracatheter, PRN, Radha Arredondo MD    sodium chloride 0 9 % infusion, 20 mL/hr, Intravenous, Continuous, Radha Arredondo MD    Allergies   Allergen Reactions    Iodinated Diagnostic Agents Anaphylaxis and Itching    Clindamycin     Clindamycin/Lincomycin        Vitals:    08/14/19 0941   BP: 144/88   Pulse: 87   Resp: 18   Temp: (!) 96 9 °F (36 1 °C)   SpO2: 97%     Physical Exam   Constitutional: She is oriented to person, place, and time  She appears well-developed and well-nourished  Well-nourished female, no respiratory distress   HENT:   Head: Normocephalic and atraumatic  Right Ear: External ear normal    Left Ear: External ear normal    Nose: Nose normal    Mouth/Throat: No oropharyngeal exudate  Oropharynx:  Mucosa moist and pink, no exudate, plates in place   Eyes: Pupils are equal, round, and reactive to light  Conjunctivae and EOM are normal    Neck: Normal range of motion  Neck supple  Neck is supple, no JVD, good range of motion, no pain or tenderness with movement   Cardiovascular: Normal rate, regular rhythm, normal heart sounds and intact distal pulses  Pulmonary/Chest: Effort normal and breath sounds normal    Right anterior chest wall port in place, area clean and dry, lungs with good air entry bilaterally, few scattered rhonchi   Abdominal: Soft   Bowel sounds are normal    Abdomen is soft, nontender, no hepatosplenomegaly, no rigidity or rebound, +bowel sounds   Musculoskeletal: Normal range of motion  Neurological: She is alert and oriented to person, place, and time  She has normal reflexes  Skin: Skin is warm  Warm, moist, good color, well tanned, no petechiae or ecchymoses, purpura lesions are less/better than before on the upper extremities, no scabs, no active bleeding   Psychiatric: She has a normal mood and affect   Her behavior is normal  Judgment and thought content normal    Extremities: no lower extremity edema, no cords, pulses are 1+  Lymphatics: no adenopathy in the neck, supraclavicular region, axilla and groin bilaterally    Performance Status: 1 - Symptomatic but completely ambulatory    Labs    08/12/2019 BUN = 15 creatinine = 0 85 LFTs WNL calcium = 9 7 glucose = 147 WBC = 7 0 hemoglobin = 14 4 hematocrit = 43 1 platelet = 375 neutrophil = 51%    07/01/2019 WBC = 7 3 hemoglobin = 15 hematocrit = 44 6 platelet = 669 neutrophil = 53% BUN = 13 creatinine = 0 95 [facility]  05/20/2019 WBC = 7 1 hemoglobin = 14 8 hematocrit = 43 platelet = 386 neutrophil = 57% BUN = 12 creatinine = 0 97 Ts [facility] LDH = 130  04/29/2019 WBC = 8 2 hemoglobin = 15 1 hematocrit = 44 platelet = 179 neutrophil = 60% BUN = 16 creatinine = 0 92 calcium = 9 8 alkaline phosphatase = 76 LDH = 143 AST = 34 ALT = 41  04/08/2019 WBC = 7 9 hemoglobin = 13 9 hematocrit = 41 2 platelet = 006 neutrophil = 61% BUN = 10 creatinine = 0 95 calcium = 9 6 phosphorus = 3 6 LDH = 147 magnesium = 1 8 AST = 33 ALT = 35 alkaline phosphatase = 70 total bilirubin = 0 4    Procedures    05/30/2019 complete pulmonary function test    PFT Interpretation     Quality of Data:  The patient's efforts are acceptable and reproducible      Test Performed:  Complete pulmonary function tests, including spirometry with and without bronchodilator, measurement of lung volume, and diffusing capacity      Spirometry Interpretation:  Spirometry is within normal limits      Flow Volume Loops:  Flow-Volume loops are normal      Lung Volumes:  Plethysmographic lung volumes are within normal limits      Diffusing Capacity:  Minimal reduction in diffusion capacity likely clinically insignificant     Conclusions: An isolated reduction in Diffusing Capacity is present and may represent Early fibrotic or pulmonary vascular disease  Clinical correlation is recommended  Imaging    05/31/2019 PET-CT    1  No findings suspicious for hypermetabolic malignancy  2  Mild patchy FDG uptake in the left lower lobe patchy groundglass opacities with associated peribronchial thickening  This is likely infectious or inflammatory given the appearance, however, given that this has been present since the 8/20/2018 CT, consider follow-up with bronchoscopy  04/26/2019 CT scan of the chest and abdomen/pelvis    1  Stable cavitary target lesion in the right upper lobe  No new site of metastasis  2   Stable left lower lobe patchy groundglass opacities with associated mild peribronchial thickening likely due to a chronic infectious or inflammatory process  Continued surveillance on follow-up is recommended  04/22/2019 MRI brain    1  Stable treatment related changes in the lateral aspect of the right temporal lobe without evidence of recurrent or progressive metastatic disease  2   No acute infarction, intracranial hemorrhage or enhancing mass lesion  3   Mild scattered white matter disease likely mild, chronic microangiopathy and/or treatment related changes are stable  4   Moderate ethmoidal and maxillary sinus disease redemonstrated  02/21/2019 CT scan of the chest and abdomen/pelvis    Limited evaluation of solid organs and vasculature without intravenous contrast   1   Stable cavitary target lesion in the right upper lobe  2   Stable left lower lobe patchy groundglass opacities with associated mild peribronchial wall thickening  This is again more likely chronic infectious or inflammatory in etiology    Continued surveillance on follow-up is recommended  3   No new metastatic lesions are found  12/20/2018 CT scan of the chest and abdomen/pelvis    1  Stable right upper lobe 3 3 cm cavitary lesion as described  2   Left lower lobe patchy groundglass opacities with associated mild peribronchial wall thickening, similar to October 18, 2018 but improved from August 20, 2018, likely related to a slowly resolving infectious/inflammatory process  Follow-up   short-term chest CT in 3 months is recommended to evaluate for resolution  3   No metastatic disease in the abdomen or pelvis  10/18/2018 CT scan of the chest without contrast: IMPRESSION: Improved appearance of the left lung base infiltrate  Stable right cavitary lesion  No new findings    08/20/2018 CT scan of the chest and abdomen/pelvis without contrast    RECIST target lesion: Cavitary right upper lobe mass is unchanged, measuring 3 4 x 2 6 cm on series 3 image 15 (previously 3 5 x 2 5 cm)  Solid component along the medial aspect of the posterior wall is also unchanged, measuring 1 4 x 0 9 cm      LUNGS:  Stable cavitary right upper lobe mass, as above  No new nodules identified  There is new small patchy density in the posterior left base compatible with infiltrate  There is no tracheal or endobronchial lesion  IMPRESSION    Unchanged cavitary right upper lobe mass  No new metastatic disease  Interval development of mild left lower lobe infiltrate noted  06/18/2018 CT scan of the chest and abdomen/pelvis    Unchanged cavitary right upper lobe mass    No new metastatic disease  04/16/2018 CT scan of the chest and abdomen/pelvis    There is no significant interval change in size of the cystic or nodular components of the cavitary right upper lobe mass  No new metastatic lesions identified in the chest, abdomen or pelvis      1/22/18 CAT scan of the chest and abdomen/pelvis    RECIST MEASUREMENTS:  TARGET LESION:   1: Right upper lobe cavitary lung mass: The overall size of the lesion is 3 5 x 2 4 cm on image 14 of series 3, significantly decreased from 4 3 x 2 7 cm on previous examination  Solid component along the medial aspect of the posterior wall is not significantly changed from previous examination currently measuring 1 5 x 0 9 cm on image 14 of series 3 compared with 1 8 x 0 8 cm when measured using similar technique on previous   examination  Nodular solid component along the lateral aspect of posterior wall measures approximately 0 7 x 0 4 cm on image 13 of series 3, and when measured using similar technique is unchanged from previous examinations  There is interval decrease in size of the cystic portion of the cavitary right upper lobe mass however, solid components of this mass are not significantly changed from previous examination  Otherwise unchanged examination with no new metastatic lesions   identified in the chest, abdomen or pelvis  Pathology    [facility] results demonstrated no ALK gene rearrangement or deletion and negative for ROS1 rearrangement  No EGFR mutations were found also  Right temporal mass from September 6, 2015 demonstrated metastatic poorly differentiated non-small cell carcinoma  The tumor cells stained positive for CK 7, TTF-1 and Napsin and negative for CK 20, CK 5/6 andmucicarmine  Pathologist stated that the immunoprofile supported the primary lung non-small cell carcinoma, favor adenocarcinoma  The pathologist also stated that given the focal p40 staining, a squamous carcinoma could not be excluded

## 2019-08-28 ENCOUNTER — HOSPITAL ENCOUNTER (OUTPATIENT)
Dept: RADIOLOGY | Facility: HOSPITAL | Age: 59
Discharge: HOME | End: 2019-08-28
Attending: INTERNAL MEDICINE

## 2019-08-28 DIAGNOSIS — C34.91 ADENOCARCINOMA OF LUNG, STAGE 4, RIGHT (HCC): ICD-10-CM

## 2019-08-28 PROCEDURE — 74176 CT ABD & PELVIS W/O CONTRAST: CPT

## 2019-08-28 PROCEDURE — 71250 CT THORAX DX C-: CPT

## 2019-09-15 DIAGNOSIS — I10 BENIGN HYPERTENSION: ICD-10-CM

## 2019-09-15 RX ORDER — AMLODIPINE BESYLATE 5 MG/1
TABLET ORAL
Qty: 30 TABLET | Refills: 3 | Status: SHIPPED | OUTPATIENT
Start: 2019-09-15 | End: 2020-01-14

## 2019-09-19 ENCOUNTER — HOSPITAL ENCOUNTER (OUTPATIENT)
Dept: INFUSION CENTER | Facility: HOSPITAL | Age: 59
Discharge: HOME | End: 2019-09-19
Payer: COMMERCIAL

## 2019-09-19 PROCEDURE — 96523 IRRIG DRUG DELIVERY DEVICE: CPT

## 2019-09-19 NOTE — PLAN OF CARE
Problem: Potential for Falls  Goal: Patient will remain free of falls  Description  INTERVENTIONS:  - Assess patient frequently for physical needs  -  Identify cognitive and physical deficits and behaviors that affect risk of falls    -  [place] fall precautions as indicated by assessment   - Educate patient/family on patient safety including physical limitations  - Instruct patient to call for assistance with activity based on assessment  - Modify environment to reduce risk of injury    Outcome: Progressing     Problem: SAFETY ADULT  Goal: Maintain or return to baseline ADL function  Description  INTERVENTIONS:  -  Assess patient's ability to carry out ADLs; assess patient's baseline for ADL function and identify physical deficits which impact ability to perform ADLs (bathing, care of mouth/teeth, toileting, grooming, dressing, etc )  - Assess/evaluate cause of self-care deficits   - Assess range of motion  - Assess patient's mobility; develop plan if impaired  - Assess patient's need for assistive devices and provide as appropriate  - Encourage maximum independence but intervene and supervise when necessary  - Involve family in performance of ADLs  - Assess for home care needs following discharge   - Consider OT consult to assist with ADL evaluation and planning for discharge  - Provide patient education as appropriate  Outcome: Progressing  Goal: Maintain or return mobility status to optimal level  Description  INTERVENTIONS:  - Assess patient's baseline mobility status (ambulation, transfers, stairs, etc )    - Identify cognitive and physical deficits and behaviors that affect mobility  - Identify mobility aids required to assist with transfers and/or ambulation (gait belt, sit-to-stand, lift, walker, cane, etc )  - [place] fall precautions as indicated by assessment  - Record patient progress and toleration of activity level on Mobility SBAR; progress patient to next Phase/Stage  - Instruct patient to call for assistance with activity based on assessment  - Consider rehabilitation consult to assist with strengthening/weightbearing, etc   Outcome: Progressing     Problem: Knowledge Deficit  Goal: Patient/family/caregiver demonstrates understanding of disease process, treatment plan, medications, and discharge instructions  Description  Complete learning assessment and assess knowledge base    Interventions:  - Provide teaching at level of understanding  - Provide teaching via preferred learning methods  Outcome: Progressing

## 2019-09-20 DIAGNOSIS — C34.91 ADENOCARCINOMA OF LUNG, STAGE 4, RIGHT (HCC): Primary | ICD-10-CM

## 2019-09-24 LAB
ALBUMIN SERPL-MCNC: 4.1 G/DL (ref 3.6–4.8)
ALBUMIN/GLOB SERPL: 1.6 {RATIO} (ref 1.2–2.2)
ALP SERPL-CCNC: 80 IU/L (ref 39–117)
ALT SERPL-CCNC: 43 IU/L (ref 0–32)
AST SERPL-CCNC: 45 IU/L (ref 0–40)
BASOPHILS # BLD AUTO: 0.1 X10E3/UL (ref 0–0.2)
BASOPHILS NFR BLD AUTO: 1 %
BILIRUB SERPL-MCNC: 0.8 MG/DL (ref 0–1.2)
BUN SERPL-MCNC: 10 MG/DL (ref 8–27)
BUN/CREAT SERPL: 10 (ref 12–28)
CALCIUM SERPL-MCNC: 9.4 MG/DL (ref 8.7–10.3)
CHLORIDE SERPL-SCNC: 101 MMOL/L (ref 96–106)
CO2 SERPL-SCNC: 24 MMOL/L (ref 20–29)
CREAT SERPL-MCNC: 1.04 MG/DL (ref 0.57–1)
EOSINOPHIL # BLD AUTO: 0.1 X10E3/UL (ref 0–0.4)
EOSINOPHIL NFR BLD AUTO: 1 %
ERYTHROCYTE [DISTWIDTH] IN BLOOD BY AUTOMATED COUNT: 13.7 % (ref 12.3–15.4)
GLOBULIN SER-MCNC: 2.5 G/DL (ref 1.5–4.5)
GLUCOSE SERPL-MCNC: 151 MG/DL (ref 65–99)
HCT VFR BLD AUTO: 41.4 % (ref 34–46.6)
HGB BLD-MCNC: 13.9 G/DL (ref 11.1–15.9)
IMM GRANULOCYTES # BLD: 0 X10E3/UL (ref 0–0.1)
IMM GRANULOCYTES NFR BLD: 0 %
LYMPHOCYTES # BLD AUTO: 1.8 X10E3/UL (ref 0.7–3.1)
LYMPHOCYTES NFR BLD AUTO: 20 %
MCH RBC QN AUTO: 31 PG (ref 26.6–33)
MCHC RBC AUTO-ENTMCNC: 33.6 G/DL (ref 31.5–35.7)
MCV RBC AUTO: 92 FL (ref 79–97)
MONOCYTES # BLD AUTO: 0.6 X10E3/UL (ref 0.1–0.9)
MONOCYTES NFR BLD AUTO: 6 %
NEUTROPHILS # BLD AUTO: 6.4 X10E3/UL (ref 1.4–7)
NEUTROPHILS NFR BLD AUTO: 72 %
PLATELET # BLD AUTO: 251 X10E3/UL (ref 150–450)
POTASSIUM SERPL-SCNC: 4.7 MMOL/L (ref 3.5–5.2)
PROT SERPL-MCNC: 6.6 G/DL (ref 6–8.5)
RBC # BLD AUTO: 4.49 X10E6/UL (ref 3.77–5.28)
SL AMB EGFR AFRICAN AMERICAN: 67 ML/MIN/1.73
SL AMB EGFR NON AFRICAN AMERICAN: 59 ML/MIN/1.73
SODIUM SERPL-SCNC: 140 MMOL/L (ref 134–144)
WBC # BLD AUTO: 9 X10E3/UL (ref 3.4–10.8)

## 2019-09-25 ENCOUNTER — OFFICE VISIT (OUTPATIENT)
Dept: HEMATOLOGY ONCOLOGY | Facility: MEDICAL CENTER | Age: 59
End: 2019-09-25
Payer: COMMERCIAL

## 2019-09-25 VITALS
OXYGEN SATURATION: 94 % | WEIGHT: 174 LBS | HEIGHT: 67 IN | DIASTOLIC BLOOD PRESSURE: 70 MMHG | SYSTOLIC BLOOD PRESSURE: 120 MMHG | HEART RATE: 81 BPM | TEMPERATURE: 97.8 F | RESPIRATION RATE: 16 BRPM | BODY MASS INDEX: 27.31 KG/M2

## 2019-09-25 DIAGNOSIS — C34.91 ADENOCARCINOMA OF LUNG, STAGE 4, RIGHT (HCC): Primary | ICD-10-CM

## 2019-09-25 PROCEDURE — 99213 OFFICE O/P EST LOW 20 MIN: CPT | Performed by: INTERNAL MEDICINE

## 2019-09-25 NOTE — PROGRESS NOTES
Joellen Petersen  1960  [address] [address] [facility] [place] [id number]    DISCUSSION  SUMMARY:    59-year-old female with M1 non-small cell lung carcinoma/adenocarcinoma  Patient is on [facility] [id number] (phase III open label randomized study of [id number] [anti-PD-L1 antibody] in combination with carboplatin and paclitaxel +/- Avastin versus carboplatin and paclitaxel +/- Avastin in patients with stage IV chemotherapy naive non-small cell lung carcinoma)  Patient was randomized to receive all 4 medications  Mrs Gabe Meléndez completed the 6 cycles of treatment without significant toxicities  Patient had been on maintenance (study drug (atezolizumab) and avastin) - subsequently only Avastin  Patient was on treatment for approximately 4 years  Issues:     Stage IV non-small cell lung carcinoma  As discussed previously, recent workup did not demonstrate any evidence of residual or recurrent disease  Patient is off the Avastin now  Mrs Gabe Meléndez feels well and clinically there are no concerning signs  Repeat CT scans did not demonstrate any evidence for disease progression  Recent blood work was good/acceptable  From a pulmonary/oncology standpoint, patient seems to be doing well  The plan is to continue surveillance  Patient is to return in 3 months with scans and blood work before (as per the protocol)  As discussed previously, as long as there is no evidence of recurrence, the plan will be for continued surveillance  If there is any evidence for recurrence, patient may be a candidate for resection and then restarting treatment  Mrs Gabe Meléndez will continue with routine port flushes  As discussed previously, the information below comes from the recent thoracic conference  Eliel Galloway was discussed at [address] [place] [address]  Her recent and previous imaging studies were reviewed   The group recommends obtaining a PET Scan, PFTs, and referral to Thoracic Surgery for surgical evaluation   If PFTs are poor and PET scan shows negative montse involvement, consider SBRT; If PFTs are good and PET scan shows negative montse involvement, consider surgery; If she has distant disease or montse involvement, continue with systemic therapy  Thoracic Surgery noted that Avastin would need to be stopped 6 weeks before surgery and held for 4 weeks after surgery      Brain metastases status post resection and radiotherapy  Patient has been tapered off of Keppra  There have been no recent CNS issues  Recent MRI/brain did not demonstrate any evidence for recurrence  Patient will follow up with Radiation Oncology as directed  Anxiety - stable  Patient has an Xanax as needed  Medication refills  Patient has been on Xanax for anxiety and Percocet for shoulder pain  The shoulder pain was thought to be due to the chemotherapeutic agents  Mrs Kraig Murguia understands that I cannot continue to write for Xanax and Percocet for her now that she is off all chemotherapy  Patient was given 1 additional set of prescriptions today to carry her through  Patient needs to speak to her PCP about this moving forward  Recent pulled muscle  Patient is using some of her prior pain medications for pain control  Patient will try a warm bath, massage, nonsteroidals etc - if the condition continues/worsens, patient needs to speak with Dr Yazan Padilla  Patient is to return in 12 weeks   Patient knows to call the hematology/oncology office if there are any other questions or concerns  Carefully review your medication list and verify that the list is accurate and up-to-date  Please call the hematology/oncology office if there are medications missing from the list, medications on the list that you are not currently taking or if there is a dosage or instruction that is different from how you're taking that medication      Patient goals and areas of care:  Continue with surveillance  Barriers to care:  None  Patient is able to self-care   __________________________________________________________________________________________________    Chief Complaint   Patient presents with    Follow-up     IV Non-small cell lung carcinoma, on surveillance     Advance Care Planning/Advance Directives: not discussed       Adenocarcinoma of lung, stage 4, right ([facility] [address] )    9/5/2015 Initial Diagnosis     Patient was referred to the emergency room for evaluation of vertigo as sent from the [facility]  (patient reported )  Patient underwent the following pertinent imaging scans  MRI of the brain demonstrated a mass in the right superior all temporal gyrus with measurements of 4 2 x 3 3 x 3 6 cm with vasogenic edema and mass effect  There was a near uncal herniation noted  CT of the chest and abdomen/pelvis demonstrated a necrotic right upper lobe mass measuring 7 x 4 2 x 4 1 cm strongly suspicious for bronchogenic carcinoma  The mass contacts the posterior medial pleural surface and potentially contacts the right posterior tracheal border  No pathologic adenopathy was identified in no evidence of metastatic disease in the chest abdomen or pelvis  9/6/2015 Surgery     Non-[facility] cell [facility] diagnosed from biopsy of right temporal mass  Pathology demonstrates poorly differentiated non-small cell carcinoma  Pathologist stated that the immunoprofile supported the primary lung non-small cell carcinoma favor adenocarcinoma  Squamous carcinoma could not be excluded, secondary to focal P 40 staining  Dale Ruggiero path report demonstrated no ALK gene rearrangement or dleation and negative ROS1 rearrangement, No EGFR mutations were found  Surgery completed by Dr Bakari Bragg         10/7/2015 - 10/16/2015 Radiation     A dose of 3000 cGy in 5 fractions delivered every other day was delivered to the resection cavity (single right superior temporal intracranial metastasis) Dr Enrique Ayala      10/20/2015 -  Research Study Participant     [id number] Phase III open label randomized study of [id number] [Anti-PDL1 antibody] in combination with carboplatin and paclitaxel +/- Avastin versus Carboplatin and Paciltaxel +/- Avastin in patients with stage IV Chemotherapy  Naive non-small cell lung carcinoma  Patient was randomized to receive off for medications  11/30/2015 - 2/18/2016 Chemotherapy     Started Carboplatin, Paclitaxel, Avastin and PD-L1 (atezolizumab; study drug)  Completed 6 cycles with cycle 6 day 1 on 2/18/16      3/10/2016 -  Chemotherapy     Beginning cycle 7, maintenance cycle with Avastin and PD-L1 (Atezolizumab; study drug)      5/24/2017 Adverse Reaction     LFT elevation-persisted  Patient was taken off of study drug Atezolizumab, but continues on Avastin maintance  5/23/2019 - 7/3/2019 Chemotherapy     [No matching medication found in this treatment plan]       History of Present Illness:    59year old female recently diagnosed with IV lung cancer here for followup  Mrs Gal Cristobal began to have headaches last spring 2015  Patient was seen by her PCP and treated with antibiotics  Initially the symptoms got better the patient went on vacation  After returning from vacation, Mrs Gal Cristobal once again developed headaches  Patient was treated with a second round of antibiotics and then was diagnosed with vertigo  Patient was sent to the [address] [place] [address]  Although the specifics are not entirely clear, the therapist in the [address] [place] [address] sent the patient to the emergency room  A CAT scan of the brain demonstrated a brain lesion and patient was transferred to [place]  Patient was seen by Dr Adilene Billings and underwent resection demonstrating adenocarcinoma  Additional testing demonstrated a lung mass  Patient improved and was discharged  Patient completed SRT with Dr Jv Dale and Dr Enrique Ayala        Patient has been on [facility]note 3[facility] [id number] (phase III open label randomized study of [id number] [anti-PD-L1 antibody] in combination with carboplatin and paclitaxel +/- Avastin versus carboplatin and paclitaxel +/- Avastin in patients with stage IV chemotherapy naÃ¯ve non-small cell lung carcinoma)  Mrs Juliane Pereyra was randomized to receive the anti-PD-L1 antibody with chemotherapy - patient completed 6 cycles and was placed on maintenance  Mrs Alondra Sharp previously suffered a tonic-clonic seizure and was seen in the emergency room  CAT scan of the head did not demonstrate any evidence of disease progression  The seizure was thought to be due to encephalomalacia  Since that time, there have been no seizure issues  Mrs Juliane Pereyra had been on Keppra - this has been tapered off  After long discussions and consultation with the thoracic Working group, patient was taken off of all treatments approximately 6 weeks ago  The plan is surveillance  From a pulmonary standpoint, Mrs Juliane Pereyra states feeling well  No shortness of breath or dyspnea on exertion  No chest pain or pressure  No headaches, blurred vision or dizziness  Appetite is good, no GI,  or GYN issues  Patient still bruises easily on her upper extremities  Patient was gardening the other day and pulled a muscle in her back  Manipulations so far have not been very effective other than Percocet  Review of Systems   Constitutional: Positive for fatigue  HENT: Negative  Eyes: Negative  Respiratory: Negative  Cardiovascular: Negative  Gastrointestinal: Negative  Endocrine: Negative  Genitourinary: Negative  Musculoskeletal: Positive for arthralgias  Negative for neck pain  Skin: Negative  Allergic/Immunologic: Negative  Neurological: Negative  Hematological: Does not bruise/bleed easily  Bruises easily   Psychiatric/Behavioral: Negative  All other systems reviewed and are negative       Patient Active Problem List   Diagnosis    Seizure [facility])    Adenocarcinoma of lung, stage 4, right (HCC)    Mixed hyperlipidemia    Abnormal LFTs    Allergic rhinitis    Brain metastasis (HCC)    Brain tumor ([facility] [address] [facility]    Encephalomalacia    Impaired fasting glucose    Insomnia    Lesion of temporal lobe    Non-small cell lung cancer ([facility] [address] [facility]    Benign hypertension    Chronic maxillary sinusitis     Past Medical History:   Diagnosis Date    Adenocarcinoma of right lung, stage 4 (HCC)     Allergic rhinitis     Alopecia     resolved 10/25/16    Anxiety     last assessed 10/4/16, resolved 10/25/16    Benign hypertension 2018    Benign neoplasm of skin of trunk 2008    last assessed 3/25/08    Benign neoplasm of skin of upper extremity and shoulder 2008    last assessed 3/25/08    Ear deformity, acquired     last assessed 10/13/14, resolved 3/12/15    Eczema     Hyperlipemia     Maintenance chemotherapy     Secondary cancer of brain ([facility] [address] [facility]     Seizures ([facility] [address] )     Vertigo      Past Surgical History:   Procedure Laterality Date    APPENDECTOMY  1964    BRAIN TUMOR EXCISION      CENTRAL VENOUS CATHETER INSERTION Right     PORTACATH     Family History   Problem Relation Age of Onset    Diabetes Mother     Heart disease Mother     Lung cancer Father 79        Smoker     Pancreatitis Brother     No Known Problems Brother      Social History     Socioeconomic History    Marital status: /Civil Union     Spouse name: Not on file    Number of children: 1    Years of education: Not on file    Highest education level: Not on file   Occupational History    Not on file   Social Needs    Financial resource strain: Not on file    Food insecurity:     Worry: Not on file     Inability: Not on file    Transportation needs:     Medical: Not on file     Non-medical: Not on file   Tobacco Use    Smoking status: Former Smoker     Packs/day: 1 00     Years: 47 00     Pack years: 47 00     Types: Cigarettes     Last attempt to quit:      Years since quittin 7    Smokeless tobacco: Never Used   Substance and Sexual Activity    Alcohol use: Yes     Frequency: Monthly or less     Drinks per session: 1 or 2     Binge frequency: Never     Comment: occasionally / Social     Drug use: Yes     Types: Marijuana    Sexual activity: Yes   Lifestyle    Physical activity:     Days per week: Not on file     Minutes per session: Not on file    Stress: Not on file   Relationships    Social connections:     Talks on phone: Not on file     Gets together: Not on file     Attends Zoroastrian service: Not on file     Active member of club or organization: Not on file     Attends meetings of clubs or organizations: Not on file     Relationship status: Not on file    Intimate partner violence:     Fear of current or ex partner: Not on file     Emotionally abused: Not on file     Physically abused: Not on file     Forced sexual activity: Not on file   Other Topics Concern    Not on file   Social History Narrative    High school or GED     Lives independently with spouse        Current Outpatient Medications:     ALPRAZolam (XANAX) 0 25 mg tablet, Take 1 tablet (0 25 mg total) by mouth 2 (two) times a day as needed for anxiety, Disp: 30 tablet, Rfl: 0    amLODIPine (NORVASC) 5 mg tablet, TAKE 1 TABLET BY MOUTH ONCE DAILY, Disp: 30 tablet, Rfl: 3    atorvastatin (LIPITOR) 10 mg tablet, Take 1 tablet (10 mg total) by mouth daily, Disp: 90 tablet, Rfl: 1    halobetasol (ULTRAVATE) 0 05 % cream, APPLY  CREAM TOPICALLY TWICE DAILY, Disp: 50 g, Rfl: 1    Omega-3 Fatty Acids (FISH OIL PO), Take 2 tablets by mouth , Disp: , Rfl:     ondansetron (ZOFRAN) 8 mg tablet, Take 1 tablet (8 mg total) by mouth 3 (three) times a day, Disp: 1 tablet, Rfl: 2    oxyCODONE (ROXICODONE) 5 mg immediate release tablet, Take 1 tablet (5 mg total) by mouth every 6 (six) hours as needed for moderate painMax Daily Amount: 20 mg, Disp: 90 tablet, Rfl: 0    prochlorperazine (COMPAZINE) 10 mg tablet, Take 10 mg by mouth every 6 (six) hours as needed for nausea or vomiting, Disp: , Rfl:     Pseudoephedrine-Guaifenesin (MUCINEX D PO), Take by mouth, Disp: , Rfl:     ranitidine (ZANTAC) 75 MG tablet, Take 1 tablet by mouth daily, Disp: , Rfl:   No current facility-administered medications for this visit  Facility-Administered Medications Ordered in Other Visits:     alteplase (CATHFLO) injection 2 mg, 2 mg, Intracatheter, PRN, Brooke Phillips MD    sodium chloride 0 9 % infusion, 20 mL/hr, Intravenous, Continuous, Brooke Phillips MD    Allergies   Allergen Reactions    Iodinated Diagnostic Agents Anaphylaxis and Itching    Clindamycin     Clindamycin/Lincomycin        Vitals:    09/25/19 0904   BP: 120/70   Pulse: 81   Resp: 16   Temp: 97 8 °F (36 6 °C)   SpO2: 94%     Physical Exam   Constitutional: She is oriented to person, place, and time  She appears well-developed and well-nourished  Well-nourished female, no respiratory distress   HENT:   Head: Normocephalic and atraumatic  Right Ear: External ear normal    Left Ear: External ear normal    Nose: Nose normal    Mouth/Throat: No oropharyngeal exudate  Oropharynx:  Mucosa moist and pink, no exudate, plates in place   Eyes: Pupils are equal, round, and reactive to light  Conjunctivae and EOM are normal    Neck: Normal range of motion  Neck supple  Neck is supple, no JVD, good range of motion, no pain or tenderness with movement   Cardiovascular: Normal rate, regular rhythm, normal heart sounds and intact distal pulses  Pulmonary/Chest: Effort normal and breath sounds normal    Right anterior chest wall port in place, area clean and dry, lungs with good air entry bilaterally, few scattered rhonchi   Abdominal: Soft  Bowel sounds are normal    Abdomen is soft, nontender, no hepatosplenomegaly, no rigidity or rebound, +bowel sounds   Musculoskeletal: Normal range of motion  Neurological: She is alert and oriented to person, place, and time   She has normal reflexes  Skin: Skin is warm  Warm, moist, good color, well tanned, no petechiae or ecchymoses, purpura lesions are less/better than before on the upper extremities, no scabs, no active bleeding   Psychiatric: She has a normal mood and affect  Her behavior is normal  Judgment and thought content normal    Extremities: no lower extremity edema bilaterally, no cords, pulses are 1+  Lymphatics: no adenopathy in the neck, supraclavicular region, axilla and groin bilaterally    Performance Status: 1 - Symptomatic but completely ambulatory    Labs    09/23/2019 WBC = 9 0 hemoglobin = 13 9 hematocrit = 41 4 platelet = 223 neutrophil = 72% BUN = 10 creatinine = 1 0 for calcium = 9 4 AST = 45 ALT = 43 total bilirubin = 0 8 alkaline phosphatase = 80    08/12/2019 BUN = 15 creatinine = 0 85 LFTs WNL calcium = 9 7 glucose = 147 WBC = 7 0 hemoglobin = 14 4 hematocrit = 43 1 platelet = 357 neutrophil = 51%  07/01/2019 WBC = 7 3 hemoglobin = 15 hematocrit = 44 6 platelet = 960 neutrophil = 53% BUN = 13 creatinine = 0 95 LFTs WNL  05/20/2019 WBC = 7 1 hemoglobin = 14 8 hematocrit = 43 platelet = 501 neutrophil = 57% BUN = 12 creatinine = 0 97 LFTs WN LDH = 130    Imaging    08/28/2019 CT scan of chest and abdomen pelvis    Stable cavitary target lesion in the right upper lobe        Stable left lower lobe patchy groundglass opacities with associated mild peribronchial thickening, probably infectious or inflammatory in etiology  Continued surveillance on follow-up is recommended  Further evaluation with bronchoscopy can also be considered given persistence of the findings since CT from 8/20/2018  No evidence of metastatic disease in the abdomen or pelvis  05/31/2019 PET-CT    1  No findings suspicious for hypermetabolic malignancy  2  Mild patchy FDG uptake in the left lower lobe patchy groundglass opacities with associated peribronchial thickening    This is likely infectious or inflammatory given the appearance, however, given that this has been present since the 8/20/2018 CT, consider follow-up with bronchoscopy  04/26/2019 CT scan of the chest and abdomen/pelvis    1  Stable cavitary target lesion in the right upper lobe  No new site of metastasis  2   Stable left lower lobe patchy groundglass opacities with associated mild peribronchial thickening likely due to a chronic infectious or inflammatory process  Continued surveillance on follow-up is recommended  04/22/2019 MRI brain    1  Stable treatment related changes in the lateral aspect of the right temporal lobe without evidence of recurrent or progressive metastatic disease  2   No acute infarction, intracranial hemorrhage or enhancing mass lesion  3   Mild scattered white matter disease likely mild, chronic microangiopathy and/or treatment related changes are stable  4   Moderate ethmoidal and maxillary sinus disease redemonstrated  02/21/2019 CT scan of the chest and abdomen/pelvis    Limited evaluation of solid organs and vasculature without intravenous contrast   1   Stable cavitary target lesion in the right upper lobe  2   Stable left lower lobe patchy groundglass opacities with associated mild peribronchial wall thickening  This is again more likely chronic infectious or inflammatory in etiology  Continued surveillance on follow-up is recommended  3   No new metastatic lesions are found  12/20/2018 CT scan of the chest and abdomen/pelvis    1  Stable right upper lobe 3 3 cm cavitary lesion as described  2   Left lower lobe patchy groundglass opacities with associated mild peribronchial wall thickening, similar to October 18, 2018 but improved from August 20, 2018, likely related to a slowly resolving infectious/inflammatory process  Follow-up   short-term chest CT in 3 months is recommended to evaluate for resolution  3   No metastatic disease in the abdomen or pelvis      10/18/2018 CT scan of the chest without contrast: IMPRESSION: Improved appearance of the left lung base infiltrate  Stable right cavitary lesion  No new findings    08/20/2018 CT scan of the chest and abdomen/pelvis without contrast    RECIST target lesion: Cavitary right upper lobe mass is unchanged, measuring 3 4 x 2 6 cm on series 3 image 15 (previously 3 5 x 2 5 cm)  Solid component along the medial aspect of the posterior wall is also unchanged, measuring 1 4 x 0 9 cm      LUNGS:  Stable cavitary right upper lobe mass, as above  No new nodules identified  There is new small patchy density in the posterior left base compatible with infiltrate  There is no tracheal or endobronchial lesion  IMPRESSION    Unchanged cavitary right upper lobe mass  No new metastatic disease  Interval development of mild left lower lobe infiltrate noted  06/18/2018 CT scan of the chest and abdomen/pelvis    Unchanged cavitary right upper lobe mass    No new metastatic disease  04/16/2018 CT scan of the chest and abdomen/pelvis    There is no significant interval change in size of the cystic or nodular components of the cavitary right upper lobe mass  No new metastatic lesions identified in the chest, abdomen or pelvis  1/22/18 CAT scan of the chest and abdomen/pelvis    RECIST MEASUREMENTS:  TARGET LESION:   1: Right upper lobe cavitary lung mass: The overall size of the lesion is 3 5 x 2 4 cm on image 14 of series 3, significantly decreased from 4 3 x 2 7 cm on previous examination  Solid component along the medial aspect of the posterior wall is not significantly changed from previous examination currently measuring 1 5 x 0 9 cm on image 14 of series 3 compared with 1 8 x 0 8 cm when measured using similar technique on previous   examination  Nodular solid component along the lateral aspect of posterior wall measures approximately 0 7 x 0 4 cm on image 13 of series 3, and when measured using similar technique is unchanged from previous examinations      There is interval decrease in size of the cystic portion of the cavitary right upper lobe mass however, solid components of this mass are not significantly changed from previous examination  Otherwise unchanged examination with no new metastatic lesions   identified in the chest, abdomen or pelvis  Pathology    [facility] results demonstrated no ALK gene rearrangement or deletion and negative for ROS1 rearrangement  No EGFR mutations were found also  Right temporal mass from September 6, 2015 demonstrated metastatic poorly differentiated non-small cell carcinoma  The tumor cells stained positive for CK 7, TTF-1 and Napsin and negative for CK 20, CK 5/6 andmucicarmine  Pathologist stated that the immunoprofile supported the primary lung non-small cell carcinoma, favor adenocarcinoma  The pathologist also stated that given the focal p40 staining, a squamous carcinoma could not be excluded  Procedures    05/30/2019 complete pulmonary function test    PFT Interpretation     Quality of Data:  The patient's efforts are acceptable and reproducible      Test Performed:  Complete pulmonary function tests, including spirometry with and without bronchodilator, measurement of lung volume, and diffusing capacity      Spirometry Interpretation:  Spirometry is within normal limits      Flow Volume Loops:  Flow-Volume loops are normal      Lung Volumes:  Plethysmographic lung volumes are within normal limits      Diffusing Capacity:  Minimal reduction in diffusion capacity likely clinically insignificant     Conclusions: An isolated reduction in Diffusing Capacity is present and may represent Early fibrotic or pulmonary vascular disease  Clinical correlation is recommended

## 2019-09-27 DIAGNOSIS — C34.91 ADENOCARCINOMA OF LUNG, STAGE 4, RIGHT (HCC): Primary | ICD-10-CM

## 2019-10-16 ENCOUNTER — TELEPHONE (OUTPATIENT)
Dept: FAMILY MEDICINE CLINIC | Facility: CLINIC | Age: 59
End: 2019-10-16

## 2019-10-16 DIAGNOSIS — E78.2 MIXED HYPERLIPIDEMIA: Primary | ICD-10-CM

## 2019-10-16 NOTE — TELEPHONE ENCOUNTER
Has an apt with Dr David José this month  She gets blood work done every three weeks from Dr Marcio Piedra  She is just double checking with DR David José if she wants anything done before next apt? Please let patient know    Thank you

## 2019-10-26 LAB
CHOLEST SERPL-MCNC: 200 MG/DL (ref 100–199)
HDLC SERPL-MCNC: 56 MG/DL
LDLC SERPL CALC-MCNC: 126 MG/DL (ref 0–99)
LDLC/HDLC SERPL: 2.3 RATIO (ref 0–3.2)
MICRODELETION SYND BLD/T FISH: NORMAL
SL AMB VLDL CHOLESTEROL CALC: 18 MG/DL (ref 5–40)
TRIGL SERPL-MCNC: 92 MG/DL (ref 0–149)

## 2019-10-29 ENCOUNTER — OFFICE VISIT (OUTPATIENT)
Dept: FAMILY MEDICINE CLINIC | Facility: CLINIC | Age: 59
End: 2019-10-29
Payer: COMMERCIAL

## 2019-10-29 VITALS
TEMPERATURE: 98 F | HEIGHT: 67 IN | RESPIRATION RATE: 16 BRPM | HEART RATE: 84 BPM | SYSTOLIC BLOOD PRESSURE: 136 MMHG | BODY MASS INDEX: 27.31 KG/M2 | DIASTOLIC BLOOD PRESSURE: 82 MMHG | WEIGHT: 174 LBS

## 2019-10-29 DIAGNOSIS — R73.01 IMPAIRED FASTING GLUCOSE: ICD-10-CM

## 2019-10-29 DIAGNOSIS — Z23 NEED FOR VACCINATION: ICD-10-CM

## 2019-10-29 DIAGNOSIS — I10 BENIGN HYPERTENSION: ICD-10-CM

## 2019-10-29 DIAGNOSIS — C34.91 NON-SMALL CELL CANCER OF RIGHT LUNG (HCC): ICD-10-CM

## 2019-10-29 DIAGNOSIS — E78.2 MIXED HYPERLIPIDEMIA: Primary | ICD-10-CM

## 2019-10-29 PROCEDURE — 99214 OFFICE O/P EST MOD 30 MIN: CPT | Performed by: FAMILY MEDICINE

## 2019-10-29 PROCEDURE — 3075F SYST BP GE 130 - 139MM HG: CPT | Performed by: FAMILY MEDICINE

## 2019-10-29 PROCEDURE — 90471 IMMUNIZATION ADMIN: CPT

## 2019-10-29 PROCEDURE — 3079F DIAST BP 80-89 MM HG: CPT | Performed by: FAMILY MEDICINE

## 2019-10-29 PROCEDURE — 90682 RIV4 VACC RECOMBINANT DNA IM: CPT

## 2019-10-29 NOTE — PROGRESS NOTES
Assessment/Plan:    1  Mixed hyperlipidemia  Assessment & Plan:  Cholesterol is up  Will recheck in 6 months     Orders:  -     Lipid Panel with Direct LDL reflex; Future; Expected date: 04/11/2020  -     Comprehensive metabolic panel; Future; Expected date: 04/11/2020  -     Lipid Panel with Direct LDL reflex  -     Comprehensive metabolic panel    2  Impaired fasting glucose  Assessment & Plan:  Last sugar was not fasting  Will check A1c with next labs    Orders:  -     Hemoglobin A1C; Future; Expected date: 04/11/2020  -     Hemoglobin A1C    3  Benign hypertension  Assessment & Plan:  Well controlled       4  Non-small cell cancer of right lung [facility])  Assessment & Plan:  Discussed PET scan done this year  She is waiting for her next CT scan      5  Need for vaccination  -     influenza vaccine, quadrivalent, recombinant, PF, 0 5 mL        BMI Counseling: Body mass index is 27 25 kg/m²  Discussed the patient's BMI with her  The BMI is above normal  Exercise recommendations include exercising 3-5 times per week  Declined mammogram because she had a PET scan  There are no Patient Instructions on file for this visit  Return in about 6 months (around 4/29/2020) for Next scheduled follow up  Subjective:      Patient ID: Jerome Felix is a 59 y o  female  Chief Complaint   Patient presents with    Follow-up     new labs--lj       She is occasionally getting back pain from her lung  She is now off of Avastin and is waiting to see if she is going to need surgery  Hyperlipidemia   This is a chronic problem  The current episode started more than 1 year ago  The problem is controlled  Recent lipid tests were reviewed and are normal  Current antihyperlipidemic treatment includes statins  The current treatment provides moderate improvement of lipids  There are no compliance problems          The following portions of the patient's history were reviewed and updated as appropriate:  past social history    Review of Systems   Respiratory: Negative  Cardiovascular: Negative  Current Outpatient Medications   Medication Sig Dispense Refill    ALPRAZolam (XANAX) 0 25 mg tablet Take 1 tablet (0 25 mg total) by mouth 2 (two) times a day as needed for anxiety 30 tablet 0    amLODIPine (NORVASC) 5 mg tablet TAKE 1 TABLET BY MOUTH ONCE DAILY 30 tablet 3    atorvastatin (LIPITOR) 10 mg tablet Take 1 tablet (10 mg total) by mouth daily 90 tablet 1    halobetasol (ULTRAVATE) 0 05 % cream APPLY  CREAM TOPICALLY TWICE DAILY 50 g 1    Omega-3 Fatty Acids (FISH OIL PO) Take 2 tablets by mouth   ondansetron (ZOFRAN) 8 mg tablet Take 1 tablet (8 mg total) by mouth 3 (three) times a day 1 tablet 2    oxyCODONE (ROXICODONE) 5 mg immediate release tablet Take 1 tablet (5 mg total) by mouth every 6 (six) hours as needed for moderate painMax Daily Amount: 20 mg (Patient not taking: Reported on 10/29/2019) 90 tablet 0    prochlorperazine (COMPAZINE) 10 mg tablet Take 10 mg by mouth every 6 (six) hours as needed for nausea or vomiting      Pseudoephedrine-Guaifenesin (MUCINEX D PO) Take by mouth      ranitidine (ZANTAC) 75 MG tablet Take 1 tablet by mouth daily       No current facility-administered medications for this visit  Facility-Administered Medications Ordered in Other Visits   Medication Dose Route Frequency Provider Last Rate Last Dose    alteplase (CATHFLO) injection 2 mg  2 mg Intracatheter PRN Sebastián Clements MD        sodium chloride 0 9 % infusion  20 mL/hr Intravenous Continuous Sebastián Clements MD           Objective:    /82   Pulse 84   Temp 98 °F (36 7 °C)   Resp 16   Ht 5' 7" (1 702 m)   Wt 78 9 kg (174 lb)   LMP  (LMP Unknown)   BMI 27 25 kg/m²        Physical Exam   Constitutional: She appears well-developed and well-nourished  HENT:   Head: Normocephalic and atraumatic     Right Ear: External ear normal    Left Ear: External ear normal    Mouth/Throat: Oropharynx is clear and moist    Cardiovascular: Normal rate, regular rhythm and normal heart sounds  Exam reveals no friction rub  No murmur heard  Pulmonary/Chest: Effort normal and breath sounds normal  No respiratory distress  She has no wheezes  She has no rales  Musculoskeletal: She exhibits no edema or deformity  Nursing note and vitals reviewed               Ethel Taylor DO

## 2019-10-30 ENCOUNTER — HOSPITAL ENCOUNTER (OUTPATIENT)
Dept: INFUSION CENTER | Facility: HOSPITAL | Age: 59
Discharge: HOME | End: 2019-10-30
Payer: COMMERCIAL

## 2019-10-30 DIAGNOSIS — C34.91 ADENOCARCINOMA OF LUNG, STAGE 4, RIGHT (HCC): Primary | ICD-10-CM

## 2019-10-30 PROCEDURE — 96523 IRRIG DRUG DELIVERY DEVICE: CPT

## 2019-10-30 NOTE — PLAN OF CARE
Problem: Potential for Falls  Goal: Patient will remain free of falls  Description  INTERVENTIONS:  - Assess patient frequently for physical needs  -  Identify cognitive and physical deficits and behaviors that affect risk of falls    -  Max fall precautions as indicated by assessment   - Educate patient/family on patient safety including physical limitations  - Instruct patient to call for assistance with activity based on assessment  - Modify environment to reduce risk of injury    Outcome: Progressing     Problem: SAFETY ADULT  Goal: Maintain or return to baseline ADL function  Description  INTERVENTIONS:  -  Assess patient's ability to carry out ADLs; assess patient's baseline for ADL function and identify physical deficits which impact ability to perform ADLs (bathing, care of mouth/teeth, toileting, grooming, dressing, etc )  - Assess/evaluate cause of self-care deficits   - Assess range of motion  - Assess patient's mobility; develop plan if impaired  - Assess patient's need for assistive devices and provide as appropriate  - Encourage maximum independence but intervene and supervise when necessary  - Involve family in performance of ADLs  - Assess for home care needs following discharge   - Consider OT consult to assist with ADL evaluation and planning for discharge  - Provide patient education as appropriate  Outcome: Progressing  Goal: Maintain or return mobility status to optimal level  Description  INTERVENTIONS:  - Assess patient's baseline mobility status (ambulation, transfers, stairs, etc )    - Identify cognitive and physical deficits and behaviors that affect mobility  - Identify mobility aids required to assist with transfers and/or ambulation (gait belt, sit-to-stand, lift, walker, cane, etc )  - Max fall precautions as indicated by assessment  - Record patient progress and toleration of activity level on Mobility SBAR; progress patient to next Phase/Stage  - Instruct patient to call for assistance with activity based on assessment  - Consider rehabilitation consult to assist with strengthening/weightbearing, etc   Outcome: Progressing     Problem: Knowledge Deficit  Goal: Patient/family/caregiver demonstrates understanding of disease process, treatment plan, medications, and discharge instructions  Description  Complete learning assessment and assess knowledge base    Interventions:  - Provide teaching at level of understanding  - Provide teaching via preferred learning methods  Outcome: Progressing

## 2019-11-20 DIAGNOSIS — C34.91 ADENOCARCINOMA OF LUNG, STAGE 4, RIGHT (HCC): Primary | ICD-10-CM

## 2019-11-21 ENCOUNTER — HOSPITAL ENCOUNTER (OUTPATIENT)
Dept: RADIOLOGY | Facility: HOSPITAL | Age: 59
Discharge: HOME | End: 2019-11-21
Attending: INTERNAL MEDICINE

## 2019-11-21 DIAGNOSIS — C34.91 ADENOCARCINOMA OF LUNG, STAGE 4, RIGHT (HCC): ICD-10-CM

## 2019-11-21 PROCEDURE — 71250 CT THORAX DX C-: CPT

## 2019-11-21 PROCEDURE — 74176 CT ABD & PELVIS W/O CONTRAST: CPT

## 2019-12-11 ENCOUNTER — HOSPITAL ENCOUNTER (OUTPATIENT)
Dept: INFUSION CENTER | Facility: HOSPITAL | Age: 59
Discharge: HOME | End: 2019-12-11
Payer: COMMERCIAL

## 2019-12-11 DIAGNOSIS — C34.91 ADENOCARCINOMA OF LUNG, STAGE 4, RIGHT (HCC): Primary | ICD-10-CM

## 2019-12-11 LAB
ALBUMIN SERPL-MCNC: 4.5 G/DL (ref 3.6–4.8)
ALBUMIN/GLOB SERPL: 1.8 {RATIO} (ref 1.2–2.2)
ALP SERPL-CCNC: 161 IU/L (ref 39–117)
ALT SERPL-CCNC: 64 IU/L (ref 0–32)
AST SERPL-CCNC: 53 IU/L (ref 0–40)
BASOPHILS # BLD AUTO: 0.1 X10E3/UL (ref 0–0.2)
BASOPHILS NFR BLD AUTO: 1 %
BILIRUB SERPL-MCNC: 0.7 MG/DL (ref 0–1.2)
BUN SERPL-MCNC: 15 MG/DL (ref 8–27)
BUN/CREAT SERPL: 16 (ref 12–28)
CALCIUM SERPL-MCNC: 9.5 MG/DL (ref 8.7–10.3)
CHLORIDE SERPL-SCNC: 101 MMOL/L (ref 96–106)
CO2 SERPL-SCNC: 21 MMOL/L (ref 20–29)
CREAT SERPL-MCNC: 0.91 MG/DL (ref 0.57–1)
EOSINOPHIL # BLD AUTO: 0.1 X10E3/UL (ref 0–0.4)
EOSINOPHIL NFR BLD AUTO: 2 %
ERYTHROCYTE [DISTWIDTH] IN BLOOD BY AUTOMATED COUNT: 14.3 % (ref 12.3–15.4)
GLOBULIN SER-MCNC: 2.5 G/DL (ref 1.5–4.5)
GLUCOSE SERPL-MCNC: 122 MG/DL (ref 65–99)
HCT VFR BLD AUTO: 39.7 % (ref 34–46.6)
HGB BLD-MCNC: 13.5 G/DL (ref 11.1–15.9)
IMM GRANULOCYTES # BLD: 0 X10E3/UL (ref 0–0.1)
IMM GRANULOCYTES NFR BLD: 0 %
LYMPHOCYTES # BLD AUTO: 2.1 X10E3/UL (ref 0.7–3.1)
LYMPHOCYTES NFR BLD AUTO: 31 %
MCH RBC QN AUTO: 31.2 PG (ref 26.6–33)
MCHC RBC AUTO-ENTMCNC: 34 G/DL (ref 31.5–35.7)
MCV RBC AUTO: 92 FL (ref 79–97)
MONOCYTES # BLD AUTO: 0.5 X10E3/UL (ref 0.1–0.9)
MONOCYTES NFR BLD AUTO: 7 %
NEUTROPHILS # BLD AUTO: 4 X10E3/UL (ref 1.4–7)
NEUTROPHILS NFR BLD AUTO: 59 %
PLATELET # BLD AUTO: 314 X10E3/UL (ref 150–450)
POTASSIUM SERPL-SCNC: 4.3 MMOL/L (ref 3.5–5.2)
PROT SERPL-MCNC: 7 G/DL (ref 6–8.5)
RBC # BLD AUTO: 4.33 X10E6/UL (ref 3.77–5.28)
SL AMB EGFR AFRICAN AMERICAN: 79 ML/MIN/1.73
SL AMB EGFR NON AFRICAN AMERICAN: 69 ML/MIN/1.73
SODIUM SERPL-SCNC: 138 MMOL/L (ref 134–144)
WBC # BLD AUTO: 6.8 X10E3/UL (ref 3.4–10.8)

## 2019-12-11 PROCEDURE — 96523 IRRIG DRUG DELIVERY DEVICE: CPT

## 2019-12-11 NOTE — PLAN OF CARE
Problem: Potential for Falls  Goal: Patient will remain free of falls  Description  INTERVENTIONS:  - Assess patient frequently for physical needs  -  Identify cognitive and physical deficits and behaviors that affect risk of falls    -  [place] fall precautions as indicated by assessment   - Educate patient/family on patient safety including physical limitations  - Instruct patient to call for assistance with activity based on assessment  - Modify environment to reduce risk of injury    Outcome: Progressing     Problem: SAFETY ADULT  Goal: Maintain or return to baseline ADL function  Description  INTERVENTIONS:  -  Assess patient's ability to carry out ADLs; assess patient's baseline for ADL function and identify physical deficits which impact ability to perform ADLs (bathing, care of mouth/teeth, toileting, grooming, dressing, etc )  - Assess/evaluate cause of self-care deficits   - Assess range of motion  - Assess patient's mobility; develop plan if impaired  - Assess patient's need for assistive devices and provide as appropriate  - Encourage maximum independence but intervene and supervise when necessary  - Involve family in performance of ADLs  - Assess for home care needs following discharge   - Consider OT consult to assist with ADL evaluation and planning for discharge  - Provide patient education as appropriate  Outcome: Progressing  Goal: Maintain or return mobility status to optimal level  Description  INTERVENTIONS:  - Assess patient's baseline mobility status (ambulation, transfers, stairs, etc )    - Identify cognitive and physical deficits and behaviors that affect mobility  - Identify mobility aids required to assist with transfers and/or ambulation (gait belt, sit-to-stand, lift, walker, cane, etc )  - [place] fall precautions as indicated by assessment  - Record patient progress and toleration of activity level on Mobility SBAR; progress patient to next Phase/Stage  - Instruct patient to call for assistance with activity based on assessment  - Consider rehabilitation consult to assist with strengthening/weightbearing, etc   Outcome: Progressing     Problem: Knowledge Deficit  Goal: Patient/family/caregiver demonstrates understanding of disease process, treatment plan, medications, and discharge instructions  Description  Complete learning assessment and assess knowledge base    Interventions:  - Provide teaching at level of understanding  - Provide teaching via preferred learning methods  Outcome: Progressing

## 2019-12-12 DIAGNOSIS — E78.2 MIXED HYPERLIPIDEMIA: ICD-10-CM

## 2019-12-12 RX ORDER — ATORVASTATIN CALCIUM 10 MG/1
TABLET, FILM COATED ORAL
Qty: 90 TABLET | Refills: 1 | Status: SHIPPED | OUTPATIENT
Start: 2019-12-12 | End: 2020-06-16

## 2019-12-16 ENCOUNTER — OFFICE VISIT (OUTPATIENT)
Dept: HEMATOLOGY ONCOLOGY | Facility: MEDICAL CENTER | Age: 59
End: 2019-12-16
Payer: COMMERCIAL

## 2019-12-16 VITALS
DIASTOLIC BLOOD PRESSURE: 92 MMHG | HEART RATE: 96 BPM | SYSTOLIC BLOOD PRESSURE: 150 MMHG | HEIGHT: 66 IN | WEIGHT: 182 LBS | RESPIRATION RATE: 16 BRPM | OXYGEN SATURATION: 96 % | BODY MASS INDEX: 29.25 KG/M2 | TEMPERATURE: 98.1 F

## 2019-12-16 DIAGNOSIS — C34.91 ADENOCARCINOMA OF LUNG, STAGE 4, RIGHT (HCC): Primary | ICD-10-CM

## 2019-12-16 PROCEDURE — 99213 OFFICE O/P EST LOW 20 MIN: CPT | Performed by: INTERNAL MEDICINE

## 2019-12-16 NOTE — PROGRESS NOTES
Rachel Vo  1960  [address] [facility] [phone]    DISCUSSION  SUMMARY:    78-year-old female with M1 non-small cell lung carcinoma/adenocarcinoma  Patient has been on [facility] 20015-14 (phase III open label randomized study of [id number] [anti-PD-L1 antibody] in combination with carboplatin and paclitaxel +/- Avastin versus carboplatin and paclitaxel +/- Avastin in patients with stage IV chemotherapy naive non-small cell lung carcinoma)  Patient was randomized to receive all 4 medications  Mrs Yuni Crook completed the 6 cycles of treatment without significant toxicities  Patient had been on maintenance (study drug (atezolizumab) and avastin) - subsequently only Avastin  More recently, patient has been on surveillance only  Issues:     Stage IV non-small cell lung carcinoma  As discussed previously, recent workup did not demonstrate any evidence of residual or recurrent disease  Patient is off the Avastin now  Mrs Yuni Crook continues to feel well and clinically there are no concerning signs  Recent scans did not demonstrate any evidence for progression  Blood work was okay/acceptable but the LFTs are slightly elevated  This has been an issue in the past (believed to be due to patient's alcohol use)  There are no signs of recurrence  The plan is to continue with surveillance  Patient is to return in 3 months with blood work before  As I understand from the , patient is being taken off the protocol entirely after today's office visit (study is closing)  As discussed previously, as long as there is no evidence of recurrence, the plan will be for continued surveillance  If there is any evidence for recurrence, patient may be a candidate for resection and then restarting treatment  Mrs Yuni Crook will continue with routine port flushes       As discussed previously, the information below comes from the recent thoracic conference  Jered Grimaldo was discussed at [address]  Her recent and previous imaging studies were reviewed  The group recommends obtaining a PET Scan, PFTs, and referral to Thoracic Surgery for surgical evaluation   If PFTs are poor and PET scan shows negative montse involvement, consider SBRT; If PFTs are good and PET scan shows negative montse involvement, consider surgery; If she has distant disease or montse involvement, continue with systemic therapy  Thoracic Surgery noted that Avastin would need to be stopped 6 weeks before surgery and held for 4 weeks after surgery      Brain metastases status post resection and radiotherapy  Patient has been tapered off of Keppra  There have been no recent CNS issues  The most recent MRI/brain did not demonstrate any evidence for recurrence  Patient will follow up with Dr Maryann Esparza early no as directed - MRIs are now being performed yearly  Anxiety - stable  Patient has used Xanax in the past   Mrs Templeton Elaine now discusses this with her PCP  Patient is to return in 3 months with blood work before  Patient knows to call the hematology/oncology office if there are any other questions or concerns  Carefully review your medication list and verify that the list is accurate and up-to-date  Please call the hematology/oncology office if there are medications missing from the list, medications on the list that you are not currently taking or if there is a dosage or instruction that is different from how you're taking that medication      Patient goals and areas of care:  Continue with surveillance  Barriers to care:  None  Patient is able to self-care   __________________________________________________________________________________________________    Chief Complaint   Patient presents with    Follow-up     Metastatic non-small cell lung carcinoma, on surveillance     Advance Care Planning/Advance Directives: not discussed       Adenocarcinoma of lung, stage 4, right ([facility] [address] )    9/5/2015 Initial Diagnosis     Patient was referred to the emergency room for evaluation of vertigo as sent from the [facility]  (patient reported )  Patient underwent the following pertinent imaging scans  MRI of the brain demonstrated a mass in the right superior all temporal gyrus with measurements of 4 2 x 3 3 x 3 6 cm with vasogenic edema and mass effect  There was a near uncal herniation noted  CT of the chest and abdomen/pelvis demonstrated a necrotic right upper lobe mass measuring 7 x 4 2 x 4 1 cm strongly suspicious for bronchogenic carcinoma  The mass contacts the posterior medial pleural surface and potentially contacts the right posterior tracheal border  No pathologic adenopathy was identified in no evidence of metastatic disease in the chest abdomen or pelvis  9/6/2015 Surgery     [facility] diagnosed from biopsy of right temporal mass  Pathology demonstrates poorly differentiated non-small cell carcinoma  Pathologist stated that the immunoprofile supported the primary lung non-small cell carcinoma favor adenocarcinoma  Squamous carcinoma could not be excluded, secondary to focal P 40 staining  Zain Crawley path report demonstrated no ALK gene rearrangement or dleation and negative ROS1 rearrangement, No EGFR mutations were found  Surgery completed by Dr Delmar Handley  10/7/2015 - 10/16/2015 Radiation     A dose of 3000 cGy in 5 fractions delivered every other day was delivered to the resection cavity (single right superior temporal intracranial metastasis) Dr Zaria Avilez      10/20/2015 -  Research Study Participant     [id number] Phase III open label randomized study of [id number] [Anti-PDL1 antibody] in combination with carboplatin and paclitaxel +/- Avastin versus Carboplatin and Paciltaxel +/- Avastin in patients with stage IV Chemotherapy  Naive non-small cell lung carcinoma    Patient was randomized to receive off for medications  11/30/2015 - 2/18/2016 Chemotherapy     Started Carboplatin, Paclitaxel, Avastin and PD-L1 (atezolizumab; study drug)  Completed 6 cycles with cycle 6 day 1 on 2/18/16      3/10/2016 -  Chemotherapy     Beginning cycle 7, maintenance cycle with Avastin and PD-L1 (Atezolizumab; study drug)      5/24/2017 Adverse Reaction     LFT elevation-persisted  Patient was taken off of study drug Atezolizumab, but continues on Avastin maintance  5/23/2019 - 7/3/2019 Chemotherapy     [No matching medication found in this treatment plan]       History of Present Illness:    59year old female recently diagnosed with IV lung cancer here for followup  Mrs Jacki Sanon began to have headaches last spring 2015  Patient was seen by her PCP and treated with antibiotics  Initially the symptoms got better the patient went on vacation  After returning from vacation, Mrs Jacki Sanon once again developed headaches  Patient was treated with a second round of antibiotics and then was diagnosed with vertigo  Patient was sent to the [address] [place] [address]  Although the specifics are not entirely clear, the therapist in the [address] [place] [address] sent the patient to the emergency room  A CAT scan of the brain demonstrated a brain lesion and patient was transferred to [facility]  Patient was seen by Dr Leighton Tang and underwent resection demonstrating adenocarcinoma  Additional testing demonstrated a lung mass  Patient improved and was discharged  Patient completed SRT with Dr Rabia Varghese and Dr Tamara Landeros  [facility] [address] (phase III open label randomized study of [id number] [anti-PD-L1 antibody] in combination with carboplatin and paclitaxel +/- Avastin versus carboplatin and paclitaxel +/- Avastin in patients with stage IV chemotherapy naÃ¯ve non-small cell lung carcinoma)  Mrs Jacki Sanon was randomized to receive the anti-PD-L1 antibody with chemotherapy - patient completed 6 cycles and was placed on maintenance  Mrs Gonzalez Ye previously suffered a tonic-clonic seizure and was seen in the emergency room  CAT scan of the head did not demonstrate any evidence of disease progression  The seizure was thought to be due to encephalomalacia  Since that time, there have been no seizure issues  Mrs Tony White had been on Keppra - this has been tapered off  After long discussions and consultation with the thoracic Working group, patient was taken off of all treatments approximately 6 weeks ago  The plan has been surveillance  Mrs Tony White feels well, baseline  No headaches, blurred vision or dizziness  No seizures  Appetite is good, weight is stable  No shortness of breath or dyspnea on exertion  No chest pain or pressure  No cough, sputum or hemoptysis  Patient states feeling tightness in her chest occasionally when she takes a deep breath  Activities are baseline  No other active medical issues  Routine health maintenance and medical care is up-to-date  Prior issues with excessive bruising/bleeding and much less/better than before  Review of Systems   Constitutional: Positive for fatigue  HENT: Negative  Eyes: Negative  Respiratory: Negative  Cardiovascular: Negative  Gastrointestinal: Negative  Endocrine: Negative  Genitourinary: Negative  Musculoskeletal: Positive for arthralgias  Negative for neck pain  Skin: Negative  Allergic/Immunologic: Negative  Neurological: Negative  Hematological: Does not bruise/bleed easily  Psychiatric/Behavioral: Negative  All other systems reviewed and are negative       Patient Active Problem List   Diagnosis    Seizure ([facility] [address] )    Adenocarcinoma of lung, stage 4, right ([facility] [address] )    Mixed hyperlipidemia    Abnormal LFTs    Allergic rhinitis    Brain metastasis ([address]    Brain tumor ([facility] [address] )    Encephalomalacia    Impaired fasting glucose    Insomnia    Lesion of temporal lobe    Non-small cell lung cancer ([address] )    Benign hypertension    Chronic maxillary sinusitis     Past Medical History:   Diagnosis Date    Adenocarcinoma of right lung, stage 4 (HCC)     Allergic rhinitis     Alopecia     resolved 10/25/16    Anxiety     last assessed 10/4/16, resolved 10/25/16    Benign hypertension 2018    Benign neoplasm of skin of trunk 2008    last assessed 3/25/08    Benign neoplasm of skin of upper extremity and shoulder 2008    last assessed 3/25/08    Ear deformity, acquired     last assessed 10/13/14, resolved 3/12/15    Eczema     Hyperlipemia     Maintenance chemotherapy     Secondary cancer of brain ([facility] [address] )     Seizures ([facility] [address] )     Vertigo      Past Surgical History:   Procedure Laterality Date    APPENDECTOMY  1964    BRAIN TUMOR EXCISION      CENTRAL VENOUS CATHETER INSERTION Right     PORTACATH     Family History   Problem Relation Age of Onset    Diabetes Mother     Heart disease Mother     Lung cancer Father 79        Smoker     Pancreatitis Brother     No Known Problems Brother      Social History     Socioeconomic History    Marital status: /Civil Union     Spouse name: Not on file    Number of children: 1    Years of education: Not on file    Highest education level: Not on file   Occupational History    Not on file   Social Needs    Financial resource strain: Not on file    Food insecurity:     Worry: Not on file     Inability: Not on file    Transportation needs:     Medical: Not on file     Non-medical: Not on file   Tobacco Use    Smoking status: Former Smoker     Packs/day: 1 00     Years: 47 00     Pack years: 47 00     Types: Cigarettes     Last attempt to quit: 2015     Years since quittin 9    Smokeless tobacco: Never Used   Substance and Sexual Activity    Alcohol use: Yes     Frequency: Monthly or less     Drinks per session: 1 or 2     Binge frequency: Never     Comment: occasionally / Social     Drug use: Yes     Types: Marijuana    Sexual activity: Yes   Lifestyle    Physical activity:     Days per week: Not on file     Minutes per session: Not on file    Stress: Not on file   Relationships    Social connections:     Talks on phone: Not on file     Gets together: Not on file     Attends Zoroastrian service: Not on file     Active member of club or organization: Not on file     Attends meetings of clubs or organizations: Not on file     Relationship status: Not on file    Intimate partner violence:     Fear of current or ex partner: Not on file     Emotionally abused: Not on file     Physically abused: Not on file     Forced sexual activity: Not on file   Other Topics Concern    Not on file   Social History Narrative    High school or GED     Lives independently with spouse        Current Outpatient Medications:     ALPRAZolam (XANAX) 0 25 mg tablet, Take 1 tablet (0 25 mg total) by mouth 2 (two) times a day as needed for anxiety, Disp: 30 tablet, Rfl: 0    amLODIPine (NORVASC) 5 mg tablet, TAKE 1 TABLET BY MOUTH ONCE DAILY, Disp: 30 tablet, Rfl: 3    atorvastatin (LIPITOR) 10 mg tablet, TAKE 1 TABLET BY MOUTH ONCE DAILY, Disp: 90 tablet, Rfl: 1    halobetasol (ULTRAVATE) 0 05 % cream, APPLY  CREAM TOPICALLY TWICE DAILY, Disp: 50 g, Rfl: 1    Omega-3 Fatty Acids (FISH OIL PO), Take 2 tablets by mouth , Disp: , Rfl:     ondansetron (ZOFRAN) 8 mg tablet, Take 1 tablet (8 mg total) by mouth 3 (three) times a day, Disp: 1 tablet, Rfl: 2    prochlorperazine (COMPAZINE) 10 mg tablet, Take 10 mg by mouth every 6 (six) hours as needed for nausea or vomiting, Disp: , Rfl:     Pseudoephedrine-Guaifenesin (MUCINEX D PO), Take by mouth, Disp: , Rfl:     ranitidine (ZANTAC) 75 MG tablet, Take 1 tablet by mouth daily, Disp: , Rfl:     oxyCODONE (ROXICODONE) 5 mg immediate release tablet, Take 1 tablet (5 mg total) by mouth every 6 (six) hours as needed for moderate painMax Daily Amount: 20 mg (Patient not taking: Reported on 10/29/2019), Disp: 90 tablet, Rfl: 0  No current facility-administered medications for this visit  Facility-Administered Medications Ordered in Other Visits:     alteplase (CATHFLO) injection 2 mg, 2 mg, Intracatheter, PRN, Severino Howard MD    sodium chloride 0 9 % infusion, 20 mL/hr, Intravenous, Continuous, Severino Howard MD    Allergies   Allergen Reactions    Iodinated Diagnostic Agents Anaphylaxis and Itching    Clindamycin     Clindamycin/Lincomycin        Vitals:    12/16/19 1510   Resp: 16     Physical Exam   Constitutional: She is oriented to person, place, and time  She appears well-developed and well-nourished  Well-nourished female, no respiratory distress   HENT:   Head: Normocephalic and atraumatic  Right Ear: External ear normal    Left Ear: External ear normal    Nose: Nose normal    Mouth/Throat: No oropharyngeal exudate  Oropharynx:  Mucosa moist and pink, no exudate, plates in place   Eyes: Pupils are equal, round, and reactive to light  Conjunctivae and EOM are normal    Neck: Normal range of motion  Neck supple  Neck is supple, no JVD, good range of motion, no pain or tenderness with movement   Cardiovascular: Normal rate, regular rhythm, normal heart sounds and intact distal pulses  Pulmonary/Chest: Effort normal and breath sounds normal    Few scattered rhonchi bilaterally, otherwise good air entry bilaterally, right anterior chest wall area clean and dry   Abdominal: Soft  Bowel sounds are normal    Abdomen is soft, nontender, no hepatosplenomegaly, no rigidity or rebound, +bowel sounds   Musculoskeletal: Normal range of motion  Neurological: She is alert and oriented to person, place, and time  She has normal reflexes  Skin: Skin is warm  Warm, moist, good color, no petechiae or ecchymoses   Psychiatric: She has a normal mood and affect   Her behavior is normal  Judgment and thought content normal    Extremities: no lower extremity edema bilaterally, no cords, pulses are 1+  Lymphatics: no adenopathy in the neck, supraclavicular region, axilla and groin bilaterally    Performance Status: 1 - Symptomatic but completely ambulatory    Labs    12/11/2019 WBC = 6 8 hemoglobin = 13 5 hematocrit = 39 7 MCV = 92 platelet = 167 neutrophil = 59% lymphocytes = 31% monocyte = 7% BUN = 15 creatinine = 0 91 calcium = 9 5 total bilirubin = 0 7 alkaline phosphatase = 161 AST = 53 ALT = 64    09/23/2019 WBC = 9 0 hemoglobin = 13 9 hematocrit = 41 4 platelet = 440 neutrophil = 72% BUN = 10 creatinine = 1 0 for calcium = 9 4 AST = 45 ALT = 43 total bilirubin = 0 8 alkaline phosphatase = 80    08/12/2019 BUN = 15 creatinine = 0 85 [facility] calcium = 9 7 glucose = 147 WBC = 7 0 hemoglobin = 14 4 hematocrit = 43 1 platelet = 783 neutrophil = 51%  07/01/2019 WBC = 7 3 hemoglobin = 15 hematocrit = 44 6 platelet = 775 neutrophil = 53% BUN = 13 creatinine = 0 95 [facility]  05/20/2019 WBC = 7 1 hemoglobin = 14 8 hematocrit = 43 platelet = 182 neutrophil = 57% BUN = 12 creatinine = 0 97 [facility] LDH = 130    Imaging    11/21/2019 CT scan of the chest and abdomen pelvis    Right upper lobe cavitary lesion with mild very slow interval retracting size  Stable component of solid thickening along the posterior wall  No new pulmonary lesions  Stable patchy subsegmental groundglass opacities remain present in the left lower lobe     08/28/2019 CT scan of chest and abdomen pelvis    Stable cavitary target lesion in the right upper lobe        Stable left lower lobe patchy groundglass opacities with associated mild peribronchial thickening, probably infectious or inflammatory in etiology  Continued surveillance on follow-up is recommended  Further evaluation with bronchoscopy can also be considered given persistence of the findings since CT from 8/20/2018  No evidence of metastatic disease in the abdomen or pelvis  05/31/2019 PET-CT    1  No findings suspicious for hypermetabolic malignancy    2  Mild patchy FDG uptake in the left lower lobe patchy groundglass opacities with associated peribronchial thickening  This is likely infectious or inflammatory given the appearance, however, given that this has been present since the 8/20/2018 CT, consider follow-up with bronchoscopy  04/26/2019 CT scan of the chest and abdomen/pelvis    1  Stable cavitary target lesion in the right upper lobe  No new site of metastasis  2   Stable left lower lobe patchy groundglass opacities with associated mild peribronchial thickening likely due to a chronic infectious or inflammatory process  Continued surveillance on follow-up is recommended  04/22/2019 MRI brain    1  Stable treatment related changes in the lateral aspect of the right temporal lobe without evidence of recurrent or progressive metastatic disease  2   No acute infarction, intracranial hemorrhage or enhancing mass lesion  3   Mild scattered white matter disease likely mild, chronic microangiopathy and/or treatment related changes are stable  4   Moderate ethmoidal and maxillary sinus disease redemonstrated  02/21/2019 CT scan of the chest and abdomen/pelvis    Limited evaluation of solid organs and vasculature without intravenous contrast   1   Stable cavitary target lesion in the right upper lobe  2   Stable left lower lobe patchy groundglass opacities with associated mild peribronchial wall thickening  This is again more likely chronic infectious or inflammatory in etiology  Continued surveillance on follow-up is recommended  3   No new metastatic lesions are found  12/20/2018 CT scan of the chest and abdomen/pelvis    1  Stable right upper lobe 3 3 cm cavitary lesion as described  2   Left lower lobe patchy groundglass opacities with associated mild peribronchial wall thickening, similar to October 18, 2018 but improved from August 20, 2018, likely related to a slowly resolving infectious/inflammatory process    Follow-up   short-term chest CT in 3 months is recommended to evaluate for resolution  3   No metastatic disease in the abdomen or pelvis  10/18/2018 CT scan of the chest without contrast: IMPRESSION: Improved appearance of the left lung base infiltrate  Stable right cavitary lesion  No new findings    08/20/2018 CT scan of the chest and abdomen/pelvis without contrast    RECIST target lesion: Cavitary right upper lobe mass is unchanged, measuring 3 4 x 2 6 cm on series 3 image 15 (previously 3 5 x 2 5 cm)  Solid component along the medial aspect of the posterior wall is also unchanged, measuring 1 4 x 0 9 cm      LUNGS:  Stable cavitary right upper lobe mass, as above  No new nodules identified  There is new small patchy density in the posterior left base compatible with infiltrate  There is no tracheal or endobronchial lesion  IMPRESSION    Unchanged cavitary right upper lobe mass  No new metastatic disease  Interval development of mild left lower lobe infiltrate noted  06/18/2018 CT scan of the chest and abdomen/pelvis    Unchanged cavitary right upper lobe mass    No new metastatic disease  04/16/2018 CT scan of the chest and abdomen/pelvis    There is no significant interval change in size of the cystic or nodular components of the cavitary right upper lobe mass  No new metastatic lesions identified in the chest, abdomen or pelvis  1/22/18 CAT scan of the chest and abdomen/pelvis    RECIST MEASUREMENTS:  TARGET LESION:   1: Right upper lobe cavitary lung mass: The overall size of the lesion is 3 5 x 2 4 cm on image 14 of series 3, significantly decreased from 4 3 x 2 7 cm on previous examination  Solid component along the medial aspect of the posterior wall is not significantly changed from previous examination currently measuring 1 5 x 0 9 cm on image 14 of series 3 compared with 1 8 x 0 8 cm when measured using similar technique on previous   examination    Nodular solid component along the lateral aspect of posterior wall measures approximately 0 7 x 0 4 cm on image 13 of series 3, and when measured using similar technique is unchanged from previous examinations  There is interval decrease in size of the cystic portion of the cavitary right upper lobe mass however, solid components of this mass are not significantly changed from previous examination  Otherwise unchanged examination with no new metastatic lesions   identified in the chest, abdomen or pelvis  Pathology    [facility] results demonstrated no ALK gene rearrangement or deletion and negative for ROS1 rearrangement  No EGFR mutations were found also  Right temporal mass from September 6, 2015 demonstrated metastatic poorly differentiated non-small cell carcinoma  The tumor cells stained positive for CK 7, TTF-1 and Napsin and negative for CK 20, CK 5/6 andmucicarmine  Pathologist stated that the immunoprofile supported the primary lung non-small cell carcinoma, favor adenocarcinoma  The pathologist also stated that given the focal p40 staining, a squamous carcinoma could not be excluded  Procedures    05/30/2019 complete pulmonary function test    PFT Interpretation     Quality of Data:  The patient's efforts are acceptable and reproducible      Test Performed:  Complete pulmonary function tests, including spirometry with and without bronchodilator, measurement of lung volume, and diffusing capacity      Spirometry Interpretation:  Spirometry is within normal limits      Flow Volume Loops:  Flow-Volume loops are normal      Lung Volumes:  Plethysmographic lung volumes are within normal limits      Diffusing Capacity:  Minimal reduction in diffusion capacity likely clinically insignificant     Conclusions: An isolated reduction in Diffusing Capacity is present and may represent Early fibrotic or pulmonary vascular disease  Clinical correlation is recommended

## 2020-01-14 DIAGNOSIS — I10 BENIGN HYPERTENSION: ICD-10-CM

## 2020-01-14 RX ORDER — AMLODIPINE BESYLATE 5 MG/1
TABLET ORAL
Qty: 30 TABLET | Refills: 5 | Status: SHIPPED | OUTPATIENT
Start: 2020-01-14 | End: 2020-07-21

## 2020-01-22 ENCOUNTER — HOSPITAL ENCOUNTER (OUTPATIENT)
Dept: INFUSION CENTER | Facility: HOSPITAL | Age: 60
Discharge: HOME | End: 2020-01-22

## 2020-01-22 DIAGNOSIS — C34.91 ADENOCARCINOMA OF LUNG, STAGE 4, RIGHT (HCC): Primary | ICD-10-CM

## 2020-01-29 ENCOUNTER — TELEPHONE (OUTPATIENT)
Dept: RADIATION ONCOLOGY | Facility: HOSPITAL | Age: 60
End: 2020-01-29

## 2020-03-04 ENCOUNTER — HOSPITAL ENCOUNTER (OUTPATIENT)
Dept: INFUSION CENTER | Facility: HOSPITAL | Age: 60
Discharge: HOME | End: 2020-03-04
Payer: COMMERCIAL

## 2020-03-04 DIAGNOSIS — C34.91 ADENOCARCINOMA OF LUNG, STAGE 4, RIGHT (HCC): Primary | ICD-10-CM

## 2020-03-04 PROCEDURE — 96523 IRRIG DRUG DELIVERY DEVICE: CPT

## 2020-04-01 ENCOUNTER — TELEPHONE (OUTPATIENT)
Dept: HEMATOLOGY ONCOLOGY | Facility: CLINIC | Age: 60
End: 2020-04-01

## 2020-04-02 ENCOUNTER — TRANSCRIBE ORDERS (OUTPATIENT)
Dept: HEMATOLOGY ONCOLOGY | Facility: MEDICAL CENTER | Age: 60
End: 2020-04-02

## 2020-04-02 DIAGNOSIS — C34.91 ADENOCARCINOMA OF LUNG, STAGE 4, RIGHT (HCC): Primary | ICD-10-CM

## 2020-04-15 ENCOUNTER — HOSPITAL ENCOUNTER (OUTPATIENT)
Dept: INFUSION CENTER | Facility: HOSPITAL | Age: 60
Discharge: HOME | End: 2020-04-15
Payer: COMMERCIAL

## 2020-04-15 ENCOUNTER — TELEMEDICINE (OUTPATIENT)
Dept: FAMILY MEDICINE CLINIC | Facility: CLINIC | Age: 60
End: 2020-04-15
Payer: COMMERCIAL

## 2020-04-15 DIAGNOSIS — C34.91 ADENOCARCINOMA OF LUNG, STAGE 4, RIGHT (HCC): Primary | ICD-10-CM

## 2020-04-15 DIAGNOSIS — J40 BRONCHITIS: Primary | ICD-10-CM

## 2020-04-15 PROCEDURE — 99213 OFFICE O/P EST LOW 20 MIN: CPT | Performed by: FAMILY MEDICINE

## 2020-04-15 PROCEDURE — 96523 IRRIG DRUG DELIVERY DEVICE: CPT

## 2020-04-15 RX ORDER — BENZONATATE 100 MG/1
100 CAPSULE ORAL 3 TIMES DAILY PRN
Qty: 20 CAPSULE | Refills: 0 | Status: SHIPPED | OUTPATIENT
Start: 2020-04-15 | End: 2021-02-22 | Stop reason: ALTCHOICE

## 2020-04-15 RX ORDER — AZITHROMYCIN 250 MG/1
TABLET, FILM COATED ORAL
Qty: 6 TABLET | Refills: 0 | Status: SHIPPED | OUTPATIENT
Start: 2020-04-15 | End: 2020-04-20

## 2020-04-30 ENCOUNTER — TELEPHONE (OUTPATIENT)
Dept: SURGICAL ONCOLOGY | Facility: CLINIC | Age: 60
End: 2020-04-30

## 2020-04-30 ENCOUNTER — DOCUMENTATION (OUTPATIENT)
Dept: HEMATOLOGY ONCOLOGY | Facility: MEDICAL CENTER | Age: 60
End: 2020-04-30

## 2020-04-30 LAB
ALBUMIN SERPL-MCNC: 4.4 G/DL (ref 3.8–4.8)
ALBUMIN/GLOB SERPL: 1.7 {RATIO} (ref 1.2–2.2)
ALP SERPL-CCNC: 105 IU/L (ref 39–117)
ALT SERPL-CCNC: 44 IU/L (ref 0–32)
AST SERPL-CCNC: 35 IU/L (ref 0–40)
BASOPHILS # BLD AUTO: 0.1 X10E3/UL (ref 0–0.2)
BASOPHILS NFR BLD AUTO: 1 %
BILIRUB SERPL-MCNC: 0.7 MG/DL (ref 0–1.2)
BUN SERPL-MCNC: 14 MG/DL (ref 8–27)
BUN/CREAT SERPL: 15 (ref 12–28)
CALCIUM SERPL-MCNC: 10 MG/DL (ref 8.7–10.3)
CHLORIDE SERPL-SCNC: 107 MMOL/L (ref 96–106)
CO2 SERPL-SCNC: 20 MMOL/L (ref 20–29)
CREAT SERPL-MCNC: 0.95 MG/DL (ref 0.57–1)
EOSINOPHIL # BLD AUTO: 0.2 X10E3/UL (ref 0–0.4)
EOSINOPHIL NFR BLD AUTO: 3 %
ERYTHROCYTE [DISTWIDTH] IN BLOOD BY AUTOMATED COUNT: 11.9 % (ref 11.7–15.4)
GLOBULIN SER-MCNC: 2.6 G/DL (ref 1.5–4.5)
GLUCOSE SERPL-MCNC: 120 MG/DL (ref 65–99)
HCT VFR BLD AUTO: 39.3 % (ref 34–46.6)
HGB BLD-MCNC: 13.8 G/DL (ref 11.1–15.9)
IMM GRANULOCYTES # BLD: 0 X10E3/UL (ref 0–0.1)
IMM GRANULOCYTES NFR BLD: 0 %
LYMPHOCYTES # BLD AUTO: 2.3 X10E3/UL (ref 0.7–3.1)
LYMPHOCYTES NFR BLD AUTO: 37 %
MCH RBC QN AUTO: 30.9 PG (ref 26.6–33)
MCHC RBC AUTO-ENTMCNC: 35.1 G/DL (ref 31.5–35.7)
MCV RBC AUTO: 88 FL (ref 79–97)
MONOCYTES # BLD AUTO: 0.4 X10E3/UL (ref 0.1–0.9)
MONOCYTES NFR BLD AUTO: 7 %
NEUTROPHILS # BLD AUTO: 3.1 X10E3/UL (ref 1.4–7)
NEUTROPHILS NFR BLD AUTO: 52 %
PLATELET # BLD AUTO: 334 X10E3/UL (ref 150–450)
POTASSIUM SERPL-SCNC: 4.2 MMOL/L (ref 3.5–5.2)
PROT SERPL-MCNC: 7 G/DL (ref 6–8.5)
RBC # BLD AUTO: 4.47 X10E6/UL (ref 3.77–5.28)
SL AMB EGFR AFRICAN AMERICAN: 75 ML/MIN/1.73
SL AMB EGFR NON AFRICAN AMERICAN: 65 ML/MIN/1.73
SODIUM SERPL-SCNC: 133 MMOL/L (ref 134–144)
WBC # BLD AUTO: 6 X10E3/UL (ref 3.4–10.8)

## 2020-05-01 LAB
ALBUMIN SERPL-MCNC: 4.4 G/DL (ref 3.8–4.8)
ALBUMIN/GLOB SERPL: 1.7 {RATIO} (ref 1.2–2.2)
ALP SERPL-CCNC: 109 IU/L (ref 39–117)
ALT SERPL-CCNC: 44 IU/L (ref 0–32)
AST SERPL-CCNC: 35 IU/L (ref 0–40)
BILIRUB SERPL-MCNC: 0.7 MG/DL (ref 0–1.2)
BUN SERPL-MCNC: 15 MG/DL (ref 8–27)
BUN/CREAT SERPL: 15 (ref 12–28)
CALCIUM SERPL-MCNC: 10 MG/DL (ref 8.7–10.3)
CHLORIDE SERPL-SCNC: 101 MMOL/L (ref 96–106)
CHOLEST SERPL-MCNC: 184 MG/DL (ref 100–199)
CO2 SERPL-SCNC: 21 MMOL/L (ref 20–29)
CREAT SERPL-MCNC: 0.99 MG/DL (ref 0.57–1)
EST. AVERAGE GLUCOSE BLD GHB EST-MCNC: 134 MG/DL
GLOBULIN SER-MCNC: 2.6 G/DL (ref 1.5–4.5)
GLUCOSE SERPL-MCNC: 118 MG/DL (ref 65–99)
HBA1C MFR BLD: 6.3 % (ref 4.8–5.6)
HDLC SERPL-MCNC: 42 MG/DL
LDLC SERPL CALC-MCNC: 109 MG/DL (ref 0–99)
LDLC/HDLC SERPL: 2.6 RATIO (ref 0–3.2)
MICRODELETION SYND BLD/T FISH: NORMAL
POTASSIUM SERPL-SCNC: 4.3 MMOL/L (ref 3.5–5.2)
PROT SERPL-MCNC: 7 G/DL (ref 6–8.5)
SL AMB EGFR AFRICAN AMERICAN: 71 ML/MIN/1.73
SL AMB EGFR NON AFRICAN AMERICAN: 62 ML/MIN/1.73
SL AMB VLDL CHOLESTEROL CALC: 33 MG/DL (ref 5–40)
SODIUM SERPL-SCNC: 137 MMOL/L (ref 134–144)
TRIGL SERPL-MCNC: 164 MG/DL (ref 0–149)

## 2020-05-04 ENCOUNTER — HOSPITAL ENCOUNTER (OUTPATIENT)
Dept: RADIOLOGY | Facility: HOSPITAL | Age: 60
Discharge: HOME | End: 2020-05-04
Attending: INTERNAL MEDICINE
Payer: COMMERCIAL

## 2020-05-04 DIAGNOSIS — C34.91 ADENOCARCINOMA OF LUNG, STAGE 4, RIGHT (HCC): ICD-10-CM

## 2020-05-04 PROCEDURE — 71250 CT THORAX DX C-: CPT

## 2020-05-12 ENCOUNTER — TELEPHONE (OUTPATIENT)
Dept: HEMATOLOGY ONCOLOGY | Facility: MEDICAL CENTER | Age: 60
End: 2020-05-12

## 2020-05-12 ENCOUNTER — TELEPHONE (OUTPATIENT)
Dept: HEMATOLOGY ONCOLOGY | Facility: CLINIC | Age: 60
End: 2020-05-12

## 2020-05-27 ENCOUNTER — HOSPITAL ENCOUNTER (OUTPATIENT)
Dept: INFUSION CENTER | Facility: HOSPITAL | Age: 60
Discharge: HOME | End: 2020-05-27
Payer: COMMERCIAL

## 2020-05-27 ENCOUNTER — TELEPHONE (OUTPATIENT)
Dept: FAMILY MEDICINE CLINIC | Facility: CLINIC | Age: 60
End: 2020-05-27

## 2020-05-27 VITALS — TEMPERATURE: 98.4 F

## 2020-05-27 DIAGNOSIS — C34.91 ADENOCARCINOMA OF LUNG, STAGE 4, RIGHT (HCC): Primary | ICD-10-CM

## 2020-05-27 PROCEDURE — 96523 IRRIG DRUG DELIVERY DEVICE: CPT

## 2020-05-28 ENCOUNTER — TELEPHONE (OUTPATIENT)
Dept: FAMILY MEDICINE CLINIC | Facility: CLINIC | Age: 60
End: 2020-05-28

## 2020-05-28 ENCOUNTER — OFFICE VISIT (OUTPATIENT)
Dept: FAMILY MEDICINE CLINIC | Facility: CLINIC | Age: 60
End: 2020-05-28
Payer: COMMERCIAL

## 2020-05-28 VITALS
BODY MASS INDEX: 30.37 KG/M2 | RESPIRATION RATE: 18 BRPM | SYSTOLIC BLOOD PRESSURE: 138 MMHG | OXYGEN SATURATION: 98 % | HEIGHT: 66 IN | DIASTOLIC BLOOD PRESSURE: 76 MMHG | WEIGHT: 189 LBS | HEART RATE: 86 BPM | TEMPERATURE: 97.1 F

## 2020-05-28 DIAGNOSIS — L08.9 SKIN INFECTION: Primary | ICD-10-CM

## 2020-05-28 PROCEDURE — 1036F TOBACCO NON-USER: CPT | Performed by: FAMILY MEDICINE

## 2020-05-28 PROCEDURE — 3008F BODY MASS INDEX DOCD: CPT | Performed by: FAMILY MEDICINE

## 2020-05-28 PROCEDURE — 3078F DIAST BP <80 MM HG: CPT | Performed by: FAMILY MEDICINE

## 2020-05-28 PROCEDURE — 3075F SYST BP GE 130 - 139MM HG: CPT | Performed by: FAMILY MEDICINE

## 2020-05-28 PROCEDURE — 99213 OFFICE O/P EST LOW 20 MIN: CPT | Performed by: FAMILY MEDICINE

## 2020-05-28 RX ORDER — SULFAMETHOXAZOLE AND TRIMETHOPRIM 800; 160 MG/1; MG/1
1 TABLET ORAL EVERY 12 HOURS SCHEDULED
Qty: 14 TABLET | Refills: 0 | Status: SHIPPED | OUTPATIENT
Start: 2020-05-28 | End: 2020-06-04

## 2020-06-16 DIAGNOSIS — E78.2 MIXED HYPERLIPIDEMIA: ICD-10-CM

## 2020-06-16 RX ORDER — ATORVASTATIN CALCIUM 10 MG/1
TABLET, FILM COATED ORAL
Qty: 90 TABLET | Refills: 0 | Status: SHIPPED | OUTPATIENT
Start: 2020-06-16 | End: 2020-09-26

## 2020-07-10 ENCOUNTER — HOSPITAL ENCOUNTER (OUTPATIENT)
Dept: INFUSION CENTER | Facility: HOSPITAL | Age: 60
Discharge: HOME | End: 2020-07-10
Payer: COMMERCIAL

## 2020-07-10 VITALS — TEMPERATURE: 97.4 F

## 2020-07-10 DIAGNOSIS — C34.91 ADENOCARCINOMA OF LUNG, STAGE 4, RIGHT (HCC): Primary | ICD-10-CM

## 2020-07-10 PROCEDURE — 96523 IRRIG DRUG DELIVERY DEVICE: CPT

## 2020-07-10 NOTE — PLAN OF CARE
Problem: SAFETY ADULT  Goal: Patient will remain free of falls  Description  INTERVENTIONS:  - Assess patient frequently for physical needs  -  Identify cognitive and physical deficits and behaviors that affect risk of falls    -  [place] fall precautions as indicated by assessment   - Educate patient/family on patient safety including physical limitations  - Instruct patient to call for assistance with activity based on assessment  - Modify environment to reduce risk of injury  - Consider OT/PT consult to assist with strengthening/mobility  7/10/2020 1224 by Carmelina Torres RN  Outcome: Progressing  7/10/2020 1208 by Carmelina Torres RN  Outcome: Progressing  Goal: Maintain or return to baseline ADL function  Description  INTERVENTIONS:  -  Assess patient's ability to carry out ADLs; assess patient's baseline for ADL function and identify physical deficits which impact ability to perform ADLs (bathing, care of mouth/teeth, toileting, grooming, dressing, etc )  - Assess/evaluate cause of self-care deficits   - Assess range of motion  - Assess patient's mobility; develop plan if impaired  - Assess patient's need for assistive devices and provide as appropriate  - Encourage maximum independence but intervene and supervise when necessary  - Involve family in performance of ADLs  - Assess for home care needs following discharge   - Consider OT consult to assist with ADL evaluation and planning for discharge  - Provide patient education as appropriate  7/10/2020 1224 by Carmelina Torres RN  Outcome: Progressing  7/10/2020 1208 by Carmelina Torres RN  Outcome: Progressing  Goal: Maintain or return mobility status to optimal level  Description  INTERVENTIONS:  - Assess patient's baseline mobility status (ambulation, transfers, stairs, etc )    - Identify cognitive and physical deficits and behaviors that affect mobility  - Identify mobility aids required to assist with transfers and/or ambulation (gait belt, sit-to-stand, lift, walker, cane, etc )  - [place] fall precautions as indicated by assessment  - Record patient progress and toleration of activity level on Mobility SBAR; progress patient to next Phase/Stage  - Instruct patient to call for assistance with activity based on assessment  - Consider rehabilitation consult to assist with strengthening/weightbearing, etc   7/10/2020 1224 by Emil Mckeon RN  Outcome: Progressing  7/10/2020 1208 by Emil Mckeon RN  Outcome: Progressing     Problem: Knowledge Deficit  Goal: Patient/family/caregiver demonstrates understanding of disease process, treatment plan, medications, and discharge instructions  Description  Complete learning assessment and assess knowledge base    Interventions:  - Provide teaching at level of understanding  - Provide teaching via preferred learning methods  7/10/2020 1224 by Emil Mckeon RN  Outcome: Progressing  7/10/2020 1208 by Emil Mckeon RN  Outcome: Progressing

## 2020-07-10 NOTE — PLAN OF CARE
Problem: SAFETY ADULT  Goal: Patient will remain free of falls  Description  INTERVENTIONS:  - Assess patient frequently for physical needs  -  Identify cognitive and physical deficits and behaviors that affect risk of falls    -  [place] fall precautions as indicated by assessment   - Educate patient/family on patient safety including physical limitations  - Instruct patient to call for assistance with activity based on assessment  - Modify environment to reduce risk of injury  - Consider OT/PT consult to assist with strengthening/mobility  Outcome: Progressing  Goal: Maintain or return to baseline ADL function  Description  INTERVENTIONS:  -  Assess patient's ability to carry out ADLs; assess patient's baseline for ADL function and identify physical deficits which impact ability to perform ADLs (bathing, care of mouth/teeth, toileting, grooming, dressing, etc )  - Assess/evaluate cause of self-care deficits   - Assess range of motion  - Assess patient's mobility; develop plan if impaired  - Assess patient's need for assistive devices and provide as appropriate  - Encourage maximum independence but intervene and supervise when necessary  - Involve family in performance of ADLs  - Assess for home care needs following discharge   - Consider OT consult to assist with ADL evaluation and planning for discharge  - Provide patient education as appropriate  Outcome: Progressing  Goal: Maintain or return mobility status to optimal level  Description  INTERVENTIONS:  - Assess patient's baseline mobility status (ambulation, transfers, stairs, etc )    - Identify cognitive and physical deficits and behaviors that affect mobility  - Identify mobility aids required to assist with transfers and/or ambulation (gait belt, sit-to-stand, lift, walker, cane, etc )  - [place] fall precautions as indicated by assessment  - Record patient progress and toleration of activity level on Mobility SBAR; progress patient to next Phase/Stage  - Instruct patient to call for assistance with activity based on assessment  - Consider rehabilitation consult to assist with strengthening/weightbearing, etc   Outcome: Progressing     Problem: Knowledge Deficit  Goal: Patient/family/caregiver demonstrates understanding of disease process, treatment plan, medications, and discharge instructions  Description  Complete learning assessment and assess knowledge base    Interventions:  - Provide teaching at level of understanding  - Provide teaching via preferred learning methods  Outcome: Progressing

## 2020-07-21 DIAGNOSIS — I10 BENIGN HYPERTENSION: ICD-10-CM

## 2020-07-21 RX ORDER — AMLODIPINE BESYLATE 5 MG/1
TABLET ORAL
Qty: 30 TABLET | Refills: 0 | Status: SHIPPED | OUTPATIENT
Start: 2020-07-21 | End: 2020-08-27

## 2020-08-14 ENCOUNTER — DOCUMENTATION (OUTPATIENT)
Dept: HEMATOLOGY ONCOLOGY | Facility: MEDICAL CENTER | Age: 60
End: 2020-08-14

## 2020-08-17 ENCOUNTER — OFFICE VISIT (OUTPATIENT)
Dept: HEMATOLOGY ONCOLOGY | Facility: MEDICAL CENTER | Age: 60
End: 2020-08-17
Payer: COMMERCIAL

## 2020-08-17 VITALS
RESPIRATION RATE: 18 BRPM | DIASTOLIC BLOOD PRESSURE: 98 MMHG | WEIGHT: 186.4 LBS | BODY MASS INDEX: 29.96 KG/M2 | HEIGHT: 66 IN | OXYGEN SATURATION: 99 % | SYSTOLIC BLOOD PRESSURE: 154 MMHG | TEMPERATURE: 97.9 F | HEART RATE: 92 BPM

## 2020-08-17 DIAGNOSIS — C34.91 ADENOCARCINOMA OF LUNG, STAGE 4, RIGHT (HCC): Primary | ICD-10-CM

## 2020-08-17 PROCEDURE — 3080F DIAST BP >= 90 MM HG: CPT | Performed by: INTERNAL MEDICINE

## 2020-08-17 PROCEDURE — 3077F SYST BP >= 140 MM HG: CPT | Performed by: INTERNAL MEDICINE

## 2020-08-17 PROCEDURE — 1036F TOBACCO NON-USER: CPT | Performed by: INTERNAL MEDICINE

## 2020-08-17 PROCEDURE — 3008F BODY MASS INDEX DOCD: CPT | Performed by: INTERNAL MEDICINE

## 2020-08-17 PROCEDURE — 99213 OFFICE O/P EST LOW 20 MIN: CPT | Performed by: INTERNAL MEDICINE

## 2020-08-17 RX ORDER — OMEPRAZOLE 10 MG/1
10 CAPSULE, DELAYED RELEASE ORAL DAILY
COMMUNITY
End: 2021-09-02 | Stop reason: ALTCHOICE

## 2020-08-17 NOTE — PROGRESS NOTES
Marce Timmons  1960  [facility] [address] [place] [id number]    DISCUSSION  SUMMARY:    58-year-old female with M1 non-small cell lung carcinoma/adenocarcinoma  Patient has been on [facility] 20015-14 (phase III open label randomized study of [id number] [anti-PD-L1 antibody] in combination with carboplatin and paclitaxel +/- Avastin versus carboplatin and paclitaxel +/- Avastin in patients with stage IV chemotherapy naive non-small cell lung carcinoma)  Patient was randomized to receive all 4 medications  Mrs Earline Oliver completed the 6 cycles of treatment without significant toxicities  Patient had been on maintenance (study drug (atezolizumab) and avastin) - subsequently only Avastin  More recently, patient has been on surveillance only  Issues:     Stage IV non-small cell lung carcinoma  As discussed previously, recent workup has not demonstrate any evidence of residual or recurrent disease  Patient feels well and clinically there are no troubling signs  Recent blood work was good/acceptable  Recent CT scan did not demonstrate any evidence for recurrence  The plan is to continue with surveillance  Mrs Earline Oliver is to return in 6 months with blood work before  At that time, we will discuss when to order the next CT scan of the chest    Mrs Earline Oliver will continue with routine port flushes - patient wishes to keep the port in place  As discussed previously, the information below comes from the recent thoracic conference  Nani Espino was discussed at [address] [place] [address]  Her recent and previous imaging studies were reviewed  The group recommends obtaining a PET Scan, PFTs, and referral to Thoracic Surgery for surgical evaluation   If PFTs are poor and PET scan shows negative montse involvement, consider SBRT; If PFTs are good and PET scan shows negative montse involvement, consider surgery;  If she has distant disease or montse involvement, continue with systemic therapy  Thoracic Surgery noted that Avastin would need to be stopped 6 weeks before surgery and held for 4 weeks after surgery      Brain metastases status post resection and radiotherapy  Patient has been tapered off of Keppra  There have been no recent CNS issues  Patient will follow up with Radiation Oncology as directed  No recent MRI/brain because of COVID  Patient is to return in 6 months with blood work before  Patient knows to call the hematology/oncology office if there are any other questions or concerns  Carefully review your medication list and verify that the list is accurate and up-to-date  Please call the hematology/oncology office if there are medications missing from the list, medications on the list that you are not currently taking or if there is a dosage or instruction that is different from how you're taking that medication  Patient goals and areas of care:  Continue with surveillance  Barriers to care:  None  Patient is able to self-care   __________________________________________________________________________________________________    Chief Complaint   Patient presents with    Follow-up     History of metastatic non-small cell lung carcinoma, on surveillance     Advance Care Planning/Advance Directives: not discussed    Oncology History   Adenocarcinoma of lung, stage 4, right ([facility] [address] )   9/5/2015 Initial Diagnosis    Patient was referred to the emergency room for evaluation of vertigo as sent from the [facility]  (patient reported )  Patient underwent the following pertinent imaging scans  MRI of the brain demonstrated a mass in the right superior all temporal gyrus with measurements of 4 2 x 3 3 x 3 6 cm with vasogenic edema and mass effect  There was a near uncal herniation noted     CT of the chest and abdomen/pelvis demonstrated a necrotic right upper lobe mass measuring 7 x 4 2 x 4 1 cm strongly suspicious for bronchogenic carcinoma  The mass contacts the posterior medial pleural surface and potentially contacts the right posterior tracheal border  No pathologic adenopathy was identified in no evidence of metastatic disease in the chest abdomen or pelvis  9/6/2015 Surgery    Non-small cell carcinoma of lung ([address] ) diagnosed from biopsy of right temporal mass  Pathology demonstrates poorly differentiated non-small cell carcinoma  Pathologist stated that the immunoprofile supported the primary lung non-small cell carcinoma favor adenocarcinoma  Squamous carcinoma could not be excluded, secondary to focal P 40 staining  [facility] path report demonstrated no ALK gene rearrangement or dleation and negative ROS1 rearrangement, No EGFR mutations were found  Surgery completed by Dr Kt Rowan  10/7/2015 - 10/16/2015 Radiation    A dose of 3000 cGy in 5 fractions delivered every other day was delivered to the resection cavity (single right superior temporal intracranial metastasis) Dr Mariana Ponce     10/20/2015 -  Research Study Participant    [id number] Phase III open label randomized study of [id number] [Anti-PDL1 antibody] in combination with carboplatin and paclitaxel +/- Avastin versus Carboplatin and Paciltaxel +/- Avastin in patients with stage IV Chemotherapy  Naive non-small cell lung carcinoma  Patient was randomized to receive off for medications  11/30/2015 - 2/18/2016 Chemotherapy    Started Carboplatin, Paclitaxel, Avastin and PD-L1 (atezolizumab; study drug)  Completed 6 cycles with cycle 6 day 1 on 2/18/16     3/10/2016 -  Chemotherapy    Beginning cycle 7, maintenance cycle with Avastin and PD-L1 (Atezolizumab; study drug)     5/24/2017 Adverse Reaction    LFT elevation-persisted  Patient was taken off of study drug Atezolizumab, but continues on Avastin maintance       5/23/2019 - 7/3/2019 Chemotherapy    [No matching medication found in this treatment plan]       History of Present Illness:    62year old female recently diagnosed with IV lung cancer here for followup  Mrs Lisy Herron began to have headaches last spring 2015  Patient was seen by her PCP and treated with antibiotics  Initially the symptoms got better the patient went on vacation  After returning from vacation, Mrs Lisy Herron once again developed headaches  Patient was treated with a second round of antibiotics and then was diagnosed with vertigo  Patient was sent to the [address] [place] [address]  Although the specifics are not entirely clear, the therapist in the [address] [place] [address] sent the patient to the emergency room  A CAT scan of the brain demonstrated a brain lesion and patient was transferred to [place]  Patient was seen by Dr Sarah Siegel and underwent resection demonstrating adenocarcinoma  Additional testing demonstrated a lung mass  Patient improved and was discharged  Patient completed SRT with Dr Jaquelin Barr and Dr Emma Villa  [facility] (phase III open label randomized study of [id number] [anti-PD-L1 antibody] in combination with carboplatin and paclitaxel +/- Avastin versus carboplatin and paclitaxel +/- Avastin in patients with stage IV chemotherapy naÃ¯ve non-small cell lung carcinoma)  Mrs Lisy Herron was randomized to receive the anti-PD-L1 antibody with chemotherapy - patient completed 6 cycles and was placed on maintenance  Mrs Leighton Upton previously suffered a tonic-clonic seizure and was seen in the emergency room  CAT scan of the head did not demonstrate any evidence of disease progression  The seizure was thought to be due to encephalomalacia  Since that time, there have been no seizure issues  Mrs Lisy Herron had been on Keppra - this has been tapered off  After long discussions and consultation with the thoracic Working group, patient was taken off of all treatments approximately 6 weeks ago  The plan has been surveillance  Mrs Lisy Herron continues to feel well, baseline    No headaches, blurred vision, dizziness or seizures  Appetite is good, weight is stable  No shortness of breath or dyspnea on exertion, no chest pain or pressure  No cough, sputum or hemoptysis  Generalized body aches/arthralgias are the same as before but patient is able to go about her normal daily activities  Routine health maintenance and medical care is up-to-date  Patient continues to smoke pot  Review of Systems   Constitutional: Positive for fatigue  HENT: Negative  Eyes: Negative  Respiratory: Negative  Cardiovascular: Negative  Gastrointestinal: Negative  Endocrine: Negative  Genitourinary: Negative  Musculoskeletal: Positive for arthralgias  Negative for neck pain  Skin: Negative  Allergic/Immunologic: Negative  Neurological: Negative  Hematological: Does not bruise/bleed easily  Psychiatric/Behavioral: Negative  All other systems reviewed and are negative       Patient Active Problem List   Diagnosis    Seizure ([facility] [address] )    Adenocarcinoma of lung, stage 4, right ([facility] [address] )    Mixed hyperlipidemia    Abnormal LFTs    Allergic rhinitis    Brain metastasis ([facility] [address] )    Brain tumor ([facility] [address] )    Encephalomalacia    Impaired fasting glucose    Insomnia    Lesion of temporal lobe    Non-small cell lung cancer ([facility] [address] )    Benign hypertension    Chronic maxillary sinusitis     Past Medical History:   Diagnosis Date    Adenocarcinoma of right lung, stage 4 (HCC)     Allergic rhinitis     Alopecia     resolved 10/25/16    Anxiety     last assessed 10/4/16, resolved 10/25/16    Benign hypertension 2/1/2018    Benign neoplasm of skin of trunk 03/25/2008    last assessed 3/25/08    Benign neoplasm of skin of upper extremity and shoulder 03/25/2008    last assessed 3/25/08    Ear deformity, acquired     last assessed 10/13/14, resolved 3/12/15    Eczema     Hyperlipemia     Maintenance chemotherapy     Secondary cancer of brain ([address] )     Seizures ([facility] [address] )     Vertigo      Past Surgical History:   Procedure Laterality Date    APPENDECTOMY  1964    BRAIN TUMOR EXCISION      CENTRAL VENOUS CATHETER INSERTION Right     PORTACATH     Family History   Problem Relation Age of Onset    Diabetes Mother     Heart disease Mother     Lung cancer Father 79        Smoker     Pancreatitis Brother     No Known Problems Brother      Social History     Socioeconomic History    Marital status: /Civil Union     Spouse name: Not on file    Number of children: 1    Years of education: Not on file    Highest education level: Not on file   Occupational History    Not on file   Social Needs    Financial resource strain: Not on file    Food insecurity     Worry: Not on file     Inability: Not on file   [employer] needs     Medical: Not on file     Non-medical: Not on file   Tobacco Use    Smoking status: Former Smoker     Packs/day: 1 00     Years: 47 00     Pack years: 47 00     Types: Cigarettes     Last attempt to quit:      Years since quittin 6    Smokeless tobacco: Never Used   Substance and Sexual Activity    Alcohol use: Yes     Frequency: Monthly or less     Drinks per session: 1 or 2     Binge frequency: Never     Comment: occasionally / Social     Drug use: Yes     Types: Marijuana    Sexual activity: Yes   Lifestyle    Physical activity     Days per week: Not on file     Minutes per session: Not on file    Stress: Not on file   Relationships    Social connections     Talks on phone: Not on file     Gets together: Not on file     Attends Gnosticism service: Not on file     Active member of club or organization: Not on file     Attends meetings of clubs or organizations: Not on file     Relationship status: Not on file    Intimate partner violence     Fear of current or ex partner: Not on file     Emotionally abused: Not on file     Physically abused: Not on file     Forced sexual activity: Not on file   Other Topics Concern    Not on file   Social History Narrative    High school or GED     Lives independently with spouse        Current Outpatient Medications:     ALPRAZolam (XANAX) 0 25 mg tablet, Take 1 tablet (0 25 mg total) by mouth 2 (two) times a day as needed for anxiety, Disp: 30 tablet, Rfl: 0    amLODIPine (NORVASC) 5 mg tablet, Take 1 tablet by mouth once daily, Disp: 30 tablet, Rfl: 0    atorvastatin (LIPITOR) 10 mg tablet, Take 1 tablet by mouth once daily, Disp: 90 tablet, Rfl: 0    halobetasol (ULTRAVATE) 0 05 % cream, APPLY  CREAM TOPICALLY TWICE DAILY, Disp: 50 g, Rfl: 1    Omega-3 Fatty Acids (FISH OIL PO), Take 2 tablets by mouth , Disp: , Rfl:     omeprazole (PriLOSEC) 10 mg delayed release capsule, Take 10 mg by mouth daily, Disp: , Rfl:     Pseudoephedrine-Guaifenesin (MUCINEX D PO), Take by mouth, Disp: , Rfl:     benzonatate (TESSALON PERLES) 100 mg capsule, Take 1 capsule (100 mg total) by mouth 3 (three) times a day as needed for cough (Patient not taking: Reported on 8/17/2020), Disp: 20 capsule, Rfl: 0    ondansetron (ZOFRAN) 8 mg tablet, Take 1 tablet (8 mg total) by mouth 3 (three) times a day (Patient not taking: Reported on 8/17/2020), Disp: 1 tablet, Rfl: 2    oxyCODONE (ROXICODONE) 5 mg immediate release tablet, Take 1 tablet (5 mg total) by mouth every 6 (six) hours as needed for moderate painMax Daily Amount: 20 mg (Patient not taking: Reported on 10/29/2019), Disp: 90 tablet, Rfl: 0    prochlorperazine (COMPAZINE) 10 mg tablet, Take 10 mg by mouth every 6 (six) hours as needed for nausea or vomiting, Disp: , Rfl:     ranitidine (ZANTAC) 75 MG tablet, Take 1 tablet by mouth daily, Disp: , Rfl:   No current facility-administered medications for this visit       Facility-Administered Medications Ordered in Other Visits:     alteplase (CATHFLO) injection 2 mg, 2 mg, Intracatheter, PRN, Roe Alejandre MD    sodium chloride 0 9 % infusion, 20 mL/hr, Intravenous, Continuous, Roe Hero, MD    Allergies   Allergen Reactions  Iodinated Diagnostic Agents Anaphylaxis and Itching    Clindamycin     Clindamycin/Lincomycin        Vitals:    08/17/20 1331   BP: 154/98   Pulse: 92   Resp: 18   Temp: 97 9 °F (36 6 °C)   SpO2: 99%     Physical Exam   Constitutional: She is oriented to person, place, and time  She appears well-developed and well-nourished  Well-nourished female, no respiratory distress   HENT:   Head: Normocephalic and atraumatic  Right Ear: External ear normal    Left Ear: External ear normal    Nose: Nose normal    Mouth/Throat: No oropharyngeal exudate  Oropharynx:  Mucosa moist and pink, no exudate, plates in place   Eyes: Pupils are equal, round, and reactive to light  Conjunctivae and EOM are normal    Neck: Normal range of motion  Neck supple  Neck is supple, no JVD, good range of motion, no pain or tenderness with movement   Cardiovascular: Normal rate, regular rhythm, normal heart sounds and intact distal pulses  Pulmonary/Chest: Effort normal and breath sounds normal    Few scattered rhonchi bilaterally, otherwise good air entry bilaterally, right anterior chest wall area clean and dry   Abdominal: Soft  Bowel sounds are normal    Abdomen is soft, nontender, no hepatosplenomegaly, no rigidity or rebound, +bowel sounds   Musculoskeletal: Normal range of motion  Neurological: She is alert and oriented to person, place, and time  She has normal reflexes  Skin: Skin is warm  Warm, moist, good color, no petechiae or ecchymoses   Psychiatric: She has a normal mood and affect   Her behavior is normal  Judgment and thought content normal    Extremities: no lower extremity edema bilaterally, no cords, pulses are 1+  Lymphatics: no adenopathy in the neck, supraclavicular region, axilla and groin bilaterally    Performance Status: 1 - Symptomatic but completely ambulatory    Labs    04/30/2020 WBC = 6 0 hemoglobin = 13 8 hematocrit = 39 3 platelet = 025 neutrophil = 52% BUN = 14 creatinine = 0 95 LFTs WNL calcium = 10 0    12/11/2019 WBC = 6 8 hemoglobin = 13 5 hematocrit = 39 7 MCV = 92 platelet = 129 neutrophil = 59% lymphocytes = 31% monocyte = 7% BUN = 15 creatinine = 0 91 calcium = 9 5 total bilirubin = 0 7 alkaline phosphatase = 161 AST = 53 ALT = 64    Imaging    05/04/2020 CT scan of the chest without contrast    IMPRESSION:  Slight diminished size of cavitary lesion in the posterior segment right upper lobe with stable posterior solid component  No new suspicious intrathoracic abnormality  Improving left lower lobe nodular groundglass opacities, likely infectious and/or inflammatory  11/21/2019 CT scan of the chest and abdomen pelvis    Right upper lobe cavitary lesion with mild very slow interval retracting size  Stable component of solid thickening along the posterior wall  No new pulmonary lesions  Stable patchy subsegmental groundglass opacities remain present in the left lower lobe     08/28/2019 CT scan of chest and abdomen pelvis    Stable cavitary target lesion in the right upper lobe        Stable left lower lobe patchy groundglass opacities with associated mild peribronchial thickening, probably infectious or inflammatory in etiology  Continued surveillance on follow-up is recommended  Further evaluation with bronchoscopy can also be considered given persistence of the findings since CT from 8/20/2018  No evidence of metastatic disease in the abdomen or pelvis  05/31/2019 PET-CT    1  No findings suspicious for hypermetabolic malignancy  2  Mild patchy FDG uptake in the left lower lobe patchy groundglass opacities with associated peribronchial thickening  This is likely infectious or inflammatory given the appearance, however, given that this has been present since the 8/20/2018 CT, consider follow-up with bronchoscopy  04/26/2019 CT scan of the chest and abdomen/pelvis    1  Stable cavitary target lesion in the right upper lobe  No new site of metastasis    2   Stable left lower lobe patchy groundglass opacities with associated mild peribronchial thickening likely due to a chronic infectious or inflammatory process  Continued surveillance on follow-up is recommended  04/22/2019 MRI brain    1  Stable treatment related changes in the lateral aspect of the right temporal lobe without evidence of recurrent or progressive metastatic disease  2   No acute infarction, intracranial hemorrhage or enhancing mass lesion  3   Mild scattered white matter disease likely mild, chronic microangiopathy and/or treatment related changes are stable  4   Moderate ethmoidal and maxillary sinus disease redemonstrated  02/21/2019 CT scan of the chest and abdomen/pelvis    Limited evaluation of solid organs and vasculature without intravenous contrast   1   Stable cavitary target lesion in the right upper lobe  2   Stable left lower lobe patchy groundglass opacities with associated mild peribronchial wall thickening  This is again more likely chronic infectious or inflammatory in etiology  Continued surveillance on follow-up is recommended  3   No new metastatic lesions are found  12/20/2018 CT scan of the chest and abdomen/pelvis    1  Stable right upper lobe 3 3 cm cavitary lesion as described  2   Left lower lobe patchy groundglass opacities with associated mild peribronchial wall thickening, similar to October 18, 2018 but improved from August 20, 2018, likely related to a slowly resolving infectious/inflammatory process  Follow-up   short-term chest CT in 3 months is recommended to evaluate for resolution  3   No metastatic disease in the abdomen or pelvis  10/18/2018 CT scan of the chest without contrast: IMPRESSION: Improved appearance of the left lung base infiltrate  Stable right cavitary lesion    No new findings    08/20/2018 CT scan of the chest and abdomen/pelvis without contrast    RECIST target lesion: Cavitary right upper lobe mass is unchanged, measuring 3 4 x 2 6 cm on series 3 image 15 (previously 3 5 x 2 5 cm)  Solid component along the medial aspect of the posterior wall is also unchanged, measuring 1 4 x 0 9 cm      LUNGS:  Stable cavitary right upper lobe mass, as above  No new nodules identified  There is new small patchy density in the posterior left base compatible with infiltrate  There is no tracheal or endobronchial lesion  IMPRESSION    Unchanged cavitary right upper lobe mass  No new metastatic disease  Interval development of mild left lower lobe infiltrate noted  06/18/2018 CT scan of the chest and abdomen/pelvis    Unchanged cavitary right upper lobe mass    No new metastatic disease  04/16/2018 CT scan of the chest and abdomen/pelvis    There is no significant interval change in size of the cystic or nodular components of the cavitary right upper lobe mass  No new metastatic lesions identified in the chest, abdomen or pelvis  1/22/18 CAT scan of the chest and abdomen/pelvis    RECIST MEASUREMENTS:  TARGET LESION:   1: Right upper lobe cavitary lung mass: The overall size of the lesion is 3 5 x 2 4 cm on image 14 of series 3, significantly decreased from 4 3 x 2 7 cm on previous examination  Solid component along the medial aspect of the posterior wall is not significantly changed from previous examination currently measuring 1 5 x 0 9 cm on image 14 of series 3 compared with 1 8 x 0 8 cm when measured using similar technique on previous   examination  Nodular solid component along the lateral aspect of posterior wall measures approximately 0 7 x 0 4 cm on image 13 of series 3, and when measured using similar technique is unchanged from previous examinations  There is interval decrease in size of the cystic portion of the cavitary right upper lobe mass however, solid components of this mass are not significantly changed from previous examination    Otherwise unchanged examination with no new metastatic lesions   identified in the chest, abdomen or pelvis  Pathology    [facility] results demonstrated no ALK gene rearrangement or deletion and negative for ROS1 rearrangement  No EGFR mutations were found also  Right temporal mass from September 6, 2015 demonstrated metastatic poorly differentiated non-small cell carcinoma  The tumor cells stained positive for CK 7, TTF-1 and Napsin and negative for CK 20, CK 5/6 andmucicarmine  Pathologist stated that the immunoprofile supported the primary lung non-small cell carcinoma, favor adenocarcinoma  The pathologist also stated that given the focal p40 staining, a squamous carcinoma could not be excluded  Procedures    05/30/2019 complete pulmonary function test    PFT Interpretation     Quality of Data:  The patient's efforts are acceptable and reproducible      Test Performed:  Complete pulmonary function tests, including spirometry with and without bronchodilator, measurement of lung volume, and diffusing capacity      Spirometry Interpretation:  Spirometry is within normal limits      Flow Volume Loops:  Flow-Volume loops are normal      Lung Volumes:  Plethysmographic lung volumes are within normal limits      Diffusing Capacity:  Minimal reduction in diffusion capacity likely clinically insignificant     Conclusions: An isolated reduction in Diffusing Capacity is present and may represent Early fibrotic or pulmonary vascular disease  Clinical correlation is recommended

## 2020-08-21 ENCOUNTER — HOSPITAL ENCOUNTER (OUTPATIENT)
Dept: INFUSION CENTER | Facility: HOSPITAL | Age: 60
Discharge: HOME | End: 2020-08-21

## 2020-08-24 ENCOUNTER — HOSPITAL ENCOUNTER (OUTPATIENT)
Dept: INFUSION CENTER | Facility: HOSPITAL | Age: 60
Discharge: HOME | End: 2020-08-24
Payer: COMMERCIAL

## 2020-08-24 VITALS — TEMPERATURE: 96.3 F

## 2020-08-24 DIAGNOSIS — C34.91 ADENOCARCINOMA OF LUNG, STAGE 4, RIGHT (HCC): Primary | ICD-10-CM

## 2020-08-24 PROCEDURE — 96523 IRRIG DRUG DELIVERY DEVICE: CPT

## 2020-08-27 DIAGNOSIS — I10 BENIGN HYPERTENSION: ICD-10-CM

## 2020-08-27 RX ORDER — AMLODIPINE BESYLATE 5 MG/1
TABLET ORAL
Qty: 30 TABLET | Refills: 0 | Status: SHIPPED | OUTPATIENT
Start: 2020-08-27 | End: 2020-09-27

## 2020-09-26 DIAGNOSIS — E78.2 MIXED HYPERLIPIDEMIA: ICD-10-CM

## 2020-09-26 DIAGNOSIS — I10 BENIGN HYPERTENSION: ICD-10-CM

## 2020-09-26 RX ORDER — ATORVASTATIN CALCIUM 10 MG/1
TABLET, FILM COATED ORAL
Qty: 90 TABLET | Refills: 0 | Status: SHIPPED | OUTPATIENT
Start: 2020-09-26 | End: 2020-12-28

## 2020-09-27 RX ORDER — AMLODIPINE BESYLATE 5 MG/1
TABLET ORAL
Qty: 30 TABLET | Refills: 5 | Status: SHIPPED | OUTPATIENT
Start: 2020-09-27 | End: 2021-03-02 | Stop reason: SDUPTHER

## 2020-10-05 ENCOUNTER — HOSPITAL ENCOUNTER (OUTPATIENT)
Dept: INFUSION CENTER | Facility: HOSPITAL | Age: 60
Discharge: HOME | End: 2020-10-05
Payer: COMMERCIAL

## 2020-10-05 VITALS — TEMPERATURE: 97.2 F

## 2020-10-05 DIAGNOSIS — C34.91 ADENOCARCINOMA OF LUNG, STAGE 4, RIGHT (HCC): Primary | ICD-10-CM

## 2020-10-05 PROCEDURE — 96523 IRRIG DRUG DELIVERY DEVICE: CPT

## 2020-11-16 ENCOUNTER — HOSPITAL ENCOUNTER (OUTPATIENT)
Dept: INFUSION CENTER | Facility: HOSPITAL | Age: 60
Discharge: HOME | End: 2020-11-16
Payer: COMMERCIAL

## 2020-11-16 VITALS — TEMPERATURE: 97.6 F

## 2020-11-16 DIAGNOSIS — C34.91 ADENOCARCINOMA OF LUNG, STAGE 4, RIGHT (HCC): Primary | ICD-10-CM

## 2020-11-16 PROCEDURE — 96523 IRRIG DRUG DELIVERY DEVICE: CPT

## 2020-12-28 ENCOUNTER — HOSPITAL ENCOUNTER (OUTPATIENT)
Dept: INFUSION CENTER | Facility: HOSPITAL | Age: 60
Discharge: HOME | End: 2020-12-28
Payer: COMMERCIAL

## 2020-12-28 VITALS — TEMPERATURE: 97 F

## 2020-12-28 DIAGNOSIS — C34.91 ADENOCARCINOMA OF LUNG, STAGE 4, RIGHT (HCC): Primary | ICD-10-CM

## 2020-12-28 DIAGNOSIS — E78.2 MIXED HYPERLIPIDEMIA: ICD-10-CM

## 2020-12-28 PROCEDURE — 96523 IRRIG DRUG DELIVERY DEVICE: CPT

## 2020-12-28 RX ORDER — ATORVASTATIN CALCIUM 10 MG/1
TABLET, FILM COATED ORAL
Qty: 90 TABLET | Refills: 0 | Status: SHIPPED | OUTPATIENT
Start: 2020-12-28 | End: 2021-04-07

## 2021-02-08 ENCOUNTER — HOSPITAL ENCOUNTER (OUTPATIENT)
Dept: INFUSION CENTER | Facility: HOSPITAL | Age: 61
Discharge: HOME | End: 2021-02-08
Payer: COMMERCIAL

## 2021-02-08 VITALS — TEMPERATURE: 96.4 F

## 2021-02-08 DIAGNOSIS — C34.91 ADENOCARCINOMA OF LUNG, STAGE 4, RIGHT (HCC): Primary | ICD-10-CM

## 2021-02-08 PROCEDURE — 96523 IRRIG DRUG DELIVERY DEVICE: CPT

## 2021-02-08 NOTE — PLAN OF CARE
Problem: Knowledge Deficit  Goal: Patient/family/caregiver demonstrates understanding of disease process, treatment plan, medications, and discharge instructions  Description: Complete learning assessment and assess knowledge base  Interventions:  - Provide teaching at level of understanding  - Provide teaching via preferred learning methods  Outcome: Progressing     Problem: SAFETY ADULT  Goal: Patient will remain free of falls  Description: INTERVENTIONS:  - Assess patient frequently for physical needs  -  Identify cognitive and physical deficits and behaviors that affect risk of falls    -  [place] fall precautions as indicated by assessment   - Educate patient/family on patient safety including physical limitations  - Instruct patient to call for assistance with activity based on assessment  - Modify environment to reduce risk of injury  - Consider OT/PT consult to assist with strengthening/mobility  Outcome: Progressing  Goal: Maintain or return to baseline ADL function  Description: INTERVENTIONS:  -  Assess patient's ability to carry out ADLs; assess patient's baseline for ADL function and identify physical deficits which impact ability to perform ADLs (bathing, care of mouth/teeth, toileting, grooming, dressing, etc )  - Assess/evaluate cause of self-care deficits   - Assess range of motion  - Assess patient's mobility; develop plan if impaired  - Assess patient's need for assistive devices and provide as appropriate  - Encourage maximum independence but intervene and supervise when necessary  - Involve family in performance of ADLs  - Assess for home care needs following discharge   - Consider OT consult to assist with ADL evaluation and planning for discharge  - Provide patient education as appropriate  Outcome: Progressing  Goal: Maintain or return mobility status to optimal level  Description: INTERVENTIONS:  - Assess patient's baseline mobility status (ambulation, transfers, stairs, etc )    - Identify cognitive and physical deficits and behaviors that affect mobility  - Identify mobility aids required to assist with transfers and/or ambulation (gait belt, sit-to-stand, lift, walker, cane, etc )  - [place] fall precautions as indicated by assessment  - Record patient progress and toleration of activity level on Mobility SBAR; progress patient to next Phase/Stage  - Instruct patient to call for assistance with activity based on assessment  - Consider rehabilitation consult to assist with strengthening/weightbearing, etc   Outcome: Progressing

## 2021-02-16 ENCOUNTER — VBI (OUTPATIENT)
Dept: ADMINISTRATIVE | Facility: OTHER | Age: 61
End: 2021-02-16

## 2021-02-19 ENCOUNTER — DOCUMENTATION (OUTPATIENT)
Dept: HEMATOLOGY ONCOLOGY | Facility: MEDICAL CENTER | Age: 61
End: 2021-02-19

## 2021-02-19 LAB
ALBUMIN SERPL-MCNC: 4.4 G/DL (ref 3.8–4.8)
ALBUMIN/GLOB SERPL: 1.6 {RATIO} (ref 1.2–2.2)
ALP SERPL-CCNC: 97 IU/L (ref 39–117)
ALT SERPL-CCNC: 94 IU/L (ref 0–32)
AST SERPL-CCNC: 58 IU/L (ref 0–40)
BASOPHILS # BLD AUTO: 0.1 X10E3/UL (ref 0–0.2)
BASOPHILS NFR BLD AUTO: 1 %
BILIRUB SERPL-MCNC: 0.6 MG/DL (ref 0–1.2)
BUN SERPL-MCNC: 18 MG/DL (ref 8–27)
BUN/CREAT SERPL: 18 (ref 12–28)
CALCIUM SERPL-MCNC: 9.6 MG/DL (ref 8.7–10.3)
CHLORIDE SERPL-SCNC: 102 MMOL/L (ref 96–106)
CO2 SERPL-SCNC: 23 MMOL/L (ref 20–29)
CREAT SERPL-MCNC: 1.02 MG/DL (ref 0.57–1)
EOSINOPHIL # BLD AUTO: 0.2 X10E3/UL (ref 0–0.4)
EOSINOPHIL NFR BLD AUTO: 3 %
ERYTHROCYTE [DISTWIDTH] IN BLOOD BY AUTOMATED COUNT: 12 % (ref 11.7–15.4)
GLOBULIN SER-MCNC: 2.7 G/DL (ref 1.5–4.5)
GLUCOSE SERPL-MCNC: 127 MG/DL (ref 65–99)
HCT VFR BLD AUTO: 39.2 % (ref 34–46.6)
HGB BLD-MCNC: 13.6 G/DL (ref 11.1–15.9)
IMM GRANULOCYTES # BLD: 0 X10E3/UL (ref 0–0.1)
IMM GRANULOCYTES NFR BLD: 0 %
LYMPHOCYTES # BLD AUTO: 3.1 X10E3/UL (ref 0.7–3.1)
LYMPHOCYTES NFR BLD AUTO: 37 %
MCH RBC QN AUTO: 30.8 PG (ref 26.6–33)
MCHC RBC AUTO-ENTMCNC: 34.7 G/DL (ref 31.5–35.7)
MCV RBC AUTO: 89 FL (ref 79–97)
MONOCYTES # BLD AUTO: 0.6 X10E3/UL (ref 0.1–0.9)
MONOCYTES NFR BLD AUTO: 7 %
NEUTROPHILS # BLD AUTO: 4.4 X10E3/UL (ref 1.4–7)
NEUTROPHILS NFR BLD AUTO: 52 %
PLATELET # BLD AUTO: 335 X10E3/UL (ref 150–450)
POTASSIUM SERPL-SCNC: 4.4 MMOL/L (ref 3.5–5.2)
PROT SERPL-MCNC: 7.1 G/DL (ref 6–8.5)
RBC # BLD AUTO: 4.42 X10E6/UL (ref 3.77–5.28)
SL AMB EGFR AFRICAN AMERICAN: 68 ML/MIN/1.73
SL AMB EGFR NON AFRICAN AMERICAN: 59 ML/MIN/1.73
SODIUM SERPL-SCNC: 138 MMOL/L (ref 134–144)
WBC # BLD AUTO: 8.4 X10E3/UL (ref 3.4–10.8)

## 2021-02-22 ENCOUNTER — OFFICE VISIT (OUTPATIENT)
Dept: HEMATOLOGY ONCOLOGY | Facility: MEDICAL CENTER | Age: 61
End: 2021-02-22
Payer: COMMERCIAL

## 2021-02-22 VITALS
DIASTOLIC BLOOD PRESSURE: 88 MMHG | WEIGHT: 207 LBS | OXYGEN SATURATION: 98 % | HEART RATE: 101 BPM | HEIGHT: 66 IN | TEMPERATURE: 97 F | BODY MASS INDEX: 33.27 KG/M2 | RESPIRATION RATE: 18 BRPM | SYSTOLIC BLOOD PRESSURE: 144 MMHG

## 2021-02-22 DIAGNOSIS — R10.84 GENERALIZED ABDOMINAL PAIN: ICD-10-CM

## 2021-02-22 DIAGNOSIS — R79.89 ABNORMAL LFTS: ICD-10-CM

## 2021-02-22 DIAGNOSIS — C34.91 ADENOCARCINOMA OF LUNG, STAGE 4, RIGHT (HCC): Primary | ICD-10-CM

## 2021-02-22 DIAGNOSIS — C79.31 BRAIN METASTASIS (HCC): ICD-10-CM

## 2021-02-22 PROBLEM — R10.9 ABDOMINAL PAIN: Status: ACTIVE | Noted: 2021-02-22

## 2021-02-22 PROCEDURE — 99214 OFFICE O/P EST MOD 30 MIN: CPT | Performed by: INTERNAL MEDICINE

## 2021-02-22 NOTE — PROGRESS NOTES
Trinh Pond  1960  [facility] [address] [id number]    DISCUSSION  SUMMARY:     66-year-old female with M1 non-small cell lung carcinoma/adenocarcinoma  Patient has been on [facility] 20015-14 (phase III open label randomized study of [id number] [anti-PD-L1 antibody] in combination with carboplatin and paclitaxel +/- Avastin versus carboplatin and paclitaxel +/- Avastin in patients with stage IV chemotherapy naive non-small cell lung carcinoma)  Patient was randomized to receive all 4 medications  Mrs Tony White completed the 6 cycles of treatment without significant toxicities  Patient had been on maintenance (study drug (atezolizumab) and avastin) - subsequently only Avastin  More recently, patient has been on surveillance only  Issues:     Stage IV non-small cell lung carcinoma  As discussed previously, recent workup has not demonstrate any evidence of residual or recurrent disease  Patient feels well and clinically there are no troubling signs  Recent blood work was good/acceptable  Prior CT scan from 5/4/20 did not demonstrate any evidence for recurrence  The plan is to continue with surveillance  Mrs Tony White is to return in 6 months with blood work and CT Chest Abdomen and Pelvis beforehand  Mrs Tony White will continue with routine port flushes - patient wishes to keep the port in place      Brain metastases status post resection and radiotherapy  Patient has been tapered off of Keppra  There have been no recent CNS issues  Patient will follow up with Radiation Oncology as directed  Abdominal Pain:   C/o alternating diarrhea and constipation over the past couple months  States that her abdominal pain improves after defecation  Denies bloody bowel movements or change in stool caliber or weight loss  Suspect this is Irritable bowel syndrome vs colitis   She will see her PCP within the month for further evaluation  Abnormal LFTs:   LFTs slightly elevated  Unclear etiology  Will continue to monitor with repeat labs in 6 months       Patient is to return in 6 months with blood work and imaging before  Patient knows to call the hematology/oncology office if there are any other questions or concerns      Carefully review your medication list and verify that the list is accurate and up-to-date  Please call the hematology/oncology office if there are medications missing from the list, medications on the list that you are not currently taking or if there is a dosage or instruction that is different from how you're taking that medication      Patient goals and areas of care:  Continue with surveillance  Barriers to care:  None  Patient is able to self-care  Chief Complaint   Patient presents with    Follow-up      non-small cell lung carcinoma, on surveillance   History of metastatic non-small cell lung carcinoma, on surveillance    Advance Care Planning/Advance Directives:  Not discussed     Oncology History   Adenocarcinoma of lung, stage 4, right ([facility] [address] )   9/5/2015 Initial Diagnosis    Patient was referred to the emergency room for evaluation of vertigo as sent from the [facility]  (patient reported )  Patient underwent the following pertinent imaging scans  MRI of the brain demonstrated a mass in the right superior all temporal gyrus with measurements of 4 2 x 3 3 x 3 6 cm with vasogenic edema and mass effect  There was a near uncal herniation noted  CT of the chest and abdomen/pelvis demonstrated a necrotic right upper lobe mass measuring 7 x 4 2 x 4 1 cm strongly suspicious for bronchogenic carcinoma  The mass contacts the posterior medial pleural surface and potentially contacts the right posterior tracheal border  No pathologic adenopathy was identified in no evidence of metastatic disease in the chest abdomen or pelvis         9/6/2015 Surgery    Non-small cell carcinoma of lung ([facility] [address] ) diagnosed from biopsy of right temporal mass  Pathology demonstrates poorly differentiated non-small cell carcinoma  Pathologist stated that the immunoprofile supported the primary lung non-small cell carcinoma favor adenocarcinoma  Squamous carcinoma could not be excluded, secondary to focal P 40 staining  Shane Dangelo path report demonstrated no ALK gene rearrangement or dleation and negative ROS1 rearrangement, No EGFR mutations were found  Surgery completed by Dr Sergio Schwarz  10/7/2015 - 10/16/2015 Radiation    A dose of 3000 cGy in 5 fractions delivered every other day was delivered to the resection cavity (single right superior temporal intracranial metastasis) Dr Jordon Rivera     10/20/2015 -  Research Study Participant    [id number] Phase III open label randomized study of [id number] [Anti-PDL1 antibody] in combination with carboplatin and paclitaxel +/- Avastin versus Carboplatin and Paciltaxel +/- Avastin in patients with stage IV Chemotherapy  Naive non-small cell lung carcinoma  Patient was randomized to receive off for medications  11/30/2015 - 2/18/2016 Chemotherapy    Started Carboplatin, Paclitaxel, Avastin and PD-L1 (atezolizumab; study drug)  Completed 6 cycles with cycle 6 day 1 on 2/18/16     3/10/2016 -  Chemotherapy    Beginning cycle 7, maintenance cycle with Avastin and PD-L1 (Atezolizumab; study drug)     5/24/2017 Adverse Reaction    LFT elevation-persisted  Patient was taken off of study drug Atezolizumab, but continues on Avastin maintance  5/23/2019 - 7/3/2019 Chemotherapy    [No matching medication found in this treatment plan]         History of Present Illness:  56year old female recently diagnosed with IV lung cancer here for followup  Mrs Francisco Javier Wan began to have headaches last spring 2015  Patient was seen by her PCP and treated with antibiotics  Initially the symptoms got better the patient went on vacation   After returning from vacation, Mrs Francisco Javier Wan once again developed headaches  Patient was treated with a second round of antibiotics and then was diagnosed with vertigo  Patient was sent to the [address] [place] [address]  Although the specifics are not entirely clear, the therapist in the [address] [place] [address] sent the patient to the emergency room  A CAT scan of the brain demonstrated a brain lesion and patient was transferred to [place]  Patient was seen by Dr Arthur Stone and underwent resection demonstrating adenocarcinoma  Additional testing demonstrated a lung mass  Patient improved and was discharged  Patient completed SRT with Dr Danyelle Stevenson and Dr Lilia Barnett        Patient has been on [facility] 20015-14 (phase III open label randomized study of [id number] [anti-PD-L1 antibody] in combination with carboplatin and paclitaxel +/- Avastin versus carboplatin and paclitaxel +/- Avastin in patients with stage IV chemotherapy naÃ¯ve non-small cell lung carcinoma)  Mrs Roger Frederick was randomized to receive the anti-PD-L1 antibody with chemotherapy - patient completed 6 cycles and was placed on maintenance        Mrs Deondre Roberson previously suffered a tonic-clonic seizure and was seen in the emergency room  CAT scan of the head did not demonstrate any evidence of disease progression  The seizure was thought to be due to encephalomalacia  Since that time, there have been no seizure issues  Mrs Roger Frederick had been on Keppra - this has been tapered off  After long discussions and consultation with the thoracic Working group, patient was taken off of all treatments approximately 6 months ago  The plan has been surveillance      Mrs Roger Frederick continues to feel well, baseline  No headaches, blurred vision, dizziness or seizures  Appetite is good, weight is stable  No shortness of breath or dyspnea on exertion, no chest pain or pressure  No cough, sputum or hemoptysis  Generalized body aches/arthralgias are the same as before but patient is able to go about her normal daily activities    Routine health maintenance and medical care is up-to-date  Patient continues to smoke pot  Review of Systems   Constitutional: Negative for chills and fever  HENT: Negative for sinus pressure and sinus pain  Eyes: Negative for pain and redness  Respiratory: Negative for cough and shortness of breath  Cardiovascular: Negative for chest pain and palpitations  Gastrointestinal: Positive for constipation  Negative for abdominal pain, diarrhea, nausea and vomiting  Endocrine: Negative for cold intolerance and heat intolerance  Genitourinary: Negative for dysuria, flank pain and urgency  Musculoskeletal: Negative for back pain and neck pain  Skin: Negative for color change and rash  Neurological: Negative for tremors, seizures and headaches  Hematological: Negative for adenopathy  Does not bruise/bleed easily         Patient Active Problem List   Diagnosis    [facility]    Adenocarcinoma of lung, stage 4, right ([facility] [address] )    Mixed hyperlipidemia    Abnormal LFTs    Allergic rhinitis    Brain metastasis ([facility]    Brain tumor [facility]    Encephalomalacia    Impaired fasting glucose    Insomnia    Lesion of temporal lobe    Non-small cell lung cancer ([facility]    Benign hypertension    Chronic maxillary sinusitis    Abdominal pain     Past Medical History:   Diagnosis Date    Adenocarcinoma of right lung, stage 4 ([facility] )     Allergic rhinitis     Alopecia     resolved 10/25/16    Anxiety     last assessed 10/4/16, resolved 10/25/16    Benign hypertension 2/1/2018    Benign neoplasm of skin of trunk 03/25/2008    last assessed 3/25/08    Benign neoplasm of skin of upper extremity and shoulder 03/25/2008    last assessed 3/25/08    Ear deformity, acquired     last assessed 10/13/14, resolved 3/12/15    Eczema     Hyperlipemia     Maintenance chemotherapy     Secondary cancer of brain ([facility] )     Seizures ([facility] )     Vertigo      Past Surgical History:   Procedure Laterality Date    APPENDECTOMY  1964    BRAIN TUMOR EXCISION      CENTRAL VENOUS CATHETER INSERTION Right     PORTACATH     Family History   Problem Relation Age of Onset    Diabetes Mother     Heart disease Mother     Lung cancer Father 79        Smoker     Pancreatitis Brother     No Known Problems Brother      Social History     Socioeconomic History    Marital status: /Civil Union     Spouse name: Not on file    Number of children: 1    Years of education: Not on file    Highest education level: Not on file   Occupational History    Not on file   Social Needs    Financial resource strain: Not on file    Food insecurity     Worry: Not on file     Inability: Not on file   [employer] needs     Medical: Not on file     Non-medical: Not on file   Tobacco Use    Smoking status: Former Smoker     Packs/day: 1 00     Years: 47 00     Pack years: 47 00     Types: Cigarettes     Quit date:      Years since quittin 1    Smokeless tobacco: Never Used   Substance and Sexual Activity    Alcohol use: Yes     Frequency: Monthly or less     Drinks per session: 1 or 2     Binge frequency: Never     Comment: occasionally / Social     Drug use: Yes     Types: Marijuana    Sexual activity: Yes   Lifestyle    Physical activity     Days per week: Not on file     Minutes per session: Not on file    Stress: Not on file   Relationships    Social connections     Talks on phone: Not on file     Gets together: Not on file     Attends Mandaen service: Not on file     Active member of club or organization: Not on file     Attends meetings of clubs or organizations: Not on file     Relationship status: Not on file    Intimate partner violence     Fear of current or ex partner: Not on file     Emotionally abused: Not on file     Physically abused: Not on file     Forced sexual activity: Not on file   Other Topics Concern    Not on file   Social History Narrative    High school or GED     Lives independently with spouse        Current Outpatient Medications:     ALPRAZolam (XANAX) 0 25 mg tablet, Take 1 tablet (0 25 mg total) by mouth 2 (two) times a day as needed for anxiety, Disp: 30 tablet, Rfl: 0    amLODIPine (NORVASC) 5 mg tablet, Take 1 tablet by mouth once daily, Disp: 30 tablet, Rfl: 5    atorvastatin (LIPITOR) 10 mg tablet, Take 1 tablet by mouth once daily, Disp: 90 tablet, Rfl: 0    halobetasol (ULTRAVATE) 0 05 % cream, APPLY  CREAM TOPICALLY TWICE DAILY, Disp: 50 g, Rfl: 1    Omega-3 Fatty Acids (FISH OIL PO), Take 2 tablets by mouth , Disp: , Rfl:     omeprazole (PriLOSEC) 10 mg delayed release capsule, Take 10 mg by mouth daily, Disp: , Rfl:     Pseudoephedrine-Guaifenesin (MUCINEX D PO), Take by mouth as needed , Disp: , Rfl:   No current facility-administered medications for this visit  Facility-Administered Medications Ordered in Other Visits:     alteplase (CATHFLO) injection 2 mg, 2 mg, Intracatheter, PRN, Lupillo Goyal MD    sodium chloride 0 9 % infusion, 20 mL/hr, Intravenous, Continuous, Lupillo Goyal MD  Allergies   Allergen Reactions    Iodinated Diagnostic Agents Anaphylaxis and Itching    Clindamycin     Clindamycin/Lincomycin      Vitals:    02/22/21 1058   BP: 144/88   Pulse: 101   Resp: 18   Temp: (!) 97 °F (36 1 °C)   SpO2: 98%     Physical Exam  Constitutional:       General: She is not in acute distress  HENT:      Head: Normocephalic and atraumatic  Mouth/Throat:      Mouth: Mucous membranes are moist       Pharynx: Oropharynx is clear  Eyes:      Extraocular Movements: Extraocular movements intact  Pupils: Pupils are equal, round, and reactive to light  Neck:      Musculoskeletal: Normal range of motion and neck supple  Cardiovascular:      Rate and Rhythm: Normal rate and regular rhythm  Heart sounds: No murmur  Pulmonary:      Effort: Pulmonary effort is normal       Breath sounds: Normal breath sounds  Abdominal:      General: Abdomen is flat  Palpations: Abdomen is soft  Tenderness: There is no abdominal tenderness  There is no guarding or rebound  Musculoskeletal: Normal range of motion  Right lower leg: No edema  Left lower leg: No edema  Skin:     General: Skin is warm and dry  Coloration: Skin is not pale  Findings: No rash  Neurological:      General: No focal deficit present  Mental Status: She is alert and oriented to person, place, and time  Psychiatric:         Mood and Affect: Mood normal        Performance Status: 1 - Symptomatic but completely ambulatory    Labs    2/19/2020 WBC 8 4 Hgb= 13 6 hematocrit = 39 2 platelet = 262 neutrophils = 52% BUN = 18 creatinine 1 02 LFTs slightly elevated calcium = 9 6     04/30/2020 WBC = 6 0 hemoglobin = 13 8 hematocrit = 39 3 platelet = 247 neutrophil = 52% BUN = 14 creatinine = 0 95 LFTs WNL calcium = 10 0     12/11/2019 WBC = 6 8 hemoglobin = 13 5 hematocrit = 39 7 MCV = 92 platelet = 757 neutrophil = 59% lymphocytes = 31% monocyte = 7% BUN = 15 creatinine = 0 91 calcium = 9 5 total bilirubin = 0 7 alkaline phosphatase = 161 AST = 53 ALT = 64    Imaging  05/04/2020 CT scan of the chest without contrast     IMPRESSION:  Slight diminished size of cavitary lesion in the posterior segment right upper lobe with stable posterior solid component   No new suspicious intrathoracic abnormality  Improving left lower lobe nodular groundglass opacities, likely infectious and/or inflammatory      11/21/2019 CT scan of the chest and abdomen pelvis     Right upper lobe cavitary lesion with mild very slow interval retracting size   Stable component of solid thickening along the posterior wall   No new pulmonary lesions   Stable patchy subsegmental groundglass opacities remain present in the left lower lobe      08/28/2019 CT scan of chest and abdomen pelvis     Stable cavitary target lesion in the right upper lobe         Stable left lower lobe patchy groundglass opacities with associated mild peribronchial thickening, probably infectious or inflammatory in etiology   Continued surveillance on follow-up is recommended   Further evaluation with bronchoscopy can also be considered given persistence of the findings since CT from 8/20/2018  No evidence of metastatic disease in the abdomen or pelvis      05/31/2019 PET-CT     1   No findings suspicious for hypermetabolic malignancy  2  Mild patchy FDG uptake in the left lower lobe patchy groundglass opacities with associated peribronchial thickening   This is likely infectious or inflammatory given the appearance, however, given that this has been present since the 8/20/2018 CT, consider follow-up with bronchoscopy      04/26/2019 CT scan of the chest and abdomen/pelvis     1   Stable cavitary target lesion in the right upper lobe   No new site of metastasis  2   Stable left lower lobe patchy groundglass opacities with associated mild peribronchial thickening likely due to a chronic infectious or inflammatory process   Continued surveillance on follow-up is recommended      04/22/2019 MRI brain     1   Stable treatment related changes in the lateral aspect of the right temporal lobe without evidence of recurrent or progressive metastatic disease  2   No acute infarction, intracranial hemorrhage or enhancing mass lesion  3   Mild scattered white matter disease likely mild, chronic microangiopathy and/or treatment related changes are stable  4   Moderate ethmoidal and maxillary sinus disease redemonstrated      02/21/2019 CT scan of the chest and abdomen/pelvis     Limited evaluation of solid organs and vasculature without intravenous contrast   1   Stable cavitary target lesion in the right upper lobe  2   Stable left lower lobe patchy groundglass opacities with associated mild peribronchial wall thickening   This is again more likely chronic infectious or inflammatory in etiology   Continued surveillance on follow-up is recommended    3   No new metastatic lesions are found      12/20/2018 CT scan of the chest and abdomen/pelvis     1   Stable right upper lobe 3 3 cm cavitary lesion as described  2   Left lower lobe patchy groundglass opacities with associated mild peribronchial wall thickening, similar to October 18, 2018 but improved from August 20, 2018, likely related to a slowly resolving infectious/inflammatory process   Follow-up   short-term chest CT in 3 months is recommended to evaluate for resolution  3   No metastatic disease in the abdomen or pelvis      10/18/2018 CT scan of the chest without contrast: IMPRESSION: Improved appearance of the left lung base infiltrate   Stable right cavitary lesion   No new findings     08/20/2018 CT scan of the chest and abdomen/pelvis without contrast     RECIST target lesion: Cavitary right upper lobe mass is unchanged, measuring 3 4 x 2 6 cm on series 3 image 15 (previously 3 5 x 2 5 cm)   Solid component along the medial aspect of the posterior wall is also unchanged, measuring 1 4 x 0 9 cm      LUNGS:  Stable cavitary right upper lobe mass, as above   No new nodules identified  Teryl Block is new small patchy density in the posterior left base compatible with infiltrate   There is no tracheal or endobronchial lesion      IMPRESSION     Unchanged cavitary right upper lobe mass   No new metastatic disease  Interval development of mild left lower lobe infiltrate noted         06/18/2018 CT scan of the chest and abdomen/pelvis     Unchanged cavitary right upper lobe mass    No new metastatic disease      04/16/2018 CT scan of the chest and abdomen/pelvis     There is no significant interval change in size of the cystic or nodular components of the cavitary right upper lobe mass  No new metastatic lesions identified in the chest, abdomen or pelvis      1/22/18 CAT scan of the chest and abdomen/pelvis     RECIST MEASUREMENTS:  TARGET LESION:   1: Right upper lobe cavitary lung mass:  The overall size of the lesion is 3 5 x 2 4 cm on image 14 of series 3, significantly decreased from 4 3 x 2 7 cm on previous examination  Solid component along the medial aspect of the posterior wall is not significantly changed from previous examination currently measuring 1 5 x 0 9 cm on image 14 of series 3 compared with 1 8 x 0 8 cm when measured using similar technique on previous   examination  Nodular solid component along the lateral aspect of posterior wall measures approximately 0 7 x 0 4 cm on image 13 of series 3, and when measured using similar technique is unchanged from previous examinations      There is interval decrease in size of the cystic portion of the cavitary right upper lobe mass however, solid components of this mass are not significantly changed from previous examination   Otherwise unchanged examination with no new metastatic lesions   identified in the chest, abdomen or pelvis  Pathology     [facility] results demonstrated no ALK gene rearrangement or deletion and negative for ROS1 rearrangement  No EGFR mutations were found also       Right temporal mass from September 6, 2015 demonstrated metastatic poorly differentiated non-small cell carcinoma  The tumor cells stained positive for CK 7, TTF-1 and Napsin and negative for CK 20, CK 5/6 andmucicarmine  Pathologist stated that the immunoprofile supported the primary lung non-small cell carcinoma, favor adenocarcinoma   The pathologist also stated that given the focal p40 staining, a squamous carcinoma could not be excluded        Procedures     05/30/2019 complete pulmonary function test     PFT Interpretation     Quality of Data:  The patient's efforts are acceptable and reproducible      Test Performed:  Complete pulmonary function tests, including spirometry with and without bronchodilator, measurement of lung volume, and diffusing capacity      Spirometry Interpretation:  Spirometry is within normal limits      Flow Volume Loops:  Flow-Volume loops are normal      Lung Volumes:  Plethysmographic lung volumes are within normal limits      Diffusing Capacity:  Minimal reduction in diffusion capacity likely clinically insignificant     Conclusions: An isolated reduction in Diffusing Capacity is present and may represent Early fibrotic or pulmonary vascular disease  Clinical correlation is recommended

## 2021-02-26 ENCOUNTER — TELEPHONE (OUTPATIENT)
Dept: HEMATOLOGY ONCOLOGY | Facility: CLINIC | Age: 61
End: 2021-02-26

## 2021-02-26 NOTE — TELEPHONE ENCOUNTER
Call transferred from  [facility] to RN line  Patient wants to verify that CT scan is authorized before she drinks barium    Auth # is in appt notes   Patient aware

## 2021-03-01 ENCOUNTER — HOSPITAL ENCOUNTER (OUTPATIENT)
Dept: RADIOLOGY | Facility: HOSPITAL | Age: 61
Discharge: HOME | End: 2021-03-01
Payer: COMMERCIAL

## 2021-03-01 DIAGNOSIS — C34.91 ADENOCARCINOMA OF LUNG, STAGE 4, RIGHT (HCC): ICD-10-CM

## 2021-03-01 DIAGNOSIS — R10.84 GENERALIZED ABDOMINAL PAIN: ICD-10-CM

## 2021-03-01 DIAGNOSIS — R79.89 ABNORMAL LFTS: ICD-10-CM

## 2021-03-01 PROCEDURE — 74176 CT ABD & PELVIS W/O CONTRAST: CPT

## 2021-03-01 PROCEDURE — G1004 CDSM NDSC: HCPCS

## 2021-03-01 PROCEDURE — 71250 CT THORAX DX C-: CPT

## 2021-03-02 ENCOUNTER — OFFICE VISIT (OUTPATIENT)
Dept: FAMILY MEDICINE CLINIC | Facility: CLINIC | Age: 61
End: 2021-03-02
Payer: COMMERCIAL

## 2021-03-02 VITALS
BODY MASS INDEX: 33.27 KG/M2 | TEMPERATURE: 97.5 F | HEIGHT: 66 IN | OXYGEN SATURATION: 97 % | HEART RATE: 105 BPM | DIASTOLIC BLOOD PRESSURE: 82 MMHG | SYSTOLIC BLOOD PRESSURE: 134 MMHG | WEIGHT: 207 LBS | RESPIRATION RATE: 16 BRPM

## 2021-03-02 DIAGNOSIS — Z23 NEED FOR VACCINATION: ICD-10-CM

## 2021-03-02 DIAGNOSIS — Z00.00 ANNUAL PHYSICAL EXAM: Primary | ICD-10-CM

## 2021-03-02 DIAGNOSIS — C34.91 NON-SMALL CELL CANCER OF RIGHT LUNG (HCC): ICD-10-CM

## 2021-03-02 DIAGNOSIS — I10 BENIGN HYPERTENSION: ICD-10-CM

## 2021-03-02 DIAGNOSIS — R73.01 IMPAIRED FASTING GLUCOSE: ICD-10-CM

## 2021-03-02 DIAGNOSIS — K59.00 CONSTIPATION, UNSPECIFIED CONSTIPATION TYPE: ICD-10-CM

## 2021-03-02 DIAGNOSIS — Z12.31 SCREENING MAMMOGRAM, ENCOUNTER FOR: ICD-10-CM

## 2021-03-02 PROCEDURE — 3008F BODY MASS INDEX DOCD: CPT | Performed by: FAMILY MEDICINE

## 2021-03-02 PROCEDURE — 90715 TDAP VACCINE 7 YRS/> IM: CPT

## 2021-03-02 PROCEDURE — 1036F TOBACCO NON-USER: CPT | Performed by: FAMILY MEDICINE

## 2021-03-02 PROCEDURE — 3725F SCREEN DEPRESSION PERFORMED: CPT | Performed by: FAMILY MEDICINE

## 2021-03-02 PROCEDURE — 99396 PREV VISIT EST AGE 40-64: CPT | Performed by: FAMILY MEDICINE

## 2021-03-02 PROCEDURE — 90471 IMMUNIZATION ADMIN: CPT

## 2021-03-02 RX ORDER — AMLODIPINE BESYLATE 5 MG/1
5 TABLET ORAL DAILY
Qty: 90 TABLET | Refills: 1 | Status: SHIPPED | OUTPATIENT
Start: 2021-03-02 | End: 2021-10-11

## 2021-03-02 NOTE — PROGRESS NOTES
FAMILY PRACTICE HEALTH MAINTENANCE OFFICE VISIT  [facility] - [address]    NAME: Arleth Worley  AGE: 56 y o  SEX: female  : 1960     DATE: 3/2/2021    Assessment and Plan     1  Annual physical exam    2  Benign hypertension  -     Lipid Panel with Direct LDL reflex; Future  -     amLODIPine (NORVASC) 5 mg tablet; Take 1 tablet (5 mg total) by mouth daily    3  Impaired fasting glucose  -     Hemoglobin A1C; Future    4  Non-small cell cancer of right lung [facility])  Assessment & Plan:  Doing well  Following with oncology      5  Screening mammogram, encounter for  -     Mammo screening bilateral w 3d & cad; Future; Expected date: 2021    6  Constipation, unspecified constipation type  Comments:  worsening, recommended she get a colonoscopy, but she declined  will work on fiber daily, water and miralax    7  Need for vaccination  -     TDAP VACCINE GREATER THAN OR EQUAL TO 6YO IM      · Patient Counseling:   · Nutrition: Stressed importance of a well balanced diet, moderation of sodium/saturated fat, caloric balance and sufficient intake of fiber  · Exercise: Stressed the importance of regular exercise with a goal of 150 minutes per week  · Dental Health: Discussed daily flossing and brushing and regular dental visits     · Immunizations reviewed: See Orders  · Discussed benefits of:  Colon Cancer Screening, Mammogram , Screening labs and declined colonoscopy due to all the cancer scans that were done   BMI Counseling: Body mass index is 33 41 kg/m²  Discussed with patient's BMI with her  The BMI is above normal  Exercise recommendations include exercising 3-5 times per week  Return in about 6 months (around 2021) for Next scheduled follow up  Chief Complaint     Chief Complaint   Patient presents with    Physical Exam     [id number]       History of Present Illness     She is feeling well  She is having issues with constipation for the past month   She has been taking stool softeners and has needed laxatives  She has been having issues with constipation since December  Well Adult Physical   Patient here for a comprehensive physical exam       Diet and Physical Activity  Diet: well balanced diet  Exercise: frequently      Depression Screen  PHQ-9 Depression Screening    PHQ-9:   Frequency of the following problems over the past two weeks:      Little interest or pleasure in doing things: 0 - not at all  Feeling down, depressed, or hopeless: 0 - not at all  PHQ-2 Score: 0          General Health  Hearing: Normal:  bilateral  Vision: no vision problems  Dental: has not teeth    Reproductive Health  Post-menopausal       The following portions of the patient's history were reviewed and updated as appropriate: allergies, current medications, past family history, past medical history, past social history, past surgical history and problem list     Review of Systems     Review of Systems   Respiratory: Negative  Cardiovascular: Negative  Gastrointestinal: Positive for constipation         Past Medical History     Past Medical History:   Diagnosis Date    Adenocarcinoma of right lung, stage 4 (HCC)     Allergic rhinitis     Alopecia     resolved 10/25/16    Anxiety     last assessed 10/4/16, resolved 10/25/16    Benign hypertension 2/1/2018    Benign neoplasm of skin of trunk 03/25/2008    last assessed 3/25/08    Benign neoplasm of skin of upper extremity and shoulder 03/25/2008    last assessed 3/25/08    Ear deformity, acquired     last assessed 10/13/14, resolved 3/12/15    Eczema     Hyperlipemia     Maintenance chemotherapy     Secondary cancer of brain ([facility] [address] [facility]     Seizures ([facility] [address] [facility]     Vertigo        Past Surgical History     Past Surgical History:   Procedure Laterality Date    APPENDECTOMY  1964    BRAIN TUMOR EXCISION      CENTRAL VENOUS CATHETER INSERTION Right     PORTACATH       Social History     Social History     Socioeconomic History  Marital status: /Civil Union     Spouse name: None    Number of children: 1    Years of education: None    Highest education level: None   Occupational History    None   Social Needs    Financial resource strain: None    Food insecurity     Worry: None     Inability: None    Transportation needs     Medical: None     Non-medical: None   Tobacco Use    Smoking status: Former Smoker     Packs/day: 1 00     Years: 47 00     Pack years: 47 00     Types: Cigarettes     Quit date:      Years since quittin 1    Smokeless tobacco: Never Used   Substance and Sexual Activity    Alcohol use: Yes     Frequency: Monthly or less     Drinks per session: 1 or 2     Binge frequency: Never     Comment: occasionally / Social     Drug use: Yes     Types: Marijuana    Sexual activity: Yes   Lifestyle    Physical activity     Days per week: None     Minutes per session: None    Stress: None   Relationships    Social connections     Talks on phone: None     Gets together: None     Attends Restorationist service: None     Active member of club or organization: None     Attends meetings of clubs or organizations: None     Relationship status: None    Intimate partner violence     Fear of current or ex partner: None     Emotionally abused: None     Physically abused: None     Forced sexual activity: None   Other Topics Concern    None   Social History Narrative    High school or GED     Lives independently with spouse        Family History     Family History   Problem Relation Age of Onset    Diabetes Mother     Heart disease Mother     Lung cancer Father 79        Smoker     Pancreatitis Brother     No Known Problems Brother        Current Medications       Current Outpatient Medications:     ALPRAZolam (XANAX) 0 25 mg tablet, Take 1 tablet (0 25 mg total) by mouth 2 (two) times a day as needed for anxiety, Disp: 30 tablet, Rfl: 0    amLODIPine (NORVASC) 5 mg tablet, Take 1 tablet (5 mg total) by mouth daily, Disp: 90 tablet, Rfl: 1    atorvastatin (LIPITOR) 10 mg tablet, Take 1 tablet by mouth once daily, Disp: 90 tablet, Rfl: 0    halobetasol (ULTRAVATE) 0 05 % cream, APPLY  CREAM TOPICALLY TWICE DAILY, Disp: 50 g, Rfl: 1    Omega-3 Fatty Acids (FISH OIL PO), Take 2 tablets by mouth , Disp: , Rfl:     omeprazole (PriLOSEC) 10 mg delayed release capsule, Take 10 mg by mouth daily, Disp: , Rfl:     Pseudoephedrine-Guaifenesin (MUCINEX D PO), Take by mouth as needed , Disp: , Rfl:   No current facility-administered medications for this visit  Facility-Administered Medications Ordered in Other Visits:     alteplase (CATHFLO) injection 2 mg, 2 mg, Intracatheter, PRN, Lakeshia Cazares MD    sodium chloride 0 9 % infusion, 20 mL/hr, Intravenous, Continuous, Lakeshia Cazares MD     Allergies     Allergies   Allergen Reactions    Iodinated Diagnostic Agents Anaphylaxis and Itching    Clindamycin     Clindamycin/Lincomycin        Objective     /82   Pulse 105   Temp 97 5 °F (36 4 °C)   Resp 16   Ht 5' 6" (1 676 m)   Wt 93 9 kg (207 lb)   LMP  (LMP Unknown)   SpO2 97%   BMI 33 41 kg/m²      Physical Exam  Vitals signs and nursing note reviewed  Constitutional:       Appearance: She is well-developed  HENT:      Head: Normocephalic and atraumatic  Right Ear: Tympanic membrane and external ear normal       Left Ear: Tympanic membrane and external ear normal    Eyes:      Pupils: Pupils are equal, round, and reactive to light  Neck:      Musculoskeletal: Normal range of motion  Cardiovascular:      Rate and Rhythm: Normal rate and regular rhythm  Heart sounds: Normal heart sounds  No murmur  No friction rub  Pulmonary:      Effort: Pulmonary effort is normal  No respiratory distress  Breath sounds: Normal breath sounds  No wheezing or rales  Abdominal:      General: Bowel sounds are normal  There is no distension  Palpations: Abdomen is soft  There is no mass  Tenderness: There is no abdominal tenderness  There is no guarding or rebound  Musculoskeletal:      Right lower leg: No edema  Left lower leg: No edema  Skin:     General: Skin is warm  Neurological:      Mental Status: She is alert and oriented to person, place, and time              Visual Acuity Screening    Right eye Left eye Both eyes   Without correction:      With correction: 20/20 20/20 [address], [address] [place] [address]

## 2021-03-10 DIAGNOSIS — Z23 ENCOUNTER FOR IMMUNIZATION: ICD-10-CM

## 2021-03-22 ENCOUNTER — HOSPITAL ENCOUNTER (OUTPATIENT)
Dept: INFUSION CENTER | Facility: HOSPITAL | Age: 61
Discharge: HOME | End: 2021-03-22
Payer: COMMERCIAL

## 2021-03-22 VITALS — TEMPERATURE: 96.5 F

## 2021-03-22 DIAGNOSIS — C34.91 ADENOCARCINOMA OF LUNG, STAGE 4, RIGHT (HCC): Primary | ICD-10-CM

## 2021-03-22 PROCEDURE — 96523 IRRIG DRUG DELIVERY DEVICE: CPT

## 2021-04-07 DIAGNOSIS — E78.2 MIXED HYPERLIPIDEMIA: ICD-10-CM

## 2021-04-07 RX ORDER — ATORVASTATIN CALCIUM 10 MG/1
TABLET, FILM COATED ORAL
Qty: 90 TABLET | Refills: 0 | Status: SHIPPED | OUTPATIENT
Start: 2021-04-07 | End: 2021-07-12

## 2021-04-14 ENCOUNTER — IMMUNIZATIONS (OUTPATIENT)
Dept: FAMILY MEDICINE CLINIC | Facility: HOSPITAL | Age: 61
End: 2021-04-14

## 2021-04-14 DIAGNOSIS — Z23 ENCOUNTER FOR IMMUNIZATION: Primary | ICD-10-CM

## 2021-04-14 PROCEDURE — 91301 SARS-COV-2 / COVID-19 MRNA VACCINE (MODERNA) 100 MCG: CPT

## 2021-04-14 PROCEDURE — 0011A SARS-COV-2 / COVID-19 MRNA VACCINE (MODERNA) 100 MCG: CPT

## 2021-05-17 ENCOUNTER — IMMUNIZATIONS (OUTPATIENT)
Dept: FAMILY MEDICINE CLINIC | Facility: HOSPITAL | Age: 61
End: 2021-05-17

## 2021-05-17 DIAGNOSIS — Z23 ENCOUNTER FOR IMMUNIZATION: Primary | ICD-10-CM

## 2021-05-17 PROCEDURE — 91301 SARS-COV-2 / COVID-19 MRNA VACCINE (MODERNA) 100 MCG: CPT

## 2021-05-17 PROCEDURE — 0012A SARS-COV-2 / COVID-19 MRNA VACCINE (MODERNA) 100 MCG: CPT

## 2021-05-24 ENCOUNTER — HOSPITAL ENCOUNTER (OUTPATIENT)
Dept: INFUSION CENTER | Facility: HOSPITAL | Age: 61
Discharge: HOME | End: 2021-05-24
Payer: COMMERCIAL

## 2021-05-24 VITALS — TEMPERATURE: 96.5 F

## 2021-05-24 DIAGNOSIS — C34.91 ADENOCARCINOMA OF LUNG, STAGE 4, RIGHT (HCC): Primary | ICD-10-CM

## 2021-05-24 PROCEDURE — 96523 IRRIG DRUG DELIVERY DEVICE: CPT

## 2021-05-24 NOTE — PLAN OF CARE
Problem: SAFETY ADULT  Goal: Patient will remain free of falls  Description: INTERVENTIONS:  - Assess patient frequently for physical needs  -  Identify cognitive and physical deficits and behaviors that affect risk of falls    -  [place] fall precautions as indicated by assessment   - Educate patient/family on patient safety including physical limitations  - Instruct patient to call for assistance with activity based on assessment  - Modify environment to reduce risk of injury  - Consider OT/PT consult to assist with strengthening/mobility  Outcome: Progressing  Goal: Maintain or return to baseline ADL function  Description: INTERVENTIONS:  -  Assess patient's ability to carry out ADLs; assess patient's baseline for ADL function and identify physical deficits which impact ability to perform ADLs (bathing, care of mouth/teeth, toileting, grooming, dressing, etc )  - Assess/evaluate cause of self-care deficits   - Assess range of motion  - Assess patient's mobility; develop plan if impaired  - Assess patient's need for assistive devices and provide as appropriate  - Encourage maximum independence but intervene and supervise when necessary  - Involve family in performance of ADLs  - Assess for home care needs following discharge   - Consider OT consult to assist with ADL evaluation and planning for discharge  - Provide patient education as appropriate  Outcome: Progressing  Goal: Maintain or return mobility status to optimal level  Description: INTERVENTIONS:  - Assess patient's baseline mobility status (ambulation, transfers, stairs, etc )    - Identify cognitive and physical deficits and behaviors that affect mobility  - Identify mobility aids required to assist with transfers and/or ambulation (gait belt, sit-to-stand, lift, walker, cane, etc )  - [place] fall precautions as indicated by assessment  - Record patient progress and toleration of activity level on Mobility SBAR; progress patient to next Phase/Stage  - Instruct patient to call for assistance with activity based on assessment  - Consider rehabilitation consult to assist with strengthening/weightbearing, etc   Outcome: Progressing     Problem: Knowledge Deficit  Goal: Patient/family/caregiver demonstrates understanding of disease process, treatment plan, medications, and discharge instructions  Description: Complete learning assessment and assess knowledge base    Interventions:  - Provide teaching at level of understanding  - Provide teaching via preferred learning methods  Outcome: Progressing

## 2021-06-23 ENCOUNTER — DOCUMENTATION (OUTPATIENT)
Dept: HEMATOLOGY ONCOLOGY | Facility: MEDICAL CENTER | Age: 61
End: 2021-06-23

## 2021-06-23 NOTE — PROGRESS NOTES
Left voicemail for patient that her appointment of 8/23/21 at 11am with Dr Melburn Canavan has been rescheduled to Thursday, 8/19/21 at 1:40pm   I asked patient to call back if new date and time does not work for her

## 2021-07-12 DIAGNOSIS — E78.2 MIXED HYPERLIPIDEMIA: ICD-10-CM

## 2021-07-12 RX ORDER — ATORVASTATIN CALCIUM 10 MG/1
TABLET, FILM COATED ORAL
Qty: 90 TABLET | Refills: 1 | Status: SHIPPED | OUTPATIENT
Start: 2021-07-12 | End: 2021-10-15 | Stop reason: SDUPTHER

## 2021-07-15 ENCOUNTER — HOSPITAL ENCOUNTER (OUTPATIENT)
Dept: RADIOLOGY | Facility: HOSPITAL | Age: 61
Discharge: HOME | End: 2021-07-15
Payer: COMMERCIAL

## 2021-07-15 VITALS — WEIGHT: 180 LBS | HEIGHT: 66 IN | BODY MASS INDEX: 28.93 KG/M2

## 2021-07-15 DIAGNOSIS — Z12.31 SCREENING MAMMOGRAM, ENCOUNTER FOR: ICD-10-CM

## 2021-07-15 PROCEDURE — 77067 SCR MAMMO BI INCL CAD: CPT

## 2021-07-15 PROCEDURE — 77063 BREAST TOMOSYNTHESIS BI: CPT

## 2021-07-28 ENCOUNTER — TELEPHONE (OUTPATIENT)
Dept: HEMATOLOGY ONCOLOGY | Facility: CLINIC | Age: 61
End: 2021-07-28

## 2021-07-28 NOTE — TELEPHONE ENCOUNTER
Patient calling to request lab slips emailed to her @ [email or web address]  NET     Patient can be reached at [phone]

## 2021-07-29 ENCOUNTER — TELEPHONE (OUTPATIENT)
Dept: FAMILY MEDICINE CLINIC | Facility: CLINIC | Age: 61
End: 2021-07-29

## 2021-07-29 DIAGNOSIS — C34.91 ADENOCARCINOMA OF LUNG, STAGE 4, RIGHT (HCC): Primary | ICD-10-CM

## 2021-07-29 NOTE — TELEPHONE ENCOUNTER
----- Message from Becky Carbajal sent at 7/29/2021 10:55 AM EDT -----  Regarding: RE: Pre-Procedure Testing Question  Contact: [phone]  good morning, please send them to me   I haven't made an appointment yet    thank you

## 2021-08-02 ENCOUNTER — HOSPITAL ENCOUNTER (OUTPATIENT)
Dept: INFUSION CENTER | Facility: HOSPITAL | Age: 61
Discharge: HOME | End: 2021-08-02
Payer: COMMERCIAL

## 2021-08-02 VITALS — TEMPERATURE: 98.1 F

## 2021-08-02 DIAGNOSIS — C34.91 ADENOCARCINOMA OF LUNG, STAGE 4, RIGHT (HCC): Primary | ICD-10-CM

## 2021-08-02 PROCEDURE — 96523 IRRIG DRUG DELIVERY DEVICE: CPT

## 2021-08-02 NOTE — PROGRESS NOTES
Port flushed  Brisk blood return noted  Patient offers no complaints   Next appointment verified, patient declines AVS

## 2021-08-02 NOTE — PLAN OF CARE
Problem: SAFETY ADULT  Goal: Patient will remain free of falls  Description: INTERVENTIONS:  - Educate patient/family on patient safety including physical limitations  - Instruct patient to call for assistance with activity   - Consult OT/PT to assist with strengthening/mobility   - Keep Call bell within reach  - Keep bed low and locked with side rails adjusted as appropriate  - Keep care items and personal belongings within reach  - Initiate and maintain comfort rounds    Outcome: Progressing     Problem: Knowledge Deficit  Goal: Patient/family/caregiver demonstrates understanding of disease process, treatment plan, medications, and discharge instructions  Description: Complete learning assessment and assess knowledge base    Interventions:  - Provide teaching at level of understanding  - Provide teaching via preferred learning methods  Outcome: Progressing

## 2021-08-17 LAB
BASOPHILS # BLD AUTO: 0.1 X10E3/UL (ref 0–0.2)
BASOPHILS NFR BLD AUTO: 1 %
BUN SERPL-MCNC: 13 MG/DL (ref 8–27)
BUN/CREAT SERPL: 15 (ref 12–28)
CALCIUM SERPL-MCNC: 10 MG/DL (ref 8.7–10.3)
CHLORIDE SERPL-SCNC: 98 MMOL/L (ref 96–106)
CO2 SERPL-SCNC: 22 MMOL/L (ref 20–29)
CREAT SERPL-MCNC: 0.86 MG/DL (ref 0.57–1)
EOSINOPHIL # BLD AUTO: 0.1 X10E3/UL (ref 0–0.4)
EOSINOPHIL NFR BLD AUTO: 1 %
ERYTHROCYTE [DISTWIDTH] IN BLOOD BY AUTOMATED COUNT: 12.1 % (ref 11.7–15.4)
GLUCOSE SERPL-MCNC: 106 MG/DL (ref 65–99)
HCT VFR BLD AUTO: 41.3 % (ref 34–46.6)
HGB BLD-MCNC: 14.1 G/DL (ref 11.1–15.9)
IMM GRANULOCYTES # BLD: 0 X10E3/UL (ref 0–0.1)
IMM GRANULOCYTES NFR BLD: 0 %
LYMPHOCYTES # BLD AUTO: 2.6 X10E3/UL (ref 0.7–3.1)
LYMPHOCYTES NFR BLD AUTO: 38 %
MCH RBC QN AUTO: 29.9 PG (ref 26.6–33)
MCHC RBC AUTO-ENTMCNC: 34.1 G/DL (ref 31.5–35.7)
MCV RBC AUTO: 88 FL (ref 79–97)
MONOCYTES # BLD AUTO: 0.5 X10E3/UL (ref 0.1–0.9)
MONOCYTES NFR BLD AUTO: 7 %
NEUTROPHILS # BLD AUTO: 3.6 X10E3/UL (ref 1.4–7)
NEUTROPHILS NFR BLD AUTO: 53 %
PLATELET # BLD AUTO: 346 X10E3/UL (ref 150–450)
POTASSIUM SERPL-SCNC: 4.4 MMOL/L (ref 3.5–5.2)
RBC # BLD AUTO: 4.71 X10E6/UL (ref 3.77–5.28)
SL AMB EGFR AFRICAN AMERICAN: 84 ML/MIN/1.73
SL AMB EGFR NON AFRICAN AMERICAN: 73 ML/MIN/1.73
SODIUM SERPL-SCNC: 136 MMOL/L (ref 134–144)
WBC # BLD AUTO: 6.9 X10E3/UL (ref 3.4–10.8)

## 2021-08-19 ENCOUNTER — OFFICE VISIT (OUTPATIENT)
Dept: HEMATOLOGY ONCOLOGY | Facility: MEDICAL CENTER | Age: 61
End: 2021-08-19
Payer: COMMERCIAL

## 2021-08-19 VITALS
TEMPERATURE: 98.1 F | DIASTOLIC BLOOD PRESSURE: 80 MMHG | HEIGHT: 66 IN | RESPIRATION RATE: 16 BRPM | SYSTOLIC BLOOD PRESSURE: 122 MMHG | HEART RATE: 80 BPM | WEIGHT: 174 LBS | BODY MASS INDEX: 27.97 KG/M2 | OXYGEN SATURATION: 98 %

## 2021-08-19 DIAGNOSIS — C34.91 ADENOCARCINOMA OF LUNG, STAGE 4, RIGHT (HCC): Primary | ICD-10-CM

## 2021-08-19 PROCEDURE — 99214 OFFICE O/P EST MOD 30 MIN: CPT | Performed by: INTERNAL MEDICINE

## 2021-08-19 PROCEDURE — 3074F SYST BP LT 130 MM HG: CPT | Performed by: INTERNAL MEDICINE

## 2021-08-19 PROCEDURE — 3079F DIAST BP 80-89 MM HG: CPT | Performed by: INTERNAL MEDICINE

## 2021-08-19 NOTE — PROGRESS NOTES
Rubina Mejia  1960  [address] [facility] [place] [facility] [id number]    DISCUSSION  SUMMARY:    78-year-old female with M1 non-small cell lung carcinoma/adenocarcinoma  Patient has been on [facility] 20015-14 (phase III open label randomized study of [id number] [anti-PD-L1 antibody] in combination with carboplatin and paclitaxel +/- Avastin versus carboplatin and paclitaxel +/- Avastin in patients with stage IV chemotherapy naive non-small cell lung carcinoma)  Patient was randomized to receive all 4 medications  Mrs Joseline Walsh completed the 6 cycles of treatment without significant toxicities  Patient had been on maintenance (study drug (atezolizumab) and avastin) - subsequently only Avastin  More recently, patient has been on surveillance only  Issues:     Stage IV non-small cell lung carcinoma  As discussed previously, recent workup did not demonstrate any evidence of residual or recurrent disease  Patient is off the Avastin now  Mrs Joseline Walsh continues to feel well and clinically there are no concerning signs  Prior scans did not demonstrate any evidence for progression  Blood work was okay/acceptable but the LFTs are slightly elevated - about the same as before  This has been an issue in the past (believed to be due to patient's alcohol use)  There are no signs of recurrence  The plan is to continue with surveillance  Patient is to return in 6 months with blood work before  At that time, we will talk about repeating CT scans  IR consulted for port removal     As I understand from the , patient is being taken off the protocol entirely after today's office visit (study is closing)  Brain metastases status post resection and radiotherapy  Patient has been tapered off of Keppra  There have been no recent CNS issues  The most recent MRI/brain did not demonstrate any evidence for recurrence    Patient will follow up with Dr Donis Rodriguez as directed - MRIs are now being performed yearly     Patient knows to call the hematology/oncology office if there are any other questions or concerns  Carefully review your medication list and verify that the list is accurate and up-to-date  Please call the hematology/oncology office if there are medications missing from the list, medications on the list that you are not currently taking or if there is a dosage or instruction that is different from how you're taking that medication  Patient goals and areas of care:  Continue with surveillance  Barriers to care:  None  Patient is able to self-care   __________________________________________________________________________________________________    Chief Complaint   Patient presents with    Follow-up     Metastatic non-small cell lung carcinoma, treated an on surveillance     Advance Care Planning/Advance Directives: not discussed    Oncology History   Adenocarcinoma of lung, stage 4, right ([facility] [address] )   9/5/2015 Initial Diagnosis    Patient was referred to the emergency room for evaluation of vertigo as sent from the [facility] center  (patient reported )  Patient underwent the following pertinent imaging scans  MRI of the brain demonstrated a mass in the right superior all temporal gyrus with measurements of 4 2 x 3 3 x 3 6 cm with vasogenic edema and mass effect  There was a near uncal herniation noted  CT of the chest and abdomen/pelvis demonstrated a necrotic right upper lobe mass measuring 7 x 4 2 x 4 1 cm strongly suspicious for bronchogenic carcinoma  The mass contacts the posterior medial pleural surface and potentially contacts the right posterior tracheal border  No pathologic adenopathy was identified in no evidence of metastatic disease in the chest abdomen or pelvis  9/6/2015 Surgery    Non-small cell carcinoma of lung ([facility] [address] ) diagnosed from biopsy of right temporal mass   Pathology demonstrates poorly differentiated non-small cell carcinoma  Pathologist stated that the immunoprofile supported the primary lung non-small cell carcinoma favor adenocarcinoma  Squamous carcinoma could not be excluded, secondary to focal P 40 staining  Marivel Villa path report demonstrated no ALK gene rearrangement or dleation and negative ROS1 rearrangement, No EGFR mutations were found  Surgery completed by Dr Edwardo Godfrey  10/7/2015 - 10/16/2015 Radiation    A dose of 3000 cGy in 5 fractions delivered every other day was delivered to the resection cavity (single right superior temporal intracranial metastasis) Dr Timothy Edwards     10/20/2015 -  Research Study Participant    [id number] Phase III open label randomized study of [id number] [Anti-PDL1 antibody] in combination with carboplatin and paclitaxel +/- Avastin versus Carboplatin and Paciltaxel +/- Avastin in patients with stage IV Chemotherapy  Naive non-small cell lung carcinoma  Patient was randomized to receive off for medications  11/30/2015 - 2/18/2016 Chemotherapy    Started Carboplatin, Paclitaxel, Avastin and PD-L1 (atezolizumab; study drug)  Completed 6 cycles with cycle 6 day 1 on 2/18/16     3/10/2016 -  Chemotherapy    Beginning cycle 7, maintenance cycle with Avastin and PD-L1 (Atezolizumab; study drug)     5/24/2017 Adverse Reaction    LFT elevation-persisted  Patient was taken off of study drug Atezolizumab, but continues on Avastin maintance  5/23/2019 - 7/3/2019 Chemotherapy    [No matching medication found in this treatment plan]       History of Present Illness:    56year old female recently diagnosed with IV lung cancer here for followup  Mrs Shanae Stanley began to have headaches last spring 2015  Patient was seen by her PCP and treated with antibiotics  Initially the symptoms got better the patient went on vacation  After returning from vacation, Mrs Shanae Stanley once again developed headaches   Patient was treated with a second round of antibiotics and then was diagnosed with vertigo  Patient was sent to the [address] [place] [address]  Although the specifics are not entirely clear, the therapist in the [address] [place] [address] sent the patient to the emergency room  A CAT scan of the brain demonstrated a brain lesion and patient was transferred to [place]  Patient was seen by Dr Eloisa Hoskins and underwent resection demonstrating adenocarcinoma  Additional testing demonstrated a lung mass  Patient improved and was discharged  Patient completed SRT with Dr Bertram Winn and Dr Diogenes Newman  Patient has been on [employer] 20015-14 (phase III open label randomized study of [id number] [anti-PD-L1 antibody] in combination with carboplatin and paclitaxel +/- Avastin versus carboplatin and paclitaxel +/- Avastin in patients with stage IV chemotherapy naÃ¯ve non-small cell lung carcinoma)  Mrs Richard Garcia was randomized to receive the anti-PD-L1 antibody with chemotherapy - patient completed 6 cycles and was placed on maintenance  Mrs Roque Hernandez previously suffered a tonic-clonic seizure and was seen in the emergency room  CAT scan of the head did not demonstrate any evidence of disease progression  The seizure was thought to be due to encephalomalacia  Since that time, there have been no seizure issues  Mrs Richard Garcia had been on Keppra - this has been tapered off  After long discussions and consultation with the thoracic Working group, patient was taken off of all treatments approximately 2 years ago  The plan has been surveillance  Patient returns for follow-up  Mrs Richard Garcia continues to feel well  No pulmonary issues  No port issues  No headaches, blurred vision or dizziness, no other CNS issues  Appetite is good, weight is stable  Routine health maintenance and medical care is up-to-date  Patient still bruises easily on her upper extremities, no bleeding  Patient has received her COVID vaccination  Review of Systems   Constitutional: Positive for fatigue  HENT: Negative  Eyes: Negative  Respiratory: Negative  Cardiovascular: Negative  Gastrointestinal: Negative  Endocrine: Negative  Genitourinary: Negative  Musculoskeletal: Positive for arthralgias  Negative for neck pain  Skin: Negative  Allergic/Immunologic: Negative  Neurological: Negative  Hematological: Does not bruise/bleed easily  Psychiatric/Behavioral: Negative  All other systems reviewed and are negative       Patient Active Problem List   Diagnosis    Seizure ([facility] [address] )    Adenocarcinoma of lung, stage 4, right ([facility] [address] )    Mixed hyperlipidemia    Abnormal LFTs    Allergic rhinitis    Brain metastasis ([facility] [address] [facility]    Brain tumor ([facility] [address] [facility]    Encephalomalacia    Impaired fasting glucose    Insomnia    Lesion of temporal lobe    Non-small cell lung cancer ([facility] [address] [facility]    Benign hypertension    Chronic maxillary sinusitis    Abdominal pain     Past Medical History:   Diagnosis Date    Adenocarcinoma of right lung, stage 4 ([facility] [address] )     Allergic rhinitis     Alopecia     resolved 10/25/16    Anxiety     last assessed 10/4/16, resolved 10/25/16    Benign hypertension 2/1/2018    Benign neoplasm of skin of trunk 03/25/2008    last assessed 3/25/08    Benign neoplasm of skin of upper extremity and shoulder 03/25/2008    last assessed 3/25/08    Ear deformity, acquired     last assessed 10/13/14, resolved 3/12/15    Eczema     History of chemotherapy     Hyperlipemia     Maintenance chemotherapy     Secondary cancer of brain ([facility] [address] [facility]     Seizures ([facility] [address] )     Vertigo      Past Surgical History:   Procedure Laterality Date    APPENDECTOMY  1964    BRAIN TUMOR EXCISION      CENTRAL VENOUS CATHETER INSERTION Right     PORTACATH     Family History   Problem Relation Age of Onset    Diabetes Mother     Heart disease Mother     Lung cancer Father 79        Smoker     Pancreatitis Brother     No Known Problems Brother     Uterine cancer Paternal Grandmother      Social History     Socioeconomic History    Marital status: /Civil Union     Spouse name: Not on file    Number of children: 1    Years of education: Not on file    Highest education level: Not on file   Occupational History    Not on file   Tobacco Use    Smoking status: Former Smoker     Packs/day: 1 00     Years: 47 00     Pack years: 47 00     Types: Cigarettes     Quit date:      Years since quittin 6    Smokeless tobacco: Never Used   Substance and Sexual Activity    Alcohol use: Yes     Comment: occasionally / Social     Drug use: Yes     Types: Marijuana    Sexual activity: Yes   Other Topics Concern    Not on file   Social History Narrative    High school or GED     Lives independently with spouse      Social Determinants of Health     Financial Resource Strain:     Difficulty of Paying Living Expenses:    Food Insecurity:     Worried About Running Out of Food in the Last Year:     [address] in the Last Year:    Transportation Needs:     Lack of Transportation (Medical):      Lack of Transportation (Non-Medical):    Physical Activity:     Days of Exercise per Week:     Minutes of Exercise per Session:    Stress:     Feeling of Stress :    Social Connections:     Frequency of Communication with Friends and Family:     Frequency of Social Gatherings with Friends and Family:     Attends Christianity Services:     Active Member of Clubs or Organizations:     Attends Club or Organization Meetings:     Marital Status:    Intimate Partner Violence:     Fear of Current or Ex-Partner:     Emotionally Abused:     Physically Abused:     Sexually Abused:        Current Outpatient Medications:     ALPRAZolam (XANAX) 0 25 mg tablet, Take 1 tablet (0 25 mg total) by mouth 2 (two) times a day as needed for anxiety, Disp: 30 tablet, Rfl: 0    amLODIPine (NORVASC) 5 mg tablet, Take 1 tablet (5 mg total) by mouth daily, Disp: 90 tablet, Rfl: 1    atorvastatin (LIPITOR) 10 mg tablet, Take 1 tablet by mouth once daily, Disp: 90 tablet, Rfl: 1    halobetasol (ULTRAVATE) 0 05 % cream, APPLY  CREAM TOPICALLY TWICE DAILY, Disp: 50 g, Rfl: 1    Omega-3 Fatty Acids (FISH OIL PO), Take 2 tablets by mouth , Disp: , Rfl:     omeprazole (PriLOSEC) 10 mg delayed release capsule, Take 10 mg by mouth daily, Disp: , Rfl:     Pseudoephedrine-Guaifenesin (MUCINEX D PO), Take by mouth as needed , Disp: , Rfl:   No current facility-administered medications for this visit  Facility-Administered Medications Ordered in Other Visits:     alteplase (CATHFLO) injection 2 mg, 2 mg, Intracatheter, PRN, Peggy Lindo MD    sodium chloride 0 9 % infusion, 20 mL/hr, Intravenous, Continuous, Peggy Lindo MD    Allergies   Allergen Reactions    Iodinated Diagnostic Agents Anaphylaxis and Itching    Clindamycin     Clindamycin/Lincomycin        Vitals:    08/19/21 1339   BP: 122/80   Pulse: 80   Resp: 16   Temp: 98 1 °F (36 7 °C)   SpO2: 98%     Physical Exam  Constitutional:       Appearance: She is well-developed  Comments: Well-nourished female, no respiratory distress   HENT:      Head: Normocephalic and atraumatic  Right Ear: External ear normal       Left Ear: External ear normal       Nose: Nose normal       Mouth/Throat:      Pharynx: No oropharyngeal exudate  Eyes:      Conjunctiva/sclera: Conjunctivae normal       Pupils: Pupils are equal, round, and reactive to light  Neck:      Comments: Neck is supple, no JVD, good range of motion, no pain or tenderness with movement  Cardiovascular:      Rate and Rhythm: Normal rate and regular rhythm  Heart sounds: Normal heart sounds  Pulmonary:      Effort: Pulmonary effort is normal       Breath sounds: Normal breath sounds  Abdominal:      General: Bowel sounds are normal       Palpations: Abdomen is soft  Comments: Abdomen is soft, nontender, no hepatosplenomegaly, no rigidity or rebound, +bowel sounds   Musculoskeletal:         General: Normal range of motion        Cervical back: Normal range of motion and neck supple  Skin:     General: Skin is warm  Comments: Warm, moist, good color, no petechiae or ecchymoses   Neurological:      Mental Status: She is alert and oriented to person, place, and time  Deep Tendon Reflexes: Reflexes are normal and symmetric  Psychiatric:         Behavior: Behavior normal          Thought Content: Thought content normal          Judgment: Judgment normal      Extremities: no lower extremity edema bilaterally, no cords, pulses are 1+  Lymphatics: no adenopathy in the neck, supraclavicular region, axilla and groin bilaterally    Performance Status: 1 - Symptomatic but completely ambulatory    Labs    08/17/2021 WBC = 6 9 hemoglobin = 14 1 hematocrit = 41 3 platelet = 267 neutrophil = 53% BUN = 13 creatinine = 0 86 calcium = 10        Imaging    03/01/2021 CT scan chest abdomen pelvis    No evidence of metastatic disease      Mixed solid and cavitary lesion in the right upper lobe has overall diminished in size more due to diminished cavity size  The soft tissue component may be slightly larger  [place] term follow-up should be considered      Groundglass opacities at the left lung base have resolved probably inflammatory at that time  11/21/2019 CT scan of the chest and abdomen pelvis    Right upper lobe cavitary lesion with mild very slow interval retracting size  Stable component of solid thickening along the posterior wall  No new pulmonary lesions  Stable patchy subsegmental groundglass opacities remain present in the left lower lobe     08/28/2019 CT scan of chest and abdomen pelvis    Stable cavitary target lesion in the right upper lobe        Stable left lower lobe patchy groundglass opacities with associated mild peribronchial thickening, probably infectious or inflammatory in etiology  Continued surveillance on follow-up is recommended    Further evaluation with bronchoscopy can also be considered given persistence of the findings since CT from 8/20/2018  No evidence of metastatic disease in the abdomen or pelvis  05/31/2019 PET-CT    1  No findings suspicious for hypermetabolic malignancy  2  Mild patchy FDG uptake in the left lower lobe patchy groundglass opacities with associated peribronchial thickening  This is likely infectious or inflammatory given the appearance, however, given that this has been present since the 8/20/2018 CT, consider follow-up with bronchoscopy  04/26/2019 CT scan of the chest and abdomen/pelvis    1  Stable cavitary target lesion in the right upper lobe  No new site of metastasis  2   Stable left lower lobe patchy groundglass opacities with associated mild peribronchial thickening likely due to a chronic infectious or inflammatory process  Continued surveillance on follow-up is recommended  04/22/2019 MRI brain    1  Stable treatment related changes in the lateral aspect of the right temporal lobe without evidence of recurrent or progressive metastatic disease  2   No acute infarction, intracranial hemorrhage or enhancing mass lesion  3   Mild scattered white matter disease likely mild, chronic microangiopathy and/or treatment related changes are stable  4   Moderate ethmoidal and maxillary sinus disease redemonstrated  02/21/2019 CT scan of the chest and abdomen/pelvis    Limited evaluation of solid organs and vasculature without intravenous contrast   1   Stable cavitary target lesion in the right upper lobe  2   Stable left lower lobe patchy groundglass opacities with associated mild peribronchial wall thickening  This is again more likely chronic infectious or inflammatory in etiology  Continued surveillance on follow-up is recommended  3   No new metastatic lesions are found  12/20/2018 CT scan of the chest and abdomen/pelvis    1  Stable right upper lobe 3 3 cm cavitary lesion as described    2   Left lower lobe patchy groundglass opacities with associated mild peribronchial wall thickening, similar to October 18, 2018 but improved from August 20, 2018, likely related to a slowly resolving infectious/inflammatory process  Follow-up   short-term chest CT in 3 months is recommended to evaluate for resolution  3   No metastatic disease in the abdomen or pelvis  10/18/2018 CT scan of the chest without contrast: IMPRESSION: Improved appearance of the left lung base infiltrate  Stable right cavitary lesion  No new findings    08/20/2018 CT scan of the chest and abdomen/pelvis without contrast    RECIST target lesion: Cavitary right upper lobe mass is unchanged, measuring 3 4 x 2 6 cm on series 3 image 15 (previously 3 5 x 2 5 cm)  Solid component along the medial aspect of the posterior wall is also unchanged, measuring 1 4 x 0 9 cm      LUNGS:  Stable cavitary right upper lobe mass, as above  No new nodules identified  There is new small patchy density in the posterior left base compatible with infiltrate  There is no tracheal or endobronchial lesion  IMPRESSION    Unchanged cavitary right upper lobe mass  No new metastatic disease  Interval development of mild left lower lobe infiltrate noted  06/18/2018 CT scan of the chest and abdomen/pelvis    Unchanged cavitary right upper lobe mass    No new metastatic disease  04/16/2018 CT scan of the chest and abdomen/pelvis    There is no significant interval change in size of the cystic or nodular components of the cavitary right upper lobe mass  No new metastatic lesions identified in the chest, abdomen or pelvis  1/22/18 CAT scan of the chest and abdomen/pelvis    RECIST MEASUREMENTS:  TARGET LESION:   1: Right upper lobe cavitary lung mass: The overall size of the lesion is 3 5 x 2 4 cm on image 14 of series 3, significantly decreased from 4 3 x 2 7 cm on previous examination    Solid component along the medial aspect of the posterior wall is not significantly changed from previous examination currently measuring 1 5 x 0 9 cm on image 14 of series 3 compared with 1 8 x 0 8 cm when measured using similar technique on previous   examination  Nodular solid component along the lateral aspect of posterior wall measures approximately 0 7 x 0 4 cm on image 13 of series 3, and when measured using similar technique is unchanged from previous examinations  There is interval decrease in size of the cystic portion of the cavitary right upper lobe mass however, solid components of this mass are not significantly changed from previous examination  Otherwise unchanged examination with no new metastatic lesions   identified in the chest, abdomen or pelvis  Pathology    [facility] results demonstrated no ALK gene rearrangement or deletion and negative for ROS1 rearrangement  No EGFR mutations were found also  Right temporal mass from September 6, 2015 demonstrated metastatic poorly differentiated non-small cell carcinoma  The tumor cells stained positive for CK 7, TTF-1 and Napsin and negative for CK 20, CK 5/6 andmucicarmine  Pathologist stated that the immunoprofile supported the primary lung non-small cell carcinoma, favor adenocarcinoma  The pathologist also stated that given the focal p40 staining, a squamous carcinoma could not be excluded  Procedures    05/30/2019 complete pulmonary function test    PFT Interpretation     Quality of Data:  The patient's efforts are acceptable and reproducible      Test Performed:  Complete pulmonary function tests, including spirometry with and without bronchodilator, measurement of lung volume, and diffusing capacity      Spirometry Interpretation:  Spirometry is within normal limits      Flow Volume Loops:  Flow-Volume loops are normal      Lung Volumes:  Plethysmographic lung volumes are within normal limits      Diffusing Capacity:  Minimal reduction in diffusion capacity likely clinically insignificant     Conclusions:   An isolated reduction in Diffusing Capacity is present and may represent Early fibrotic or pulmonary vascular disease  Clinical correlation is recommended

## 2021-09-02 LAB
CHOLEST SERPL-MCNC: 151 MG/DL (ref 100–199)
HBA1C MFR BLD: 6.2 % (ref 4.8–5.6)
HDLC SERPL-MCNC: 41 MG/DL
LDLC SERPL CALC-MCNC: 90 MG/DL (ref 0–99)
TRIGL SERPL-MCNC: 108 MG/DL (ref 0–149)

## 2021-09-03 ENCOUNTER — OFFICE VISIT (OUTPATIENT)
Dept: FAMILY MEDICINE CLINIC | Facility: CLINIC | Age: 61
End: 2021-09-03
Payer: COMMERCIAL

## 2021-09-03 VITALS
OXYGEN SATURATION: 100 % | BODY MASS INDEX: 27.97 KG/M2 | HEART RATE: 89 BPM | TEMPERATURE: 98.3 F | WEIGHT: 174 LBS | DIASTOLIC BLOOD PRESSURE: 82 MMHG | SYSTOLIC BLOOD PRESSURE: 126 MMHG | HEIGHT: 66 IN | RESPIRATION RATE: 16 BRPM

## 2021-09-03 DIAGNOSIS — E78.2 MIXED HYPERLIPIDEMIA: ICD-10-CM

## 2021-09-03 DIAGNOSIS — R73.01 IMPAIRED FASTING GLUCOSE: ICD-10-CM

## 2021-09-03 DIAGNOSIS — I10 BENIGN HYPERTENSION: Primary | ICD-10-CM

## 2021-09-03 DIAGNOSIS — C34.91 ADENOCARCINOMA OF LUNG, STAGE 4, RIGHT (HCC): ICD-10-CM

## 2021-09-03 PROCEDURE — 1036F TOBACCO NON-USER: CPT | Performed by: FAMILY MEDICINE

## 2021-09-03 PROCEDURE — 3008F BODY MASS INDEX DOCD: CPT | Performed by: FAMILY MEDICINE

## 2021-09-03 PROCEDURE — 99214 OFFICE O/P EST MOD 30 MIN: CPT | Performed by: FAMILY MEDICINE

## 2021-09-03 NOTE — PROGRESS NOTES
Assessment/Plan:    1  Benign hypertension  Assessment & Plan:  Well controlled   continue amlodipine 5 mg daily    Orders:  -     CBC; Future; Expected date: 02/15/2022  -     Comprehensive metabolic panel; Future; Expected date: 02/15/2022  -     Lipid Panel with Direct LDL reflex; Future; Expected date: 02/15/2022    2  Mixed hyperlipidemia  Assessment & Plan:  Well controlled   Continue atorvastatin 10 mg daily    Orders:  -     Comprehensive metabolic panel; Future; Expected date: 02/15/2022  -     Lipid Panel with Direct LDL reflex; Future; Expected date: 02/15/2022    3  Adenocarcinoma of lung, stage 4, right [facility])  Assessment & Plan:  No sign of disease  Port is going to be removed       4  Impaired fasting glucose  Assessment & Plan:  Improving  Continue keto diet     Orders:  -     Hemoglobin A1C; Future; Expected date: 02/15/2022          There are no Patient Instructions on file for this visit  Return in about 6 months (around 3/3/2022) for Annual physical     Subjective:      Patient ID: Cleve Romano is a 56 y o  female  Chief Complaint   Patient presents with    Follow-up     6 month Labs mz cma       She is feeling very emotional   Dr [place] Fuchs told her she is cured and is taking her Port out  Hypertension - patient has been taking her blood pressure medication regularly  Associated symptoms:  Swelling:  no  Leg cramps: no    Hyperlipidemia-  she  has been taking their cholesterol medication regularly  Associated symptoms:  Myalgias: no      The following portions of the patient's history were reviewed and updated as appropriate: allergies, current medications, past family history, past medical history, past social history, past surgical history and problem list     Review of Systems   Constitutional: Negative  Respiratory: Negative  Cardiovascular: Negative            Current Outpatient Medications   Medication Sig Dispense Refill    ALPRAZolam (XANAX) 0 25 mg tablet Take 1 tablet (0 25 mg total) by mouth 2 (two) times a day as needed for anxiety 30 tablet 0    amLODIPine (NORVASC) 5 mg tablet Take 1 tablet (5 mg total) by mouth daily 90 tablet 1    atorvastatin (LIPITOR) 10 mg tablet Take 1 tablet by mouth once daily 90 tablet 1    halobetasol (ULTRAVATE) 0 05 % cream APPLY  CREAM TOPICALLY TWICE DAILY 50 g 1    Omega-3 Fatty Acids (FISH OIL PO) Take 2 tablets by mouth  No current facility-administered medications for this visit  Facility-Administered Medications Ordered in Other Visits   Medication Dose Route Frequency Provider Last Rate Last Admin    alteplase (CATHFLO) injection 2 mg  2 mg Intracatheter PRN Melanie Osborne MD        sodium chloride 0 9 % infusion  20 mL/hr Intravenous Continuous Melanie Osborne MD           Objective:    /82   Pulse 89   Temp 98 3 °F (36 8 °C)   Resp 16   Ht 5' 6" (1 676 m)   Wt 78 9 kg (174 lb)   LMP  (LMP Unknown)   SpO2 100%   BMI 28 08 kg/m²        Physical Exam  Vitals and nursing note reviewed  Constitutional:       Appearance: She is well-developed  HENT:      Head: Normocephalic and atraumatic  Right Ear: Tympanic membrane and external ear normal       Left Ear: Tympanic membrane and external ear normal    Cardiovascular:      Rate and Rhythm: Normal rate and regular rhythm  Heart sounds: Normal heart sounds  No murmur heard  No friction rub  Pulmonary:      Effort: Pulmonary effort is normal  No respiratory distress  Breath sounds: Normal breath sounds  No wheezing or rales  Musculoskeletal:      Right lower leg: No edema  Left lower leg: No edema                  Renan Serrato DO

## 2021-09-09 ENCOUNTER — TELEPHONE (OUTPATIENT)
Dept: SURGERY | Facility: HOSPITAL | Age: 61
End: 2021-09-09

## 2021-09-10 ENCOUNTER — HOSPITAL ENCOUNTER (OUTPATIENT)
Dept: NON INVASIVE DIAGNOSTICS | Facility: HOSPITAL | Age: 61
Discharge: HOME | End: 2021-09-10
Attending: INTERNAL MEDICINE
Payer: COMMERCIAL

## 2021-09-10 VITALS
DIASTOLIC BLOOD PRESSURE: 67 MMHG | OXYGEN SATURATION: 99 % | RESPIRATION RATE: 18 BRPM | TEMPERATURE: 97.4 F | SYSTOLIC BLOOD PRESSURE: 119 MMHG | HEART RATE: 73 BPM

## 2021-09-10 DIAGNOSIS — C34.91 ADENOCARCINOMA OF LUNG, STAGE 4, RIGHT (HCC): ICD-10-CM

## 2021-09-10 PROCEDURE — 36590 REMOVAL TUNNELED CV CATH: CPT

## 2021-09-10 PROCEDURE — 36590 REMOVAL TUNNELED CV CATH: CPT | Performed by: RADIOLOGY

## 2021-09-10 PROCEDURE — 77001 FLUOROGUIDE FOR VEIN DEVICE: CPT | Performed by: RADIOLOGY

## 2021-09-10 PROCEDURE — 99152 MOD SED SAME PHYS/QHP 5/>YRS: CPT | Performed by: RADIOLOGY

## 2021-09-10 RX ORDER — FENTANYL CITRATE 50 UG/ML
INJECTION, SOLUTION INTRAMUSCULAR; INTRAVENOUS CODE/TRAUMA/SEDATION MEDICATION
Status: COMPLETED | OUTPATIENT
Start: 2021-09-10 | End: 2021-09-10

## 2021-09-10 RX ORDER — OXYCODONE HYDROCHLORIDE 5 MG/1
10 TABLET ORAL EVERY 4 HOURS PRN
Status: DISCONTINUED | OUTPATIENT
Start: 2021-09-10 | End: 2021-09-11 | Stop reason: HOSPADM

## 2021-09-10 RX ORDER — DIPHENHYDRAMINE HYDROCHLORIDE 50 MG/ML
INJECTION INTRAMUSCULAR; INTRAVENOUS CODE/TRAUMA/SEDATION MEDICATION
Status: COMPLETED | OUTPATIENT
Start: 2021-09-10 | End: 2021-09-10

## 2021-09-10 RX ORDER — OXYCODONE HYDROCHLORIDE 5 MG/1
5 TABLET ORAL EVERY 4 HOURS PRN
Status: DISCONTINUED | OUTPATIENT
Start: 2021-09-10 | End: 2021-09-11 | Stop reason: HOSPADM

## 2021-09-10 RX ORDER — FENTANYL CITRATE 50 UG/ML
25 INJECTION, SOLUTION INTRAMUSCULAR; INTRAVENOUS EVERY 2 HOUR PRN
Status: CANCELLED | OUTPATIENT
Start: 2021-09-10 | End: 2021-09-12

## 2021-09-10 RX ORDER — ACETAMINOPHEN 325 MG/1
650 TABLET ORAL EVERY 4 HOURS PRN
Status: DISCONTINUED | OUTPATIENT
Start: 2021-09-10 | End: 2021-09-11 | Stop reason: HOSPADM

## 2021-09-10 RX ORDER — ONDANSETRON 2 MG/ML
INJECTION INTRAMUSCULAR; INTRAVENOUS CODE/TRAUMA/SEDATION MEDICATION
Status: COMPLETED | OUTPATIENT
Start: 2021-09-10 | End: 2021-09-10

## 2021-09-10 RX ORDER — MIDAZOLAM HYDROCHLORIDE 2 MG/2ML
INJECTION, SOLUTION INTRAMUSCULAR; INTRAVENOUS CODE/TRAUMA/SEDATION MEDICATION
Status: COMPLETED | OUTPATIENT
Start: 2021-09-10 | End: 2021-09-10

## 2021-09-10 RX ADMIN — MIDAZOLAM 2 MG: 1 INJECTION INTRAMUSCULAR; INTRAVENOUS at 10:46

## 2021-09-10 RX ADMIN — DIPHENHYDRAMINE HYDROCHLORIDE 50 MG: 50 INJECTION INTRAMUSCULAR; INTRAVENOUS at 10:36

## 2021-09-10 RX ADMIN — MIDAZOLAM 2 MG: 1 INJECTION INTRAMUSCULAR; INTRAVENOUS at 10:35

## 2021-09-10 RX ADMIN — Medication 20 ML: at 10:35

## 2021-09-10 RX ADMIN — FENTANYL CITRATE 100 MCG: 50 INJECTION, SOLUTION INTRAMUSCULAR; INTRAVENOUS at 10:35

## 2021-09-10 RX ADMIN — ONDANSETRON 4 MG: 2 INJECTION INTRAMUSCULAR; INTRAVENOUS at 10:36

## 2021-09-10 NOTE — PERIOPERATIVE NURSING NOTE
Received patient from IR via stretcher awake and alert  VSS  Right upper chest portacath removal site D+I  Patient denies C/O pain or discomfort at this time  IV infusing well  Tolerating oral fluids well   at bedside

## 2021-09-10 NOTE — DISCHARGE INSTRUCTIONS
Implanted Venous Access Port Removal    WHAT YOU NEED TO KNOW:   An implanted venous access port is a device used to give treatments and take blood  It may also be called a central venous access device (CVAD)  The port is a small container that is placed under your skin, usually in your upper chest  A port can also be placed in your arm or abdomen  The port is attached to a catheter that enters a large vein  DISCHARGE INSTRUCTIONS:   Resume your normal diet  Small sips of flat soda will help with mild nausea  Prevent an infection:     Wash your hands often  Use soap and water  Clean your hands before and  after you care for your incision  Check your skin for infection every day  Look for redness, swelling, or fluid oozing from the incision site  Dressing may come off in 24 hours  Medical glue will peel off on its own in 5 to 10 days  You may shower 24 hours after procedure  Follow up with your healthcare provider as directed  Write down your questions so you remember to ask them during your visits  Activity:  You may return to your daily activities when the area heals  Contact Interventional Radiology at [phone] Isiah PATIENTS: Contact Interventional Radiology at [phone]) Jourdan Ortiz PATIENTS: Contact Interventional Radiology at [phone]) if:     You have a fever  You have persistent nausea  Your inciscion site is red, swollen, or draining pus  You have questions or concerns about your condition or care  Seek care immediately or call 911 if:  Blood soaks through your bandage  The skin over or around your incision breaks open  Your heart is jumping or fluttering  You have a headache, blurred vision, and feel confused  You have pain in your arm, neck, shoulder, or chest     You have trouble breathing that is getting worse over time

## 2021-09-10 NOTE — BRIEF OP NOTE (RAD/CATH)
INTERVENTIONAL RADIOLOGY PROCEDURE NOTE    Date: 9/10/2021    Procedure: IR PORT REMOVAL     Preoperative diagnosis:   1  Adenocarcinoma of lung, stage 4, right (HCC)       Postoperative diagnosis: Same  Surgeon: Gena Riedel, MD     Assistant: None  No qualified resident was available  Blood loss: minimal    Specimens: none    Findings: Successful port removal     Complications: None immediate      Anesthesia: conscious sedation

## 2021-09-10 NOTE — SEDATION DOCUMENTATION
Port successfully removed  Patient tolerated well with additional IV conscious sedation  Stable for transfer back to room  Report and care assumed by primary RN  abrasion to right knee

## 2021-10-10 DIAGNOSIS — I10 BENIGN HYPERTENSION: ICD-10-CM

## 2021-10-10 RX ORDER — AMLODIPINE BESYLATE 5 MG/1
TABLET ORAL
Qty: 90 TABLET | Refills: 0 | Status: CANCELLED | OUTPATIENT
Start: 2021-10-10

## 2021-10-11 RX ORDER — AMLODIPINE BESYLATE 5 MG/1
TABLET ORAL
Qty: 90 TABLET | Refills: 1 | Status: SHIPPED | OUTPATIENT
Start: 2021-10-11 | End: 2022-04-05

## 2021-10-15 DIAGNOSIS — E78.2 MIXED HYPERLIPIDEMIA: ICD-10-CM

## 2021-10-15 RX ORDER — ATORVASTATIN CALCIUM 10 MG/1
10 TABLET, FILM COATED ORAL DAILY
Qty: 90 TABLET | Refills: 1 | Status: SHIPPED | OUTPATIENT
Start: 2021-10-15 | End: 2022-01-06

## 2021-10-29 DIAGNOSIS — L30.9 DERMATITIS: ICD-10-CM

## 2021-11-12 ENCOUNTER — IMMUNIZATIONS (OUTPATIENT)
Dept: FAMILY MEDICINE CLINIC | Facility: HOSPITAL | Age: 61
End: 2021-11-12

## 2021-11-12 DIAGNOSIS — Z23 ENCOUNTER FOR IMMUNIZATION: Primary | ICD-10-CM

## 2021-11-12 PROCEDURE — 91306 COVID-19 MODERNA VACC 0.25 ML BOOSTER: CPT

## 2021-11-12 PROCEDURE — 0013A COVID-19 MODERNA VACC 0.25 ML BOOSTER: CPT

## 2022-01-06 DIAGNOSIS — E78.2 MIXED HYPERLIPIDEMIA: ICD-10-CM

## 2022-01-06 RX ORDER — ATORVASTATIN CALCIUM 10 MG/1
TABLET, FILM COATED ORAL
Qty: 90 TABLET | Refills: 0 | Status: SHIPPED | OUTPATIENT
Start: 2022-01-06 | End: 2022-04-05

## 2022-02-11 ENCOUNTER — TELEPHONE (OUTPATIENT)
Dept: CARDIAC SURGERY | Facility: CLINIC | Age: 62
End: 2022-02-11

## 2022-02-11 ENCOUNTER — TELEPHONE (OUTPATIENT)
Dept: HEMATOLOGY ONCOLOGY | Facility: MEDICAL CENTER | Age: 62
End: 2022-02-11

## 2022-02-11 NOTE — TELEPHONE ENCOUNTER
Lionel Patton is a patient of Dr Alberto Cline  She called last week requesting her orders for blood work be mailed to her  She has not yet received anything and would like to get all her blood work done before her appt  She is able to come to the office to  the orders if needed   Pt can be reached at [phone]

## 2022-02-11 NOTE — TELEPHONE ENCOUNTER
Spoke to patient  Was informed by hopeline that slips would be sent out multiple times and has not yet received anything  Patient will be stopping by office to  scripts

## 2022-02-15 LAB
ALBUMIN SERPL-MCNC: 4.7 G/DL (ref 3.8–4.8)
ALBUMIN SERPL-MCNC: 4.9 G/DL (ref 3.8–4.8)
ALBUMIN/GLOB SERPL: 2 {RATIO} (ref 1.2–2.2)
ALBUMIN/GLOB SERPL: 2.2 {RATIO} (ref 1.2–2.2)
ALP SERPL-CCNC: 63 IU/L (ref 44–121)
ALP SERPL-CCNC: 64 IU/L (ref 44–121)
ALT SERPL-CCNC: 25 IU/L (ref 0–32)
ALT SERPL-CCNC: 26 IU/L (ref 0–32)
AST SERPL-CCNC: 22 IU/L (ref 0–40)
AST SERPL-CCNC: 22 IU/L (ref 0–40)
BASOPHILS # BLD AUTO: 0.1 X10E3/UL (ref 0–0.2)
BASOPHILS # BLD AUTO: 0.1 X10E3/UL (ref 0–0.2)
BASOPHILS NFR BLD AUTO: 1 %
BASOPHILS NFR BLD AUTO: 2 %
BILIRUB SERPL-MCNC: 0.4 MG/DL (ref 0–1.2)
BILIRUB SERPL-MCNC: 0.6 MG/DL (ref 0–1.2)
BUN SERPL-MCNC: 16 MG/DL (ref 8–27)
BUN SERPL-MCNC: 17 MG/DL (ref 8–27)
BUN/CREAT SERPL: 18 (ref 12–28)
BUN/CREAT SERPL: 18 (ref 12–28)
CALCIUM SERPL-MCNC: 9.9 MG/DL (ref 8.7–10.3)
CALCIUM SERPL-MCNC: 9.9 MG/DL (ref 8.7–10.3)
CHLORIDE SERPL-SCNC: 100 MMOL/L (ref 96–106)
CHLORIDE SERPL-SCNC: 99 MMOL/L (ref 96–106)
CHOLEST SERPL-MCNC: 183 MG/DL (ref 100–199)
CO2 SERPL-SCNC: 21 MMOL/L (ref 20–29)
CO2 SERPL-SCNC: 22 MMOL/L (ref 20–29)
CREAT SERPL-MCNC: 0.91 MG/DL (ref 0.57–1)
CREAT SERPL-MCNC: 0.94 MG/DL (ref 0.57–1)
EOSINOPHIL # BLD AUTO: 0.1 X10E3/UL (ref 0–0.4)
EOSINOPHIL # BLD AUTO: 0.1 X10E3/UL (ref 0–0.4)
EOSINOPHIL NFR BLD AUTO: 1 %
EOSINOPHIL NFR BLD AUTO: 1 %
ERYTHROCYTE [DISTWIDTH] IN BLOOD BY AUTOMATED COUNT: 11.8 % (ref 11.7–15.4)
ERYTHROCYTE [DISTWIDTH] IN BLOOD BY AUTOMATED COUNT: 11.9 % (ref 11.7–15.4)
GLOBULIN SER-MCNC: 2.2 G/DL (ref 1.5–4.5)
GLOBULIN SER-MCNC: 2.4 G/DL (ref 1.5–4.5)
GLUCOSE SERPL-MCNC: 118 MG/DL (ref 65–99)
GLUCOSE SERPL-MCNC: 119 MG/DL (ref 65–99)
HBA1C MFR BLD: 6.3 % (ref 4.8–5.6)
HCT VFR BLD AUTO: 40.1 % (ref 34–46.6)
HCT VFR BLD AUTO: 42.8 % (ref 34–46.6)
HDLC SERPL-MCNC: 54 MG/DL
HGB BLD-MCNC: 14.3 G/DL (ref 11.1–15.9)
HGB BLD-MCNC: 14.4 G/DL (ref 11.1–15.9)
IMM GRANULOCYTES # BLD: 0 X10E3/UL (ref 0–0.1)
IMM GRANULOCYTES # BLD: 0 X10E3/UL (ref 0–0.1)
IMM GRANULOCYTES NFR BLD: 0 %
IMM GRANULOCYTES NFR BLD: 0 %
LDLC SERPL CALC-MCNC: 109 MG/DL (ref 0–99)
LYMPHOCYTES # BLD AUTO: 3 X10E3/UL (ref 0.7–3.1)
LYMPHOCYTES # BLD AUTO: 3.1 X10E3/UL (ref 0.7–3.1)
LYMPHOCYTES NFR BLD AUTO: 40 %
LYMPHOCYTES NFR BLD AUTO: 41 %
MCH RBC QN AUTO: 29.6 PG (ref 26.6–33)
MCH RBC QN AUTO: 31.2 PG (ref 26.6–33)
MCHC RBC AUTO-ENTMCNC: 33.4 G/DL (ref 31.5–35.7)
MCHC RBC AUTO-ENTMCNC: 35.9 G/DL (ref 31.5–35.7)
MCV RBC AUTO: 87 FL (ref 79–97)
MCV RBC AUTO: 89 FL (ref 79–97)
MICRODELETION SYND BLD/T FISH: NORMAL
MONOCYTES # BLD AUTO: 0.5 X10E3/UL (ref 0.1–0.9)
MONOCYTES # BLD AUTO: 0.5 X10E3/UL (ref 0.1–0.9)
MONOCYTES NFR BLD AUTO: 6 %
MONOCYTES NFR BLD AUTO: 7 %
NEUTROPHILS # BLD AUTO: 3.8 X10E3/UL (ref 1.4–7)
NEUTROPHILS # BLD AUTO: 3.8 X10E3/UL (ref 1.4–7)
NEUTROPHILS NFR BLD AUTO: 50 %
NEUTROPHILS NFR BLD AUTO: 51 %
PLATELET # BLD AUTO: 353 X10E3/UL (ref 150–450)
PLATELET # BLD AUTO: 370 X10E3/UL (ref 150–450)
POTASSIUM SERPL-SCNC: 4.4 MMOL/L (ref 3.5–5.2)
POTASSIUM SERPL-SCNC: 4.5 MMOL/L (ref 3.5–5.2)
PROT SERPL-MCNC: 7.1 G/DL (ref 6–8.5)
PROT SERPL-MCNC: 7.1 G/DL (ref 6–8.5)
RBC # BLD AUTO: 4.61 X10E6/UL (ref 3.77–5.28)
RBC # BLD AUTO: 4.83 X10E6/UL (ref 3.77–5.28)
SL AMB EGFR AFRICAN AMERICAN: 75 ML/MIN/1.73
SL AMB EGFR AFRICAN AMERICAN: 78 ML/MIN/1.73
SL AMB EGFR NON AFRICAN AMERICAN: 65 ML/MIN/1.73
SL AMB EGFR NON AFRICAN AMERICAN: 67 ML/MIN/1.73
SODIUM SERPL-SCNC: 136 MMOL/L (ref 134–144)
SODIUM SERPL-SCNC: 136 MMOL/L (ref 134–144)
TRIGL SERPL-MCNC: 113 MG/DL (ref 0–149)
WBC # BLD AUTO: 7.5 X10E3/UL (ref 3.4–10.8)
WBC # BLD AUTO: 7.6 X10E3/UL (ref 3.4–10.8)

## 2022-03-30 ENCOUNTER — OFFICE VISIT (OUTPATIENT)
Dept: HEMATOLOGY ONCOLOGY | Facility: MEDICAL CENTER | Age: 62
End: 2022-03-30
Payer: COMMERCIAL

## 2022-03-30 VITALS
SYSTOLIC BLOOD PRESSURE: 124 MMHG | BODY MASS INDEX: 29.89 KG/M2 | TEMPERATURE: 97.6 F | HEIGHT: 66 IN | DIASTOLIC BLOOD PRESSURE: 70 MMHG | HEART RATE: 92 BPM | RESPIRATION RATE: 18 BRPM | WEIGHT: 186 LBS | OXYGEN SATURATION: 97 %

## 2022-03-30 DIAGNOSIS — C34.91 ADENOCARCINOMA OF LUNG, STAGE 4, RIGHT (HCC): Primary | ICD-10-CM

## 2022-03-30 DIAGNOSIS — C79.31 BRAIN METASTASIS (HCC): ICD-10-CM

## 2022-03-30 PROCEDURE — 3008F BODY MASS INDEX DOCD: CPT | Performed by: INTERNAL MEDICINE

## 2022-03-30 PROCEDURE — 99215 OFFICE O/P EST HI 40 MIN: CPT | Performed by: INTERNAL MEDICINE

## 2022-03-30 PROCEDURE — 1036F TOBACCO NON-USER: CPT | Performed by: INTERNAL MEDICINE

## 2022-03-30 RX ORDER — MELATONIN
1000 DAILY
COMMUNITY

## 2022-03-30 RX ORDER — MAGNESIUM 30 MG
30 TABLET ORAL 2 TIMES DAILY
COMMUNITY

## 2022-03-30 RX ORDER — RIBOFLAVIN (VITAMIN B2) 100 MG
100 TABLET ORAL DAILY
COMMUNITY

## 2022-03-30 NOTE — PROGRESS NOTES
[facility]  1960  [address] [facility] [place]/ [address] [address] [id number]    DISCUSSION  SUMMARY:    20-year-old female with M1 non-small cell lung carcinoma/adenocarcinoma  Patient has been on [facility] 20015-14 (phase III open label randomized study of [id number] [anti-PD-L1 antibody] in combination with carboplatin and paclitaxel +/- Avastin versus carboplatin and paclitaxel +/- Avastin in patients with stage IV chemotherapy naive non-small cell lung carcinoma)  Patient was randomized to receive all 4 medications  Mrs Lisy Herron completed the 6 cycles of treatment without significant toxicities  Patient had been on maintenance (study drug (atezolizumab) and avastin) - subsequently only Avastin  More recently, patient has been on surveillance only  Issues:     Stage IV non-small cell lung carcinoma  As discussed previously, recent workup did not demonstrate any evidence of residual or recurrent disease  Patient is off the Avastin now  Mrs Lisy Herron continues to feel well and clinically there are no concerning signs  Prior scans did not demonstrate any evidence for progression  Blood work was okay/acceptable but the LFTs are slightly elevated - about the same as before  This has been an issue in the past (believed to be due to patient's alcohol use)  There are no signs of recurrence  The plan is to continue with surveillance  Patient is to return in 6 months with blood work and CT scan before  Brain metastases status post resection and radiotherapy  Patient has been tapered off of Keppra  There have been no recent CNS issues  The previous MRI/brain did not demonstrate any evidence for recurrence  Patient has not seen Radiation Oncology since [facility]  Mrs Lisy Herron is to call the Radiation Oncology office to see if a follow-up appointment is needed (no recent MRI/brain)      Patient knows to call the hematology/oncology office if there are any other questions or concerns  Carefully review your medication list and verify that the list is accurate and up-to-date  Please call the hematology/oncology office if there are medications missing from the list, medications on the list that you are not currently taking or if there is a dosage or instruction that is different from how you're taking that medication  Patient goals and areas of care:  Continue with surveillance  Barriers to care:  None  Patient is able to self-care   __________________________________________________________________________________________________    Chief Complaint   Patient presents with    Follow-up     History of non-small cell lung carcinoma, on surveillance     Advance Care Planning/Advance Directives: not discussed    Oncology History   Adenocarcinoma of lung, stage 4, right ([facility] )   9/5/2015 Initial Diagnosis    Patient was referred to the emergency room for evaluation of vertigo as sent from the [facility]  (patient reported )  Patient underwent the following pertinent imaging scans  MRI of the brain demonstrated a mass in the right superior all temporal gyrus with measurements of 4 2 x 3 3 x 3 6 cm with vasogenic edema and mass effect  There was a near uncal herniation noted  CT of the chest and abdomen/pelvis demonstrated a necrotic right upper lobe mass measuring 7 x 4 2 x 4 1 cm strongly suspicious for bronchogenic carcinoma  The mass contacts the posterior medial pleural surface and potentially contacts the right posterior tracheal border  No pathologic adenopathy was identified in no evidence of metastatic disease in the chest abdomen or pelvis  9/6/2015 Surgery    Non-small cell carcinoma of lung ([facility] ) diagnosed from biopsy of right temporal mass  Pathology demonstrates poorly differentiated non-small cell carcinoma    Pathologist stated that the immunoprofile supported the primary lung non-small cell carcinoma favor adenocarcinoma  Squamous carcinoma could not be excluded, secondary to focal P 40 staining  Minta Gallagher path report demonstrated no ALK gene rearrangement or dleation and negative ROS1 rearrangement, No EGFR mutations were found  Surgery completed by Dr Kim Cleaning  10/7/2015 - 10/16/2015 Radiation    A dose of 3000 cGy in 5 fractions delivered every other day was delivered to the resection cavity (single right superior temporal intracranial metastasis) Dr Vinicius Tapia     10/20/2015 -  Research Study Participant    [id number] Phase III open label randomized study of [id number] [Anti-PDL1 antibody] in combination with carboplatin and paclitaxel +/- Avastin versus Carboplatin and Paciltaxel +/- Avastin in patients with stage IV Chemotherapy  Naive non-small cell lung carcinoma  Patient was randomized to receive off for medications  11/30/2015 - 2/18/2016 Chemotherapy    Started Carboplatin, Paclitaxel, Avastin and PD-L1 (atezolizumab; study drug)  Completed 6 cycles with cycle 6 day 1 on 2/18/16     3/10/2016 -  Chemotherapy    Beginning cycle 7, maintenance cycle with Avastin and PD-L1 (Atezolizumab; study drug)     5/24/2017 Adverse Reaction    LFT elevation-persisted  Patient was taken off of study drug Atezolizumab, but continues on Avastin maintance  5/23/2019 - 7/3/2019 Chemotherapy    [No matching medication found in this treatment plan]       History of Present Illness:    59year old female recently diagnosed with IV lung cancer here for followup  Mrs Noa Lutz began to have headaches last spring 2015  Patient was seen by her PCP and treated with antibiotics  Initially the symptoms got better the patient went on vacation  After returning from vacation, Mrs Noa Lutz once again developed headaches  Patient was treated with a second round of antibiotics and then was diagnosed with vertigo  Patient was sent to the [address] [place] [address]   Although the specifics are not entirely clear, the therapist in the [facility] sent the patient to the emergency room  A CAT scan of the brain demonstrated a brain lesion and patient was transferred to [place]  Patient was seen by Dr Sarah Siegel and underwent resection demonstrating adenocarcinoma  Additional testing demonstrated a lung mass  Patient improved and was discharged  Patient completed SRT with Dr Jaquelin Barr and Dr Emma Villa  Patient has been on [facility] 20015-14 (phase III open label randomized study of [id number] [anti-PD-L1 antibody] in combination with carboplatin and paclitaxel +/- Avastin versus carboplatin and paclitaxel +/- Avastin in patients with stage IV chemotherapy naÃ¯ve non-small cell lung carcinoma)  Mrs Lisy Herron was randomized to receive the anti-PD-L1 antibody with chemotherapy - patient completed 6 cycles and was placed on maintenance  Mrs Leighton Upton previously suffered a tonic-clonic seizure and was seen in the emergency room  CAT scan of the head did not demonstrate any evidence of disease progression  The seizure was thought to be due to encephalomalacia  Since that time, there have been no seizure issues  Mrs Lisy Herron had been on Keppra - this has been tapered off  After long discussions and consultation with the thoracic Working group, patient was taken off of all treatments approximately 3+ years ago  The plan has been surveillance  Patient returns for follow-up  Mrs Lisy Herron states feeling quite well  No respiratory issues  Port has been removed  No headaches, blurred vision, dizziness or syncopal episodes  Appetite is good, weight is stable  Activities are baseline  Routine health maintenance and medical care is up-to-date  Patient has received her COVID vaccine and the booster  Review of Systems   Constitutional: Positive for fatigue  HENT: Negative  Eyes: Negative  Respiratory: Negative  Cardiovascular: Negative  Gastrointestinal: Negative  Endocrine: Negative  Genitourinary: Negative  Musculoskeletal: Positive for arthralgias  Negative for neck pain  Skin: Negative  Allergic/Immunologic: Negative  Neurological: Negative  Hematological: Does not bruise/bleed easily  Psychiatric/Behavioral: Negative  All other systems reviewed and are negative       Patient Active Problem List   Diagnosis    [facility]    Ad[facility]ocarcinoma [facility] lung, stage 4, right [facility]    Mixed hyperlipidemia    Abnormal LFTs    Allergic rhinitis    [facility] metastasis [facility]    Brain tumor [facility]    Encephalomalacia    Impaired fasting glucose    Insomnia    Lesion of temporal lobe    Non-small cell lung cancer ([facility]    Benign hypertension    Chronic maxillary sinusitis    Abdominal pain     Past Medical History:   Diagnosis Date    Adenocarcinoma of right lung, stage 4 [facility]     Allergic rhinitis     Alopecia     resolved 10/25/16    Anxiety     last assessed 10/4/16, resolved 10/25/16    Benign hypertension 2/1/2018    Benign neoplasm of skin of trunk 03/25/2008    last assessed 3/25/08    Benign neoplasm of skin of upper extremity and shoulder 03/25/2008    last assessed 3/25/08    Ear deformity, acquired     last assessed 10/13/14, resolved 3/12/15    Eczema     History of chemotherapy     Hyperlipemia     Maintenance chemotherapy     Secondary cancer of brain [facility]     Seizures ([facility]     Vertigo      Past Surgical History:   Procedure Laterality Date    APPENDECTOMY  1964    BRAIN TUMOR EXCISION      CENTRAL VENOUS CATHETER INSERTION Right     PORTACATH    IR PORT REMOVAL  9/10/2021     Family History   Problem Relation Age of Onset    Diabetes Mother     Heart disease Mother     Lung cancer Father 79        Smoker     Pancreatitis Brother     No Known Problems Brother     Uterine cancer Paternal Grandmother      Social History     Socioeconomic History    Marital status: /Civil Union     Spouse name: Not on file    Number of children: 1    Years of education: Not on file    Highest education level: Not on file   Occupational History    Not on file   Tobacco Use    Smoking status: Former Smoker     Packs/day: 1 00     Years: 47 00     Pack years: 47 00     Types: Cigarettes     Quit date:      Years since quittin 2    Smokeless tobacco: Never Used   Substance and Sexual Activity    Alcohol use: Yes     Comment: occasionally / Social     Drug use: Yes     Types: Marijuana    Sexual activity: Yes   Other Topics Concern    Not on file   Social History Narrative    High school or GED     Lives independently with spouse      Social Determinants of Health     Financial Resource Strain: Not on file   Food Insecurity: Not on file   Transportation Needs: Not on file   Physical Activity: Not on file   Stress: Not on file   Social Connections: Not on file   Intimate Partner Violence: Not on file   Housing Stability: Not on file       Current Outpatient Medications:     amLODIPine (NORVASC) 5 mg tablet, Take 1 tablet by mouth once daily, Disp: 90 tablet, Rfl: 1    Ascorbic Acid (vitamin C) 100 MG tablet, Take 100 mg by mouth daily, Disp: , Rfl:     atorvastatin (LIPITOR) 10 mg tablet, Take 1 tablet by mouth once daily, Disp: 90 tablet, Rfl: 0    cholecalciferol (VITAMIN D3) 1,000 units tablet, Take 1,000 Units by mouth daily, Disp: , Rfl:     halobetasol (ULTRAVATE) 0 05 % cream, Apply topically 2 (two) times a day, Disp: 50 g, Rfl: 0    magnesium 30 MG tablet, Take 30 mg by mouth 2 (two) times a day, Disp: , Rfl:     Omega-3 Fatty Acids (FISH OIL PO), Take 2 tablets by mouth , Disp: , Rfl:     ALPRAZolam (XANAX) 0 25 mg tablet, Take 1 tablet (0 25 mg total) by mouth 2 (two) times a day as needed for anxiety (Patient not taking: Reported on 3/30/2022 ), Disp: 30 tablet, Rfl: 0  No current facility-administered medications for this visit      Facility-Administered Medications Ordered in Other Visits:     alteplase (CATHFLO) injection 2 mg, 2 mg, Intracatheter, PRN, Karina Babin MD    sodium chloride 0 9 % infusion, 20 mL/hr, Intravenous, Continuous, Karina Babin MD    Allergies   Allergen Reactions    Iodinated Diagnostic Agents Anaphylaxis and Itching    Clindamycin     Clindamycin/Lincomycin        Vitals:    03/30/22 1443   Resp: 18   Temp: 97 6 °F (36 4 °C)     Physical Exam  Constitutional:       Appearance: She is well-developed  Comments: Well-nourished female, no respiratory distress   HENT:      Head: Normocephalic and atraumatic  Right Ear: External ear normal       Left Ear: External ear normal       Nose: Nose normal       Mouth/Throat:      Pharynx: No oropharyngeal exudate  Eyes:      Conjunctiva/sclera: Conjunctivae normal       Pupils: Pupils are equal, round, and reactive to light  Neck:      Comments: Neck is supple, no JVD, good range of motion, no pain or tenderness with movement  Cardiovascular:      Rate and Rhythm: Normal rate and regular rhythm  Heart sounds: Normal heart sounds  Pulmonary:      Effort: Pulmonary effort is normal       Breath sounds: Normal breath sounds  Abdominal:      General: Bowel sounds are normal       Palpations: Abdomen is soft  Comments: Abdomen is soft, nontender, no hepatosplenomegaly, no rigidity or rebound, +bowel sounds   Musculoskeletal:         General: Normal range of motion  Cervical back: Normal range of motion and neck supple  Skin:     General: Skin is warm  Comments: Warm, moist, good color, no petechiae or ecchymoses   Neurological:      Mental Status: She is alert and oriented to person, place, and time  Deep Tendon Reflexes: Reflexes are normal and symmetric  Psychiatric:         Behavior: Behavior normal          Thought Content:  Thought content normal          Judgment: Judgment normal      Extremities: no lower extremity edema bilaterally, no cords, pulses are 1+  Lymphatics: no adenopathy in the neck, supraclavicular region, axilla and groin bilaterally    Performance Status: 1 - Symptomatic but completely ambulatory    Labs    02/15/2022 WBC = 7 6 hemoglobin = 14 3 hematocrit = 43 platelet = 638 neutrophil = 51% BUN = 17 creatinine = 0 94 calcium = 9 9 LFTs WNL    08/17/2021 WBC = 6 9 hemoglobin = 14 1 hematocrit = 41 3 platelet = 988 neutrophil = 53% BUN = 13 creatinine = 0 86 calcium = 10    Imaging    03/01/2021 CT scan chest abdomen pelvis    No evidence of metastatic disease      Mixed solid and cavitary lesion in the right upper lobe has overall diminished in size more due to diminished cavity size  The soft tissue component may be slightly larger  Umpire term follow-up should be considered      Groundglass opacities at the left lung base have resolved probably inflammatory at that time  11/21/2019 CT scan of the chest and abdomen pelvis    Right upper lobe cavitary lesion with mild very slow interval retracting size  Stable component of solid thickening along the posterior wall  No new pulmonary lesions  Stable patchy subsegmental groundglass opacities remain present in the left lower lobe     08/28/2019 CT scan of chest and abdomen pelvis    Stable cavitary target lesion in the right upper lobe        Stable left lower lobe patchy groundglass opacities with associated mild peribronchial thickening, probably infectious or inflammatory in etiology  Continued surveillance on follow-up is recommended  Further evaluation with bronchoscopy can also be considered given persistence of the findings since CT from 8/20/2018  No evidence of metastatic disease in the abdomen or pelvis  05/31/2019 PET-CT    1  No findings suspicious for hypermetabolic malignancy  2  Mild patchy FDG uptake in the left lower lobe patchy groundglass opacities with associated peribronchial thickening    This is likely infectious or inflammatory given the appearance, however, given that this has been present since the 8/20/2018 CT, consider follow-up with bronchoscopy  04/26/2019 CT scan of the chest and abdomen/pelvis    1  Stable cavitary target lesion in the right upper lobe  No new site of metastasis  2   Stable left lower lobe patchy groundglass opacities with associated mild peribronchial thickening likely due to a chronic infectious or inflammatory process  Continued surveillance on follow-up is recommended  04/22/2019 MRI brain    1  Stable treatment related changes in the lateral aspect of the right temporal lobe without evidence of recurrent or progressive metastatic disease  2   No acute infarction, intracranial hemorrhage or enhancing mass lesion  3   Mild scattered white matter disease likely mild, chronic microangiopathy and/or treatment related changes are stable  4   Moderate ethmoidal and maxillary sinus disease redemonstrated  02/21/2019 CT scan of the chest and abdomen/pelvis    Limited evaluation of solid organs and vasculature without intravenous contrast   1   Stable cavitary target lesion in the right upper lobe  2   Stable left lower lobe patchy groundglass opacities with associated mild peribronchial wall thickening  This is again more likely chronic infectious or inflammatory in etiology  Continued surveillance on follow-up is recommended  3   No new metastatic lesions are found  12/20/2018 CT scan of the chest and abdomen/pelvis    1  Stable right upper lobe 3 3 cm cavitary lesion as described  2   Left lower lobe patchy groundglass opacities with associated mild peribronchial wall thickening, similar to October 18, 2018 but improved from August 20, 2018, likely related to a slowly resolving infectious/inflammatory process  Follow-up   short-term chest CT in 3 months is recommended to evaluate for resolution  3   No metastatic disease in the abdomen or pelvis  10/18/2018 CT scan of the chest without contrast: IMPRESSION: Improved appearance of the left lung base infiltrate  Stable right cavitary lesion    No new findings    08/20/2018 CT scan of the chest and abdomen/pelvis without contrast    RECIST target lesion: Cavitary right upper lobe mass is unchanged, measuring 3 4 x 2 6 cm on series 3 image 15 (previously 3 5 x 2 5 cm)  Solid component along the medial aspect of the posterior wall is also unchanged, measuring 1 4 x 0 9 cm      LUNGS:  Stable cavitary right upper lobe mass, as above  No new nodules identified  There is new small patchy density in the posterior left base compatible with infiltrate  There is no tracheal or endobronchial lesion  IMPRESSION    Unchanged cavitary right upper lobe mass  No new metastatic disease  Interval development of mild left lower lobe infiltrate noted  06/18/2018 CT scan of the chest and abdomen/pelvis    Unchanged cavitary right upper lobe mass    No new metastatic disease  04/16/2018 CT scan of the chest and abdomen/pelvis    There is no significant interval change in size of the cystic or nodular components of the cavitary right upper lobe mass  No new metastatic lesions identified in the chest, abdomen or pelvis  1/22/18 CAT scan of the chest and abdomen/pelvis    RECIST MEASUREMENTS:  TARGET LESION:   1: Right upper lobe cavitary lung mass: The overall size of the lesion is 3 5 x 2 4 cm on image 14 of series 3, significantly decreased from 4 3 x 2 7 cm on previous examination  Solid component along the medial aspect of the posterior wall is not significantly changed from previous examination currently measuring 1 5 x 0 9 cm on image 14 of series 3 compared with 1 8 x 0 8 cm when measured using similar technique on previous   examination  Nodular solid component along the lateral aspect of posterior wall measures approximately 0 7 x 0 4 cm on image 13 of series 3, and when measured using similar technique is unchanged from previous examinations      There is interval decrease in size of the cystic portion of the cavitary right upper lobe mass however, solid components of this mass are not significantly changed from previous examination  Otherwise unchanged examination with no new metastatic lesions   identified in the chest, abdomen or pelvis  Pathology    [facility] results demonstrated no ALK gene rearrangement or deletion and negative for ROS1 rearrangement  No EGFR mutations were found also  Right temporal mass from September 6, 2015 demonstrated metastatic poorly differentiated non-small cell carcinoma  The tumor cells stained positive for CK 7, TTF-1 and Napsin and negative for CK 20, CK 5/6 andmucicarmine  Pathologist stated that the immunoprofile supported the primary lung non-small cell carcinoma, favor adenocarcinoma  The pathologist also stated that given the focal p40 staining, a squamous carcinoma could not be excluded  Procedures    05/30/2019 complete pulmonary function test    PFT Interpretation     Quality of Data:  The patient's efforts are acceptable and reproducible      Test Performed:  Complete pulmonary function tests, including spirometry with and without bronchodilator, measurement of lung volume, and diffusing capacity      Spirometry Interpretation:  Spirometry is within normal limits      Flow Volume Loops:  Flow-Volume loops are normal      Lung Volumes:  Plethysmographic lung volumes are within normal limits      Diffusing Capacity:  Minimal reduction in diffusion capacity likely clinically insignificant     Conclusions: An isolated reduction in Diffusing Capacity is present and may represent Early fibrotic or pulmonary vascular disease  Clinical correlation is recommended

## 2022-04-04 ENCOUNTER — OFFICE VISIT (OUTPATIENT)
Dept: FAMILY MEDICINE CLINIC | Facility: CLINIC | Age: 62
End: 2022-04-04
Payer: COMMERCIAL

## 2022-04-04 VITALS
RESPIRATION RATE: 16 BRPM | WEIGHT: 186 LBS | BODY MASS INDEX: 29.89 KG/M2 | HEIGHT: 66 IN | TEMPERATURE: 97.5 F | OXYGEN SATURATION: 97 % | SYSTOLIC BLOOD PRESSURE: 140 MMHG | DIASTOLIC BLOOD PRESSURE: 90 MMHG | HEART RATE: 97 BPM

## 2022-04-04 DIAGNOSIS — I10 BENIGN HYPERTENSION: ICD-10-CM

## 2022-04-04 DIAGNOSIS — R73.01 IMPAIRED FASTING GLUCOSE: ICD-10-CM

## 2022-04-04 DIAGNOSIS — Z11.59 NEED FOR HEPATITIS C SCREENING TEST: ICD-10-CM

## 2022-04-04 DIAGNOSIS — L98.8 WRINKLES: ICD-10-CM

## 2022-04-04 DIAGNOSIS — Z23 NEED FOR VACCINATION: ICD-10-CM

## 2022-04-04 DIAGNOSIS — E78.2 MIXED HYPERLIPIDEMIA: ICD-10-CM

## 2022-04-04 DIAGNOSIS — D49.6 BRAIN TUMOR (HCC): ICD-10-CM

## 2022-04-04 DIAGNOSIS — Z00.00 ANNUAL PHYSICAL EXAM: Primary | ICD-10-CM

## 2022-04-04 PROCEDURE — 3080F DIAST BP >= 90 MM HG: CPT | Performed by: FAMILY MEDICINE

## 2022-04-04 PROCEDURE — 90750 HZV VACC RECOMBINANT IM: CPT

## 2022-04-04 PROCEDURE — 3008F BODY MASS INDEX DOCD: CPT | Performed by: FAMILY MEDICINE

## 2022-04-04 PROCEDURE — 3077F SYST BP >= 140 MM HG: CPT | Performed by: FAMILY MEDICINE

## 2022-04-04 PROCEDURE — 99396 PREV VISIT EST AGE 40-64: CPT | Performed by: FAMILY MEDICINE

## 2022-04-04 PROCEDURE — 90471 IMMUNIZATION ADMIN: CPT

## 2022-04-04 PROCEDURE — 3725F SCREEN DEPRESSION PERFORMED: CPT | Performed by: FAMILY MEDICINE

## 2022-04-04 PROCEDURE — 1036F TOBACCO NON-USER: CPT | Performed by: FAMILY MEDICINE

## 2022-04-04 RX ORDER — VITAMIN B COMPLEX
1 CAPSULE ORAL DAILY
COMMUNITY

## 2022-04-04 RX ORDER — TRETINOIN 1 MG/G
CREAM TOPICAL
Qty: 45 G | Refills: 2 | Status: SHIPPED | OUTPATIENT
Start: 2022-04-04

## 2022-04-04 NOTE — ASSESSMENT & PLAN NOTE
Stable  Hemoglobin A1C   Date Value Ref Range Status   02/15/2022 6 3 (H) 4 8 - 5 6 % Final     Comment:              Prediabetes: 5 7 - 6 4           Diabetes: >6 4           Glycemic control for adults with diabetes: <7 0

## 2022-04-04 NOTE — PROGRESS NOTES
FAMILY PRACTICE HEALTH MAINTENANCE OFFICE VISIT  [facility] - [address]    NAME: Cheryl Jimenez  AGE: 59 y o  SEX: female  : 1960     DATE: 2022    Assessment and Plan     1  Annual physical exam    2  Wrinkles  -     tretinoin (RETIN-A) 0 1 % cream; Apply topically daily at bedtime    3  Impaired fasting glucose  Assessment & Plan:  Stable  Hemoglobin A1C   Date Value Ref Range Status   02/15/2022 6 3 (H) 4 8 - 5 6 % Final     Comment:              Prediabetes: 5 7 - 6 4           Diabetes: >6 4           Glycemic control for adults with diabetes: <7 0           Orders:  -     Hemoglobin A1C; Future; Expected date: 2022    4  Need for hepatitis C screening test  -     Hepatitis C Antibody ([facility]); Future    5  Benign hypertension  -     CBC; Future; Expected date: 2022  -     Comprehensive metabolic panel; Future; Expected date: 2022  -     Lipid Panel with Direct LDL reflex; Future; Expected date: 2022    6  Brain tumor [facility])  Assessment & Plan:  Has not had follow up MRI of brain in 3 years  Orders:  -     MRI brain w wo contrast; Future; Expected date: 2022    7  Need for vaccination  -     Zoster Vaccine Recombinant IM      · Patient Counseling:   · Nutrition: Stressed importance of a well balanced diet, moderation of sodium/saturated fat, caloric balance and sufficient intake of fiber  · Exercise: Stressed the importance of regular exercise with a goal of 150 minutes per week  · Dental Health: Discussed daily flossing and brushing and regular dental visits     · Immunizations reviewed: See Orders  · Discussed benefits of:  Colon Cancer Screening, Mammogram  and Screening labs   BMI Counseling: Body mass index is 30 02 kg/m²  Discussed with patient's BMI with her  The BMI is above normal  Exercise recommendations include exercising 3-5 times per week      Return in about 6 months (around 10/4/2022) for Next scheduled follow up; nursing visit in 2 months 2nd [facility]   Declined colon cancer screening at this time  Chief Complaint     Chief Complaint   Patient presents with    Physical Exam     [id number]       History of Present Illness     She would like a cream to help with her wrinkles   She is will to pay for the cream        Well Adult Physical   Patient here for a comprehensive physical exam       Diet and Physical Activity  Diet: well balanced diet  Exercise: frequently      Depression Screen  PHQ-2/9 Depression Screening    Little interest or pleasure in doing things: 0 - not at all  Feeling down, depressed, or hopeless: 0 - not at all  PHQ-2 Score: 0  PHQ-2 Interpretation: Negative depression screen          General Health  Hearing: Normal:  bilateral  Vision: no vision problems  Dental: dentures    Reproductive Health  Post-menopausal       The following portions of the patient's history were reviewed and updated as appropriate: allergies, current medications, past family history, past medical history, past social history, past surgical history and problem list     Review of Systems     Review of Systems    Past Medical History     Past Medical History:   Diagnosis Date    Adenocarcinoma of right lung, stage 4 ([facility] [address] )     Allergic rhinitis     Alopecia     resolved 10/25/16    Anxiety     last assessed 10/4/16, resolved 10/25/16    Benign hypertension 2/1/2018    Benign neoplasm of skin of trunk 03/25/2008    last assessed 3/25/08    Benign neoplasm of skin of upper extremity and shoulder 03/25/2008    last assessed 3/25/08    Ear deformity, acquired     last assessed 10/13/14, resolved 3/12/15    Eczema     History of chemotherapy     Hyperlipemia     Maintenance chemotherapy     Secondary cancer of brain ([facility] [address] [facility]     Seizures ([facility] [address] )     Vertigo        Past Surgical History     Past Surgical History:   Procedure Laterality Date    APPENDECTOMY  1964    BRAIN TUMOR EXCISION      CENTRAL VENOUS CATHETER INSERTION Right     PORTACATH    IR PORT REMOVAL  9/10/2021       Social History     Social History     Socioeconomic History    Marital status: /Civil Union     Spouse name: None    Number of children: 1    Years of education: None    Highest education level: None   Occupational History    None   Tobacco Use    Smoking status: Former Smoker     Packs/day: 1 00     Years: 47 00     Pack years: 47 00     Types: Cigarettes     Quit date:      Years since quittin 2    Smokeless tobacco: Never Used   Substance and Sexual Activity    Alcohol use: Yes     Comment: occasionally / Social     Drug use: Yes     Types: Marijuana    Sexual activity: Yes   Other Topics Concern    None   Social History Narrative    High school or GED     Lives independently with spouse      Social Determinants of Health     Financial Resource Strain: Not on file   Food Insecurity: Not on file   Transportation Needs: Not on file   Physical Activity: Not on file   Stress: Not on file   Social Connections: Not on file   Intimate Partner Violence: Not on file   Housing Stability: Not on file       Family History     Family History   Problem Relation Age of Onset    Diabetes Mother     Heart disease Mother     Lung cancer Father 79        Smoker     Pancreatitis Brother     No Known Problems Brother     Uterine cancer Paternal Grandmother        Current Medications       Current Outpatient Medications:     amLODIPine (NORVASC) 5 mg tablet, Take 1 tablet by mouth once daily, Disp: 90 tablet, Rfl: 1    Ascorbic Acid (vitamin C) 100 MG tablet, Take 100 mg by mouth daily, Disp: , Rfl:     atorvastatin (LIPITOR) 10 mg tablet, Take 1 tablet by mouth once daily, Disp: 90 tablet, Rfl: 0    b complex vitamins capsule, Take 1 capsule by mouth daily, Disp: , Rfl:     cholecalciferol (VITAMIN D3) 1,000 units tablet, Take 1,000 Units by mouth daily, Disp: , Rfl:     halobetasol (ULTRAVATE) 0 05 % cream, Apply topically 2 (two) times a day, Disp: 50 g, Rfl: 0    magnesium 30 MG tablet, Take 30 mg by mouth 2 (two) times a day, Disp: , Rfl:     Omega-3 Fatty Acids (FISH OIL PO), Take 2 tablets by mouth , Disp: , Rfl:     ALPRAZolam (XANAX) 0 25 mg tablet, Take 1 tablet (0 25 mg total) by mouth 2 (two) times a day as needed for anxiety (Patient not taking: Reported on 3/30/2022 ), Disp: 30 tablet, Rfl: 0    tretinoin (RETIN-A) 0 1 % cream, Apply topically daily at bedtime, Disp: 45 g, Rfl: 2  No current facility-administered medications for this visit  Facility-Administered Medications Ordered in Other Visits:     alteplase (CATHFLO) injection 2 mg, 2 mg, Intracatheter, PRN, Evelia Norris MD    sodium chloride 0 9 % infusion, 20 mL/hr, Intravenous, Continuous, Evelia Norris MD     Allergies     Allergies   Allergen Reactions    Iodinated Diagnostic Agents Anaphylaxis and Itching    Clindamycin     Clindamycin/Lincomycin        Objective     /90   Pulse 97   Temp 97 5 °F (36 4 °C)   Resp 16   Ht 5' 6" (1 676 m)   Wt 84 4 kg (186 lb)   LMP  (LMP Unknown)   SpO2 97%   BMI 30 02 kg/m²      Physical Exam  Vitals and nursing note reviewed  Constitutional:       Appearance: She is well-developed  HENT:      Head: Normocephalic and atraumatic  Right Ear: Tympanic membrane and external ear normal       Left Ear: Tympanic membrane and external ear normal    Cardiovascular:      Rate and Rhythm: Normal rate and regular rhythm  Heart sounds: Normal heart sounds  No murmur heard  No friction rub  Pulmonary:      Effort: Pulmonary effort is normal  No respiratory distress  Breath sounds: Normal breath sounds  No wheezing or rales  Abdominal:      General: There is no distension  Palpations: Abdomen is soft  There is no mass  Tenderness: There is no abdominal tenderness  There is no guarding or rebound  Musculoskeletal:      Cervical back: Normal range of motion        Right lower leg: No edema  Left lower leg: No edema  Skin:     General: Skin is warm  Neurological:      Mental Status: She is alert and oriented to person, place, and time     Psychiatric:         Mood and Affect: Mood normal             Visual Acuity Screening    Right eye Left eye Both eyes   Without correction:      With correction: 20/20 20/20 20/20           Lauren Ernst, [address]

## 2022-04-05 RX ORDER — ATORVASTATIN CALCIUM 10 MG/1
TABLET, FILM COATED ORAL
Qty: 90 TABLET | Refills: 0 | Status: SHIPPED | OUTPATIENT
Start: 2022-04-05

## 2022-04-05 RX ORDER — AMLODIPINE BESYLATE 5 MG/1
TABLET ORAL
Qty: 90 TABLET | Refills: 0 | Status: SHIPPED | OUTPATIENT
Start: 2022-04-05 | End: 2022-07-09

## 2022-07-09 DIAGNOSIS — I10 BENIGN HYPERTENSION: ICD-10-CM

## 2022-07-09 RX ORDER — AMLODIPINE BESYLATE 5 MG/1
TABLET ORAL
Qty: 30 TABLET | Refills: 0 | Status: SHIPPED | OUTPATIENT
Start: 2022-07-09 | End: 2022-08-10

## 2022-08-10 DIAGNOSIS — I10 BENIGN HYPERTENSION: ICD-10-CM

## 2022-08-10 RX ORDER — AMLODIPINE BESYLATE 5 MG/1
TABLET ORAL
Qty: 30 TABLET | Refills: 0 | Status: SHIPPED | OUTPATIENT
Start: 2022-08-10 | End: 2022-08-16

## 2022-08-15 DIAGNOSIS — I10 BENIGN HYPERTENSION: ICD-10-CM

## 2022-08-16 RX ORDER — AMLODIPINE BESYLATE 5 MG/1
TABLET ORAL
Qty: 30 TABLET | Refills: 0 | Status: SHIPPED | OUTPATIENT
Start: 2022-08-16 | End: 2022-10-08

## 2022-09-01 ENCOUNTER — HOSPITAL ENCOUNTER (OUTPATIENT)
Dept: RADIOLOGY | Facility: HOSPITAL | Age: 62
Discharge: HOME | End: 2022-09-01
Payer: COMMERCIAL

## 2022-09-01 DIAGNOSIS — D49.6 BRAIN TUMOR (HCC): ICD-10-CM

## 2022-09-01 PROCEDURE — A9585 GADOBUTROL INJECTION: HCPCS | Performed by: FAMILY MEDICINE

## 2022-09-01 PROCEDURE — 70553 MRI BRAIN STEM W/O & W/DYE: CPT

## 2022-09-01 PROCEDURE — G1004 CDSM NDSC: HCPCS

## 2022-09-01 RX ADMIN — GADOBUTROL 8 ML: 604.72 INJECTION INTRAVENOUS at 09:21

## 2022-09-02 ENCOUNTER — TELEPHONE (OUTPATIENT)
Dept: HEMATOLOGY ONCOLOGY | Facility: MEDICAL CENTER | Age: 62
End: 2022-09-02

## 2022-09-02 ENCOUNTER — TELEPHONE (OUTPATIENT)
Dept: RADIOLOGY | Facility: HOSPITAL | Age: 62
End: 2022-09-02

## 2022-09-02 DIAGNOSIS — C34.91 NON-SMALL CELL CANCER OF RIGHT LUNG (HCC): Primary | ICD-10-CM

## 2022-09-06 ENCOUNTER — HOSPITAL ENCOUNTER (OUTPATIENT)
Dept: RADIOLOGY | Facility: HOSPITAL | Age: 62
Discharge: HOME | End: 2022-09-06
Attending: INTERNAL MEDICINE
Payer: COMMERCIAL

## 2022-09-06 DIAGNOSIS — C34.91 NON-SMALL CELL CANCER OF RIGHT LUNG (HCC): ICD-10-CM

## 2022-09-06 PROCEDURE — G1004 CDSM NDSC: HCPCS

## 2022-09-06 PROCEDURE — 71250 CT THORAX DX C-: CPT

## 2022-09-06 PROCEDURE — 74176 CT ABD & PELVIS W/O CONTRAST: CPT

## 2022-09-10 DIAGNOSIS — E78.2 MIXED HYPERLIPIDEMIA: ICD-10-CM

## 2022-09-12 RX ORDER — ATORVASTATIN CALCIUM 10 MG/1
TABLET, FILM COATED ORAL
Qty: 90 TABLET | Refills: 0 | Status: SHIPPED | OUTPATIENT
Start: 2022-09-12

## 2022-09-30 LAB
ALBUMIN SERPL-MCNC: 5 G/DL (ref 3.8–4.8)
ALBUMIN SERPL-MCNC: 5 G/DL (ref 3.8–4.8)
ALBUMIN/GLOB SERPL: 2.4 {RATIO} (ref 1.2–2.2)
ALBUMIN/GLOB SERPL: 2.4 {RATIO} (ref 1.2–2.2)
ALP SERPL-CCNC: 60 IU/L (ref 44–121)
ALP SERPL-CCNC: 62 IU/L (ref 44–121)
ALT SERPL-CCNC: 43 IU/L (ref 0–32)
ALT SERPL-CCNC: 44 IU/L (ref 0–32)
AST SERPL-CCNC: 31 IU/L (ref 0–40)
AST SERPL-CCNC: 31 IU/L (ref 0–40)
BASOPHILS # BLD AUTO: 0.1 X10E3/UL (ref 0–0.2)
BASOPHILS # BLD AUTO: 0.1 X10E3/UL (ref 0–0.2)
BASOPHILS NFR BLD AUTO: 1 %
BASOPHILS NFR BLD AUTO: 1 %
BILIRUB SERPL-MCNC: 0.5 MG/DL (ref 0–1.2)
BILIRUB SERPL-MCNC: 0.5 MG/DL (ref 0–1.2)
BUN SERPL-MCNC: 11 MG/DL (ref 8–27)
BUN SERPL-MCNC: 12 MG/DL (ref 8–27)
BUN/CREAT SERPL: 13 (ref 12–28)
BUN/CREAT SERPL: 14 (ref 12–28)
CALCIUM SERPL-MCNC: 10 MG/DL (ref 8.7–10.3)
CALCIUM SERPL-MCNC: 10 MG/DL (ref 8.7–10.3)
CHLORIDE SERPL-SCNC: 100 MMOL/L (ref 96–106)
CHLORIDE SERPL-SCNC: 98 MMOL/L (ref 96–106)
CHOLEST SERPL-MCNC: 188 MG/DL (ref 100–199)
CO2 SERPL-SCNC: 21 MMOL/L (ref 20–29)
CO2 SERPL-SCNC: 24 MMOL/L (ref 20–29)
CREAT SERPL-MCNC: 0.87 MG/DL (ref 0.57–1)
CREAT SERPL-MCNC: 0.87 MG/DL (ref 0.57–1)
EGFR: 75 ML/MIN/1.73
EGFR: 75 ML/MIN/1.73
EOSINOPHIL # BLD AUTO: 0.1 X10E3/UL (ref 0–0.4)
EOSINOPHIL # BLD AUTO: 0.2 X10E3/UL (ref 0–0.4)
EOSINOPHIL NFR BLD AUTO: 2 %
EOSINOPHIL NFR BLD AUTO: 2 %
ERYTHROCYTE [DISTWIDTH] IN BLOOD BY AUTOMATED COUNT: 12.4 % (ref 11.7–15.4)
ERYTHROCYTE [DISTWIDTH] IN BLOOD BY AUTOMATED COUNT: 12.5 % (ref 11.7–15.4)
GLOBULIN SER-MCNC: 2.1 G/DL (ref 1.5–4.5)
GLOBULIN SER-MCNC: 2.1 G/DL (ref 1.5–4.5)
GLUCOSE SERPL-MCNC: 126 MG/DL (ref 70–99)
GLUCOSE SERPL-MCNC: 128 MG/DL (ref 70–99)
HBA1C MFR BLD: 6.3 % (ref 4.8–5.6)
HCT VFR BLD AUTO: 41.8 % (ref 34–46.6)
HCT VFR BLD AUTO: 41.9 % (ref 34–46.6)
HCV AB S/CO SERPL IA: <0.1 S/CO RATIO (ref 0–0.9)
HDLC SERPL-MCNC: 46 MG/DL
HGB BLD-MCNC: 14.4 G/DL (ref 11.1–15.9)
HGB BLD-MCNC: 14.6 G/DL (ref 11.1–15.9)
IMM GRANULOCYTES # BLD: 0 X10E3/UL (ref 0–0.1)
IMM GRANULOCYTES # BLD: 0 X10E3/UL (ref 0–0.1)
IMM GRANULOCYTES NFR BLD: 0 %
IMM GRANULOCYTES NFR BLD: 0 %
LDLC SERPL CALC-MCNC: 109 MG/DL (ref 0–99)
LYMPHOCYTES # BLD AUTO: 3.2 X10E3/UL (ref 0.7–3.1)
LYMPHOCYTES # BLD AUTO: 3.4 X10E3/UL (ref 0.7–3.1)
LYMPHOCYTES NFR BLD AUTO: 42 %
LYMPHOCYTES NFR BLD AUTO: 43 %
MCH RBC QN AUTO: 30.7 PG (ref 26.6–33)
MCH RBC QN AUTO: 30.9 PG (ref 26.6–33)
MCHC RBC AUTO-ENTMCNC: 34.4 G/DL (ref 31.5–35.7)
MCHC RBC AUTO-ENTMCNC: 34.9 G/DL (ref 31.5–35.7)
MCV RBC AUTO: 88 FL (ref 79–97)
MCV RBC AUTO: 90 FL (ref 79–97)
MICRODELETION SYND BLD/T FISH: NORMAL
MONOCYTES # BLD AUTO: 0.6 X10E3/UL (ref 0.1–0.9)
MONOCYTES # BLD AUTO: 0.6 X10E3/UL (ref 0.1–0.9)
MONOCYTES NFR BLD AUTO: 7 %
MONOCYTES NFR BLD AUTO: 7 %
NEUTROPHILS # BLD AUTO: 3.6 X10E3/UL (ref 1.4–7)
NEUTROPHILS # BLD AUTO: 3.7 X10E3/UL (ref 1.4–7)
NEUTROPHILS NFR BLD AUTO: 47 %
NEUTROPHILS NFR BLD AUTO: 48 %
PLATELET # BLD AUTO: 364 X10E3/UL (ref 150–450)
PLATELET # BLD AUTO: 375 X10E3/UL (ref 150–450)
POTASSIUM SERPL-SCNC: 4.6 MMOL/L (ref 3.5–5.2)
POTASSIUM SERPL-SCNC: 4.8 MMOL/L (ref 3.5–5.2)
PROT SERPL-MCNC: 7.1 G/DL (ref 6–8.5)
PROT SERPL-MCNC: 7.1 G/DL (ref 6–8.5)
RBC # BLD AUTO: 4.66 X10E6/UL (ref 3.77–5.28)
RBC # BLD AUTO: 4.76 X10E6/UL (ref 3.77–5.28)
SODIUM SERPL-SCNC: 138 MMOL/L (ref 134–144)
SODIUM SERPL-SCNC: 139 MMOL/L (ref 134–144)
TRIGL SERPL-MCNC: 191 MG/DL (ref 0–149)
WBC # BLD AUTO: 7.6 X10E3/UL (ref 3.4–10.8)
WBC # BLD AUTO: 7.8 X10E3/UL (ref 3.4–10.8)

## 2022-10-03 ENCOUNTER — OFFICE VISIT (OUTPATIENT)
Dept: HEMATOLOGY ONCOLOGY | Facility: MEDICAL CENTER | Age: 62
End: 2022-10-03
Payer: COMMERCIAL

## 2022-10-03 VITALS
SYSTOLIC BLOOD PRESSURE: 138 MMHG | HEIGHT: 66 IN | RESPIRATION RATE: 17 BRPM | WEIGHT: 191 LBS | OXYGEN SATURATION: 99 % | DIASTOLIC BLOOD PRESSURE: 82 MMHG | HEART RATE: 81 BPM | TEMPERATURE: 98.7 F | BODY MASS INDEX: 30.7 KG/M2

## 2022-10-03 DIAGNOSIS — C34.91 NON-SMALL CELL CANCER OF RIGHT LUNG (HCC): ICD-10-CM

## 2022-10-03 DIAGNOSIS — R56.9 SEIZURE (HCC): ICD-10-CM

## 2022-10-03 DIAGNOSIS — C79.31 BRAIN METASTASIS (HCC): ICD-10-CM

## 2022-10-03 DIAGNOSIS — C34.91 ADENOCARCINOMA OF LUNG, STAGE 4, RIGHT (HCC): Primary | ICD-10-CM

## 2022-10-03 PROCEDURE — 99214 OFFICE O/P EST MOD 30 MIN: CPT | Performed by: INTERNAL MEDICINE

## 2022-10-03 NOTE — PROGRESS NOTES
Nicola Berry  1960  [address] [facility] [phone]    DISCUSSION  SUMMARY:    57-year-old female with M1 non-small cell lung carcinoma/adenocarcinoma  Patient has been on [facility] 20015-14 (phase III open label randomized study of [id number] [anti-PD-L1 antibody] in combination with carboplatin and paclitaxel +/- Avastin versus carboplatin and paclitaxel +/- Avastin in patients with stage IV chemotherapy naive non-small cell lung carcinoma)  Patient was randomized to receive all 4 medications  Mrs Claudia Tineo completed the 6 cycles of treatment without significant toxicities  Patient had been on maintenance (study drug (atezolizumab) and avastin) - subsequently only Avastin  More recently, patient has been on surveillance only  Issues:     Stage IV non-small cell lung carcinoma  As discussed previously, recent workup did not demonstrate any evidence of residual or recurrent disease  Patient is off the Avastin now  Mrs Claudia Tineo was initially diagnosed approximately 7 years ago  Patient continues to feel well and clinically there are no concerning signs  Recent CT scans and blood work were good/acceptable  The plan is to continue with surveillance  Patient is to return in 12 months  At that time, we will discuss repeating scans  Brain metastases status post resection and radiotherapy  Patient has been tapered off of Keppra  There have been no recent CNS issues  The previous MRI/brain did not demonstrate any evidence for recurrence  Patient will follow-up with Radiation Oncology as directed  Routine health maintenance and medical care  Patient has an appointment with Dr Aishwarya Mccloud tomorrow - need of mammogram and colonoscopy  Patient knows to call the hematology/oncology office if there are any other questions or concerns      Carefully review your medication list and verify that the list is accurate and up-to-date  Please call the hematology/oncology office if there are medications missing from the list, medications on the list that you are not currently taking or if there is a dosage or instruction that is different from how you're taking that medication  Patient goals and areas of care:  Continue with surveillance  Barriers to care:  None  Patient is able to self-care   __________________________________________________________________________________________________    Chief Complaint   Patient presents with    Follow-up     Advance Care Planning/Advance Directives: not discussed    Oncology History   Adenocarcinoma of lung, stage 4, right ([facility] [address] )   9/5/2015 Initial Diagnosis    Patient was referred to the emergency room for evaluation of vertigo as sent from the [facility]  (patient reported )  Patient underwent the following pertinent imaging scans  MRI of the brain demonstrated a mass in the right superior all temporal gyrus with measurements of 4 2 x 3 3 x 3 6 cm with vasogenic edema and mass effect  There was a near uncal herniation noted  CT of the chest and abdomen/pelvis demonstrated a necrotic right upper lobe mass measuring 7 x 4 2 x 4 1 cm strongly suspicious for bronchogenic carcinoma  The mass contacts the posterior medial pleural surface and potentially contacts the right posterior tracheal border  No pathologic adenopathy was identified in no evidence of metastatic disease in the chest abdomen or pelvis  9/6/2015 Surgery    Non-small cell carcinoma of lung ([facility] [address] ) diagnosed from biopsy of right temporal mass  Pathology demonstrates poorly differentiated non-small cell carcinoma  Pathologist stated that the immunoprofile supported the primary lung non-small cell carcinoma favor adenocarcinoma  Squamous carcinoma could not be excluded, secondary to focal P 40 staining    [place] [facility] path report demonstrated no ALK gene rearrangement or dleation and negative ROS1 rearrangement, No EGFR mutations were found  Surgery completed by Dr Khadra Hu  10/7/2015 - 10/16/2015 Radiation    A dose of 3000 cGy in 5 fractions delivered every other day was delivered to the resection cavity (single right superior temporal intracranial metastasis) Dr Corie Herbert     10/20/2015 -  Research Study Participant    [id number] Phase III open label randomized study of [id number] [Anti-PDL1 antibody] in combination with carboplatin and paclitaxel +/- Avastin versus Carboplatin and Paciltaxel +/- Avastin in patients with stage IV Chemotherapy  Naive non-small cell lung carcinoma  Patient was randomized to receive off for medications  11/30/2015 - 2/18/2016 Chemotherapy    Started Carboplatin, Paclitaxel, Avastin and PD-L1 (atezolizumab; study drug)  Completed 6 cycles with cycle 6 day 1 on 2/18/16     3/10/2016 -  Chemotherapy    Beginning cycle 7, maintenance cycle with Avastin and PD-L1 (Atezolizumab; study drug)     5/24/2017 Adverse Reaction    LFT elevation-persisted  Patient was taken off of study drug Atezolizumab, but continues on Avastin maintance  5/23/2019 - 7/3/2019 Chemotherapy    [No matching medication found in this treatment plan]       History of Present Illness:    59year old female recently diagnosed with IV lung cancer here for followup  Mrs Jered Rawls began to have headaches last spring 2015  Patient was seen by her PCP and treated with antibiotics  Initially the symptoms got better the patient went on vacation  After returning from vacation, Mrs Jered Rawls once again developed headaches  Patient was treated with a second round of antibiotics and then was diagnosed with vertigo  Patient was sent to the [address] [place] [address]  Although the specifics are not entirely clear, the therapist in the [address] [place] [address] sent the patient to the emergency room  A CAT scan of the brain demonstrated a brain lesion and patient was transferred to [place]   Patient was seen by Dr Khadra Hu and underwent resection demonstrating adenocarcinoma  Additional testing demonstrated a lung mass  Patient improved and was discharged  Patient completed SRT with Dr Jeronimo Majano and Dr Neftali Hartman  Patient has been on [facility] 20015-14 (phase III open label randomized study of [id number] [anti-PD-L1 antibody] in combination with carboplatin and paclitaxel +/- Avastin versus carboplatin and paclitaxel +/- Avastin in patients with stage IV chemotherapy naÃ¯ve non-small cell lung carcinoma)  Mrs Mya Robertson was randomized to receive the anti-PD-L1 antibody with chemotherapy - patient completed 6 cycles and was placed on maintenance  Mrs Ted Quiroz previously suffered a tonic-clonic seizure and was seen in the emergency room  CAT scan of the head did not demonstrate any evidence of disease progression  The seizure was thought to be due to encephalomalacia  Since that time, there have been no seizure issues  Mrs Mya Robertson had been on Keppra - this has been tapered off  After long discussions and consultation with the thoracic Working group, patient was taken off of all treatments approximately 3+ years ago  The plan has been surveillance  Patient returns for follow-up  Mrs Mya Robertson continues to feel well  No headaches, blurred vision or dizziness  No respiratory issues, no cough, sputum or hemoptysis  Fatigue is the same as before, generalized body aches are the same as before but patient continues to be active  Appetite is good, weight is stable  Patient has received her COVID vaccine and the booster  Review of Systems   Constitutional: Positive for fatigue  HENT: Negative  Eyes: Negative  Respiratory: Negative  Cardiovascular: Negative  Gastrointestinal: Negative  Endocrine: Negative  Genitourinary: Negative  Musculoskeletal: Positive for arthralgias  Negative for neck pain  Skin: Negative  Allergic/Immunologic: Negative  Neurological: Negative  Hematological: Does not bruise/bleed easily  Psychiatric/Behavioral: Negative  All other systems reviewed and are negative       Patient Active Problem List   Diagnosis    Seizure ([facility] [address] )    Adenocarcinoma of lung, stage 4, right ([facility] [address] )    Mixed hyperlipidemia    Abnormal LFTs    Allergic rhinitis    Brain metastasis ([facility] [address] [facility]    [facility]    Encephalomalacia    Impaired fasting glucose    Insomnia    Lesion of temporal lobe    Non-small cell lung cancer [facility]    Benign hypertension    Chronic maxillary sinusitis    Abdominal pain     Past Medical History:   Diagnosis Date    Adenocarcinoma of right lung, stage 4 ([facility] [address] )     Allergic rhinitis     Alopecia     resolved 10/25/16    Anxiety     last assessed 10/4/16, resolved 10/25/16    Benign hypertension 2/1/2018    Benign neoplasm of skin of trunk 03/25/2008    last assessed 3/25/08    Benign neoplasm of skin of upper extremity and shoulder 03/25/2008    last assessed 3/25/08    Ear deformity, acquired     last assessed 10/13/14, resolved 3/12/15    Eczema     History of chemotherapy     Hyperlipemia     Maintenance chemotherapy     [facility]     Seizures [facility]     Vertigo      Past Surgical History:   Procedure Laterality Date    APPENDECTOMY  1964    BRAIN TUMOR EXCISION      CENTRAL VENOUS CATHETER INSERTION Right     PORTACATH    IR PORT REMOVAL  9/10/2021     Family History   Problem Relation Age of Onset    Diabetes Mother     Heart disease Mother     Lung cancer Father 79        Smoker     Pancreatitis Brother     No Known Problems Brother     Uterine cancer Paternal Grandmother      Social History     Socioeconomic History    Marital status: /Civil Union     Spouse name: Not on file    Number of children: 1    Years of education: Not on file    Highest education level: Not on file   Occupational History    Not on file   Tobacco Use    Smoking status: Former Smoker     Packs/day: 1 00     Years: 47 00     Pack years: 47 00 Types: Cigarettes     Quit date:      Years since quittin 7    Smokeless tobacco: Never Used   Substance and Sexual Activity    Alcohol use: Yes     Comment: occasionally / Social     Drug use: Yes     Types: Marijuana    Sexual activity: Yes   Other Topics Concern    Not on file   Social History Narrative    High school or GED     Lives independently with spouse      Social Determinants of Health     Financial Resource Strain: Not on file   Food Insecurity: Not on file   Transportation Needs: Not on file   Physical Activity: Not on file   Stress: Not on file   Social Connections: Not on file   Intimate Partner Violence: Not on file   Housing Stability: Not on file       Current Outpatient Medications:     amLODIPine (NORVASC) 5 mg tablet, Take 1 tablet by mouth once daily, Disp: 30 tablet, Rfl: 0    Ascorbic Acid (vitamin C) 100 MG tablet, Take 100 mg by mouth daily, Disp: , Rfl:     atorvastatin (LIPITOR) 10 mg tablet, Take 1 tablet by mouth once daily, Disp: 90 tablet, Rfl: 0    b complex vitamins capsule, Take 1 capsule by mouth daily, Disp: , Rfl:     cholecalciferol (VITAMIN D3) 1,000 units tablet, Take 1,000 Units by mouth daily, Disp: , Rfl:     halobetasol (ULTRAVATE) 0 05 % cream, Apply topically 2 (two) times a day, Disp: 50 g, Rfl: 0    magnesium 30 MG tablet, Take 30 mg by mouth 2 (two) times a day, Disp: , Rfl:     Omega-3 Fatty Acids (FISH OIL PO), Take 2 tablets by mouth , Disp: , Rfl:     tretinoin (RETIN-A) 0 1 % cream, Apply topically daily at bedtime, Disp: 45 g, Rfl: 2    ALPRAZolam (XANAX) 0 25 mg tablet, Take 1 tablet (0 25 mg total) by mouth 2 (two) times a day as needed for anxiety (Patient not taking: No sig reported), Disp: 30 tablet, Rfl: 0  No current facility-administered medications for this visit      Facility-Administered Medications Ordered in Other Visits:     alteplase (CATHFLO) injection 2 mg, 2 mg, Intracatheter, PRN, Petra Rodríguez MD    sodium chloride 0 9 % infusion, 20 mL/hr, Intravenous, Continuous, Zuleyma Moore MD    Allergies   Allergen Reactions    Iodinated Diagnostic Agents Anaphylaxis and Itching    Clindamycin     Clindamycin/Lincomycin        Vitals:    10/03/22 1105   BP: 138/82   Pulse: 81   Resp: 17   Temp: 98 7 °F (37 1 °C)   SpO2: 99%     Physical Exam  Constitutional:       Appearance: She is well-developed  Comments: Well-nourished female, no respiratory distress   HENT:      Head: Normocephalic and atraumatic  Right Ear: External ear normal       Left Ear: External ear normal       Nose: Nose normal       Mouth/Throat:      Pharynx: No oropharyngeal exudate  Eyes:      Conjunctiva/sclera: Conjunctivae normal       Pupils: Pupils are equal, round, and reactive to light  Neck:      Comments: Neck is supple, no JVD, good range of motion, no pain or tenderness with movement  Cardiovascular:      Rate and Rhythm: Normal rate and regular rhythm  Heart sounds: Normal heart sounds  Pulmonary:      Effort: Pulmonary effort is normal       Breath sounds: Normal breath sounds  Abdominal:      General: Bowel sounds are normal       Palpations: Abdomen is soft  Comments: Abdomen is soft, nontender, no hepatosplenomegaly, no rigidity or rebound, +bowel sounds   Musculoskeletal:         General: Normal range of motion  Cervical back: Normal range of motion and neck supple  Skin:     General: Skin is warm  Comments: Warm, moist, good color, no petechiae or ecchymoses   Neurological:      Mental Status: She is alert and oriented to person, place, and time  Deep Tendon Reflexes: Reflexes are normal and symmetric  Psychiatric:         Behavior: Behavior normal          Thought Content:  Thought content normal          Judgment: Judgment normal      Extremities: no lower extremity edema bilaterally, no cords, pulses are 1+  Lymphatics: no adenopathy in the neck, supraclavicular region, axilla and groin bilaterally    Performance Status: 1 - Symptomatic but completely ambulatory    Labs    09/28/2022 WBC = 7 8 hemoglobin = 14 6 hematocrit = 42 platelet = 209 neutrophil = 47% BUN = 12 creatinine = 0 87 AST = 31 ALT = 43 alkaline phosphatase = 62 total bilirubin = 0 5 calcium = 10 0    02/15/2022 WBC = 7 6 hemoglobin = 14 3 hematocrit = 43 platelet = 649 neutrophil = 51% BUN = 17 creatinine = 0 94 calcium = 9 9 LFTs WNL    Imaging    09/06/2022 CT scan chest abdomen pelvis without contrast    1  Mixed solid and cavitary lesion in the right upper lobe has overall decreased in size  The soft tissue component appears more dense and well-defined  Continued attention on follow-up is recommended  2   No evidence of metastatic disease  09/01/2022 MRI brain    1  Stable treatment related changes in the right temporal lobe with gliosis and encephalomalacia  No evidence of recurrent or progressive tumor  2  No acute infarction, intracranial hemorrhage or mass  No MR evidence of metastases  3  Stable white matter signal abnormality, likely microangiopathy  03/01/2021 CT scan chest abdomen pelvis    No evidence of metastatic disease      Mixed solid and cavitary lesion in the right upper lobe has overall diminished in size more due to diminished cavity size  The soft tissue component may be slightly larger  [place] term follow-up should be considered      Groundglass opacities at the left lung base have resolved probably inflammatory at that time  11/21/2019 CT scan of the chest and abdomen pelvis    Right upper lobe cavitary lesion with mild very slow interval retracting size  Stable component of solid thickening along the posterior wall  No new pulmonary lesions    Stable patchy subsegmental groundglass opacities remain present in the left lower lobe     08/28/2019 CT scan of chest and abdomen pelvis    Stable cavitary target lesion in the right upper lobe        Stable left lower lobe patchy groundglass opacities with associated mild peribronchial thickening, probably infectious or inflammatory in etiology  Continued surveillance on follow-up is recommended  Further evaluation with bronchoscopy can also be considered given persistence of the findings since CT from 8/20/2018  No evidence of metastatic disease in the abdomen or pelvis  05/31/2019 PET-CT    1  No findings suspicious for hypermetabolic malignancy  2  Mild patchy FDG uptake in the left lower lobe patchy groundglass opacities with associated peribronchial thickening  This is likely infectious or inflammatory given the appearance, however, given that this has been present since the 8/20/2018 CT, consider follow-up with bronchoscopy  04/26/2019 CT scan of the chest and abdomen/pelvis    1  Stable cavitary target lesion in the right upper lobe  No new site of metastasis  2   Stable left lower lobe patchy groundglass opacities with associated mild peribronchial thickening likely due to a chronic infectious or inflammatory process  Continued surveillance on follow-up is recommended  04/22/2019 MRI brain    1  Stable treatment related changes in the lateral aspect of the right temporal lobe without evidence of recurrent or progressive metastatic disease  2   No acute infarction, intracranial hemorrhage or enhancing mass lesion  3   Mild scattered white matter disease likely mild, chronic microangiopathy and/or treatment related changes are stable  4   Moderate ethmoidal and maxillary sinus disease redemonstrated  02/21/2019 CT scan of the chest and abdomen/pelvis    Limited evaluation of solid organs and vasculature without intravenous contrast   1   Stable cavitary target lesion in the right upper lobe  2   Stable left lower lobe patchy groundglass opacities with associated mild peribronchial wall thickening  This is again more likely chronic infectious or inflammatory in etiology    Continued surveillance on follow-up is recommended  3   No new metastatic lesions are found  12/20/2018 CT scan of the chest and abdomen/pelvis    1  Stable right upper lobe 3 3 cm cavitary lesion as described  2   Left lower lobe patchy groundglass opacities with associated mild peribronchial wall thickening, similar to October 18, 2018 but improved from August 20, 2018, likely related to a slowly resolving infectious/inflammatory process  Follow-up   short-term chest CT in 3 months is recommended to evaluate for resolution  3   No metastatic disease in the abdomen or pelvis  10/18/2018 CT scan of the chest without contrast: IMPRESSION: Improved appearance of the left lung base infiltrate  Stable right cavitary lesion  No new findings    08/20/2018 CT scan of the chest and abdomen/pelvis without contrast    RECIST target lesion: Cavitary right upper lobe mass is unchanged, measuring 3 4 x 2 6 cm on series 3 image 15 (previously 3 5 x 2 5 cm)  Solid component along the medial aspect of the posterior wall is also unchanged, measuring 1 4 x 0 9 cm      LUNGS:  Stable cavitary right upper lobe mass, as above  No new nodules identified  There is new small patchy density in the posterior left base compatible with infiltrate  There is no tracheal or endobronchial lesion  IMPRESSION    Unchanged cavitary right upper lobe mass  No new metastatic disease  Interval development of mild left lower lobe infiltrate noted  06/18/2018 CT scan of the chest and abdomen/pelvis    Unchanged cavitary right upper lobe mass    No new metastatic disease  04/16/2018 CT scan of the chest and abdomen/pelvis    There is no significant interval change in size of the cystic or nodular components of the cavitary right upper lobe mass  No new metastatic lesions identified in the chest, abdomen or pelvis  1/22/18 CAT scan of the chest and abdomen/pelvis    RECIST MEASUREMENTS:  TARGET LESION:   1: Right upper lobe cavitary lung mass:  The overall size of the lesion is 3 5 x 2 4 cm on image 14 of series 3, significantly decreased from 4 3 x 2 7 cm on previous examination  Solid component along the medial aspect of the posterior wall is not significantly changed from previous examination currently measuring 1 5 x 0 9 cm on image 14 of series 3 compared with 1 8 x 0 8 cm when measured using similar technique on previous   examination  Nodular solid component along the lateral aspect of posterior wall measures approximately 0 7 x 0 4 cm on image 13 of series 3, and when measured using similar technique is unchanged from previous examinations  There is interval decrease in size of the cystic portion of the cavitary right upper lobe mass however, solid components of this mass are not significantly changed from previous examination  Otherwise unchanged examination with no new metastatic lesions   identified in the chest, abdomen or pelvis  Pathology    [facility] results demonstrated no ALK gene rearrangement or deletion and negative for ROS1 rearrangement  No EGFR mutations were found also  Right temporal mass from September 6, 2015 demonstrated metastatic poorly differentiated non-small cell carcinoma  The tumor cells stained positive for CK 7, TTF-1 and Napsin and negative for CK 20, CK 5/6 andmucicarmine  Pathologist stated that the immunoprofile supported the primary lung non-small cell carcinoma, favor adenocarcinoma  The pathologist also stated that given the focal p40 staining, a squamous carcinoma could not be excluded        Procedures    05/30/2019 complete pulmonary function test    PFT Interpretation     Quality of Data:  The patient's efforts are acceptable and reproducible      Test Performed:  Complete pulmonary function tests, including spirometry with and without bronchodilator, measurement of lung volume, and diffusing capacity      Spirometry Interpretation:  Spirometry is within normal limits      Flow Volume Loops:  Flow-Volume loops are normal      Lung Volumes:  Plethysmographic lung volumes are within normal limits      Diffusing Capacity:  Minimal reduction in diffusion capacity likely clinically insignificant     Conclusions: An isolated reduction in Diffusing Capacity is present and may represent Early fibrotic or pulmonary vascular disease  Clinical correlation is recommended

## 2022-10-04 ENCOUNTER — OFFICE VISIT (OUTPATIENT)
Dept: FAMILY MEDICINE CLINIC | Facility: CLINIC | Age: 62
End: 2022-10-04
Payer: COMMERCIAL

## 2022-10-04 VITALS
HEART RATE: 72 BPM | DIASTOLIC BLOOD PRESSURE: 72 MMHG | BODY MASS INDEX: 31.15 KG/M2 | TEMPERATURE: 97.7 F | SYSTOLIC BLOOD PRESSURE: 134 MMHG | WEIGHT: 193 LBS

## 2022-10-04 DIAGNOSIS — Z23 NEED FOR VACCINATION: ICD-10-CM

## 2022-10-04 DIAGNOSIS — E11.9 TYPE 2 DIABETES MELLITUS WITHOUT COMPLICATION, WITHOUT LONG-TERM CURRENT USE OF INSULIN (HCC): Primary | ICD-10-CM

## 2022-10-04 DIAGNOSIS — E78.2 MIXED HYPERLIPIDEMIA: ICD-10-CM

## 2022-10-04 DIAGNOSIS — L98.8 WRINKLES: ICD-10-CM

## 2022-10-04 DIAGNOSIS — I10 BENIGN HYPERTENSION: ICD-10-CM

## 2022-10-04 PROCEDURE — 90750 HZV VACC RECOMBINANT IM: CPT

## 2022-10-04 PROCEDURE — 90471 IMMUNIZATION ADMIN: CPT

## 2022-10-04 PROCEDURE — 99214 OFFICE O/P EST MOD 30 MIN: CPT | Performed by: FAMILY MEDICINE

## 2022-10-04 RX ORDER — TRETINOIN 1 MG/G
CREAM TOPICAL
Qty: 45 G | Refills: 2 | Status: SHIPPED | OUTPATIENT
Start: 2022-10-04

## 2022-10-04 NOTE — PROGRESS NOTES
Name: Flavio Todd      : 1960      MRN: [id number]  Encounter Provider: Traci Miranda DO  Encounter Date: 10/4/2022   Encounter department: [address] [place] [address]     1  Type 2 diabetes mellitus without complication, without long-term current use of insulin ([facility])  Assessment & Plan:    Lab Results   Component Value Date    HGBA1C 6 3 (H) 2022   Newly diagnosed  Patient's fasting sugar is above 126 although her hemoglobin A1c is 6 3  Will have her continue to follow her low sugar diet  Orders:  -     Hemoglobin A1C; Future; Expected date: 2023  -     Microalbumin / creatinine urine ratio; Future; Expected date: 2023  -     Hemoglobin A1C  -     Microalbumin / creatinine urine ratio    2  Benign hypertension  Assessment & Plan:  Well controlled   Continue amlodipine 5 mg daily    Orders:  -     CBC; Future; Expected date: 2023  -     Comprehensive metabolic panel; Future; Expected date: 2023  -     Lipid Panel with Direct LDL reflex; Future; Expected date: 2023  -     CBC  -     Comprehensive metabolic panel  -     Lipid Panel with Direct LDL reflex    3  Mixed hyperlipidemia  Assessment & Plan:  Stable   Continue atorvastatin 10 mg daily    Orders:  -     Lipid Panel with Direct LDL reflex; Future; Expected date: 2023  -     Lipid Panel with Direct LDL reflex    4  Need for vaccination  -     Zoster Vaccine Recombinant IM    5  Wrinkles  -     tretinoin (RETIN-A) 0 1 % cream; Apply topically daily at bedtime          Return in about 6 months (around 2023) for Annual physical       Subjective      Patient reports that she is feeling well  She had a good checkup with her oncologist   She has otherwise been feeling well  She is staying active  She is really watching the sugar in her diet      Review of Systems    Current Outpatient Medications on File Prior to Visit   Medication Sig    amLODIPine (NORVASC) 5 mg tablet Take 1 tablet by mouth once daily    Ascorbic Acid (vitamin C) 100 MG tablet Take 100 mg by mouth daily    atorvastatin (LIPITOR) 10 mg tablet Take 1 tablet by mouth once daily    b complex vitamins capsule Take 1 capsule by mouth daily    cholecalciferol (VITAMIN D3) 1,000 units tablet Take 1,000 Units by mouth daily    halobetasol (ULTRAVATE) 0 05 % cream Apply topically 2 (two) times a day    magnesium 30 MG tablet Take 30 mg by mouth in the morning    Omega-3 Fatty Acids (FISH OIL PO) Take 2 tablets by mouth   [DISCONTINUED] tretinoin (RETIN-A) 0 1 % cream Apply topically daily at bedtime    [DISCONTINUED] ALPRAZolam (XANAX) 0 25 mg tablet Take 1 tablet (0 25 mg total) by mouth 2 (two) times a day as needed for anxiety (Patient not taking: No sig reported)       Objective     /72   Pulse 72   Temp 97 7 °F (36 5 °C)   Wt 87 5 kg (193 lb)   LMP  (LMP Unknown)   BMI 31 15 kg/m²     Physical Exam  Vitals and nursing note reviewed  Constitutional:       Appearance: She is well-developed  HENT:      Head: Normocephalic and atraumatic  Right Ear: Tympanic membrane and external ear normal       Left Ear: Tympanic membrane and external ear normal    Cardiovascular:      Rate and Rhythm: Normal rate and regular rhythm  Pulses: no weak pulses          Dorsalis pedis pulses are 2+ on the right side and 2+ on the left side  Posterior tibial pulses are 2+ on the right side and 2+ on the left side  Heart sounds: Normal heart sounds  No murmur heard  No friction rub  Pulmonary:      Effort: Pulmonary effort is normal  No respiratory distress  Breath sounds: Normal breath sounds  No wheezing or rales  Musculoskeletal:      Right lower leg: No edema  Left lower leg: No edema  Feet:      Right foot:      Skin integrity: No ulcer, skin breakdown, erythema, warmth, callus or dry skin        Left foot:      Skin integrity: No ulcer, skin breakdown, erythema, warmth, callus or dry skin  Patient's shoes and socks removed  Right Foot/Ankle   Right Foot Inspection  Skin Exam: skin normal  Skin not intact, no dry skin, no warmth, no callus, no erythema, no maceration, no abnormal color, no pre-ulcer, no ulcer and no callus  Toe Exam: ROM and strength within normal limits  Sensory   Monofilament testing: intact    Vascular  Capillary refills: < 3 seconds  The right DP pulse is 2+  The right PT pulse is 2+  Left Foot/Ankle  Left Foot Inspection  Skin Exam: skin normal  Skin not intact, no dry skin, no warmth, no erythema, no maceration, normal color, no pre-ulcer, no ulcer and no callus  Toe Exam: ROM and strength within normal limits  Sensory   Monofilament testing: intact    Vascular  Capillary refills: < 3 seconds  The left DP pulse is 2+  The left PT pulse is 2+       Assign Risk Category  No deformity present  No loss of protective sensation  No weak pulses  Risk: 0     Licha Oakley DO

## 2022-10-04 NOTE — ASSESSMENT & PLAN NOTE
Lab Results   Component Value Date    HGBA1C 6 3 (H) 09/28/2022   Newly diagnosed  Patient's fasting sugar is above 126 although her hemoglobin A1c is 6 3  Will have her continue to follow her low sugar diet

## 2022-10-08 DIAGNOSIS — I10 BENIGN HYPERTENSION: ICD-10-CM

## 2022-10-08 RX ORDER — AMLODIPINE BESYLATE 5 MG/1
TABLET ORAL
Qty: 30 TABLET | Refills: 0 | Status: SHIPPED | OUTPATIENT
Start: 2022-10-08

## 2022-11-14 DIAGNOSIS — I10 BENIGN HYPERTENSION: ICD-10-CM

## 2022-11-14 RX ORDER — AMLODIPINE BESYLATE 5 MG/1
TABLET ORAL
Qty: 30 TABLET | Refills: 0 | Status: SHIPPED | OUTPATIENT
Start: 2022-11-14

## 2022-12-25 DIAGNOSIS — I10 BENIGN HYPERTENSION: ICD-10-CM

## 2022-12-27 RX ORDER — AMLODIPINE BESYLATE 5 MG/1
TABLET ORAL
Qty: 30 TABLET | Refills: 0 | Status: SHIPPED | OUTPATIENT
Start: 2022-12-27

## 2023-01-11 DIAGNOSIS — I10 BENIGN HYPERTENSION: ICD-10-CM

## 2023-01-11 DIAGNOSIS — E78.2 MIXED HYPERLIPIDEMIA: ICD-10-CM

## 2023-01-11 RX ORDER — ATORVASTATIN CALCIUM 10 MG/1
10 TABLET, FILM COATED ORAL DAILY
Qty: 90 TABLET | Refills: 1 | Status: SHIPPED | OUTPATIENT
Start: 2023-01-11

## 2023-01-11 RX ORDER — AMLODIPINE BESYLATE 5 MG/1
5 TABLET ORAL DAILY
Qty: 30 TABLET | Refills: 1 | Status: SHIPPED | OUTPATIENT
Start: 2023-01-11

## 2023-03-16 DIAGNOSIS — I10 BENIGN HYPERTENSION: ICD-10-CM

## 2023-03-17 RX ORDER — AMLODIPINE BESYLATE 5 MG/1
TABLET ORAL
Qty: 30 TABLET | Refills: 1 | Status: SHIPPED | OUTPATIENT
Start: 2023-03-17

## 2023-03-30 ENCOUNTER — RA CDI HCC (OUTPATIENT)
Dept: OTHER | Facility: HOSPITAL | Age: 63
End: 2023-03-30

## 2023-03-30 NOTE — PROGRESS NOTES
[address]  coding opportunities          Chart Reviewed number of suggestions sent to Provider: 1   [id number]      Patients Insurance        Commercial Insurance: [employer]

## 2023-03-31 LAB
ALBUMIN SERPL-MCNC: 4.8 G/DL (ref 3.8–4.8)
ALBUMIN/CREAT UR: 34 MG/G CREAT (ref 0–29)
ALBUMIN/GLOB SERPL: 2 {RATIO} (ref 1.2–2.2)
ALP SERPL-CCNC: 67 IU/L (ref 44–121)
ALT SERPL-CCNC: 92 IU/L (ref 0–32)
AST SERPL-CCNC: 53 IU/L (ref 0–40)
BILIRUB SERPL-MCNC: 0.5 MG/DL (ref 0–1.2)
BUN SERPL-MCNC: 16 MG/DL (ref 8–27)
BUN/CREAT SERPL: 18 (ref 12–28)
CALCIUM SERPL-MCNC: 9.8 MG/DL (ref 8.7–10.3)
CHLORIDE SERPL-SCNC: 100 MMOL/L (ref 96–106)
CHOLEST SERPL-MCNC: 175 MG/DL (ref 100–199)
CO2 SERPL-SCNC: 24 MMOL/L (ref 20–29)
CREAT SERPL-MCNC: 0.91 MG/DL (ref 0.57–1)
CREAT UR-MCNC: 60.9 MG/DL
EGFR: 70 ML/MIN/1.73
ERYTHROCYTE [DISTWIDTH] IN BLOOD BY AUTOMATED COUNT: 12.1 % (ref 11.7–15.4)
EST. AVERAGE GLUCOSE BLD GHB EST-MCNC: 148 MG/DL
GLOBULIN SER-MCNC: 2.4 G/DL (ref 1.5–4.5)
GLUCOSE SERPL-MCNC: 144 MG/DL (ref 70–99)
HBA1C MFR BLD: 6.8 % (ref 4.8–5.6)
HCT VFR BLD AUTO: 41.5 % (ref 34–46.6)
HDLC SERPL-MCNC: 47 MG/DL
HGB BLD-MCNC: 14.2 G/DL (ref 11.1–15.9)
LDLC SERPL CALC-MCNC: 106 MG/DL (ref 0–99)
LDLC/HDLC SERPL: 2.3 RATIO (ref 0–3.2)
MCH RBC QN AUTO: 30.7 PG (ref 26.6–33)
MCHC RBC AUTO-ENTMCNC: 34.2 G/DL (ref 31.5–35.7)
MCV RBC AUTO: 90 FL (ref 79–97)
MICROALBUMIN UR-MCNC: 20.6 UG/ML
MICRODELETION SYND BLD/T FISH: NORMAL
PLATELET # BLD AUTO: 404 X10E3/UL (ref 150–450)
POTASSIUM SERPL-SCNC: 4.3 MMOL/L (ref 3.5–5.2)
PROT SERPL-MCNC: 7.2 G/DL (ref 6–8.5)
RBC # BLD AUTO: 4.63 X10E6/UL (ref 3.77–5.28)
SL AMB VLDL CHOLESTEROL CALC: 22 MG/DL (ref 5–40)
SODIUM SERPL-SCNC: 137 MMOL/L (ref 134–144)
TRIGL SERPL-MCNC: 124 MG/DL (ref 0–149)
WBC # BLD AUTO: 8.9 X10E3/UL (ref 3.4–10.8)

## 2023-04-05 ENCOUNTER — OFFICE VISIT (OUTPATIENT)
Dept: FAMILY MEDICINE CLINIC | Facility: CLINIC | Age: 63
End: 2023-04-05

## 2023-04-05 VITALS
RESPIRATION RATE: 14 BRPM | HEART RATE: 120 BPM | DIASTOLIC BLOOD PRESSURE: 88 MMHG | HEIGHT: 66 IN | BODY MASS INDEX: 31.82 KG/M2 | TEMPERATURE: 97.6 F | WEIGHT: 198 LBS | SYSTOLIC BLOOD PRESSURE: 148 MMHG | OXYGEN SATURATION: 99 %

## 2023-04-05 DIAGNOSIS — C79.31 MALIGNANT NEOPLASM METASTATIC TO BRAIN (HCC): ICD-10-CM

## 2023-04-05 DIAGNOSIS — Z23 NEED FOR VACCINATION: ICD-10-CM

## 2023-04-05 DIAGNOSIS — Z13.6 SCREENING FOR CARDIOVASCULAR CONDITION: ICD-10-CM

## 2023-04-05 DIAGNOSIS — Z00.00 ANNUAL PHYSICAL EXAM: Primary | ICD-10-CM

## 2023-04-05 DIAGNOSIS — Z12.11 COLON CANCER SCREENING: ICD-10-CM

## 2023-04-05 DIAGNOSIS — E11.9 TYPE 2 DIABETES MELLITUS WITHOUT COMPLICATION, WITHOUT LONG-TERM CURRENT USE OF INSULIN (HCC): ICD-10-CM

## 2023-04-05 DIAGNOSIS — Z79.899 ENCOUNTER FOR LONG-TERM CURRENT USE OF MEDICATION: ICD-10-CM

## 2023-04-05 DIAGNOSIS — R56.9 SEIZURE (HCC): ICD-10-CM

## 2023-04-05 DIAGNOSIS — I10 BENIGN HYPERTENSION: ICD-10-CM

## 2023-04-05 DIAGNOSIS — Z12.31 SCREENING MAMMOGRAM, ENCOUNTER FOR: ICD-10-CM

## 2023-04-05 LAB
LEFT EYE DIABETIC RETINOPATHY: NORMAL
LEFT EYE IMAGE QUALITY: NORMAL
LEFT EYE MACULAR EDEMA: NORMAL
LEFT EYE OTHER RETINOPATHY: NORMAL
RIGHT EYE DIABETIC RETINOPATHY: NORMAL
RIGHT EYE IMAGE QUALITY: NORMAL
RIGHT EYE MACULAR EDEMA: NORMAL
RIGHT EYE OTHER RETINOPATHY: NORMAL
SEVERITY (EYE EXAM): NORMAL

## 2023-04-05 NOTE — ASSESSMENT & PLAN NOTE
Lab Results   Component Value Date    HGBA1C 6 8 (H) 03/30/2023     controlled but has been really watching her diet and her A1c is still going up  We will have her start metformin  Risk benefits medication discussed

## 2023-04-05 NOTE — PROGRESS NOTES
ADULT ANNUAL PHYSICAL  [address]    NAME: Lia Bruce  AGE: 57 y o  SEX: female  : 1960     DATE: 2023     Assessment and Plan:     Problem List Items Addressed This Visit     Benign hypertension    Relevant Orders    CBC    Comprehensive metabolic panel    Lipid Panel with Direct LDL reflex    Seizure ([address] )     No active seizures          Type 2 diabetes mellitus without complication, without long-term current use of insulin (HCC)       Lab Results   Component Value Date    HGBA1C 6 8 (H) 2023     controlled but has been really watching her diet and her A1c is still going up  We will have her start metformin  Risk benefits medication discussed  Relevant Medications    metFORMIN (GLUCOPHAGE) 500 mg tablet    Other Relevant Orders    IRIS Diabetic eye exam (Completed)    Hemoglobin A1C    TSH, 3rd generation   Other Visit Diagnoses     Annual physical exam    -  Primary    Malignant neoplasm metastatic to brain [facility])        followed by oncology    Need for vaccination        Relevant Orders    Pneumococcal Conjugate Vaccine 20-valent (Pcv20) (Completed)    Colon cancer screening        Relevant Orders    Ambulatory referral for colonoscopy    Screening mammogram, encounter for        Relevant Orders    Mammo screening bilateral w 3d & cad    Screening for cardiovascular condition        Relevant Orders    Comprehensive metabolic panel    Lipid Panel with Direct LDL reflex    Encounter for long-term current use of medication        Relevant Orders    Vitamin B12          Immunizations and preventive care screenings were discussed with patient today  Appropriate education was printed on patient's after visit summary  Counseling:  Exercise: the importance of regular exercise/physical activity was discussed  Recommend exercise 3-5 times per week for at least 30 minutes            Return in about 6 months (around 10/5/2023) for Next scheduled follow up  Chief Complaint:     Chief Complaint   Patient presents with   • Physical Exam     Holly Bangura CMA       History of Present Illness:     Adult Annual Physical   Patient here for a comprehensive physical exam  The patient reports That she is concerned about her weight  She has been strictly watching her diet and is still gaining       Diet and Physical Activity  Diet/Nutrition: well balanced diet  Exercise: no formal exercise  Depression Screening  PHQ-2/9 Depression Screening    Little interest or pleasure in doing things: 0 - not at all  Feeling down, depressed, or hopeless: 0 - not at all  PHQ-2 Score: 0  PHQ-2 Interpretation: Negative depression screen       General Health  Sleep: sleeps well  Hearing: normal - bilateral   Vision: no vision problems  Dental: dentures  /GYN Health  Patient is: postmenopausal       Review of Systems:     Review of Systems   Constitutional: Negative  Respiratory: Negative  Cardiovascular: Negative         Past Medical History:     Past Medical History:   Diagnosis Date   • Adenocarcinoma of right lung, stage 4 (HCC)    • Allergic rhinitis    • Alopecia     resolved 10/25/16   • Anxiety     last assessed 10/4/16, resolved 10/25/16   • Benign hypertension 2/1/2018   • Benign neoplasm of skin of trunk 03/25/2008    last assessed 3/25/08   • Benign neoplasm of skin of upper extremity and shoulder 03/25/2008    last assessed 3/25/08   • Ear deformity, acquired     last assessed 10/13/14, resolved 3/12/15   • Eczema    • History of chemotherapy    • Hyperlipemia    • Maintenance chemotherapy    • Secondary cancer of brain ([facility] [address] )    • Seizures ([facility] [address] )    • Vertigo       Past Surgical History:     Past Surgical History:   Procedure Laterality Date   • APPENDECTOMY  1964   • BRAIN TUMOR EXCISION     • CENTRAL VENOUS CATHETER INSERTION Right     PORTACATH   • IR PORT REMOVAL  9/10/2021      Social History:     Social History     Socioeconomic History   • Marital status: /Civil Union     Spouse name: None   • Number of children: 1   • Years of education: None   • Highest education level: None   Occupational History   • None   Tobacco Use   • Smoking status: Former     Packs/day: 1 00     Years: 47 00     Pack years: 47 00     Types: Cigarettes     Quit date:      Years since quittin 2   • Smokeless tobacco: Never   Substance and Sexual Activity   • Alcohol use: Yes     Comment: occasionally / Social    • Drug use: Yes     Types: Marijuana   • Sexual activity: Yes   Other Topics Concern   • None   Social History Narrative    High school or GED     Lives independently with spouse      Social Determinants of Health     Financial Resource Strain: Not on file   Food Insecurity: Not on file   Transportation Needs: Not on file   Physical Activity: Not on file   Stress: Not on file   Social Connections: Not on file   Intimate Partner Violence: Not on file   Housing Stability: Not on file      Family History:     Family History   Problem Relation Age of Onset   • Diabetes Mother    • Heart disease Mother    • Lung cancer Father 79        Smoker    • Pancreatitis Brother    • No Known Problems Brother    • Uterine cancer Paternal Grandmother       Current Medications:     Current Outpatient Medications   Medication Sig Dispense Refill   • amLODIPine (NORVASC) 5 mg tablet TAKE 1 TABLET DAILY 30 tablet 1   • Ascorbic Acid (vitamin C) 100 MG tablet Take 100 mg by mouth daily     • atorvastatin (LIPITOR) 10 mg tablet Take 1 tablet (10 mg total) by mouth daily 90 tablet 1   • b complex vitamins capsule Take 1 capsule by mouth daily Plus folic acid and vitamin c     • cholecalciferol (VITAMIN D3) 1,000 units tablet Take 1,000 Units by mouth daily     • halobetasol (ULTRAVATE) 0 05 % cream Apply topically 2 (two) times a day 50 g 0   • Lactobacillus (ACIDOPHILUS PO) Take by mouth     • magnesium 30 MG tablet Take 30 mg by mouth in the morning     • "metFORMIN (GLUCOPHAGE) 500 mg tablet Take 1 tablet (500 mg total) by mouth daily with breakfast 90 tablet 1   • Omega-3 Fatty Acids (FISH OIL PO) Take 2 tablets by mouth  • tretinoin (RETIN-A) 0 1 % cream Apply topically daily at bedtime 45 g 2     No current facility-administered medications for this visit  Facility-Administered Medications Ordered in Other Visits   Medication Dose Route Frequency Provider Last Rate Last Admin   • alteplase (CATHFLO) injection 2 mg  2 mg Intracatheter PRN Ángel Abrams MD       • sodium chloride 0 9 % infusion  20 mL/hr Intravenous Continuous Ángel Abrams MD          Allergies: Allergies   Allergen Reactions   • Iodinated Contrast Media Anaphylaxis and Itching   • Clindamycin    • Clindamycin/Lincomycin       Physical Exam:     /88   Pulse (!) 120   Temp 97 6 °F (36 4 °C)   Resp 14   Ht 5' 6\" (1 676 m)   Wt 89 8 kg (198 lb)   LMP  (LMP Unknown)   SpO2 99%   BMI 31 96 kg/m²     Physical Exam  Vitals and nursing note reviewed  Constitutional:       Appearance: She is well-developed  HENT:      Head: Normocephalic and atraumatic  Right Ear: External ear normal       Left Ear: External ear normal       Nose: Nose normal    Cardiovascular:      Rate and Rhythm: Normal rate and regular rhythm  Heart sounds: Normal heart sounds  No murmur heard  No friction rub  Pulmonary:      Effort: No respiratory distress  Breath sounds: Normal breath sounds  No wheezing or rales  Abdominal:      Palpations: Abdomen is soft  Tenderness: There is no abdominal tenderness  Musculoskeletal:      Right lower leg: No edema  Left lower leg: No edema  Neurological:      Mental Status: She is oriented to person, place, and time  Cranial Nerves: No cranial nerve deficit            Rolando Del Real, [address]  "

## 2023-05-07 DIAGNOSIS — I10 BENIGN HYPERTENSION: ICD-10-CM

## 2023-05-08 RX ORDER — AMLODIPINE BESYLATE 5 MG/1
TABLET ORAL
Qty: 30 TABLET | Refills: 1 | Status: SHIPPED | OUTPATIENT
Start: 2023-05-08

## 2023-07-02 DIAGNOSIS — E78.2 MIXED HYPERLIPIDEMIA: ICD-10-CM

## 2023-07-03 RX ORDER — ATORVASTATIN CALCIUM 10 MG/1
TABLET, FILM COATED ORAL
Qty: 90 TABLET | Refills: 1 | Status: SHIPPED | OUTPATIENT
Start: 2023-07-03

## 2023-07-06 DIAGNOSIS — I10 BENIGN HYPERTENSION: ICD-10-CM

## 2023-07-06 RX ORDER — AMLODIPINE BESYLATE 5 MG/1
TABLET ORAL
Qty: 30 TABLET | Refills: 1 | Status: SHIPPED | OUTPATIENT
Start: 2023-07-06

## 2023-09-04 DIAGNOSIS — I10 BENIGN HYPERTENSION: ICD-10-CM

## 2023-09-05 RX ORDER — AMLODIPINE BESYLATE 5 MG/1
TABLET ORAL
Qty: 30 TABLET | Refills: 1 | Status: SHIPPED | OUTPATIENT
Start: 2023-09-05

## 2023-09-19 DIAGNOSIS — E11.9 TYPE 2 DIABETES MELLITUS WITHOUT COMPLICATION, WITHOUT LONG-TERM CURRENT USE OF INSULIN (HCC): ICD-10-CM

## 2023-09-28 ENCOUNTER — TELEPHONE (OUTPATIENT)
Dept: HEMATOLOGY ONCOLOGY | Facility: CLINIC | Age: 63
End: 2023-09-28

## 2023-09-28 ENCOUNTER — TELEPHONE (OUTPATIENT)
Age: 63
End: 2023-09-28

## 2023-09-28 DIAGNOSIS — C34.91 NON-SMALL CELL CANCER OF RIGHT LUNG (HCC): Primary | ICD-10-CM

## 2023-09-28 NOTE — TELEPHONE ENCOUNTER
Lab Inquiry   Who are you speaking with? Patient     If it is not the patient, are they listed on an active communication consent form? N/A   Name of ordering provider Dr. Katerina Martínezoms   What is being requested? Patient requesting labs to be faxed to [facility] at fax number [phone]   Lab draw location [facility]   What is the best call back number?  [phone]

## 2023-10-02 ENCOUNTER — TELEPHONE (OUTPATIENT)
Age: 63
End: 2023-10-02

## 2023-10-02 DIAGNOSIS — E11.9 TYPE 2 DIABETES MELLITUS WITHOUT COMPLICATION, WITHOUT LONG-TERM CURRENT USE OF INSULIN (HCC): Primary | ICD-10-CM

## 2023-10-02 LAB
BASOPHILS # BLD AUTO: 0.2 X10E3/UL (ref 0–0.2)
BASOPHILS NFR BLD AUTO: 2 %
EOSINOPHIL # BLD AUTO: 0.2 X10E3/UL (ref 0–0.4)
EOSINOPHIL NFR BLD AUTO: 2 %
ERYTHROCYTE [DISTWIDTH] IN BLOOD BY AUTOMATED COUNT: 11.6 % (ref 11.7–15.4)
HCT VFR BLD AUTO: 41.4 % (ref 34–46.6)
HGB BLD-MCNC: 14.6 G/DL (ref 11.1–15.9)
IMM GRANULOCYTES # BLD: 0 X10E3/UL (ref 0–0.1)
IMM GRANULOCYTES NFR BLD: 0 %
LYMPHOCYTES # BLD AUTO: 3.4 X10E3/UL (ref 0.7–3.1)
LYMPHOCYTES NFR BLD AUTO: 38 %
MCH RBC QN AUTO: 31.4 PG (ref 26.6–33)
MCHC RBC AUTO-ENTMCNC: 35.3 G/DL (ref 31.5–35.7)
MCV RBC AUTO: 89 FL (ref 79–97)
MONOCYTES # BLD AUTO: 0.7 X10E3/UL (ref 0.1–0.9)
MONOCYTES NFR BLD AUTO: 8 %
NEUTROPHILS # BLD AUTO: 4.5 X10E3/UL (ref 1.4–7)
NEUTROPHILS NFR BLD AUTO: 50 %
PLATELET # BLD AUTO: 375 X10E3/UL (ref 150–450)
RBC # BLD AUTO: 4.65 X10E6/UL (ref 3.77–5.28)
WBC # BLD AUTO: 8.9 X10E3/UL (ref 3.4–10.8)

## 2023-10-02 NOTE — TELEPHONE ENCOUNTER
Patient is at [facility] and gave a urine specimen but no order. Please put order for Microalbumin/ Creatinin urine test and fax to [facility] [phone].     Thank You

## 2023-10-02 NOTE — TELEPHONE ENCOUNTER
[facility] so it should go to them.   You can also call the patient to find out where she went  Thank you,  Karen Tovar, DO

## 2023-10-02 NOTE — TELEPHONE ENCOUNTER
Fax number did not go through with number given which [facility] is she at or send correct number.

## 2023-10-03 LAB
ALBUMIN SERPL-MCNC: 4.8 G/DL (ref 3.9–4.9)
ALBUMIN SERPL-MCNC: 4.9 G/DL (ref 3.9–4.9)
ALBUMIN/CREAT UR: 76 MG/G CREAT (ref 0–29)
ALBUMIN/GLOB SERPL: 1.9 {RATIO} (ref 1.2–2.2)
ALBUMIN/GLOB SERPL: 2 {RATIO} (ref 1.2–2.2)
ALP SERPL-CCNC: 74 IU/L (ref 44–121)
ALP SERPL-CCNC: 75 IU/L (ref 44–121)
ALT SERPL-CCNC: 67 IU/L (ref 0–32)
ALT SERPL-CCNC: 70 IU/L (ref 0–32)
AST SERPL-CCNC: 45 IU/L (ref 0–40)
AST SERPL-CCNC: 47 IU/L (ref 0–40)
BILIRUB SERPL-MCNC: 0.7 MG/DL (ref 0–1.2)
BILIRUB SERPL-MCNC: 0.7 MG/DL (ref 0–1.2)
BUN SERPL-MCNC: 16 MG/DL (ref 8–27)
BUN SERPL-MCNC: 16 MG/DL (ref 8–27)
BUN/CREAT SERPL: 17 (ref 12–28)
BUN/CREAT SERPL: 18 (ref 12–28)
CALCIUM SERPL-MCNC: 9.5 MG/DL (ref 8.7–10.3)
CALCIUM SERPL-MCNC: 9.7 MG/DL (ref 8.7–10.3)
CHLORIDE SERPL-SCNC: 100 MMOL/L (ref 96–106)
CHLORIDE SERPL-SCNC: 98 MMOL/L (ref 96–106)
CHOLEST SERPL-MCNC: 173 MG/DL (ref 100–199)
CO2 SERPL-SCNC: 22 MMOL/L (ref 20–29)
CO2 SERPL-SCNC: 22 MMOL/L (ref 20–29)
CREAT SERPL-MCNC: 0.9 MG/DL (ref 0.57–1)
CREAT SERPL-MCNC: 0.93 MG/DL (ref 0.57–1)
CREAT UR-MCNC: 26.6 MG/DL
EGFR: 69 ML/MIN/1.73
EGFR: 71 ML/MIN/1.73
ERYTHROCYTE [DISTWIDTH] IN BLOOD BY AUTOMATED COUNT: 11.8 % (ref 11.7–15.4)
EST. AVERAGE GLUCOSE BLD GHB EST-MCNC: 146 MG/DL
GLOBULIN SER-MCNC: 2.4 G/DL (ref 1.5–4.5)
GLOBULIN SER-MCNC: 2.5 G/DL (ref 1.5–4.5)
GLUCOSE SERPL-MCNC: 146 MG/DL (ref 70–99)
GLUCOSE SERPL-MCNC: 151 MG/DL (ref 70–99)
HBA1C MFR BLD: 6.7 % (ref 4.8–5.6)
HCT VFR BLD AUTO: 42.4 % (ref 34–46.6)
HDLC SERPL-MCNC: 44 MG/DL
HGB BLD-MCNC: 15.2 G/DL (ref 11.1–15.9)
LDLC SERPL CALC-MCNC: 103 MG/DL (ref 0–99)
LDLC/HDLC SERPL: 2.3 RATIO (ref 0–3.2)
MCH RBC QN AUTO: 32.6 PG (ref 26.6–33)
MCHC RBC AUTO-ENTMCNC: 35.8 G/DL (ref 31.5–35.7)
MCV RBC AUTO: 91 FL (ref 79–97)
MICROALBUMIN UR-MCNC: 20.2 UG/ML
MICRODELETION SYND BLD/T FISH: NORMAL
PLATELET # BLD AUTO: 359 X10E3/UL (ref 150–450)
POTASSIUM SERPL-SCNC: 4.2 MMOL/L (ref 3.5–5.2)
POTASSIUM SERPL-SCNC: 4.3 MMOL/L (ref 3.5–5.2)
PROT SERPL-MCNC: 7.3 G/DL (ref 6–8.5)
PROT SERPL-MCNC: 7.3 G/DL (ref 6–8.5)
RBC # BLD AUTO: 4.66 X10E6/UL (ref 3.77–5.28)
SL AMB VLDL CHOLESTEROL CALC: 26 MG/DL (ref 5–40)
SODIUM SERPL-SCNC: 135 MMOL/L (ref 134–144)
SODIUM SERPL-SCNC: 137 MMOL/L (ref 134–144)
TRIGL SERPL-MCNC: 148 MG/DL (ref 0–149)
TSH SERPL DL<=0.005 MIU/L-ACNC: 2.17 UIU/ML (ref 0.45–4.5)
VIT B12 SERPL-MCNC: 996 PG/ML (ref 232–1245)
WBC # BLD AUTO: 8.9 X10E3/UL (ref 3.4–10.8)

## 2023-10-04 ENCOUNTER — OFFICE VISIT (OUTPATIENT)
Dept: HEMATOLOGY ONCOLOGY | Facility: MEDICAL CENTER | Age: 63
End: 2023-10-04
Payer: COMMERCIAL

## 2023-10-04 VITALS
BODY MASS INDEX: 31.18 KG/M2 | RESPIRATION RATE: 17 BRPM | SYSTOLIC BLOOD PRESSURE: 142 MMHG | HEART RATE: 96 BPM | OXYGEN SATURATION: 96 % | HEIGHT: 66 IN | TEMPERATURE: 98.3 F | WEIGHT: 194 LBS | DIASTOLIC BLOOD PRESSURE: 90 MMHG

## 2023-10-04 DIAGNOSIS — C34.91 ADENOCARCINOMA OF LUNG, STAGE 4, RIGHT (HCC): Primary | ICD-10-CM

## 2023-10-04 PROCEDURE — 99214 OFFICE O/P EST MOD 30 MIN: CPT | Performed by: INTERNAL MEDICINE

## 2023-10-04 NOTE — PROGRESS NOTES
Hair Bailey  1960  [address] [facility]AT[facility] [place] [id number] No. [facility] [id number]    DISCUSSION. SUMMARY:    66-year-old female with M1 non-small cell lung carcinoma/adenocarcinoma. Patient has been on [facility] 20015-14 (phase III open label randomized study of [id number] [anti-PD-L1 antibody] in combination with carboplatin and paclitaxel +/- Avastin versus carboplatin and paclitaxel +/- Avastin in patients with stage IV chemotherapy naive non-small cell lung carcinoma). Patient was randomized to receive all 4 medications. Mrs. Marlo Travis completed the 6 cycles of treatment without significant toxicities. Patient had been on maintenance (study drug (atezolizumab) and avastin) - subsequently only Avastin. More recently, patient has been on surveillance only. Issues:     Stage IV non-small cell lung carcinoma. As discussed previously, recent follow-up scanning has never demonstrated any evidence for recurrence. Patient was diagnosed approximately 9 years ago. The plan is to continue with surveillance. CT of the chest without contrast has been ordered. Brain metastases status post resection and radiotherapy. Patient has been tapered off of Keppra. There have been no recent CNS issues. The prior MRI/brain did not demonstrate any evidence for recurrence. Patient will follow-up with Radiation Oncology as directed. Routine health maintenance and medical care. Patient has an appointment with Dr. Melisa Escobar tomorrow. Patient is to return in 1 year. Mrs. Marlo Travis knows to call the hematology/oncology office if there are any other questions or concerns. Carefully review your medication list and verify that the list is accurate and up-to-date.  Please call the hematology/oncology office if there are medications missing from the list, medications on the list that you are not currently taking or if there is a dosage or instruction that is different from how you're taking that medication. Patient goals and areas of care:  Continue with lung cancer surveillance  Barriers to care:  None  Patient is able to self-care  __________________________________________________________________________________________________    Chief Complaint   Patient presents with   • Follow-up   • History of lung cancer on surveillance     Advance Care Planning/Advance Directives: not discussed    Oncology History   Adenocarcinoma of lung, stage 4, right ([address])   9/5/2015 Initial Diagnosis    Patient was referred to the emergency room for evaluation of vertigo as sent from the [facility]. (patient reported.)  Patient underwent the following pertinent imaging scans. MRI of the brain demonstrated a mass in the right superior all temporal gyrus with measurements of 4.2 x 3.3 x 3.6 cm with vasogenic edema and mass effect. There was a near uncal herniation noted. CT of the chest and abdomen/pelvis demonstrated a necrotic right upper lobe mass measuring 7 x 4.2 x 4.1 cm strongly suspicious for bronchogenic carcinoma. The mass contacts the posterior medial pleural surface and potentially contacts the right posterior tracheal border. No pathologic adenopathy was identified in no evidence of metastatic disease in the chest abdomen or pelvis. 9/6/2015 Surgery    Non-small cell carcinoma of lung ([address]) diagnosed from biopsy of right temporal mass. Pathology demonstrates poorly differentiated non-small cell carcinoma. Pathologist stated that the immunoprofile supported the primary lung non-small cell carcinoma favor adenocarcinoma. Squamous carcinoma could not be excluded, secondary to focal P 40 staining. Maurilio Christine path report demonstrated no ALK gene rearrangement or dleation and negative ROS1 rearrangement, No EGFR mutations were found. Surgery completed by Dr. Andrew Bethea.       10/7/2015 - 10/16/2015 Radiation    A dose of 3000 cGy in 5 fractions delivered every other day was delivered to the resection cavity (single right superior temporal intracranial metastasis) Dr Bishop Smith     10/20/2015 -  Research Study Participant    [id number] Phase III open label randomized study of [id number] [Anti-PDL1 antibody] in combination with carboplatin and paclitaxel +/- Avastin versus Carboplatin and Paciltaxel +/- Avastin in patients with stage IV Chemotherapy  Naive non-small cell lung carcinoma. Patient was randomized to receive off for medications. 11/30/2015 - 2/18/2016 Chemotherapy    Started Carboplatin, Paclitaxel, Avastin and PD-L1 (atezolizumab; study drug). Completed 6 cycles with cycle 6 day 1 on 2/18/16     3/10/2016 -  Chemotherapy    Beginning cycle 7, maintenance cycle with Avastin and PD-L1 (Atezolizumab; study drug)     5/24/2017 Adverse Reaction    LFT elevation-persisted. Patient was taken off of study drug Atezolizumab, but continues on Avastin maintance. 5/23/2019 - 7/3/2019 Chemotherapy    [No matching medication found in this treatment plan]       History of Present Illness:    57year old female recently diagnosed with IV lung cancer here for followup. Mrs. Antony Freire began to have headaches last spring 2015. Patient was seen by her PCP and treated with antibiotics. Initially the symptoms got better the patient went on vacation. After returning from vacation, Mrs. Antony Freire once again developed headaches. Patient was treated with a second round of antibiotics and then was diagnosed with vertigo. Patient was sent to the [address] [place] [address]. Although the specifics are not entirely clear, the therapist in the [address] [place] [address] sent the patient to the emergency room. A CAT scan of the brain demonstrated a brain lesion and patient was transferred to [facility]. Patient was seen by Dr. Daysi Loya and underwent resection demonstrating adenocarcinoma. Additional testing demonstrated a lung mass. Patient improved and was discharged.  Patient completed SRT with Dr. Serge Chan and  Edgar. [facility] (phase III open label randomized study of [id number] [anti-PD-L1 antibody] in combination with carboplatin and paclitaxel +/- Avastin versus carboplatin and paclitaxel +/- Avastin in patients with stage IV chemotherapy naÃ¯ve non-small cell lung carcinoma). Mrs. Eladio Bergman was randomized to receive the anti-PD-L1 antibody with chemotherapy - patient completed 6 cycles and was placed on maintenance. Mrs. Kaylie Layton previously suffered a tonic-clonic seizure and was seen in the emergency room. CAT scan of the head did not demonstrate any evidence of disease progression. The seizure was thought to be due to encephalomalacia. Since that time, there have been no seizure issues. Mrs. Eladio Bergman had been on Keppra - this has been tapered off. After long discussions and consultation with the thoracic Working group, patient was taken off of all treatments approximately 5 years ago. The plan has been surveillance. Patient returns for follow-up. Mrs. Eladio Bergman states feeling well. Appetite is good, weight is stable. No respiratory issues, no cough, no chest pain. No headaches, blurred vision or dizziness. Activities are baseline. Routine health maintenance and medical care is up-to-date. Review of Systems   Constitutional: Positive for fatigue. HENT: Negative. Eyes: Negative. Respiratory: Negative. Cardiovascular: Negative. Gastrointestinal: Negative. Endocrine: Negative. Genitourinary: Negative. Musculoskeletal: Positive for arthralgias. Negative for neck pain. Skin: Negative. Allergic/Immunologic: Negative. Neurological: Negative. Hematological: Does not bruise/bleed easily. Psychiatric/Behavioral: Negative. All other systems reviewed and are negative.      Patient Active Problem List   Diagnosis   • Seizure [facility])   • Adenocarcinoma of lung, stage 4, right ([address])   • Mixed hyperlipidemia   • Abnormal LFTs   • Allergic rhinitis   • Brain metastasis   • Brain tumor ([address])   • Encephalomalacia   • Type 2 diabetes mellitus without complication, without long-term current use of insulin (HCC)   • Insomnia   • Lesion of temporal lobe   • Non-small cell lung cancer ([address])   • Benign hypertension   • Chronic maxillary sinusitis   • Abdominal pain     Past Medical History:   Diagnosis Date   • Adenocarcinoma of right lung, stage 4 (HCC)    • Allergic rhinitis    • Alopecia     resolved 10/25/16   • Anxiety     last assessed 10/4/16, resolved 10/25/16   • Benign hypertension 2018   • Benign neoplasm of skin of trunk 2008    last assessed 3/25/08   • Benign neoplasm of skin of upper extremity and shoulder 2008    last assessed 3/25/08   • Ear deformity, acquired     last assessed 10/13/14, resolved 3/12/15   • Eczema    • History of chemotherapy    • Hyperlipemia    • Maintenance chemotherapy    • Secondary cancer of brain ([address])    • Seizures ([address])    • Vertigo      Past Surgical History:   Procedure Laterality Date   • APPENDECTOMY     • BRAIN TUMOR EXCISION     • CENTRAL VENOUS CATHETER INSERTION Right     PORTACATH   • IR PORT REMOVAL  9/10/2021     Family History   Problem Relation Age of Onset   • Diabetes Mother    • Heart disease Mother    • Lung cancer Father 79        Smoker    • Uterine cancer Paternal Grandmother    • Pancreatitis Brother    • No Known Problems Brother      Social History     Socioeconomic History   • Marital status: /Civil Union     Spouse name: Not on file   • Number of children: 1   • Years of education: Not on file   • Highest education level: Not on file   Occupational History   • Not on file   Tobacco Use   • Smoking status: Former     Packs/day: 1.00     Years: 47.00     Total pack years: 47.00     Types: Cigarettes     Quit date:      Years since quittin.7   • Smokeless tobacco: Never   Substance and Sexual Activity   • Alcohol use: Yes     Comment: occasionally / Social    • Drug use: Yes     Types: Marijuana   • Sexual activity: Yes   Other Topics Concern   • Not on file   Social History Narrative    High school or GED     Lives independently with spouse      Social Determinants of Health     Financial Resource Strain: Not on file   Food Insecurity: Not on file   Transportation Needs: Not on file   Physical Activity: Not on file   Stress: Not on file   Social Connections: Not on file   Intimate Partner Violence: Not on file   Housing Stability: Not on file       Current Outpatient Medications:   •  amLODIPine (NORVASC) 5 mg tablet, TAKE 1 TABLET DAILY, Disp: 30 tablet, Rfl: 1  •  atorvastatin (LIPITOR) 10 mg tablet, TAKE 1 TABLET DAILY, Disp: 90 tablet, Rfl: 1  •  b complex vitamins capsule, Take 1 capsule by mouth daily Plus folic acid and vitamin c, Disp: , Rfl:   •  cholecalciferol (VITAMIN D3) 1,000 units tablet, Take 1,000 Units by mouth daily, Disp: , Rfl:   •  halobetasol (ULTRAVATE) 0.05 % cream, Apply topically 2 (two) times a day, Disp: 50 g, Rfl: 0  •  Lactobacillus (ACIDOPHILUS PO), Take by mouth, Disp: , Rfl:   •  magnesium 30 MG tablet, Take 30 mg by mouth in the morning, Disp: , Rfl:   •  metFORMIN (GLUCOPHAGE) 500 mg tablet, TAKE 1 TABLET DAILY WITH   BREAKFAST, Disp: 90 tablet, Rfl: 1  •  Omega-3 Fatty Acids (FISH OIL PO), Take 2 tablets by mouth., Disp: , Rfl:   •  Ascorbic Acid (vitamin C) 100 MG tablet, Take 100 mg by mouth daily (Patient not taking: Reported on 10/4/2023), Disp: , Rfl:   •  tretinoin (RETIN-A) 0.1 % cream, Apply topically daily at bedtime (Patient not taking: Reported on 10/4/2023), Disp: 45 g, Rfl: 2  No current facility-administered medications for this visit.     Facility-Administered Medications Ordered in Other Visits:   •  alteplase (CATHFLO) injection 2 mg, 2 mg, Intracatheter, PRN, Gladys Hui MD  •  sodium chloride 0.9 % infusion, 20 mL/hr, Intravenous, Continuous, Gladys Hui MD    Allergies   Allergen Reactions   • Iodinated Contrast Media Anaphylaxis and Itching   • Clindamycin    • Clindamycin/Lincomycin        Vitals:    10/04/23 1349   BP: 142/90   Pulse: 96   Resp: 17   Temp: 98.3 °F (36.8 °C)   SpO2: 96%     Physical Exam  Constitutional:       Appearance: She is well-developed. Comments: Well-nourished female, no respiratory distress   HENT:      Head: Normocephalic and atraumatic. Right Ear: External ear normal.      Left Ear: External ear normal.      Nose: Nose normal.      Mouth/Throat:      Pharynx: No oropharyngeal exudate. Eyes:      Conjunctiva/sclera: Conjunctivae normal.      Pupils: Pupils are equal, round, and reactive to light. Neck:      Comments: Neck is supple, no JVD, good range of motion, no pain or tenderness with movement  Cardiovascular:      Rate and Rhythm: Normal rate and regular rhythm. Heart sounds: Normal heart sounds. Pulmonary:      Effort: Pulmonary effort is normal.      Breath sounds: Normal breath sounds. Abdominal:      General: Bowel sounds are normal.      Palpations: Abdomen is soft. Comments: Abdomen is soft, nontender, no hepatosplenomegaly, no rigidity or rebound, +bowel sounds   Musculoskeletal:         General: Normal range of motion. Cervical back: Normal range of motion and neck supple. Skin:     General: Skin is warm. Comments: Warm, moist, good color, no petechiae or ecchymoses   Neurological:      Mental Status: She is alert and oriented to person, place, and time. Deep Tendon Reflexes: Reflexes are normal and symmetric. Psychiatric:         Behavior: Behavior normal.         Thought Content:  Thought content normal.         Judgment: Judgment normal.     Extremities: no lower extremity edema bilaterally, no cords, pulses are 1+  Lymphatics: no adenopathy in the neck, supraclavicular region, axilla and groin bilaterally    Performance Status: 1 - Symptomatic but completely ambulatory    Labs    10/2/2023 WBC = 8.9 hemoglobin = 15.2 hematocrit = 42.4 platelet = 275 BUN = 16 creatinine = 0.93 calcium = 9.7 AST = 45 ALT = 70 alkaline phosphatase = 75 total bilirubin = 0.7    09/28/2022 WBC = 7.8 hemoglobin = 14.6 hematocrit = 42 platelet = 772 neutrophil = 47% BUN = 12 creatinine = 0.87 AST = 31 ALT = 43 alkaline phosphatase = 62 total bilirubin = 0.5 calcium = 10.0    Imaging    09/06/2022 CT scan chest abdomen pelvis without contrast    1. Mixed solid and cavitary lesion in the right upper lobe has overall decreased in size. The soft tissue component appears more dense and well-defined. Continued attention on follow-up is recommended. 2.  No evidence of metastatic disease. 09/01/2022 MRI brain    1. Stable treatment related changes in the right temporal lobe with gliosis and encephalomalacia. No evidence of recurrent or progressive tumor. 2. No acute infarction, intracranial hemorrhage or mass. No MR evidence of metastases. 3. Stable white matter signal abnormality, likely microangiopathy. 03/01/2021 CT scan chest abdomen pelvis    No evidence of metastatic disease.     Mixed solid and cavitary lesion in the right upper lobe has overall diminished in size more due to diminished cavity size. The soft tissue component may be slightly larger. [place] term follow-up should be considered.     Groundglass opacities at the left lung base have resolved probably inflammatory at that time. 11/21/2019 CT scan of the chest and abdomen pelvis    Right upper lobe cavitary lesion with mild very slow interval retracting size. Stable component of solid thickening along the posterior wall. No new pulmonary lesions. Stable patchy subsegmental groundglass opacities remain present in the left lower lobe.    08/28/2019 CT scan of chest and abdomen pelvis    Stable cavitary target lesion in the right upper lobe.       Stable left lower lobe patchy groundglass opacities with associated mild peribronchial thickening, probably infectious or inflammatory in etiology. Continued surveillance on follow-up is recommended. Further evaluation with bronchoscopy can also be considered given persistence of the findings since CT from 8/20/2018. No evidence of metastatic disease in the abdomen or pelvis. 05/31/2019 PET-CT    1. No findings suspicious for hypermetabolic malignancy. 2. Mild patchy FDG uptake in the left lower lobe patchy groundglass opacities with associated peribronchial thickening. This is likely infectious or inflammatory given the appearance, however, given that this has been present since the 8/20/2018 CT, consider follow-up with bronchoscopy. 04/26/2019 CT scan of the chest and abdomen/pelvis    1. Stable cavitary target lesion in the right upper lobe. No new site of metastasis. 2.  Stable left lower lobe patchy groundglass opacities with associated mild peribronchial thickening likely due to a chronic infectious or inflammatory process. Continued surveillance on follow-up is recommended. 04/22/2019 MRI brain    1. Stable treatment related changes in the lateral aspect of the right temporal lobe without evidence of recurrent or progressive metastatic disease. 2.  No acute infarction, intracranial hemorrhage or enhancing mass lesion. 3.  Mild scattered white matter disease likely mild, chronic microangiopathy and/or treatment related changes are stable. 4.  Moderate ethmoidal and maxillary sinus disease redemonstrated. 02/21/2019 CT scan of the chest and abdomen/pelvis    Limited evaluation of solid organs and vasculature without intravenous contrast.  1.  Stable cavitary target lesion in the right upper lobe. 2.  Stable left lower lobe patchy groundglass opacities with associated mild peribronchial wall thickening. This is again more likely chronic infectious or inflammatory in etiology. Continued surveillance on follow-up is recommended. 3.  No new metastatic lesions are found. 12/20/2018 CT scan of the chest and abdomen/pelvis    1. Stable right upper lobe 3.3 cm cavitary lesion as described. 2.  Left lower lobe patchy groundglass opacities with associated mild peribronchial wall thickening, similar to October 18, 2018 but improved from August 20, 2018, likely related to a slowly resolving infectious/inflammatory process. Follow-up   short-term chest CT in 3 months is recommended to evaluate for resolution. 3.  No metastatic disease in the abdomen or pelvis. 10/18/2018 CT scan of the chest without contrast: IMPRESSION: Improved appearance of the left lung base infiltrate. Stable right cavitary lesion. No new findings    08/20/2018 CT scan of the chest and abdomen/pelvis without contrast    RECIST target lesion: Cavitary right upper lobe mass is unchanged, measuring 3.4 x 2.6 cm on series 3 image 15 (previously 3.5 x 2.5 cm). Solid component along the medial aspect of the posterior wall is also unchanged, measuring 1.4 x 0.9 cm.     LUNGS:  Stable cavitary right upper lobe mass, as above. No new nodules identified. There is new small patchy density in the posterior left base compatible with infiltrate. There is no tracheal or endobronchial lesion. IMPRESSION    Unchanged cavitary right upper lobe mass. No new metastatic disease. Interval development of mild left lower lobe infiltrate noted. 06/18/2018 CT scan of the chest and abdomen/pelvis    Unchanged cavitary right upper lobe mass.   No new metastatic disease. 04/16/2018 CT scan of the chest and abdomen/pelvis    There is no significant interval change in size of the cystic or nodular components of the cavitary right upper lobe mass. No new metastatic lesions identified in the chest, abdomen or pelvis. 1/22/18 CAT scan of the chest and abdomen/pelvis    RECIST MEASUREMENTS:  TARGET LESION:   1: Right upper lobe cavitary lung mass:  The overall size of the lesion is 3.5 x 2.4 cm on image 14 of series 3, significantly decreased from 4.3 x 2.7 cm on previous examination. Solid component along the medial aspect of the posterior wall is not significantly changed from previous examination currently measuring 1.5 x 0.9 cm on image 14 of series 3 compared with 1.8 x 0.8 cm when measured using similar technique on previous   examination. Nodular solid component along the lateral aspect of posterior wall measures approximately 0.7 x 0.4 cm on image 13 of series 3, and when measured using similar technique is unchanged from previous examinations. There is interval decrease in size of the cystic portion of the cavitary right upper lobe mass however, solid components of this mass are not significantly changed from previous examination. Otherwise unchanged examination with no new metastatic lesions   identified in the chest, abdomen or pelvis. Pathology    [facility] results demonstrated no ALK gene rearrangement or deletion and negative for ROS1 rearrangement. No EGFR mutations were found also. Right temporal mass from September 6, 2015 demonstrated metastatic poorly differentiated non-small cell carcinoma. The tumor cells stained positive for CK 7, TTF-1 and Napsin and negative for CK 20, CK 5/6 andmucicarmine. Pathologist stated that the immunoprofile supported the primary lung non-small cell carcinoma, favor adenocarcinoma. The pathologist also stated that given the focal p40 staining, a squamous carcinoma could not be excluded.       Procedures    05/30/2019 complete pulmonary function test    PFT Interpretation     Quality of Data:  The patient's efforts are acceptable and reproducible.     Test Performed:  Complete pulmonary function tests, including spirometry with and without bronchodilator, measurement of lung volume, and diffusing capacity.     Spirometry Interpretation:  Spirometry is within normal limits.     Flow Volume Loops:  Flow-Volume loops are normal.     Lung Volumes:  Plethysmographic lung volumes are within normal limits.     Diffusing Capacity:  Minimal reduction in diffusion capacity likely clinically insignificant     Conclusions: An isolated reduction in Diffusing Capacity is present and may represent Early fibrotic or pulmonary vascular disease. Clinical correlation is recommended.

## 2023-10-05 ENCOUNTER — OFFICE VISIT (OUTPATIENT)
Dept: FAMILY MEDICINE CLINIC | Facility: CLINIC | Age: 63
End: 2023-10-05
Payer: COMMERCIAL

## 2023-10-05 VITALS
DIASTOLIC BLOOD PRESSURE: 86 MMHG | SYSTOLIC BLOOD PRESSURE: 144 MMHG | WEIGHT: 199 LBS | TEMPERATURE: 98.2 F | BODY MASS INDEX: 32.12 KG/M2 | RESPIRATION RATE: 16 BRPM | HEART RATE: 108 BPM

## 2023-10-05 DIAGNOSIS — E78.2 MIXED HYPERLIPIDEMIA: ICD-10-CM

## 2023-10-05 DIAGNOSIS — E11.9 TYPE 2 DIABETES MELLITUS WITHOUT COMPLICATION, WITHOUT LONG-TERM CURRENT USE OF INSULIN (HCC): Primary | ICD-10-CM

## 2023-10-05 DIAGNOSIS — C34.91 ADENOCARCINOMA OF LUNG, STAGE 4, RIGHT (HCC): ICD-10-CM

## 2023-10-05 DIAGNOSIS — I10 BENIGN HYPERTENSION: ICD-10-CM

## 2023-10-05 DIAGNOSIS — Z23 NEED FOR VACCINATION: ICD-10-CM

## 2023-10-05 PROCEDURE — 90471 IMMUNIZATION ADMIN: CPT

## 2023-10-05 PROCEDURE — 90686 IIV4 VACC NO PRSV 0.5 ML IM: CPT

## 2023-10-05 PROCEDURE — 99214 OFFICE O/P EST MOD 30 MIN: CPT | Performed by: FAMILY MEDICINE

## 2023-10-05 RX ORDER — AMLODIPINE BESYLATE 5 MG/1
5 TABLET ORAL DAILY
Qty: 90 TABLET | Refills: 3 | Status: SHIPPED | OUTPATIENT
Start: 2023-10-05

## 2023-10-05 RX ORDER — LOSARTAN POTASSIUM 50 MG/1
50 TABLET ORAL DAILY
Qty: 90 TABLET | Refills: 3 | Status: SHIPPED | OUTPATIENT
Start: 2023-10-05

## 2023-10-05 NOTE — PROGRESS NOTES
Name: Alvin Rosenthal      : 1960      MRN: [id number]  Encounter Provider: Marta Salazar DO  Encounter Date: 10/5/2023   Encounter department: [address] [facility]     1. Type 2 diabetes mellitus without complication, without long-term current use of insulin ([facility])  Assessment & Plan:    Lab Results   Component Value Date    HGBA1C 6.7 (H) 10/02/2023     Diabetes remains well controlled  Continue metformin 500 mg daily    Orders:  -     Hemoglobin A1C; Future; Expected date: 2024  -     Hemoglobin A1C    2. Mixed hyperlipidemia  Assessment & Plan:  Stable  Continue atorvastatin 10 mg daily    Orders:  -     Comprehensive metabolic panel; Future; Expected date: 2024  -     Lipid Panel with Direct LDL reflex; Future; Expected date: 2024  -     Comprehensive metabolic panel  -     Lipid Panel with Direct LDL reflex    3. Benign hypertension  Assessment & Plan:  Blood pressure is not at goal and her urine microalbumin testing was positive  Losartan 50 mg daily added    Orders:  -     losartan (COZAAR) 50 mg tablet; Take 1 tablet (50 mg total) by mouth daily  -     CBC; Future; Expected date: 2024  -     Comprehensive metabolic panel; Future; Expected date: 2024  -     Lipid Panel with Direct LDL reflex; Future; Expected date: 2024  -     amLODIPine (NORVASC) 5 mg tablet; Take 1 tablet (5 mg total) by mouth daily  -     CBC  -     Comprehensive metabolic panel  -     Lipid Panel with Direct LDL reflex    4. Adenocarcinoma of lung, stage 4, right [facility])  Assessment & Plan:  Has follow up CT scan scheduled       5. Need for vaccination  -     influenza vaccine, quadrivalent, 0.5 mL, preservative-free, for adult and pediatric patients 6 mos+ (AFLURIA, FLUARIX, FLULAVAL, FLUZONE)        Return in about 6 months (around 2024) for Annual physical.      Subjective      Patient here today for follow-up of her diabetes. She has been feeling well.   She had a good checkup with her oncologist yesterday. She does not feel any symptoms of recurrence of disease. She does have the CT of her chest scheduled. Review of Systems    Current Outpatient Medications on File Prior to Visit   Medication Sig   • Ascorbic Acid (vitamin C) 100 MG tablet Take 100 mg by mouth daily   • atorvastatin (LIPITOR) 10 mg tablet TAKE 1 TABLET DAILY   • b complex vitamins capsule Take 1 capsule by mouth daily Plus folic acid and vitamin c   • cholecalciferol (VITAMIN D3) 1,000 units tablet Take 1,000 Units by mouth daily   • halobetasol (ULTRAVATE) 0.05 % cream Apply topically 2 (two) times a day   • Lactobacillus (ACIDOPHILUS PO) Take by mouth   • magnesium 30 MG tablet Take 30 mg by mouth in the morning   • metFORMIN (GLUCOPHAGE) 500 mg tablet TAKE 1 TABLET DAILY WITH   BREAKFAST   • Omega-3 Fatty Acids (FISH OIL PO) Take 2 tablets by mouth. • tretinoin (RETIN-A) 0.1 % cream Apply topically daily at bedtime   • [DISCONTINUED] amLODIPine (NORVASC) 5 mg tablet TAKE 1 TABLET DAILY       Objective     /86   Pulse (!) 108   Temp 98.2 °F (36.8 °C)   Resp 16   Wt 90.3 kg (199 lb)   LMP  (LMP Unknown)   BMI 32.12 kg/m²     Physical Exam  Vitals and nursing note reviewed. Constitutional:       Appearance: She is well-developed. HENT:      Head: Normocephalic and atraumatic. Right Ear: Tympanic membrane and external ear normal.      Left Ear: Tympanic membrane and external ear normal.   Cardiovascular:      Rate and Rhythm: Normal rate and regular rhythm. Pulses: no weak pulses          Dorsalis pedis pulses are 2+ on the right side and 2+ on the left side. Posterior tibial pulses are 2+ on the right side and 2+ on the left side. Heart sounds: Normal heart sounds. No murmur heard. No friction rub. Pulmonary:      Effort: Pulmonary effort is normal. No respiratory distress. Breath sounds: Normal breath sounds. No wheezing or rales.    Musculoskeletal: Right lower leg: No edema. Left lower leg: No edema. Feet:      Right foot:      Skin integrity: No ulcer, skin breakdown, erythema, warmth, callus or dry skin. Left foot:      Skin integrity: No ulcer, skin breakdown, erythema, warmth, callus or dry skin. Patient's shoes and socks removed. Right Foot/Ankle   Right Foot Inspection  Skin Exam: skin normal. Skin not intact, no dry skin, no warmth, no callus, no erythema, no maceration, no abnormal color, no pre-ulcer, no ulcer and no callus. Toe Exam: ROM and strength within normal limits. Sensory   Monofilament testing: intact    Vascular  Capillary refills: < 3 seconds  The right DP pulse is 2+. The right PT pulse is 2+. Left Foot/Ankle  Left Foot Inspection  Skin Exam: skin normal. Skin not intact, no dry skin, no warmth, no erythema, no maceration, normal color, no pre-ulcer, no ulcer and no callus. Toe Exam: ROM and strength within normal limits. Sensory   Monofilament testing: intact    Vascular  Capillary refills: < 3 seconds  The left DP pulse is 2+. The left PT pulse is 2+.      Assign Risk Category  No deformity present  No loss of protective sensation  No weak pulses  Risk: 0    Bruce Keenan,

## 2023-10-05 NOTE — ASSESSMENT & PLAN NOTE
Blood pressure is not at goal and her urine microalbumin testing was positive  Losartan 50 mg daily added

## 2023-10-05 NOTE — ASSESSMENT & PLAN NOTE
Lab Results   Component Value Date    HGBA1C 6.7 (H) 10/02/2023     Diabetes remains well controlled  Continue metformin 500 mg daily

## 2023-10-12 ENCOUNTER — HOSPITAL ENCOUNTER (OUTPATIENT)
Dept: RADIOLOGY | Facility: HOSPITAL | Age: 63
Discharge: HOME | End: 2023-10-12
Attending: INTERNAL MEDICINE
Payer: COMMERCIAL

## 2023-10-12 DIAGNOSIS — C34.91 ADENOCARCINOMA OF LUNG, STAGE 4, RIGHT (HCC): ICD-10-CM

## 2023-10-12 PROCEDURE — G1004 CDSM NDSC: HCPCS

## 2023-10-12 PROCEDURE — 71250 CT THORAX DX C-: CPT

## 2023-10-20 DIAGNOSIS — L30.9 DERMATITIS: ICD-10-CM

## 2023-10-23 ENCOUNTER — TELEPHONE (OUTPATIENT)
Dept: HEMATOLOGY ONCOLOGY | Facility: MEDICAL CENTER | Age: 63
End: 2023-10-23

## 2023-10-23 DIAGNOSIS — C34.91 ADENOCARCINOMA OF LUNG, STAGE 4, RIGHT (HCC): Primary | ICD-10-CM

## 2023-10-23 NOTE — TELEPHONE ENCOUNTER
Dr Lukas Burks reviewed CT scan  PET scan ordered  Spoke to patient in response to [employer] message  CT is "concerning" for tumor recurrence   Emotional support given to patient  Will send to  to arrange PER ASAP  Patient aware      ----- Message -----  From: Mikal Poole  Sent: 10/23/2023   8:30 AM EDT  To: [facility]  Subject: Yet results                                      Is this something to worry about?   Scary

## 2023-11-08 ENCOUNTER — HOSPITAL ENCOUNTER (OUTPATIENT)
Dept: RADIOLOGY | Age: 63
Discharge: HOME | End: 2023-11-08
Payer: COMMERCIAL

## 2023-11-08 ENCOUNTER — TELEPHONE (OUTPATIENT)
Dept: HEMATOLOGY ONCOLOGY | Facility: MEDICAL CENTER | Age: 63
End: 2023-11-08

## 2023-11-08 DIAGNOSIS — C34.91 ADENOCARCINOMA OF LUNG, STAGE 4, RIGHT (HCC): ICD-10-CM

## 2023-11-08 LAB — GLUCOSE SERPL-MCNC: 105 MG/DL (ref 65–140)

## 2023-11-08 PROCEDURE — 78815 PET IMAGE W/CT SKULL-THIGH: CPT

## 2023-11-08 PROCEDURE — G1004 CDSM NDSC: HCPCS

## 2023-11-08 PROCEDURE — 82948 REAGENT STRIP/BLOOD GLUCOSE: CPT

## 2023-11-08 PROCEDURE — A9552 F18 FDG: HCPCS

## 2023-11-08 NOTE — LETTER
[address] [place] [id number]      November 13, 2023    MRN: [id number]     Phone: [phone]     Dear Ms. Gustabo Ball recently had a(n) Nuclear Medicine performed on 11/8/2023 at  [address] [place] [address] that was requested by Faith Sharp MD. The study was reviewed by a radiologist, which is a physician who specializes in medical imaging. The radiologist issued a report describing his or her findings. In that report there was a finding that the radiologist felt warranted further discussion with your health care provider and that discussion would be beneficial to you. The results were sent to Faith Sharp MD on 11/08/2023  1:53 PM. We recommend that you contact Faith Sharp MD at [phone] or set up an appointment to discuss the results of the imaging test. If you have already heard from Faith Sharp MD regarding the results of your study, you can disregard this letter. This letter is not meant to alarm you, but intended to encourage you to follow-up on your results with the provider that sent you for the imaging study. In addition, we have enclosed answers to frequently asked questions by other patients who have also received a letter to review results with their health care provider (see page two). Thank you for choosing [address] [place] [address] for your medical imaging needs. FREQUENTLY ASKED QUESTIONS    Why am I receiving this letter? [facility] requires us to notify patients who have findings on imaging exams that may require more testing or follow-up with a health professional within the next 3 months. How serious is the finding on the imaging test?  This letter is sent to all patients who may need follow-up or more testing within the next 3 months. Receiving this letter does not necessarily mean you have a life-threatening imaging finding or disease. Recommendations in the radiologist’s imaging report are general in nature and it is up to your healthcare provider to say whether those recommendations make sense for your situation. You are strongly encouraged to talk to your health care provider about the results and ask whether additional steps need to be taken. Where can I get a copy of the final report for my recent radiology exam?  To get a full copy of the report you can access your records online at [email or web address] or please contact [facility] at [phone] Monday through Friday between 8 am and 6 pm.         What do I need to do now? Please contact your health care provider who requested the imaging study to discuss what further actions (if any) are needed. You may have already heard from (your ordering provider) in regard to this test in which case you can disregard this letter. NOTICE IN ACCORDANCE WITH THE [place] STATE “PATIENT TEST RESULT INFORMATION ACT OF 2018”    You are receiving this notice as a result of a determination by your diagnostic imaging service that further discussions of your test results are warranted and would be beneficial to you. The complete results of your test or tests have been or will be sent to the health care practitioner that ordered the test or tests. It is recommended that you contact your health care practitioner to discuss your results as soon as possible.

## 2023-11-09 ENCOUNTER — TELEPHONE (OUTPATIENT)
Dept: HEMATOLOGY ONCOLOGY | Facility: CLINIC | Age: 63
End: 2023-11-09

## 2023-11-09 DIAGNOSIS — C34.91 ADENOCARCINOMA OF LUNG, STAGE 4, RIGHT (HCC): Primary | ICD-10-CM

## 2023-12-19 DIAGNOSIS — E78.2 MIXED HYPERLIPIDEMIA: ICD-10-CM

## 2023-12-19 RX ORDER — ATORVASTATIN CALCIUM 10 MG/1
TABLET, FILM COATED ORAL
Qty: 90 TABLET | Refills: 1 | Status: SHIPPED | OUTPATIENT
Start: 2023-12-19

## 2023-12-24 NOTE — PROGRESS NOTES
To clinic for avastin as single agent on study for NSCLC  Seen in clinic by Research Coordinator  Therapy given as per orders without incident  To return to clinic as directed by Clinical Trials  (1) Outpatient Area

## 2023-12-31 DIAGNOSIS — E11.9 TYPE 2 DIABETES MELLITUS WITHOUT COMPLICATION, WITHOUT LONG-TERM CURRENT USE OF INSULIN (HCC): ICD-10-CM

## 2024-02-05 DIAGNOSIS — E11.9 TYPE 2 DIABETES MELLITUS WITHOUT COMPLICATION, WITHOUT LONG-TERM CURRENT USE OF INSULIN (HCC): ICD-10-CM

## 2024-02-05 DIAGNOSIS — E78.2 MIXED HYPERLIPIDEMIA: ICD-10-CM

## 2024-02-05 DIAGNOSIS — I10 BENIGN HYPERTENSION: ICD-10-CM

## 2024-02-06 RX ORDER — LOSARTAN POTASSIUM 50 MG/1
50 TABLET ORAL DAILY
Qty: 90 TABLET | Refills: 1 | Status: SHIPPED | OUTPATIENT
Start: 2024-02-06

## 2024-02-06 RX ORDER — ATORVASTATIN CALCIUM 10 MG/1
10 TABLET, FILM COATED ORAL DAILY
Qty: 90 TABLET | Refills: 1 | Status: SHIPPED | OUTPATIENT
Start: 2024-02-06

## 2024-02-06 RX ORDER — AMLODIPINE BESYLATE 5 MG/1
5 TABLET ORAL DAILY
Qty: 90 TABLET | Refills: 1 | Status: SHIPPED | OUTPATIENT
Start: 2024-02-06

## 2024-03-01 ENCOUNTER — HOSPITAL ENCOUNTER (OUTPATIENT)
Dept: RADIOLOGY | Facility: HOSPITAL | Age: 64
Discharge: HOME | End: 2024-03-01
Payer: MEDICARE

## 2024-03-01 DIAGNOSIS — C34.91 ADENOCARCINOMA OF LUNG, STAGE 4, RIGHT (HCC): ICD-10-CM

## 2024-03-01 PROCEDURE — G1004 CDSM NDSC: HCPCS

## 2024-03-01 PROCEDURE — 71250 CT THORAX DX C-: CPT

## 2024-03-06 ENCOUNTER — TELEPHONE (OUTPATIENT)
Dept: HEMATOLOGY ONCOLOGY | Facility: MEDICAL CENTER | Age: 64
End: 2024-03-06

## 2024-03-06 DIAGNOSIS — C34.91 ADENOCARCINOMA OF LUNG, STAGE 4, RIGHT (HCC): Primary | ICD-10-CM

## 2024-03-06 NOTE — TELEPHONE ENCOUNTER
Discussed CT scan results with Dr Osuna  Right upper lobe opacity is stable  Repeat CT scan in 4 months  Patient verbalizes understanding  Will send to  to arrange

## 2024-03-21 ENCOUNTER — TELEPHONE (OUTPATIENT)
Age: 64
End: 2024-03-21

## 2024-04-03 LAB
ALBUMIN SERPL-MCNC: 4.5 G/DL (ref 3.9–4.9)
ALBUMIN/GLOB SERPL: 1.7 {RATIO} (ref 1.2–2.2)
ALP SERPL-CCNC: 69 IU/L (ref 44–121)
ALT SERPL-CCNC: 79 IU/L (ref 0–32)
AST SERPL-CCNC: 54 IU/L (ref 0–40)
BILIRUB SERPL-MCNC: 0.6 MG/DL (ref 0–1.2)
BUN SERPL-MCNC: 11 MG/DL (ref 8–27)
BUN/CREAT SERPL: 12 (ref 12–28)
CALCIUM SERPL-MCNC: 9.5 MG/DL (ref 8.7–10.3)
CHLORIDE SERPL-SCNC: 99 MMOL/L (ref 96–106)
CHOLEST SERPL-MCNC: 166 MG/DL (ref 100–199)
CO2 SERPL-SCNC: 20 MMOL/L (ref 20–29)
CREAT SERPL-MCNC: 0.95 MG/DL (ref 0.57–1)
EGFR: 66 ML/MIN/1.73
ERYTHROCYTE [DISTWIDTH] IN BLOOD BY AUTOMATED COUNT: 11.9 % (ref 11.7–15.4)
EST. AVERAGE GLUCOSE BLD GHB EST-MCNC: 140 MG/DL
GLOBULIN SER-MCNC: 2.6 G/DL (ref 1.5–4.5)
GLUCOSE SERPL-MCNC: 158 MG/DL (ref 70–99)
HBA1C MFR BLD: 6.5 % (ref 4.8–5.6)
HCT VFR BLD AUTO: 41.6 % (ref 34–46.6)
HDLC SERPL-MCNC: 42 MG/DL
HGB BLD-MCNC: 14.3 G/DL (ref 11.1–15.9)
LDLC SERPL CALC-MCNC: 93 MG/DL (ref 0–99)
LDLC/HDLC SERPL: 2.2 RATIO (ref 0–3.2)
MCH RBC QN AUTO: 31.7 PG (ref 26.6–33)
MCHC RBC AUTO-ENTMCNC: 34.4 G/DL (ref 31.5–35.7)
MCV RBC AUTO: 92 FL (ref 79–97)
MICRODELETION SYND BLD/T FISH: NORMAL
PLATELET # BLD AUTO: 385 X10E3/UL (ref 150–450)
POTASSIUM SERPL-SCNC: 4.1 MMOL/L (ref 3.5–5.2)
PROT SERPL-MCNC: 7.1 G/DL (ref 6–8.5)
RBC # BLD AUTO: 4.51 X10E6/UL (ref 3.77–5.28)
SL AMB VLDL CHOLESTEROL CALC: 31 MG/DL (ref 5–40)
SODIUM SERPL-SCNC: 136 MMOL/L (ref 134–144)
TRIGL SERPL-MCNC: 179 MG/DL (ref 0–149)
WBC # BLD AUTO: 8.1 X10E3/UL (ref 3.4–10.8)

## 2024-04-05 ENCOUNTER — OFFICE VISIT (OUTPATIENT)
Dept: FAMILY MEDICINE CLINIC | Facility: CLINIC | Age: 64
End: 2024-04-05
Payer: COMMERCIAL

## 2024-04-05 VITALS
SYSTOLIC BLOOD PRESSURE: 124 MMHG | TEMPERATURE: 99.2 F | RESPIRATION RATE: 16 BRPM | HEIGHT: 66 IN | BODY MASS INDEX: 32.53 KG/M2 | WEIGHT: 202.4 LBS | HEART RATE: 96 BPM | DIASTOLIC BLOOD PRESSURE: 68 MMHG

## 2024-04-05 DIAGNOSIS — Z00.00 ENCOUNTER FOR INITIAL PREVENTIVE PHYSICAL EXAMINATION COVERED BY MEDICARE: Primary | ICD-10-CM

## 2024-04-05 DIAGNOSIS — I10 BENIGN HYPERTENSION: ICD-10-CM

## 2024-04-05 DIAGNOSIS — Z79.899 ENCOUNTER FOR LONG-TERM CURRENT USE OF MEDICATION: ICD-10-CM

## 2024-04-05 DIAGNOSIS — E78.2 MIXED HYPERLIPIDEMIA: ICD-10-CM

## 2024-04-05 DIAGNOSIS — R74.8 ELEVATED LIVER ENZYMES: ICD-10-CM

## 2024-04-05 DIAGNOSIS — Z78.0 ASYMPTOMATIC POSTMENOPAUSAL STATE: ICD-10-CM

## 2024-04-05 DIAGNOSIS — Z12.11 SCREENING FOR COLON CANCER: ICD-10-CM

## 2024-04-05 DIAGNOSIS — E11.9 TYPE 2 DIABETES MELLITUS WITHOUT COMPLICATION, WITHOUT LONG-TERM CURRENT USE OF INSULIN (HCC): ICD-10-CM

## 2024-04-05 DIAGNOSIS — C79.31 MALIGNANT NEOPLASM METASTATIC TO BRAIN (HCC): ICD-10-CM

## 2024-04-05 PROBLEM — R10.9 ABDOMINAL PAIN: Status: RESOLVED | Noted: 2021-02-22 | Resolved: 2024-04-05

## 2024-04-05 PROBLEM — R79.89 ABNORMAL LFTS: Status: RESOLVED | Noted: 2017-02-08 | Resolved: 2024-04-05

## 2024-04-05 PROCEDURE — 99214 OFFICE O/P EST MOD 30 MIN: CPT | Performed by: FAMILY MEDICINE

## 2024-04-05 PROCEDURE — G0402 INITIAL PREVENTIVE EXAM: HCPCS | Performed by: FAMILY MEDICINE

## 2024-04-05 NOTE — PATIENT INSTRUCTIONS
Medicare Preventive Visit Patient Instructions  Thank you for completing your Welcome to Medicare Visit or Medicare Annual Wellness Visit today. Your next wellness visit will be due in one year (4/6/2025).  The screening/preventive services that you may require over the next 5-10 years are detailed below. Some tests may not apply to you based off risk factors and/or age. Screening tests ordered at today's visit but not completed yet may show as past due. Also, please note that scanned in results may not display below.  Preventive Screenings:  Service Recommendations Previous Testing/Comments   Colorectal Cancer Screening  * Colonoscopy    * Fecal Occult Blood Test (FOBT)/Fecal Immunochemical Test (FIT)  * Fecal DNA/Cologuard Test  * Flexible Sigmoidoscopy Age: 45-75 years old   Colonoscopy: every 10 years (may be performed more frequently if at higher risk)  OR  FOBT/FIT: every 1 year  OR  Cologuard: every 3 years  OR  Sigmoidoscopy: every 5 years  Screening may be recommended earlier than age 45 if at higher risk for colorectal cancer. Also, an individualized decision between you and your healthcare provider will decide whether screening between the ages of 76-85 would be appropriate. Colonoscopy: Not on file  FOBT/FIT: Not on file  Cologuard: Not on file  Sigmoidoscopy: Not on file          Breast Cancer Screening Age: 40+ years old  Frequency: every 1-2 years  Not required if history of left and right mastectomy Mammogram: 04/20/2023        Cervical Cancer Screening Between the ages of 21-29, pap smear recommended once every 3 years.   Between the ages of 30-65, can perform pap smear with HPV co-testing every 5 years.   Recommendations may differ for women with a history of total hysterectomy, cervical cancer, or abnormal pap smears in past. Pap Smear: Not on file        Hepatitis C Screening Once for adults born between 1945 and 1965  More frequently in patients at high risk for Hepatitis C Hep C Antibody:  09/28/2022        Diabetes Screening 1-2 times per year if you're at risk for diabetes or have pre-diabetes Fasting glucose: No results in last 5 years (No results in last 5 years)  A1C: 6.5 % (4/2/2024)      Cholesterol Screening Once every 5 years if you don't have a lipid disorder. May order more often based on risk factors. Lipid panel: 04/02/2024          Other Preventive Screenings Covered by Medicare:  Abdominal Aortic Aneurysm (AAA) Screening: covered once if your at risk. You're considered to be at risk if you have a family history of AAA.  Lung Cancer Screening: covers low dose CT scan once per year if you meet all of the following conditions: (1) Age 55-77; (2) No signs or symptoms of lung cancer; (3) Current smoker or have quit smoking within the last 15 years; (4) You have a tobacco smoking history of at least 20 pack years (packs per day multiplied by number of years you smoked); (5) You get a written order from a healthcare provider.  Glaucoma Screening: covered annually if you're considered high risk: (1) You have diabetes OR (2) Family history of glaucoma OR (3)  aged 50 and older OR (4)  American aged 65 and older  Osteoporosis Screening: covered every 2 years if you meet one of the following conditions: (1) You're estrogen deficient and at risk for osteoporosis based off medical history and other findings; (2) Have a vertebral abnormality; (3) On glucocorticoid therapy for more than 3 months; (4) Have primary hyperparathyroidism; (5) On osteoporosis medications and need to assess response to drug therapy.   Last bone density test (DXA Scan): Not on file.  HIV Screening: covered annually if you're between the age of 15-65. Also covered annually if you are younger than 15 and older than 65 with risk factors for HIV infection. For pregnant patients, it is covered up to 3 times per pregnancy.    Immunizations:  Immunization Recommendations   Influenza Vaccine Annual influenza  vaccination during flu season is recommended for all persons aged >= 6 months who do not have contraindications   Pneumococcal Vaccine   * Pneumococcal conjugate vaccine = PCV13 (Prevnar 13), PCV15 (Vaxneuvance), PCV20 (Prevnar 20)  * Pneumococcal polysaccharide vaccine = PPSV23 (Pneumovax) Adults 19-65 yo with certain risk factors or if 65+ yo  If never received any pneumonia vaccine: recommend Prevnar 20 (PCV20)  Give PCV20 if previously received 1 dose of PCV13 or PPSV23   Hepatitis B Vaccine 3 dose series if at intermediate or high risk (ex: diabetes, end stage renal disease, liver disease)   Respiratory syncytial virus (RSV) Vaccine - COVERED BY MEDICARE PART D  * RSVPreF3 (Arexvy) CDC recommends that adults 60 years of age and older may receive a single dose of RSV vaccine using shared clinical decision-making (SCDM)   Tetanus (Td) Vaccine - COST NOT COVERED BY MEDICARE PART B Following completion of primary series, a booster dose should be given every 10 years to maintain immunity against tetanus. Td may also be given as tetanus wound prophylaxis.   Tdap Vaccine - COST NOT COVERED BY MEDICARE PART B Recommended at least once for all adults. For pregnant patients, recommended with each pregnancy.   Shingles Vaccine (Shingrix) - COST NOT COVERED BY MEDICARE PART B  2 shot series recommended in those 19 years and older who have or will have weakened immune systems or those 50 years and older     Health Maintenance Due:      Topic Date Due   • HIV Screening  Never done   • Cervical Cancer Screening  Never done   • Colorectal Cancer Screening  Never done   • Breast Cancer Screening: Mammogram  04/20/2025   • Hepatitis C Screening  Completed     Immunizations Due:      Topic Date Due   • COVID-19 Vaccine (3 - Moderna risk series) 12/10/2021     Advance Directives   What are advance directives?  Advance directives are legal documents that state your wishes and plans for medical care. These plans are made ahead of  time in case you lose your ability to make decisions for yourself. Advance directives can apply to any medical decision, such as the treatments you want, and if you want to donate organs.   What are the types of advance directives?  There are many types of advance directives, and each state has rules about how to use them. You may choose a combination of any of the following:  Living will:  This is a written record of the treatment you want. You can also choose which treatments you do not want, which to limit, and which to stop at a certain time. This includes surgery, medicine, IV fluid, and tube feedings.   Durable power of  for healthcare (DPAHC):  This is a written record that states who you want to make healthcare choices for you when you are unable to make them for yourself. This person, called a proxy, is usually a family member or a friend. You may choose more than 1 proxy.  Do not resuscitate (DNR) order:  A DNR order is used in case your heart stops beating or you stop breathing. It is a request not to have certain forms of treatment, such as CPR. A DNR order may be included in other types of advance directives.  Medical directive:  This covers the care that you want if you are in a coma, near death, or unable to make decisions for yourself. You can list the treatments you want for each condition. Treatment may include pain medicine, surgery, blood transfusions, dialysis, IV or tube feedings, and a ventilator (breathing machine).  Values history:  This document has questions about your views, beliefs, and how you feel and think about life. This information can help others choose the care that you would choose.  Why are advance directives important?  An advance directive helps you control your care. Although spoken wishes may be used, it is better to have your wishes written down. Spoken wishes can be misunderstood, or not followed. Treatments may be given even if you do not want them. An advance  directive may make it easier for your family to make difficult choices about your care.   Weight Management   Why it is important to manage your weight:  Being overweight increases your risk of health conditions such as heart disease, high blood pressure, type 2 diabetes, and certain types of cancer. It can also increase your risk for osteoarthritis, sleep apnea, and other respiratory problems. Aim for a slow, steady weight loss. Even a small amount of weight loss can lower your risk of health problems.  How to lose weight safely:  A safe and healthy way to lose weight is to eat fewer calories and get regular exercise. You can lose up about 1 pound a week by decreasing the number of calories you eat by 500 calories each day.   Healthy meal plan for weight management:  A healthy meal plan includes a variety of foods, contains fewer calories, and helps you stay healthy. A healthy meal plan includes the following:  Eat whole-grain foods more often.  A healthy meal plan should contain fiber. Fiber is the part of grains, fruits, and vegetables that is not broken down by your body. Whole-grain foods are healthy and provide extra fiber in your diet. Some examples of whole-grain foods are whole-wheat breads and pastas, oatmeal, brown rice, and bulgur.  Eat a variety of vegetables every day.  Include dark, leafy greens such as spinach, kale, dalia greens, and mustard greens. Eat yellow and orange vegetables such as carrots, sweet potatoes, and winter squash.   Eat a variety of fruits every day.  Choose fresh or canned fruit (canned in its own juice or light syrup) instead of juice. Fruit juice has very little or no fiber.  Eat low-fat dairy foods.  Drink fat-free (skim) milk or 1% milk. Eat fat-free yogurt and low-fat cottage cheese. Try low-fat cheeses such as mozzarella and other reduced-fat cheeses.  Choose meat and other protein foods that are low in fat.  Choose beans or other legumes such as split peas or lentils.  Choose fish, skinless poultry (chicken or turkey), or lean cuts of red meat (beef or pork). Before you cook meat or poultry, cut off any visible fat.   Use less fat and oil.  Try baking foods instead of frying them. Add less fat, such as margarine, sour cream, regular salad dressing and mayonnaise to foods. Eat fewer high-fat foods. Some examples of high-fat foods include french fries, doughnuts, ice cream, and cakes.  Eat fewer sweets.  Limit foods and drinks that are high in sugar. This includes candy, cookies, regular soda, and sweetened drinks.  Exercise:  Exercise at least 30 minutes per day on most days of the week. Some examples of exercise include walking, biking, dancing, and swimming. You can also fit in more physical activity by taking the stairs instead of the elevator or parking farther away from stores. Ask your healthcare provider about the best exercise plan for you.      © Copyright [employer] 2018 Information is for End User's use only and may not be sold, redistributed or otherwise used for commercial purposes. All illustrations and images included in CareNotes® are the copyrighted property of [employer] or [employer]

## 2024-04-05 NOTE — PROGRESS NOTES
Assessment and Plan:     Problem List Items Addressed This Visit     Benign hypertension     Stable  Continue losartan 50 mg daily         Relevant Orders    CBC    Comprehensive metabolic panel    Brain metastasis     Following with oncology         Mixed hyperlipidemia     Stable  Continue atorvastatin 10 mg daily         Relevant Orders    Lipid Panel with Direct LDL reflex    Type 2 diabetes mellitus without complication, without long-term current use of insulin (HCC)       Lab Results   Component Value Date    HGBA1C 6.5 (H) 04/02/2024     Continue metformin  We will add Ozempic to improve diabetes control  Benefits medication discussed         Relevant Medications    semaglutide, 0.25 or 0.5 mg/dose, (Ozempic, 0.25 or 0.5 MG/DOSE,) 2 mg/3 mL injection pen    Other Relevant Orders    Hemoglobin A1C    Albumin / creatinine urine ratio   Other Visit Diagnoses     Encounter for initial preventive physical examination covered by Medicare    -  Primary    Screening for colon cancer        declined colonoscopy she just had a PET scan    Elevated liver enzymes        suspect fatty liver disease but last CT of abdomen in 2022 was normal    Relevant Orders    US abdomen limited    Asymptomatic postmenopausal state        Relevant Orders    DXA bone density spine hip and pelvis    Encounter for long-term current use of medication        Relevant Orders    Vitamin B12      Return in about 6 months (around 10/5/2024) for Diabetic follow up.     Preventive health issues were discussed with patient, and age appropriate screening tests were ordered as noted in patient's After Visit Summary.  Personalized health advice and appropriate referrals for health education or preventive services given if needed, as noted in patient's After Visit Summary.     History of Present Illness:     Patient presents for a Medicare Wellness Visit    She has been trying to watch her sugar diet.  She is concerned about her recent CT results.  She  does have her follow-up CAT scan scheduled for early July as ordered by her oncologist.  Not knowing if her cancer is back and has been very hard on her mentally.  She has been feeling more anxious.  She denies feeling depressed       Patient Care Team:  Flor Mckeon DO as PCP - General  Yemi Hernández MD (Radiation Oncology)  DO Terrell Johns MD (Hematology and Oncology)  Nilsa Chiang MD (Neurology)     Review of Systems:     Review of Systems     Problem List:     Patient Active Problem List   Diagnosis   • Adenocarcinoma of lung, stage 4, right (HCC)   • Mixed hyperlipidemia   • Allergic rhinitis   • Brain metastasis   • Brain tumor (HCC)   • Type 2 diabetes mellitus without complication, without long-term current use of insulin (HCC)   • Insomnia   • Lesion of temporal lobe   • Non-small cell lung cancer (HCC)   • Benign hypertension   • Chronic maxillary sinusitis      Past Medical and Surgical History:     Past Medical History:   Diagnosis Date   • Adenocarcinoma of right lung, stage 4 (HCC)    • Allergic rhinitis    • Alopecia     resolved 10/25/16   • Anxiety     last assessed 10/4/16, resolved 10/25/16   • Benign hypertension 2/1/2018   • Benign neoplasm of skin of trunk 03/25/2008    last assessed 3/25/08   • Benign neoplasm of skin of upper extremity and shoulder 03/25/2008    last assessed 3/25/08   • Ear deformity, acquired     last assessed 10/13/14, resolved 3/12/15   • Eczema    • History of chemotherapy    • Hyperlipemia    • Maintenance chemotherapy    • Secondary cancer of brain (HCC)    • Seizures (HCC)    • Vertigo      Past Surgical History:   Procedure Laterality Date   • APPENDECTOMY  1964   • BRAIN TUMOR EXCISION     • CENTRAL VENOUS CATHETER INSERTION Right     PORTACATH   • IR PORT REMOVAL  9/10/2021      Family History:     Family History   Problem Relation Age of Onset   • Diabetes Mother    • Heart disease Mother    • Lung cancer Father 70        Smoker    • Uterine  cancer Paternal Grandmother    • Pancreatitis Brother    • No Known Problems Brother       Social History:     Social History     Socioeconomic History   • Marital status: /Civil Union     Spouse name: None   • Number of children: 1   • Years of education: None   • Highest education level: None   Occupational History   • None   Tobacco Use   • Smoking status: Former     Current packs/day: 0.00     Average packs/day: 1 pack/day for 47.0 years (47.0 ttl pk-yrs)     Types: Cigarettes     Start date:      Quit date:      Years since quittin.2     Passive exposure: Never   • Smokeless tobacco: Never   Vaping Use   • Vaping status: Never Used   Substance and Sexual Activity   • Alcohol use: Yes     Comment: occasionally / Social    • Drug use: Yes     Types: Marijuana   • Sexual activity: Yes   Other Topics Concern   • None   Social History Narrative    High school or GED     Lives independently with spouse      Social Determinants of Health     Financial Resource Strain: Not on file   Food Insecurity: No Food Insecurity (3/29/2024)    Hunger Vital Sign    • Worried About Running Out of Food in the Last Year: Never true    • Ran Out of Food in the Last Year: Never true   Transportation Needs: No Transportation Needs (3/29/2024)    PRAPARE - Transportation    • Lack of Transportation (Medical): No    • Lack of Transportation (Non-Medical): No   Physical Activity: Not on file   Stress: Not on file   Social Connections: Not on file   Intimate Partner Violence: Not on file   Housing Stability: Low Risk  (3/29/2024)    Housing Stability Vital Sign    • Unable to Pay for Housing in the Last Year: No    • Number of Places Lived in the Last Year: 1    • Unstable Housing in the Last Year: No      Medications and Allergies:     Current Outpatient Medications   Medication Sig Dispense Refill   • amLODIPine (NORVASC) 5 mg tablet Take 1 tablet (5 mg total) by mouth daily 90 tablet 1   • Ascorbic Acid (vitamin C) 100  MG tablet Take 100 mg by mouth daily     • atorvastatin (LIPITOR) 10 mg tablet Take 1 tablet (10 mg total) by mouth daily 90 tablet 1   • b complex vitamins capsule Take 1 capsule by mouth daily Plus folic acid and vitamin c     • cholecalciferol (VITAMIN D3) 1,000 units tablet Take 1,000 Units by mouth daily     • halobetasol (ULTRAVATE) 0.05 % cream Apply topically 2 (two) times a day (Patient taking differently: Apply topically 2 (two) times a day As needed) 50 g 0   • Lactobacillus (ACIDOPHILUS PO) Take by mouth     • losartan (COZAAR) 50 mg tablet Take 1 tablet (50 mg total) by mouth daily 90 tablet 1   • magnesium 30 MG tablet Take 30 mg by mouth in the morning     • metFORMIN (GLUCOPHAGE) 500 mg tablet Take 1 tablet (500 mg total) by mouth daily with breakfast 90 tablet 1   • semaglutide, 0.25 or 0.5 mg/dose, (Ozempic, 0.25 or 0.5 MG/DOSE,) 2 mg/3 mL injection pen Inject 0.375 mL (0.25 mg total) under the skin every 7 days 0.25 mg under the skin every 7 days Code: E11.9 3 mL 3   • tretinoin (RETIN-A) 0.1 % cream Apply topically daily at bedtime 45 g 2   • Omega-3 Fatty Acids (FISH OIL PO) Take 2 tablets by mouth. (Patient not taking: Reported on 4/5/2024)       No current facility-administered medications for this visit.     Facility-Administered Medications Ordered in Other Visits   Medication Dose Route Frequency Provider Last Rate Last Admin   • alteplase (CATHFLO) injection 2 mg  2 mg Intracatheter PRN Terrell Osuna MD       • sodium chloride 0.9 % infusion  20 mL/hr Intravenous Continuous Terrell Osuna MD         Allergies   Allergen Reactions   • Iodinated Contrast Media Anaphylaxis and Itching   • Clindamycin    • Clindamycin/Lincomycin       Immunizations:     Immunization History   Administered Date(s) Administered   • COVID-19 MODERNA VACC 0.25 ML IM BOOSTER 11/12/2021   • COVID-19 MODERNA VACC 0.5 ML IM 04/14/2021, 05/17/2021   • Influenza, injectable, quadrivalent, preservative free 0.5 mL  10/05/2023   • Influenza, recombinant, quadrivalent,injectable, preservative free 09/06/2018, 10/29/2019   • Influenza, seasonal, injectable 10/18/2010, 02/19/2018   • Pneumococcal Conjugate Vaccine 20-valent (Pcv20), Polysace 04/05/2023   • Tdap 03/24/2010, 03/02/2021   • Zoster Vaccine Recombinant 04/04/2022, 10/04/2022      Health Maintenance:         Topic Date Due   • HIV Screening  Never done   • Cervical Cancer Screening  Never done   • Colorectal Cancer Screening  Never done   • Breast Cancer Screening: Mammogram  04/20/2025   • Hepatitis C Screening  Completed         Topic Date Due   • COVID-19 Vaccine (3 - Moderna risk series) 12/10/2021      Medicare Screening Tests and Risk Assessments:     Jeannie is here for her Welcome to Medicare visit.     Health Risk Assessment:   Patient rates overall health as fair. Patient feels that their physical health rating is slightly worse. Patient is very satisfied with their life. Eyesight was rated as same. Hearing was rated as same. Patient feels that their emotional and mental health rating is same. Patients states they are never, rarely angry. Patient states they are often unusually tired/fatigued. Pain experienced in the last 7 days has been none. Patient states that she has experienced weight loss or gain in last 6 months. Gaining weight. Not eating much    Depression Screening:   PHQ-2 Score: 0      Fall Risk Screening:   In the past year, patient has experienced: no history of falling in past year      Urinary Incontinence Screening:   Patient has not leaked urine accidently in the last six months.     Home Safety:  Patient does not have trouble with stairs inside or outside of their home. Patient has working smoke alarms and has working carbon monoxide detector. Home safety hazards include: none.     Nutrition:   Current diet is Diabetic, Low Cholesterol, Low Carb, No Added Salt and Limited junk food.     Medications:   Patient is currently taking  over-the-counter supplements. OTC medications include: see medication list. Patient is able to manage medications.     Activities of Daily Living (ADLs)/Instrumental Activities of Daily Living (IADLs):   Walk and transfer into and out of bed and chair?: Yes  Dress and groom yourself?: Yes    Bathe or shower yourself?: Yes    Feed yourself? Yes  Do your laundry/housekeeping?: Yes  Manage your money, pay your bills and track your expenses?: Yes  Make your own meals?: Yes    Do your own shopping?: Yes    Previous Hospitalizations:   Any hospitalizations or ED visits within the last 12 months?: No      Advance Care Planning:   Living will: No    Durable POA for healthcare: No    Advanced directive: No    Advanced directive counseling given: Yes    ACP document given: Yes    End of Life Decisions reviewed with patient: Yes    Provider agrees with end of life decisions: Yes      Cognitive Screening:   Provider or family/friend/caregiver concerned regarding cognition?: No    PREVENTIVE SCREENINGS      Cardiovascular Screening:    General: Screening Not Indicated and History Lipid Disorder      Diabetes Screening:     General: Screening Not Indicated and History Diabetes      Colorectal Cancer Screening:     General: Risks and Benefits Discussed and Patient Declines      Breast Cancer Screening:     General: Screening Current      Cervical Cancer Screening:    General: Screening Not Indicated      Osteoporosis Screening:    General: Risks and Benefits Discussed    Due for: DXA Axial      Abdominal Aortic Aneurysm (AAA) Screening:        General: Screening Not Indicated      Lung Cancer Screening:     General: Screening Not Indicated and History Lung Cancer      Hepatitis C Screening:    General: Screening Current    Screening, Brief Intervention, and Referral to Treatment (SBIRT)    Screening  Typical number of drinks in a day: 1  Typical number of drinks in a week: 7  Interpretation: Low risk drinking behavior.    AUDIT-C  "Screenin) How often did you have a drink containing alcohol in the past year? 4 or more times a week  2) How many drinks did you have on a typical day when you were drinking in the past year? 1 to 2  3) How often did you have 6 or more drinks on one occasion in the past year? never    AUDIT-C Score: 4  Interpretation: Score 3-12 (female): POSITIVE screen for alcohol misuse    AUDIT Screenin) How often during the last year have you found that you were not able to stop drinking once you had started? 0 - never  5) How often during the last year have you failed to do what was normally expected from you because of drinking? 0 - never  6) How often during the last year have you needed a first drink in the morning to get yourself going after a heavy drinking session? 0 - never  7) How often during the last year have you had a feeling of guilt or remorse after drinking? 0 - never  8) How often during the last year have you been unable to remember what happened the night before because you had been drinking? 0 - never  9) Have you or someone else been injured as a result of your drinking? 0 - no  10) Has a relative or friend or a doctor or another health worker been concerned about your drinking or suggested you cut down? 0 - no    AUDIT Score: 4  Interpretation: Low risk alcohol consumption    Single Item Drug Screening:  How often have you used an illegal drug (including marijuana) or a prescription medication for non-medical reasons in the past year? daily or almost daily    Single Item Drug Screen Score: 4  Interpretation: POSITIVE screen for possible drug use disorder    Drug Abuse Screening Test (DAST-10):  1) Have you used drugs other than those required for medical reasons? Yes  2) Do you abuse more than one drug at a time? No  3) Are you always able to stop using drugs when you want to? Yes  4) Have you had \"blackouts\" or \"flashbacks\" as a result of drug use? No  5) Do you ever feel bad or guilty about " "your drug use? No  6) Does your spouse (or parents) ever complain about your involvement with drugs? No  7) Have you neglected your family because of your use of drugs? No  8) Have you engaged in illegal activities in order to obtain drugs? No  9) Have you ever experienced withdrawal symptoms (felt sick) when you stopped taking drugs? No  10) Have you had medical problems as a result of your drug use (e.g., memory loss, hepatitis, convulsions, bleeding, etc.)? No    DAST-10 Score: 1  Interpretation: Low level problems related to drug abuse    Brief Intervention  Alcohol & drug use screenings were reviewed. No concerns regarding substance use disorder identified.     Vision Screening    Right eye Left eye Both eyes   Without correction      With correction 20/25 20/25 20/25        Physical Exam:     /68   Pulse 96   Temp 99.2 °F (37.3 °C) (Tympanic)   Resp 16   Ht 5' 6\" (1.676 m)   Wt 91.8 kg (202 lb 6.4 oz)   LMP  (LMP Unknown)   BMI 32.67 kg/m²     Physical Exam  Vitals and nursing note reviewed.   Constitutional:       Appearance: She is well-developed.   HENT:      Head: Normocephalic and atraumatic.      Right Ear: External ear normal.      Left Ear: External ear normal.      Nose: Nose normal.   Cardiovascular:      Rate and Rhythm: Normal rate and regular rhythm.      Heart sounds: Normal heart sounds. No murmur heard.     No friction rub.   Pulmonary:      Effort: No respiratory distress.      Breath sounds: Normal breath sounds. No wheezing or rales.   Musculoskeletal:      Right lower leg: No edema.      Left lower leg: No edema.          Flor Mckeon, DO  "

## 2024-04-05 NOTE — ASSESSMENT & PLAN NOTE
Lab Results   Component Value Date    HGBA1C 6.5 (H) 04/02/2024     Continue metformin  We will add Ozempic to improve diabetes control  Benefits medication discussed

## 2024-04-06 DIAGNOSIS — E11.9 TYPE 2 DIABETES MELLITUS WITHOUT COMPLICATION, WITHOUT LONG-TERM CURRENT USE OF INSULIN (HCC): ICD-10-CM

## 2024-04-08 DIAGNOSIS — E11.9 TYPE 2 DIABETES MELLITUS WITHOUT COMPLICATION, WITHOUT LONG-TERM CURRENT USE OF INSULIN (HCC): ICD-10-CM

## 2024-04-08 NOTE — TELEPHONE ENCOUNTER
Jeannie Hoover   to  Primary Care [facility] (supporting Flor Mckeon DO)         4/7/24 10:53 AM  Please send my new ozampic prescription [facility] [place]. I cannot get it at the [facility], thank you

## 2024-04-09 ENCOUNTER — TELEPHONE (OUTPATIENT)
Dept: FAMILY MEDICINE CLINIC | Facility: CLINIC | Age: 64
End: 2024-04-09

## 2024-04-09 NOTE — TELEPHONE ENCOUNTER
..Received fax from pharmacy for Prior Authorization.    Medication: Ozempic (0.25 or 0.5 MG/DOSE) 2MG/3ML Pen injectors     Key: [id number]

## 2024-04-12 NOTE — TELEPHONE ENCOUNTER
PA for Ozempic 0.25 or 0.5 mg    Submitted via    [x]CMM-KEY  [id number]  []Surescripts-Case ID #   []Faxed to plan   []Other website   []Phone call Case ID #     Office notes sent, clinical questions answered. Awaiting determination    Turnaround time for your insurance to make a decision on your Prior Authorization can take 7-21 business days.

## 2024-04-12 NOTE — TELEPHONE ENCOUNTER
PA for Ozempic not required     Reason (screenshot if applicable)          Message sent to provider pool no

## 2024-05-09 DIAGNOSIS — E11.9 TYPE 2 DIABETES MELLITUS WITHOUT COMPLICATION, WITHOUT LONG-TERM CURRENT USE OF INSULIN (HCC): ICD-10-CM

## 2024-07-01 DIAGNOSIS — E78.2 MIXED HYPERLIPIDEMIA: ICD-10-CM

## 2024-07-01 DIAGNOSIS — I10 BENIGN HYPERTENSION: ICD-10-CM

## 2024-07-01 DIAGNOSIS — E11.9 TYPE 2 DIABETES MELLITUS WITHOUT COMPLICATION, WITHOUT LONG-TERM CURRENT USE OF INSULIN (HCC): ICD-10-CM

## 2024-07-02 RX ORDER — LOSARTAN POTASSIUM 50 MG/1
50 TABLET ORAL DAILY
Qty: 90 TABLET | Refills: 1 | Status: SHIPPED | OUTPATIENT
Start: 2024-07-02

## 2024-07-02 RX ORDER — AMLODIPINE BESYLATE 5 MG/1
5 TABLET ORAL DAILY
Qty: 90 TABLET | Refills: 1 | Status: SHIPPED | OUTPATIENT
Start: 2024-07-02

## 2024-07-02 RX ORDER — ATORVASTATIN CALCIUM 10 MG/1
10 TABLET, FILM COATED ORAL DAILY
Qty: 90 TABLET | Refills: 1 | Status: SHIPPED | OUTPATIENT
Start: 2024-07-02

## 2024-07-03 DIAGNOSIS — E11.9 TYPE 2 DIABETES MELLITUS WITHOUT COMPLICATION, WITHOUT LONG-TERM CURRENT USE OF INSULIN (HCC): ICD-10-CM

## 2024-07-09 ENCOUNTER — HOSPITAL ENCOUNTER (OUTPATIENT)
Dept: RADIOLOGY | Facility: HOSPITAL | Age: 64
Discharge: HOME | End: 2024-07-09
Attending: INTERNAL MEDICINE
Payer: COMMERCIAL

## 2024-07-09 DIAGNOSIS — C34.91 ADENOCARCINOMA OF LUNG, STAGE 4, RIGHT (HCC): ICD-10-CM

## 2024-07-09 PROCEDURE — 71250 CT THORAX DX C-: CPT

## 2024-07-11 ENCOUNTER — TELEPHONE (OUTPATIENT)
Dept: HEMATOLOGY ONCOLOGY | Facility: CLINIC | Age: 64
End: 2024-07-11

## 2024-07-11 DIAGNOSIS — C34.91 ADENOCARCINOMA OF LUNG, STAGE 4, RIGHT (HCC): Primary | ICD-10-CM

## 2024-07-11 NOTE — TELEPHONE ENCOUNTER
CT scan reviewed by Dr Zhang  Pulmonology consult ordered  Patient aware that pulmonology will be calling patient to schedule an appt  Patient will call [facility] if appt not made by Monday  Patient aware of plan

## 2024-07-26 ENCOUNTER — DOCUMENTATION (OUTPATIENT)
Dept: HEMATOLOGY ONCOLOGY | Facility: CLINIC | Age: 64
End: 2024-07-26

## 2024-07-26 ENCOUNTER — CONSULT (OUTPATIENT)
Dept: PULMONOLOGY | Facility: MEDICAL CENTER | Age: 64
End: 2024-07-26
Payer: MEDICARE

## 2024-07-26 VITALS
BODY MASS INDEX: 30.6 KG/M2 | HEIGHT: 66 IN | DIASTOLIC BLOOD PRESSURE: 84 MMHG | RESPIRATION RATE: 12 BRPM | HEART RATE: 92 BPM | SYSTOLIC BLOOD PRESSURE: 138 MMHG | WEIGHT: 190.4 LBS

## 2024-07-26 DIAGNOSIS — C34.91 NON-SMALL CELL CANCER OF RIGHT LUNG (HCC): ICD-10-CM

## 2024-07-26 DIAGNOSIS — C34.91 ADENOCARCINOMA OF LUNG, STAGE 4, RIGHT (HCC): Primary | ICD-10-CM

## 2024-07-26 PROCEDURE — 99204 OFFICE O/P NEW MOD 45 MIN: CPT | Performed by: STUDENT IN AN ORGANIZED HEALTH CARE EDUCATION/TRAINING PROGRAM

## 2024-07-26 NOTE — PROGRESS NOTES
In-basket message received from Dr. Orosco to add patient to the next available thoracic [facility] on 8/5/2024. Chart reviewed and prep completed.

## 2024-07-26 NOTE — PROGRESS NOTES
"    Consultation - Pulmonary Medicine   Jeannie Hoover 63 y.o. female MRN: [id number]      Reason for Consult: Lung Nodule    Jeannie Hoover is a 63 y.o. female with a PMH of Tobacco abuse, Lung Cancer('15 - Met Brain - Carboplatin, Paclitaxel, Avastin and PD-L1), HTN, Allergic Rhinitis who presents due to GGN that is growing.    Ground Glass Nodule - Slow growing since 2022 - Highest concerns for AIS. Plan to discuss nodule at tumor board for excision vs biopsy.  - PFTs with TRUDY Orosco MD  [facility]    _____________________________________________________________________    HPI:    Jeannie Hoover is a 63 y.o. 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  2019  Spirometry Interpretation:  Spirometry is within normal limits.     Flow Volume Loops:  Flow-Volume loops are normal.     Lung Volumes:  Plethysmographic lung volumes are within normal limits.     Diffusing Capacity:  Minimal reduction in diffusion capacity likely clinically insignificant    Imaging:  I personally reviewed the images on the PAC system pertinent to today's visit  CT Chest  1. The right upper lobe groundglass opacity has shown a gradual increase in size and density. Therefore, this could remains most concerning for adenocarcinoma spectrum lesion and tissue sampling is recommended.  2. The more superior opacity within the right upper lobe remains stable.       PhysicalExamination:  Vitals:   /84 (BP Location: Left arm, Patient Position: Sitting, Cuff Size: Extra-Large)   Pulse 92   Resp 12   Ht 5' 6\" (1.676 m)   Wt 86.4 kg (190 lb " 6.4 oz)   LMP  (LMP Unknown)   BMI 30.73 kg/m²     Appearance -- NAD, speaking full sentences  Neuro -- A&Ox3  Neck -- no JVD  Heart -- RRR, no murmurs  Lungs -- CTAB  Abdomen -- soft, NTND  Extremities -- WWP, no LE edema  Skin -- no rash    Review of Systems:  Aside from what is mentioned in the HPI, the review of systems otherwise negative.    Immunization History   Administered Date(s) Administered    COVID-19 MODERNA VACC 0.25 ML IM BOOSTER 11/12/2021    COVID-19 MODERNA VACC 0.5 ML IM 04/14/2021, 05/17/2021    Influenza, injectable, quadrivalent, preservative free 0.5 mL 10/05/2023    Influenza, recombinant, quadrivalent,injectable, preservative free 09/06/2018, 10/29/2019    Influenza, seasonal, injectable 10/18/2010, 02/19/2018    Pneumococcal Conjugate Vaccine 20-valent (Pcv20), Polysace 04/05/2023    Tdap 03/24/2010, 03/02/2021    Zoster Vaccine Recombinant 04/04/2022, 10/04/2022        Current Medications:    Current Outpatient Medications:     amLODIPine (NORVASC) 5 mg tablet, Take 1 tablet (5 mg total) by mouth daily, Disp: 90 tablet, Rfl: 1    atorvastatin (LIPITOR) 10 mg tablet, Take 1 tablet (10 mg total) by mouth daily, Disp: 90 tablet, Rfl: 1    b complex vitamins capsule, Take 1 capsule by mouth daily Plus folic acid and vitamin c, Disp: , Rfl:     cholecalciferol (VITAMIN D3) 1,000 units tablet, Take 1,000 Units by mouth daily, Disp: , Rfl:     halobetasol (ULTRAVATE) 0.05 % cream, Apply topically 2 (two) times a day (Patient taking differently: Apply topically 2 (two) times a day As needed), Disp: 50 g, Rfl: 0    Lactobacillus (ACIDOPHILUS PO), Take by mouth, Disp: , Rfl:     losartan (COZAAR) 50 mg tablet, Take 1 tablet (50 mg total) by mouth daily, Disp: 90 tablet, Rfl: 1    magnesium 30 MG tablet, Take 30 mg by mouth in the morning, Disp: , Rfl:     metFORMIN (GLUCOPHAGE) 500 mg tablet, Take 1 tablet (500 mg total) by mouth daily with breakfast, Disp: 90 tablet, Rfl: 1    semaglutide, 0.25  or 0.5 mg/dose, (Ozempic, 0.25 or 0.5 MG/DOSE,) 2 mg/3 mL injection pen, Inject 0.375 mL (0.25 mg total) under the skin every 7 days 0.25 mg under the skin every 7 days Code: E11.9, Disp: 3 mL, Rfl: 0    tretinoin (RETIN-A) 0.1 % cream, Apply topically daily at bedtime, Disp: 45 g, Rfl: 2    Ascorbic Acid (vitamin C) 100 MG tablet, Take 100 mg by mouth daily, Disp: , Rfl:     Omega-3 Fatty Acids (FISH OIL PO), Take 2 tablets by mouth. (Patient not taking: Reported on 2024), Disp: , Rfl:   No current facility-administered medications for this visit.    Facility-Administered Medications Ordered in Other Visits:     alteplase (CATHFLO) injection 2 mg, 2 mg, Intracatheter, PRN, Terrell Osuna MD    sodium chloride 0.9 % infusion, 20 mL/hr, Intravenous, Continuous, Terrell Osuna MD    Historical Information   Past Medical History:   Diagnosis Date    Adenocarcinoma of right lung, stage 4 (HCC)     Allergic rhinitis     Alopecia     resolved 10/25/16    Anxiety     last assessed 10/4/16, resolved 10/25/16    Benign hypertension 2018    Benign neoplasm of skin of trunk 2008    last assessed 3/25/08    Benign neoplasm of skin of upper extremity and shoulder 2008    last assessed 3/25/08    Ear deformity, acquired     last assessed 10/13/14, resolved 3/12/15    Eczema     History of chemotherapy     Hyperlipemia     Maintenance chemotherapy     Secondary cancer of brain (HCC)     Seizures (HCC)     Vertigo      Past Surgical History:   Procedure Laterality Date    APPENDECTOMY  1964    BRAIN TUMOR EXCISION      CENTRAL VENOUS CATHETER INSERTION Right     PORTACATH    IR PORT REMOVAL  9/10/2021     Social History   Social History     Tobacco Use   Smoking Status Former    Current packs/day: 0.00    Average packs/day: 1 pack/day for 47.0 years (47.0 ttl pk-yrs)    Types: Cigarettes    Start date:     Quit date:     Years since quittin.5    Passive exposure: Never   Smokeless Tobacco Never  "      Family History:   Family History   Problem Relation Age of Onset    Diabetes Mother     Heart disease Mother     Lung cancer Father 70        Smoker     Uterine cancer Paternal Grandmother     Pancreatitis Brother     No Known Problems Brother                  Diagnostic Data:  Labs:  I personally reviewed the most recent laboratory data pertinent to today's visit    Lab Results   Component Value Date    WBC 8.1 04/02/2024    HGB 14.3 04/02/2024    HCT 41.6 04/02/2024    MCV 92 04/02/2024     04/02/2024     Lab Results   Component Value Date    GLUCOSE 108 (H) 05/23/2017    CALCIUM 9.5 05/23/2017     05/23/2017    K 4.1 04/02/2024    CO2 20 04/02/2024    CL 99 04/02/2024    BUN 11 04/02/2024    CREATININE 0.95 04/02/2024     No results found for: \"IGE\"  Lab Results   Component Value Date    ALT 79 (H) 04/02/2024    AST 54 (H) 04/02/2024    ALKPHOS 90 05/23/2017    BILITOT 0.5 05/23/2017           I have spent a total time of 45 minutes on 07/26/24 in caring for this patient including Diagnostic results, Prognosis, Risks and benefits of tx options, and Instructions for management.   _        "

## 2024-07-31 ENCOUNTER — DOCUMENTATION (OUTPATIENT)
Dept: HEMATOLOGY ONCOLOGY | Facility: CLINIC | Age: 64
End: 2024-07-31

## 2024-07-31 NOTE — PROGRESS NOTES
Chart reviewed in preparation of Thoracic Tumor Conference presentation by Dr. Orosco on 8/5/24. Patient with history of Stage IV metastatic NSCLC with RUL primary and a single right superior temporal intracranial metastasis status post resection. She completed adjuvant SRT to the resection cavity, 3000 cGy  in 5 fractions on 10/16/15. She has been on [facility] 46467-08 (phase III open label randomized study of [id number] [anti-PD-L1 antibody] in combination with carboplatin and paclitaxel +/- Avastin versus carboplatin and paclitaxel +/- Avastin in patients with stage IV chemotherapy naive non-small cell lung carcinoma).Patient was randomized to receive all 4 medications. She completed the 6 cycles of treatment without significant toxicities. Patient had been on maintenance (study drug (atezolizumab) and avastin) - subsequently only Avastin until 7/2019.  She has remained on surveillance.  07/09/24 CT chest shows groundglass opacity in the right upper lobe measures 2.9 x 1.4 x 1.9 cm, earlier 2.2 x 1.7 x 1.8 cm has increased in size and density in particular when compared to prior study from 9/6/2022. Density appears greater.

## 2024-08-05 ENCOUNTER — DOCUMENTATION (OUTPATIENT)
Dept: HEMATOLOGY ONCOLOGY | Facility: CLINIC | Age: 64
End: 2024-08-05

## 2024-08-05 NOTE — PROGRESS NOTES
THORACIC ONCOLOGY MULTIDISCIPLINARY CASE REVIEW    DATE:  8/5/2024    PRESENTING DOCTOR:  Dr. Orosco    DIAGNOSIS:  Hx of NSCLC ~ Adenocarcinoma  STAGING: Hx of Stage IV    Jeannie Hoover is a 63 y.o. female who was presented at the Thoracic Oncology Multidisciplinary Tumor Conference today.  She has a history of Stage IV metastatic NSCLC with RUL primary and a single right superior temporal intracranial metastasis status post resection. She completed adjuvant SRT to the resection cavity, 3000 cGy  in 5 fractions on 10/16/15. She has been on [facility] 20015-14 (phase III open label randomized study of [id number] [anti-PD-L1 antibody] in combination with carboplatin and paclitaxel +/- Avastin versus carboplatin and paclitaxel +/- Avastin in patients with stage IV chemotherapy naive non-small cell lung carcinoma).Patient was randomized to receive all 4 medications. She completed the 6 cycles of treatment without significant toxicities. Patient had been on maintenance (study drug (atezolizumab) and avastin) - subsequently only Avastin until 7/2019.  She has remained on surveillance.  07/09/24 CT chest shows groundglass opacity in the right upper lobe measures 2.9 x 1.4 x 1.9 cm, earlier 2.2 x 1.7 x 1.8 cm has increased in size and density in particular when compared to prior study from 9/6/2022. Density appears greater.      PHYSICIAN RECOMMENDED PLAN: Possible Navigational Bronchoscopy after further CT review.       Imaging reviewed:   07/09/24 CT chest wo contrast  03/01/24 CT chest wo contrast   11/08/24 PET CT    Pathology reviewed: No    PFT's reviewed: No    Future imaging: No    Referrals: No    Clinical Trials Reviewed:  No  Clinical Trial Eligibility:     Team agreed to plan.  NCCN guidelines were readily available for review at this discussion    The final treatment plan will be left to the discretion of the patient and the treating physician.     DISCLAIMERS:  TO THE TREATING PHYSICIAN:  This conference is  a meeting of clinicians from various specialty areas who evaluate and discuss patients for whom a multidisciplinary treatment approach is being considered. Please note that the above opinion was a consensus of the conference attendees and is intended only to assist in quality care of the discussed patient.  The responsibility for follow up on the input given during the conference, along with any final decisions regarding plan of care, is that of the patient and the patient's provider. Accordingly, appointments have only been recommended based on this information and have NOT been scheduled unless otherwise noted.      TO THE PATIENT:  This summary is a brief record of major aspects of your cancer treatment. You may choose to share a copy with any of your doctors or nurses. However, this is not a detailed or comprehensive record of your care.

## 2024-08-10 DIAGNOSIS — E11.9 TYPE 2 DIABETES MELLITUS WITHOUT COMPLICATION, WITHOUT LONG-TERM CURRENT USE OF INSULIN (HCC): ICD-10-CM

## 2024-09-19 ENCOUNTER — TELEPHONE (OUTPATIENT)
Dept: HEMATOLOGY ONCOLOGY | Facility: CLINIC | Age: 64
End: 2024-09-19

## 2024-09-19 NOTE — TELEPHONE ENCOUNTER
Due to provider availability, your appointment with Dr Osuna,  has been rescheduled to 11/18/24 at 10:20 am with Dr Heath at the [place] location . If you cannot make this appointment and need to reschedule, please call [phone].    Hematology Oncology

## 2024-09-23 DIAGNOSIS — E11.9 TYPE 2 DIABETES MELLITUS WITHOUT COMPLICATION, WITHOUT LONG-TERM CURRENT USE OF INSULIN (HCC): ICD-10-CM

## 2024-09-30 ENCOUNTER — RA CDI HCC (OUTPATIENT)
Dept: OTHER | Facility: HOSPITAL | Age: 64
End: 2024-09-30

## 2024-10-04 LAB
ALBUMIN SERPL-MCNC: 4.5 G/DL (ref 3.9–4.9)
ALBUMIN/CREAT UR: 14 MG/G CREAT (ref 0–29)
ALP SERPL-CCNC: 65 IU/L (ref 44–121)
ALT SERPL-CCNC: 42 IU/L (ref 0–32)
AST SERPL-CCNC: 30 IU/L (ref 0–40)
BILIRUB SERPL-MCNC: 0.5 MG/DL (ref 0–1.2)
BUN SERPL-MCNC: 15 MG/DL (ref 8–27)
BUN/CREAT SERPL: 17 (ref 12–28)
CALCIUM SERPL-MCNC: 9.4 MG/DL (ref 8.7–10.3)
CHLORIDE SERPL-SCNC: 97 MMOL/L (ref 96–106)
CHOLEST SERPL-MCNC: 180 MG/DL (ref 100–199)
CO2 SERPL-SCNC: 21 MMOL/L (ref 20–29)
CREAT SERPL-MCNC: 0.86 MG/DL (ref 0.57–1)
CREAT UR-MCNC: 39.7 MG/DL
EGFR: 75 ML/MIN/1.73
ERYTHROCYTE [DISTWIDTH] IN BLOOD BY AUTOMATED COUNT: 12.2 % (ref 11.7–15.4)
EST. AVERAGE GLUCOSE BLD GHB EST-MCNC: 143 MG/DL
GLOBULIN SER-MCNC: 2.5 G/DL (ref 1.5–4.5)
GLUCOSE SERPL-MCNC: 115 MG/DL (ref 70–99)
HBA1C MFR BLD: 6.6 % (ref 4.8–5.6)
HCT VFR BLD AUTO: 40.2 % (ref 34–46.6)
HDLC SERPL-MCNC: 48 MG/DL
HGB BLD-MCNC: 13.7 G/DL (ref 11.1–15.9)
LDLC SERPL CALC-MCNC: 109 MG/DL (ref 0–99)
LDLC/HDLC SERPL: 2.3 RATIO (ref 0–3.2)
MCH RBC QN AUTO: 30.9 PG (ref 26.6–33)
MCHC RBC AUTO-ENTMCNC: 34.1 G/DL (ref 31.5–35.7)
MCV RBC AUTO: 91 FL (ref 79–97)
MICROALBUMIN UR-MCNC: 5.5 UG/ML
MICRODELETION SYND BLD/T FISH: NORMAL
PLATELET # BLD AUTO: 388 X10E3/UL (ref 150–450)
POTASSIUM SERPL-SCNC: 4.1 MMOL/L (ref 3.5–5.2)
PROT SERPL-MCNC: 7 G/DL (ref 6–8.5)
RBC # BLD AUTO: 4.44 X10E6/UL (ref 3.77–5.28)
SL AMB VLDL CHOLESTEROL CALC: 23 MG/DL (ref 5–40)
SODIUM SERPL-SCNC: 134 MMOL/L (ref 134–144)
TRIGL SERPL-MCNC: 131 MG/DL (ref 0–149)
VIT B12 SERPL-MCNC: 915 PG/ML (ref 232–1245)
WBC # BLD AUTO: 8.9 X10E3/UL (ref 3.4–10.8)

## 2024-10-07 ENCOUNTER — OFFICE VISIT (OUTPATIENT)
Dept: FAMILY MEDICINE CLINIC | Facility: CLINIC | Age: 64
End: 2024-10-07
Payer: COMMERCIAL

## 2024-10-07 VITALS
SYSTOLIC BLOOD PRESSURE: 132 MMHG | BODY MASS INDEX: 30.44 KG/M2 | WEIGHT: 189.4 LBS | HEIGHT: 66 IN | HEART RATE: 100 BPM | TEMPERATURE: 98.6 F | DIASTOLIC BLOOD PRESSURE: 82 MMHG | RESPIRATION RATE: 17 BRPM

## 2024-10-07 DIAGNOSIS — E78.2 MIXED HYPERLIPIDEMIA: ICD-10-CM

## 2024-10-07 DIAGNOSIS — C34.91 ADENOCARCINOMA OF LUNG, STAGE 4, RIGHT (HCC): ICD-10-CM

## 2024-10-07 DIAGNOSIS — L98.8 WRINKLES: ICD-10-CM

## 2024-10-07 DIAGNOSIS — E11.9 TYPE 2 DIABETES MELLITUS WITHOUT COMPLICATION, WITHOUT LONG-TERM CURRENT USE OF INSULIN (HCC): Primary | ICD-10-CM

## 2024-10-07 DIAGNOSIS — J06.9 VIRAL UPPER RESPIRATORY TRACT INFECTION: ICD-10-CM

## 2024-10-07 DIAGNOSIS — I10 BENIGN HYPERTENSION: ICD-10-CM

## 2024-10-07 LAB
LEFT EYE DIABETIC RETINOPATHY: NORMAL
LEFT EYE IMAGE QUALITY: NORMAL
LEFT EYE MACULAR EDEMA: NORMAL
LEFT EYE OTHER RETINOPATHY: NORMAL
RIGHT EYE DIABETIC RETINOPATHY: NORMAL
RIGHT EYE IMAGE QUALITY: NORMAL
RIGHT EYE MACULAR EDEMA: NORMAL
RIGHT EYE OTHER RETINOPATHY: NORMAL
SARS-COV-2 AG UPPER RESP QL IA: NEGATIVE
SEVERITY (EYE EXAM): NORMAL
VALID CONTROL: NORMAL

## 2024-10-07 PROCEDURE — 99214 OFFICE O/P EST MOD 30 MIN: CPT | Performed by: FAMILY MEDICINE

## 2024-10-07 PROCEDURE — 92250 FUNDUS PHOTOGRAPHY W/I&R: CPT | Performed by: FAMILY MEDICINE

## 2024-10-07 PROCEDURE — 87811 SARS-COV-2 COVID19 W/OPTIC: CPT | Performed by: FAMILY MEDICINE

## 2024-10-07 RX ORDER — TRETINOIN 1 MG/G
CREAM TOPICAL
Qty: 45 G | Refills: 2 | Status: SHIPPED | OUTPATIENT
Start: 2024-10-07

## 2024-10-07 RX ORDER — ATORVASTATIN CALCIUM 10 MG/1
10 TABLET, FILM COATED ORAL DAILY
Qty: 90 TABLET | Refills: 1 | Status: SHIPPED | OUTPATIENT
Start: 2024-10-07

## 2024-10-07 RX ORDER — AMLODIPINE BESYLATE 5 MG/1
5 TABLET ORAL DAILY
Qty: 90 TABLET | Refills: 1 | Status: SHIPPED | OUTPATIENT
Start: 2024-10-07

## 2024-10-07 RX ORDER — LOSARTAN POTASSIUM 50 MG/1
50 TABLET ORAL DAILY
Qty: 90 TABLET | Refills: 1 | Status: SHIPPED | OUTPATIENT
Start: 2024-10-07

## 2024-10-07 NOTE — ASSESSMENT & PLAN NOTE
Stable  Continue atorvastatin 10 mg daily  Orders:    atorvastatin (LIPITOR) 10 mg tablet; Take 1 tablet (10 mg total) by mouth daily    Comprehensive metabolic panel; Future    Lipid Panel with Direct LDL reflex; Future    Comprehensive metabolic panel    Lipid Panel with Direct LDL reflex

## 2024-10-07 NOTE — ASSESSMENT & PLAN NOTE
Well-controlled  Continue losartan 50 mg daily and amlodipine 5 mg daily  Orders:    amLODIPine (NORVASC) 5 mg tablet; Take 1 tablet (5 mg total) by mouth daily    losartan (COZAAR) 50 mg tablet; Take 1 tablet (50 mg total) by mouth daily    CBC; Future    Comprehensive metabolic panel; Future    Lipid Panel with Direct LDL reflex; Future    CBC    Comprehensive metabolic panel    Lipid Panel with Direct LDL reflex

## 2024-10-07 NOTE — ASSESSMENT & PLAN NOTE
Diabetes is well-controlled  Continue metformin 500 mg daily and Ozempic 0.5 mg weekly  Lab Results   Component Value Date    HGBA1C 6.6 (H) 10/03/2024       Orders:    metFORMIN (GLUCOPHAGE) 500 mg tablet; Take 1 tablet (500 mg total) by mouth daily with breakfast    semaglutide, 0.25 or 0.5 mg/dose, (Ozempic, 0.25 or 0.5 MG/DOSE,) 2 mg/3 mL injection pen; Inject 0.75 mL (0.5 mg total) under the skin every 7 days 0.25 mg under the skin every 7 days Code: E11.9    Hemoglobin A1C; Future    Albumin / creatinine urine ratio; Future    Hemoglobin A1C    Albumin / creatinine urine ratio    IRIS Diabetic eye exam

## 2024-10-07 NOTE — ASSESSMENT & PLAN NOTE
Needs follow-up CT scan  Will get it done in January and will follow-up with surgeon  Orders:    CT chest wo contrast; Future

## 2024-10-08 ENCOUNTER — VBI (OUTPATIENT)
Dept: ADMINISTRATIVE | Facility: OTHER | Age: 64
End: 2024-10-08

## 2024-10-08 NOTE — TELEPHONE ENCOUNTER
10/08/24 3:35 PM     Chart reviewed for Diabetic Eye Exam was/were submitted to the patient's insurance.     Emily Samaniego MA   [facility]

## 2024-10-21 ENCOUNTER — TELEPHONE (OUTPATIENT)
Dept: PULMONOLOGY | Facility: MEDICAL CENTER | Age: 64
End: 2024-10-21

## 2024-11-13 ENCOUNTER — DOCUMENTATION (OUTPATIENT)
Dept: HEMATOLOGY ONCOLOGY | Facility: CLINIC | Age: 64
End: 2024-11-13

## 2024-11-13 NOTE — PROGRESS NOTES
All records needed are in patients chart. No records retrieval needed at this time.     Referral received/ Chart reviewed for work up completed for thoracic surgery referral.     Imaging completed:    [x] PET/CT 11/08/23   [] MRI   [x] CT 07/09/24 CT chest wo contrat    [] US   [] Mammo   [] Bone scan   [] N/A    Pathology completed: [facility]    Date: 09/06/15   Location: right temporal mass   []N/A    Additional records needed:   [] Genomic report   [] Genetic testing results   [] Office Note   [] Procedure/ Operative note   [] Lab results   [] N/A      [] Radiation Oncology records retrieval needed (PN to route to rad/onc clerical pool once scheduled)  Date:  Location:

## 2024-11-15 ENCOUNTER — TELEPHONE (OUTPATIENT)
Dept: CARDIAC SURGERY | Facility: CLINIC | Age: 64
End: 2024-11-15

## 2024-11-15 DIAGNOSIS — C34.90 NON-SMALL CELL LUNG CANCER, UNSPECIFIED LATERALITY (HCC): Primary | ICD-10-CM

## 2024-11-15 NOTE — TELEPHONE ENCOUNTER
Called patient back regarding earlier communication. Patient's long-time medical oncologist Dr. Osuna is returning and she has an upcoming appointment with him on 12/31/24 following chest CT on 12/27/24. Patient had re-circulated through our referrals in this process and her most recent chest CT from July 2024 was reviewed for which Dr. Hernandez has offered her repeat chest CT and biopsy. Patient reports she is not quite sure how to proceed and if there is bad news would overall prefer to hear this from Dr. Osuna or at least have his opinion involved. Patient is agreeable to move imaging up if needed but for the current time we have decided to keep appointments and imaging as scheduled.

## 2024-11-15 NOTE — TELEPHONE ENCOUNTER
Call placed to patient as follow up to discussion that Leny King had with Dr. Hernandez regarding patient.  Patient has CT chest scheduled for 12/27/24 and follow up with Dr. Hernandez 01/08/25.  Advised patient that Dr. Hernandez would like to move CT chest up and schedule patient for Navigational Bronchoscopy on 11/25/24.  Patient expressed concern and became emotional over change in timeline.  Patient is requesting to speak with provider to further understand the change in timeline from discussion after Tumor Board on 08/05/24.  Advised patient that I would relay the message to the provider.  Patient acknowledged understanding and will await follow up call.

## 2024-11-18 DIAGNOSIS — R05.1 ACUTE COUGH: Primary | ICD-10-CM

## 2024-11-18 RX ORDER — AMOXICILLIN 875 MG/1
875 TABLET, COATED ORAL 2 TIMES DAILY
Qty: 14 TABLET | Refills: 0 | Status: SHIPPED | OUTPATIENT
Start: 2024-11-18 | End: 2024-11-25

## 2024-12-27 ENCOUNTER — HOSPITAL ENCOUNTER (OUTPATIENT)
Dept: RADIOLOGY | Facility: HOSPITAL | Age: 64
Discharge: HOME | End: 2024-12-27
Payer: MEDICARE

## 2024-12-27 ENCOUNTER — RESULTS FOLLOW-UP (OUTPATIENT)
Dept: FAMILY MEDICINE CLINIC | Facility: CLINIC | Age: 64
End: 2024-12-27

## 2024-12-27 DIAGNOSIS — C34.91 ADENOCARCINOMA OF LUNG, STAGE 4, RIGHT (HCC): ICD-10-CM

## 2024-12-27 PROCEDURE — 71250 CT THORAX DX C-: CPT

## 2024-12-27 NOTE — TELEPHONE ENCOUNTER
12/27/2024 2:00 PM Called Jeannie regarding her abnormal chest CT results.    The spot in your right upper lung has increased in size from 2.3 to 2.4 cm  They are recommending a biopsy.  I would like you to follow-up with pulmonology      Message left on voice mail to check her mychart  Flor Mckeon, DO

## 2024-12-31 ENCOUNTER — OFFICE VISIT (OUTPATIENT)
Dept: HEMATOLOGY ONCOLOGY | Facility: MEDICAL CENTER | Age: 64
End: 2024-12-31
Payer: MEDICARE

## 2024-12-31 VITALS
DIASTOLIC BLOOD PRESSURE: 80 MMHG | OXYGEN SATURATION: 98 % | BODY MASS INDEX: 30.53 KG/M2 | HEIGHT: 66 IN | HEART RATE: 107 BPM | SYSTOLIC BLOOD PRESSURE: 134 MMHG | TEMPERATURE: 98.1 F | RESPIRATION RATE: 15 BRPM | WEIGHT: 190 LBS

## 2024-12-31 DIAGNOSIS — C34.91 ADENOCARCINOMA OF LUNG, STAGE 4, RIGHT (HCC): Primary | ICD-10-CM

## 2024-12-31 PROCEDURE — 99215 OFFICE O/P EST HI 40 MIN: CPT | Performed by: INTERNAL MEDICINE

## 2024-12-31 NOTE — PROGRESS NOTES
Jeannie Hoover  1960  [facility] [address] [id number]    DISCUSSION.SUMMARY:    64 year-old female with M1 non-small cell lung carcinoma/adenocarcinoma. [id number] (phase III open label randomized study of [id number] [anti-PD-L1 antibody] in combination with carboplatin and paclitaxel +/- Avastin versus carboplatin and paclitaxel +/- Avastin in patients with stage IV chemotherapy naive non-small cell lung carcinoma).Patient was randomized to receive all 4 medications. Mrs. Hoover completed the 6 cycles of treatment without significant toxicities. Patient had been on maintenance (study drug (atezolizumab) and avastin) - subsequently only Avastin.  More recently, patient has been on surveillance only.  Issues:     Stage IV non-small cell lung carcinoma.  Patient was diagnosed approximately 9 years ago.  Recent CAT scans of the chest have demonstrated continued increase in size and attenuation of a groundglass nodular opacity in the right upper lobe.  There is an adjacent more solid-appearing component.  Other findings are stable.  Patient has a pending appointment with thoracic surgery in approximately 1 week.  The obvious concern is recurrence, new tumor, another malignancy etc. Physically patient feels well.    Patient was given a 4-week follow-up with this may change depending upon the above.  The obvious question is biopsy.    Brain metastases status post resection and radiotherapy. Patient has been tapered off of Keppra.  There have been no recent CNS issues.  Patient has been followed by radiation oncology.    Routine health maintenance and medical care.  Patient has an appointment with Dr. Mckeon tomorrow.    Patient is to return in 4 weeks but this may change depending upon the above.  Mrs. Hoover knows to call the hematology/oncology office if there are any other questions or  concerns.    Carefully review your medication list and verify that the list is accurate and up-to-date. Please call the hematology/oncology office if there are medications missing from the list, medications on the list that you are not currently taking or if there is a dosage or instruction that is different from how you're taking that medication.    Patient goals and areas of care: See thoracic surgery  Barriers to care:  None  Patient is able to self-care  __________________________________________________________________________________________________    Chief Complaint   Patient presents with    Follow-up    Static non-small cell lung carcinoma     Advance Care Planning/Advance Directives: not discussed    Oncology History   Adenocarcinoma of lung, stage 4, right (HCC)   9/5/2015 Initial Diagnosis    Patient was referred to the emergency room for evaluation of [facility]igo as [facility]. (patient reported.)  Patient underwent the following pertinent imaging scans.  MRI of the brain demonstrated a mass in the right superior all temporal gyrus with measurements of 4.2 x 3.3 x 3.6 cm with vasogenic edema and mass effect.  There was a near uncal herniation noted.   CT of the chest and abdomen/pelvis demonstrated a necrotic right upper lobe mass measuring 7 x 4.2 x 4.1 cm strongly suspicious for bronchogenic carcinoma.  The mass contacts the posterior medial pleural surface and potentially contacts the right posterior tracheal border.  No pathologic adenopathy was identified in no evidence of metastatic disease in the chest abdomen or pelvis.       9/6/2015 Surgery    Non-small cell carcinoma of lung (HCC) diagnosed from biopsy of right temporal mass. Pathology demonstrates poorly differentiated non-small cell carcinoma.  Pathologist stated that the immunoprofile supported the primary lung non-small cell carcinoma favor adenocarcinoma.  Squamous carcinoma could not be excluded, secondary to focal P 40  staining.  [facility] path report demonstrated no ALK gene rearrangement or dleation and negative ROS1 rearrangement, No EGFR mutations were found.     Surgery completed by Dr. Parsons.      10/7/2015 - 10/16/2015 Radiation    A dose of 3000 cGy in 5 fractions delivered every other day was delivered to the resection cavity (single right superior temporal intracranial metastasis) Dr Hernández     10/20/2015 -  Research Study Participant    [id number] Phase III open label randomized study of [id number] [Anti-PDL1 antibody] in combination with carboplatin and paclitaxel +/- Avastin versus Carboplatin and Paciltaxel +/- Avastin in patients with stage IV Chemotherapy  Naive non-small cell lung carcinoma.  Patient was randomized to receive off for medications.     11/30/2015 - 2/18/2016 Chemotherapy    Started Carboplatin, Paclitaxel, Avastin and PD-L1 (atezolizumab; study drug).  Completed 6 cycles with cycle 6 day 1 on 2/18/16     3/10/2016 -  Chemotherapy    Beginning cycle 7, maintenance cycle with Avastin and PD-L1 (Atezolizumab; study drug)     5/24/2017 Adverse Reaction    LFT elevation-persisted.  Patient was taken off of study drug Atezolizumab, but continues on Avastin maintance.     5/23/2019 - 7/3/2019 Chemotherapy    [No matching medication found in this treatment plan]       History of Present Illness: 64 year old female recently diagnosed with IV lung cancer here for followup. Mrs. Hoover began to have headaches last spring 2015. Patient was seen by her PCP and treated with antibiotics. Initially the symptoms got better the patient went on vacation. After returning from vacation, Mrs. Hoover once again developed headaches. Patient was treated with a second round of antibiotics and then was diagnosed with vertigo. [facility]. Although the specifics are not entirely clear, the therapist in the [facility] sent the patient to the emergency room. A CAT scan of the brain demonstrated  a brain lesion and patient was transferred to [place]. Patient was seen by Dr. Parsons and underwent resection demonstrating adenocarcinoma. Additional testing demonstrated a lung mass. Patient improved and was discharged. Patient completed SRT with Dr. Hickey and Dr. Hernández.      Patient has been on [facility] [id number] (phase III open label randomized study of [id number] [anti-PD-L1 antibody] in combination with carboplatin and paclitaxel +/- Avastin versus carboplatin and paclitaxel +/- Avastin in patients with stage IV chemotherapy naÃ¯ve non-small cell lung carcinoma). Mrs. Hoover was randomized to receive the anti-PD-L1 antibody with chemotherapy - patient completed 6 cycles and was placed on maintenance.      Mrs. Lam subsequently (years ago) suffered a tonic-clonic seizure and was seen in the emergency room. CAT scan of the head did not demonstrate any evidence of disease progression. The seizure was thought to be due to encephalomalacia. Since that time, there have been no seizure issues. Mrs. Hoover had been on Keppra - this has been tapered off.  After long discussions and consultation with the thoracic Working group, patient was taken off of all treatments approximately 6+ years ago.  The plan has been surveillance.  Patient returns for follow-up.    Mrs. Hoover states feeling physically well.  No shortness of breath or dyspnea on exertion, no chest pain or pressure.  No cough, sputum or hemoptysis.  Patient has gained some weight.  No headaches, blurred vision or dizziness, activities are baseline.  Routine health maintenance and medical care is up-to-date.  Patient was treated for a URI a few weeks ago.    Review of Systems   Constitutional:  Negative for fatigue.   HENT: Negative.     Eyes: Negative.    Respiratory: Negative.     Cardiovascular: Negative.    Gastrointestinal: Negative.    Endocrine: Negative.    Genitourinary: Negative.    Musculoskeletal:  Positive for arthralgias. Negative  for neck pain.   Skin: Negative.    Allergic/Immunologic: Negative.    Neurological: Negative.    Hematological:  Does not bruise/bleed easily.   Psychiatric/Behavioral: Negative.     All other systems reviewed and are negative.     Patient Active Problem List   Diagnosis    Adenocarcinoma of lung, stage 4, right (HCC)    Mixed hyperlipidemia    Allergic rhinitis    Brain metastasis    Brain tumor (HCC)    Type 2 diabetes mellitus without complication, without long-term current use of insulin (HCC)    Insomnia    Lesion of temporal lobe    Non-small cell lung cancer (HCC)    Benign hypertension    Chronic maxillary sinusitis     Past Medical History:   Diagnosis Date    Adenocarcinoma of right lung, stage 4 (HCC)     Allergic rhinitis     Alopecia     resolved 10/25/16    Anxiety     last assessed 10/4/16, resolved 10/25/16    Benign hypertension 2/1/2018    Benign neoplasm of skin of trunk 03/25/2008    last assessed 3/25/08    Benign neoplasm of skin of upper extremity and shoulder 03/25/2008    last assessed 3/25/08    Ear deformity, acquired     last assessed 10/13/14, resolved 3/12/15    Eczema     History of chemotherapy     Hyperlipemia     Maintenance chemotherapy     Secondary cancer of brain (HCC)     Seizures (HCC)     Vertigo      Past Surgical History:   Procedure Laterality Date    APPENDECTOMY  1964    BRAIN TUMOR EXCISION      CENTRAL VENOUS CATHETER INSERTION Right     PORTACATH    IR PORT REMOVAL  9/10/2021     Family History   Problem Relation Age of Onset    Diabetes Mother     Heart disease Mother     Lung cancer Father 70        Smoker     Uterine cancer Paternal Grandmother     Pancreatitis Brother     No Known Problems Brother      Social History     Socioeconomic History    Marital status: /Civil Union     Spouse name: Not on file    Number of children: 1    Years of education: Not on file    Highest education level: Not on file   Occupational History    Not on file   Tobacco Use     Smoking status: Former     Current packs/day: 0.00     Average packs/day: 1 pack/day for 47.0 years (47.0 ttl pk-yrs)     Types: Cigarettes     Start date: 1968     Quit date: 2015     Years since quitting: 10.0     Passive exposure: Never    Smokeless tobacco: Never   Vaping Use    Vaping status: Never Used   Substance and Sexual Activity    Alcohol use: Yes     Comment: occasionally / Social     Drug use: Yes     Types: Marijuana    Sexual activity: Yes   Other Topics Concern    Not on file   Social History Narrative    High school or GED     Lives independently with spouse      Social Drivers of Health     Financial Resource Strain: Not on file   Food Insecurity: No Food Insecurity (3/29/2024)    Nursing - Inadequate Food Risk Classification     Worried About Running Out of Food in the Last Year: Never true     Ran Out of Food in the Last Year: Never true     Ran Out of Food in the Last Year: Not on file   Transportation Needs: No Transportation Needs (3/29/2024)    PRAPARE - Transportation     Lack of Transportation (Medical): No     Lack of Transportation (Non-Medical): No   Physical Activity: Not on file   Stress: Not on file   Social Connections: Not on file   Intimate Partner Violence: Not on file   Housing Stability: Low Risk  (3/29/2024)    Housing Stability Vital Sign     Unable to Pay for Housing in the Last Year: No     Number of Times Moved in the Last Year: 1     Homeless in the Last Year: No       Current Outpatient Medications:     amLODIPine (NORVASC) 5 mg tablet, Take 1 tablet (5 mg total) by mouth daily, Disp: 90 tablet, Rfl: 1    Ascorbic Acid (vitamin C) 100 MG tablet, Take 100 mg by mouth daily, Disp: , Rfl:     atorvastatin (LIPITOR) 10 mg tablet, Take 1 tablet (10 mg total) by mouth daily, Disp: 90 tablet, Rfl: 1    b complex vitamins capsule, Take 1 capsule by mouth daily Plus folic acid and vitamin c, Disp: , Rfl:     cholecalciferol (VITAMIN D3) 1,000 units tablet, Take 1,000 Units by  mouth daily, Disp: , Rfl:     Lactobacillus (ACIDOPHILUS PO), Take by mouth, Disp: , Rfl:     losartan (COZAAR) 50 mg tablet, Take 1 tablet (50 mg total) by mouth daily, Disp: 90 tablet, Rfl: 1    magnesium 30 MG tablet, Take 30 mg by mouth in the morning, Disp: , Rfl:     metFORMIN (GLUCOPHAGE) 500 mg tablet, Take 1 tablet (500 mg total) by mouth daily with breakfast, Disp: 90 tablet, Rfl: 1    semaglutide, 0.25 or 0.5 mg/dose, (Ozempic, 0.25 or 0.5 MG/DOSE,) 2 mg/3 mL injection pen, Inject 0.75 mL (0.5 mg total) under the skin every 7 days 0.25 mg under the skin every 7 days Code: E11.9, Disp: 9 mL, Rfl: 3    tretinoin (RETIN-A) 0.1 % cream, Apply topically daily at bedtime, Disp: 45 g, Rfl: 2    halobetasol (ULTRAVATE) 0.05 % cream, Apply topically 2 (two) times a day (Patient taking differently: Apply topically 2 (two) times a day As needed), Disp: 50 g, Rfl: 0    Omega-3 Fatty Acids (FISH OIL PO), Take 2 tablets by mouth. (Patient not taking: Reported on 4/5/2024), Disp: , Rfl:   No current facility-administered medications for this visit.    Facility-Administered Medications Ordered in Other Visits:     alteplase (CATHFLO) injection 2 mg, 2 mg, Intracatheter, PRN, Terrell Osuna MD    sodium chloride 0.9 % infusion, 20 mL/hr, Intravenous, Continuous, Terrell Osuna MD    Allergies   Allergen Reactions    Iodinated Contrast Media Anaphylaxis and Itching    Clindamycin     Clindamycin/Lincomycin        Vitals:    12/31/24 0829   BP: 134/80   Pulse: (!) 107   Resp: 15   Temp: 98.1 °F (36.7 °C)   SpO2: 98%     Physical Exam  Constitutional:       Appearance: She is well-developed.      Comments: Well-nourished female, no respiratory distress   HENT:      Head: Normocephalic and atraumatic.      Right Ear: External ear normal.      Left Ear: External ear normal.      Nose: Nose normal.      Mouth/Throat:      Pharynx: No oropharyngeal exudate.   Eyes:      Conjunctiva/sclera: Conjunctivae normal.      Pupils: Pupils  are equal, round, and reactive to light.   Neck:      Comments: Neck is supple, no JVD, good range of motion, no pain or tenderness with movement  Cardiovascular:      Rate and Rhythm: Normal rate and regular rhythm.      Heart sounds: Normal heart sounds.   Pulmonary:      Effort: Pulmonary effort is normal.      Breath sounds: Normal breath sounds.   Abdominal:      General: Bowel sounds are normal.      Palpations: Abdomen is soft.      Comments: Abdomen is soft, nontender, no hepatosplenomegaly, no rigidity or rebound, +bowel sounds   Musculoskeletal:         General: Normal range of motion.      Cervical back: Normal range of motion and neck supple.   Skin:     General: Skin is warm.      Comments: Warm, moist, good color, no petechiae or ecchymoses   Neurological:      Mental Status: She is alert and oriented to person, place, and time.      Deep Tendon Reflexes: Reflexes are normal and symmetric.   Psychiatric:         Behavior: Behavior normal.         Thought Content: Thought content normal.         Judgment: Judgment normal.     Extremities: no lower extremity edema bilaterally, no cords, pulses are 1+  Lymphatics: no adenopathy in the neck, supraclavicular region, axilla and groin bilaterally    Performance Status: 1 - Symptomatic but completely ambulatory    Labs    10/3/2024 WBC = 8.9 hemoglobin = 13.7 hematocrit = 40.2 MCV = 91 platelet = 388 BUN = 15 creatinine = 0.86 calcium = 9.4 LFTs WNL    10/2/2023 WBC = 8.9 hemoglobin = 15.2 hematocrit = 42.4 platelet = 359 BUN = 16 creatinine = 0.93 calcium = 9.7 AST = 45 ALT = 70 alkaline phosphatase = 75 total bilirubin = 0.7    Imaging    12/27/2024 CAT scan chest without contrast    LUNGS:  - The right upper lobe irregularly-shaped opacity image 55 measures 2.3 x 1.3 cm. This has not grown since recent prior studies  - The groundglass opacity in the right upper lobe, for example image 69, measures 2.9 x 1.4 x 1.9 cm, earlier 2.2 x 1.7 x 1.8 cm. It has  increased in size and density in particular when compared to prior study from 9/6/2022. Density appears greater.  - Right apical 0.3 cm nodule image 43 is unchanged from numerous prior studies.  - Right lower lobe cyst image 158, unchanged.  - Left lower lobe nodule image 161 measuring 0.3 cm, unchanged. Small groundglass opacity in the left lower lobe abutting the fissure on image 137 is also unchanged.  - Background pulmonary emphysema     PLEURA: Unremarkable.    Impression    1. Continued increase in size and attenuation of groundglass nodular opacity right upper lobe and adjacent more solid-appearing component as described above concerning for progressive neoplasia/adenocarcinoma spectrum lesion. Biopsy advised.  2. Roughly wedge-shaped right upper lobe opacity again noted stable or minimally more prominent than on the prior examination for which continued imaging surveillance is advised.  3. Additional stable findings as noted.    09/06/2022 CT scan chest abdomen pelvis without contrast    1.  Mixed solid and cavitary lesion in the right upper lobe has overall decreased in size.  The soft tissue component appears more dense and well-defined.  Continued attention on follow-up is recommended.  2.  No evidence of metastatic disease.    09/01/2022 MRI brain    1. Stable treatment related changes in the right temporal lobe with gliosis and encephalomalacia. No evidence of recurrent or progressive tumor.  2. No acute infarction, intracranial hemorrhage or mass. No MR evidence of metastases.  3. Stable white matter signal abnormality, likely microangiopathy.    03/01/2021 CT scan chest abdomen pelvis    No evidence of metastatic disease.     Mixed solid and cavitary lesion in the right upper lobe has overall diminished in size more due to diminished cavity size.  The soft tissue component may be slightly larger.  Gravel Switch term follow-up should be considered.     Groundglass opacities at the left lung base have  resolved probably inflammatory at that time.    11/21/2019 CT scan of the chest and abdomen pelvis    Right upper lobe cavitary lesion with mild very slow interval retracting size.  Stable component of solid thickening along the posterior wall.  No new pulmonary lesions.  Stable patchy subsegmental groundglass opacities remain present in the left lower lobe.    08/28/2019 CT scan of chest and abdomen pelvis    Stable cavitary target lesion in the right upper lobe.       Stable left lower lobe patchy groundglass opacities with associated mild peribronchial thickening, probably infectious or inflammatory in etiology.  Continued surveillance on follow-up is recommended.  Further evaluation with bronchoscopy can also be considered given persistence of the findings since CT from 8/20/2018.  No evidence of metastatic disease in the abdomen or pelvis.    05/31/2019 PET-CT    1.  No findings suspicious for hypermetabolic malignancy.  2. Mild patchy FDG uptake in the left lower lobe patchy groundglass opacities with associated peribronchial thickening.  This is likely infectious or inflammatory given the appearance, however, given that this has been present since the 8/20/2018 CT, consider follow-up with bronchoscopy.    04/26/2019 CT scan of the chest and abdomen/pelvis    1.  Stable cavitary target lesion in the right upper lobe.  No new site of metastasis.  2.  Stable left lower lobe patchy groundglass opacities with associated mild peribronchial thickening likely due to a chronic infectious or inflammatory process.  Continued surveillance on follow-up is recommended.    04/22/2019 MRI brain    1.  Stable treatment related changes in the lateral aspect of the right temporal lobe without evidence of recurrent or progressive metastatic disease.  2.  No acute infarction, intracranial hemorrhage or enhancing mass lesion.  3.  Mild scattered white matter disease likely mild, chronic microangiopathy and/or treatment related  changes are stable.  4.  Moderate ethmoidal and maxillary sinus disease redemonstrated.    02/21/2019 CT scan of the chest and abdomen/pelvis    Limited evaluation of solid organs and vasculature without intravenous contrast.  1.  Stable cavitary target lesion in the right upper lobe.  2.  Stable left lower lobe patchy groundglass opacities with associated mild peribronchial wall thickening.  This is again more likely chronic infectious or inflammatory in etiology.  Continued surveillance on follow-up is recommended.  3.  No new metastatic lesions are found.    12/20/2018 CT scan of the chest and abdomen/pelvis    1.  Stable right upper lobe 3.3 cm cavitary lesion as described.  2.  Left lower lobe patchy groundglass opacities with associated mild peribronchial wall thickening, similar to October 18, 2018 but improved from August 20, 2018, likely related to a slowly resolving infectious/inflammatory process.  Follow-up   short-term chest CT in 3 months is recommended to evaluate for resolution.  3.  No metastatic disease in the abdomen or pelvis.    10/18/2018 CT scan of the chest without contrast: IMPRESSION: Improved appearance of the left lung base infiltrate.  Stable right cavitary lesion.  No new findings    08/20/2018 CT scan of the chest and abdomen/pelvis without contrast    RECIST target lesion: Cavitary right upper lobe mass is unchanged, measuring 3.4 x 2.6 cm on series 3 image 15 (previously 3.5 x 2.5 cm).  Solid component along the medial aspect of the posterior wall is also unchanged, measuring 1.4 x 0.9 cm.     LUNGS:  Stable cavitary right upper lobe mass, as above.  No new nodules identified.  There is new small patchy density in the posterior left base compatible with infiltrate.  There is no tracheal or endobronchial lesion.    IMPRESSION    Unchanged cavitary right upper lobe mass.  No new metastatic disease.  Interval development of mild left lower lobe infiltrate noted.      06/18/2018 CT scan  of the chest and abdomen/pelvis    Unchanged cavitary right upper lobe mass.   No new metastatic disease.    04/16/2018 CT scan of the chest and abdomen/pelvis    There is no significant interval change in size of the cystic or nodular components of the cavitary right upper lobe mass.  No new metastatic lesions identified in the chest, abdomen or pelvis.    1/22/18 CAT scan of the chest and abdomen/pelvis    RECIST MEASUREMENTS:  TARGET LESION:   1: Right upper lobe cavitary lung mass: The overall size of the lesion is 3.5 x 2.4 cm on image 14 of series 3, significantly decreased from 4.3 x 2.7 cm on previous examination.  Solid component along the medial aspect of the posterior wall is not significantly changed from previous examination currently measuring 1.5 x 0.9 cm on image 14 of series 3 compared with 1.8 x 0.8 cm when measured using similar technique on previous   examination.  Nodular solid component along the lateral aspect of posterior wall measures approximately 0.7 x 0.4 cm on image 13 of series 3, and when measured using similar technique is unchanged from previous examinations.    There is interval decrease in size of the cystic portion of the cavitary right upper lobe mass however, solid components of this mass are not significantly changed from previous examination.  Otherwise unchanged examination with no new metastatic lesions   identified in the chest, abdomen or pelvis.    Pathology    [facility] results demonstrated no ALK gene rearrangement or deletion and negative for ROS1 rearrangement. No EGFR mutations were found also.     Right temporal mass from September 6, 2015 demonstrated metastatic poorly differentiated non-small cell carcinoma. The tumor cells stained positive for CK 7, TTF-1 and Napsin and negative for CK 20, CK 5/6 andmucicarmine. Pathologist stated that the immunoprofile supported the primary lung non-small cell carcinoma, favor adenocarcinoma. The pathologist also stated that given  the focal p40 staining, a squamous carcinoma could not be excluded.      Procedures    05/30/2019 complete pulmonary function test    PFT Interpretation     Quality of Data:  The patient's efforts are acceptable and reproducible.     Test Performed:  Complete pulmonary function tests, including spirometry with and without bronchodilator, measurement of lung volume, and diffusing capacity.     Spirometry Interpretation:  Spirometry is within normal limits.     Flow Volume Loops:  Flow-Volume loops are normal.     Lung Volumes:  Plethysmographic lung volumes are within normal limits.     Diffusing Capacity:  Minimal reduction in diffusion capacity likely clinically insignificant     Conclusions:  An isolated reduction in Diffusing Capacity is present and may represent Early fibrotic or pulmonary vascular disease. Clinical correlation is recommended.

## 2025-01-07 DIAGNOSIS — E11.9 TYPE 2 DIABETES MELLITUS WITHOUT COMPLICATION, WITHOUT LONG-TERM CURRENT USE OF INSULIN (HCC): ICD-10-CM

## 2025-01-08 ENCOUNTER — CONSULT (OUTPATIENT)
Dept: CARDIAC SURGERY | Facility: CLINIC | Age: 65
End: 2025-01-08
Payer: MEDICARE

## 2025-01-08 ENCOUNTER — PREP FOR PROCEDURE (OUTPATIENT)
Dept: INTERVENTIONAL RADIOLOGY/VASCULAR | Facility: CLINIC | Age: 65
End: 2025-01-08

## 2025-01-08 VITALS
DIASTOLIC BLOOD PRESSURE: 79 MMHG | WEIGHT: 189.38 LBS | BODY MASS INDEX: 30.44 KG/M2 | OXYGEN SATURATION: 96 % | SYSTOLIC BLOOD PRESSURE: 154 MMHG | RESPIRATION RATE: 14 BRPM | TEMPERATURE: 97.9 F | HEART RATE: 90 BPM | HEIGHT: 66 IN

## 2025-01-08 DIAGNOSIS — C34.91 NON-SMALL CELL CANCER OF RIGHT LUNG (HCC): Primary | ICD-10-CM

## 2025-01-08 DIAGNOSIS — C34.91 ADENOCARCINOMA OF LUNG, STAGE 4, RIGHT (HCC): Primary | ICD-10-CM

## 2025-01-08 DIAGNOSIS — C34.11 MALIGNANT NEOPLASM OF UPPER LOBE, RIGHT BRONCHUS OR LUNG (HCC): ICD-10-CM

## 2025-01-08 DIAGNOSIS — C34.91 ADENOCARCINOMA OF LUNG, STAGE 4, RIGHT (HCC): ICD-10-CM

## 2025-01-08 PROCEDURE — 99205 OFFICE O/P NEW HI 60 MIN: CPT | Performed by: THORACIC SURGERY (CARDIOTHORACIC VASCULAR SURGERY)

## 2025-01-08 NOTE — ASSESSMENT & PLAN NOTE
Orders:    Ambulatory Referral to Interventional Radiology; Future    NM PET CT skull base to mid thigh; Future    Complete PFT with post bronchodilator; Future

## 2025-01-08 NOTE — LETTER
2025     Flor Mckeon, DO  [address] [facility] [address] [facility] [address]   [address]  [facility] [address]    Patient: Jeannie Hoover   YOB: 1960   Date of Visit: 2025       Dear Dr. Mckeon:    Thank you for referring Jeannie Hoover to me for evaluation. Below are my notes for this consultation.    If you have questions, please do not hesitate to call me. I look forward to following your patient along with you.         Sincerely,        Abe Hernandez MD        CC: MD Yemi De Jesus MD Dustin Manchester, MD  2025  3:51 PM  Sign when Signing Visit  Name: Jeannie Hoover      : 1960      MRN: [id number]  Encounter Provider: Abe Hernandez MD  Encounter Date: 2025   Encounter department: [facility]  :  Assessment & Plan  Non-small cell cancer of right lung (HCC)    Orders:  •  Ambulatory Referral to Interventional Radiology; Future  •  NM PET CT skull base to mid thigh; Future  •  Complete PFT with post bronchodilator; Future    Malignant neoplasm of upper lobe, right bronchus or lung (HCC)    Orders:  •  NM PET CT skull base to mid thigh; Future    Adenocarcinoma of lung, stage 4, right (HCC)  64-year-old female with history of a right upper lobe non-small cell lung cancer complicated by brain metastasis now with an enlarging right upper lobe nodule suspicious for recurrent disease.    I had a good visit with Jeannie this afternoon.  She has a long history of stage IV lung cancer over the last 10 years.  This was initially found unfortunately with brain metastasis.  She is done very well with treatment to this point.  Unfortunately she now has an enlarging right upper lobe nodule suspicious for recurrent disease.    Given her enlarging nodule I think some sort of action is necessary.  I would recommend a PET scan to evaluate for other areas of distant disease.  We will order this now.  I did also like to get PFTs to determine if she  would be a potential surgical candidate in the future.    Finally I discussed biopsy of this mass.  Given the delayed progression greater than 10 years since her diagnosis and interval growth is certainly could be a second primary tumor.  I think it is definitely reasonable to do a biopsy.  The patient herself would prefer to avoid anesthesia if possible.  That would require a percutaneous transthoracic needle biopsy.  We would set this up through IR.  I will order this now and then see her back in our office after these tests are performed.    We discussed a few potential options.  Of course this is possible that this is not malignant.  I think that is unlikely.  If this is malignant then local disease without any distant disease we would discuss resection versus radiation.  If she has any evidence of distant disease on PET then she likely would need systemic therapy.  She voiced understanding and agreement with this approach.  Will see her back after PET, PFTs, and IR biopsy    Abe Grant               History of Present Illness  HPI  Jeannie Hoover is a 64 y.o. female with past medical history of hypertension, type 2 diabetes mellitus, hyperlipidemia, and previous tobacco abuse who we are seeing in consultation for a history of stage IV right upper lobe non-small cell lung cancer with concern for recurrence    She had a extensive previous smoking history and family history of lung cancer.  She is diagnosed with stage IV non-small cell lung cancer/adenocarcinoma on 9/5/2015.  She had resection by neurosurgeon Dr. Parsons at that time.  She tolerated this well.  She then went on to have definitive chemoradiation from 2015 through 2019.  She has been in remission since then.  They have been following her imaging and she has had progressive enlargement of a right upper lobe posterior segment mass.    Currently she denies any shortness of breath.  No hemoptysis.  No fevers chills or unintentional weight loss.  " She has not smoked cigarettes for over 10 years but does smoke marijuana daily.        Review of Systems   Constitutional:  Negative for activity change, appetite change, chills, diaphoresis, fatigue, fever and unexpected weight change.   HENT: Negative.     Eyes: Negative.    Respiratory:  Negative for apnea, cough, chest tightness, shortness of breath, wheezing and stridor.    Cardiovascular:  Negative for chest pain, palpitations and leg swelling.   Gastrointestinal:  Negative for abdominal distention, abdominal pain, blood in stool, constipation, diarrhea, nausea and vomiting.   Endocrine: Negative.    Genitourinary: Negative.    Musculoskeletal: Negative.    Skin: Negative.    Allergic/Immunologic: Negative.    Neurological: Negative.    Hematological: Negative.    Psychiatric/Behavioral: Negative.            Objective  /79 (BP Location: Left arm, Patient Position: Sitting, Cuff Size: Large)   Pulse 90   Temp 97.9 °F (36.6 °C) (Temporal)   Resp 14   Ht 5' 6\" (1.676 m)   Wt 85.9 kg (189 lb 6 oz)   LMP  (LMP Unknown)   SpO2 96%   BMI 30.57 kg/m²      Physical Exam  Vitals and nursing note reviewed.   Constitutional:       General: She is not in acute distress.     Appearance: She is well-developed. She is not diaphoretic.   HENT:      Head: Normocephalic and atraumatic.      Mouth/Throat:      Pharynx: No oropharyngeal exudate.   Eyes:      General: No scleral icterus.     Conjunctiva/sclera: Conjunctivae normal.      Pupils: Pupils are equal, round, and reactive to light.   Neck:      Thyroid: No thyromegaly.      Vascular: No JVD.      Trachea: No tracheal deviation.      Comments: No palpable cervical adenopathy  Cardiovascular:      Rate and Rhythm: Normal rate and regular rhythm.      Heart sounds: Normal heart sounds. No murmur heard.     No friction rub. No gallop.   Pulmonary:      Effort: Pulmonary effort is normal. No respiratory distress.      Breath sounds: Normal breath sounds. No " wheezing or rales.      Comments: Clear to auscultation bilaterally.  Chest:      Chest wall: No tenderness.   Abdominal:      General: Bowel sounds are normal. There is no distension.      Palpations: Abdomen is soft. There is no mass.      Tenderness: There is no abdominal tenderness. There is no guarding or rebound.   Musculoskeletal:         General: No tenderness or deformity. Normal range of motion.      Cervical back: Normal range of motion and neck supple.   Skin:     General: Skin is warm and dry.      Coloration: Skin is not pale.      Findings: No erythema or rash.   Neurological:      Mental Status: She is alert and oriented to person, place, and time.   Psychiatric:         Behavior: Behavior normal.         Thought Content: Thought content normal.         Judgment: Judgment normal.     I personally reviewed her imaging in PACS including her most recent CT scan from 12/27/2024.  This shows a right upper lobe posterior segment 2.4 x 1.4 cm solid mass.  There is some surrounding scarring and groundglass areas.  This is otherwise stable.  No definite mediastinal adenopathy.  No other distant findings    We reviewed her PFTs from 2019.  FEV1 83% predicted.  DLCO 66% predicted.

## 2025-01-08 NOTE — ASSESSMENT & PLAN NOTE
64-year-old female with history of a right upper lobe non-small cell lung cancer complicated by brain metastasis now with an enlarging right upper lobe nodule suspicious for recurrent disease.    I had a good visit with Jeannie this afternoon.  She has a long history of stage IV lung cancer over the last 10 years.  This was initially found unfortunately with brain metastasis.  She is done very well with treatment to this point.  Unfortunately she now has an enlarging right upper lobe nodule suspicious for recurrent disease.    Given her enlarging nodule I think some sort of action is necessary.  I would recommend a PET scan to evaluate for other areas of distant disease.  We will order this now.  I did also like to get PFTs to determine if she would be a potential surgical candidate in the future.    Finally I discussed biopsy of this mass.  Given the delayed progression greater than 10 years since her diagnosis and interval growth is certainly could be a second primary tumor.  I think it is definitely reasonable to do a biopsy.  The patient herself would prefer to avoid anesthesia if possible.  That would require a percutaneous transthoracic needle biopsy.  We would set this up through IR.  I will order this now and then see her back in our office after these tests are performed.    We discussed a few potential options.  Of course this is possible that this is not malignant.  I think that is unlikely.  If this is malignant then local disease without any distant disease we would discuss resection versus radiation.  If she has any evidence of distant disease on PET then she likely would need systemic therapy.  She voiced understanding and agreement with this approach.  Will see her back after PET, PFTs, and IR biopsy    Abe Hernandez

## 2025-01-08 NOTE — PROGRESS NOTES
Name: Jeannie Hoover      : 1960      MRN: [id number]  Encounter Provider: Abe Hernandez MD  Encounter Date: 2025   Encounter department: [facility]  :  Assessment & Plan  Non-small cell cancer of right lung (HCC)    Orders:    Ambulatory Referral to Interventional Radiology; Future    NM PET CT skull base to mid thigh; Future    Complete PFT with post bronchodilator; Future    Malignant neoplasm of upper lobe, right bronchus or lung (HCC)    Orders:    NM PET CT skull base to mid thigh; Future    Adenocarcinoma of lung, stage 4, right (HCC)  64-year-old female with history of a right upper lobe non-small cell lung cancer complicated by brain metastasis now with an enlarging right upper lobe nodule suspicious for recurrent disease.    I had a good visit with Jeannie this afternoon.  She has a long history of stage IV lung cancer over the last 10 years.  This was initially found unfortunately with brain metastasis.  She is done very well with treatment to this point.  Unfortunately she now has an enlarging right upper lobe nodule suspicious for recurrent disease.    Given her enlarging nodule I think some sort of action is necessary.  I would recommend a PET scan to evaluate for other areas of distant disease.  We will order this now.  I did also like to get PFTs to determine if she would be a potential surgical candidate in the future.    Finally I discussed biopsy of this mass.  Given the delayed progression greater than 10 years since her diagnosis and interval growth is certainly could be a second primary tumor.  I think it is definitely reasonable to do a biopsy.  The patient herself would prefer to avoid anesthesia if possible.  That would require a percutaneous transthoracic needle biopsy.  We would set this up through IR.  I will order this now and then see her back in our office after these tests are performed.    We discussed a few potential options.   Of course this is possible that this is not malignant.  I think that is unlikely.  If this is malignant then local disease without any distant disease we would discuss resection versus radiation.  If she has any evidence of distant disease on PET then she likely would need systemic therapy.  She voiced understanding and agreement with this approach.  Will see her back after PET, PFTs, and IR biopsy    [facility]               History of Present Illness   HPI  Jeannie Hoover is a 64 y.o. female with past medical history of hypertension, type 2 diabetes mellitus, hyperlipidemia, and previous tobacco abuse who we are seeing in consultation for a history of stage IV right upper lobe non-small cell lung cancer with concern for recurrence    She had a extensive previous smoking history and family history of lung cancer.  She is diagnosed with stage IV non-small cell lung cancer/adenocarcinoma on 9/5/2015.  She had resection by neurosurgeon Dr. Parsons at that time.  She tolerated this well.  She then went on to have definitive chemoradiation from 2015 through 2019.  She has been in remission since then.  They have been following her imaging and she has had progressive enlargement of a right upper lobe posterior segment mass.    Currently she denies any shortness of breath.  No hemoptysis.  No fevers chills or unintentional weight loss.  She has not smoked cigarettes for over 10 years but does smoke marijuana daily.        Review of Systems   Constitutional:  Negative for activity change, appetite change, chills, diaphoresis, fatigue, fever and unexpected weight change.   HENT: Negative.     Eyes: Negative.    Respiratory:  Negative for apnea, cough, chest tightness, shortness of breath, wheezing and stridor.    Cardiovascular:  Negative for chest pain, palpitations and leg swelling.   Gastrointestinal:  Negative for abdominal distention, abdominal pain, blood in stool, constipation, diarrhea, nausea and vomiting.  "  Endocrine: Negative.    Genitourinary: Negative.    Musculoskeletal: Negative.    Skin: Negative.    Allergic/Immunologic: Negative.    Neurological: Negative.    Hematological: Negative.    Psychiatric/Behavioral: Negative.            Objective   /79 (BP Location: Left arm, Patient Position: Sitting, Cuff Size: Large)   Pulse 90   Temp 97.9 °F (36.6 °C) (Temporal)   Resp 14   Ht 5' 6\" (1.676 m)   Wt 85.9 kg (189 lb 6 oz)   LMP  (LMP Unknown)   SpO2 96%   BMI 30.57 kg/m²      Physical Exam  Vitals and nursing note reviewed.   Constitutional:       General: She is not in acute distress.     Appearance: She is well-developed. She is not diaphoretic.   HENT:      Head: Normocephalic and atraumatic.      Mouth/Throat:      Pharynx: No oropharyngeal exudate.   Eyes:      General: No scleral icterus.     Conjunctiva/sclera: Conjunctivae normal.      Pupils: Pupils are equal, round, and reactive to light.   Neck:      Thyroid: No thyromegaly.      Vascular: No JVD.      Trachea: No tracheal deviation.      Comments: No palpable cervical adenopathy  Cardiovascular:      Rate and Rhythm: Normal rate and regular rhythm.      Heart sounds: Normal heart sounds. No murmur heard.     No friction rub. No gallop.   Pulmonary:      Effort: Pulmonary effort is normal. No respiratory distress.      Breath sounds: Normal breath sounds. No wheezing or rales.      Comments: Clear to auscultation bilaterally.  Chest:      Chest wall: No tenderness.   Abdominal:      General: Bowel sounds are normal. There is no distension.      Palpations: Abdomen is soft. There is no mass.      Tenderness: There is no abdominal tenderness. There is no guarding or rebound.   Musculoskeletal:         General: No tenderness or deformity. Normal range of motion.      Cervical back: Normal range of motion and neck supple.   Skin:     General: Skin is warm and dry.      Coloration: Skin is not pale.      Findings: No erythema or rash. "   Neurological:      Mental Status: She is alert and oriented to person, place, and time.   Psychiatric:         Behavior: Behavior normal.         Thought Content: Thought content normal.         Judgment: Judgment normal.     I personally reviewed her imaging in PACS including her most recent CT scan from 12/27/2024.  This shows a right upper lobe posterior segment 2.4 x 1.4 cm solid mass.  There is some surrounding scarring and groundglass areas.  This is otherwise stable.  No definite mediastinal adenopathy.  No other distant findings    We reviewed her PFTs from 2019.  FEV1 83% predicted.  DLCO 66% predicted.

## 2025-01-09 ENCOUNTER — DOCUMENTATION (OUTPATIENT)
Dept: CARDIAC SURGERY | Facility: CLINIC | Age: 65
End: 2025-01-09

## 2025-01-09 NOTE — PROGRESS NOTES
I met with Jeannie at her visit with Dr. Hernandez yesterday. I introduced myself to her and explained my role to her. Questions answered, support provided.

## 2025-01-21 ENCOUNTER — HOSPITAL ENCOUNTER (OUTPATIENT)
Dept: PULMONOLOGY | Facility: HOSPITAL | Age: 65
Discharge: HOME | End: 2025-01-21
Attending: THORACIC SURGERY (CARDIOTHORACIC VASCULAR SURGERY)
Payer: MEDICARE

## 2025-01-21 DIAGNOSIS — C34.91 NON-SMALL CELL CANCER OF RIGHT LUNG (HCC): ICD-10-CM

## 2025-01-21 PROCEDURE — 94726 PLETHYSMOGRAPHY LUNG VOLUMES: CPT

## 2025-01-21 PROCEDURE — 94060 EVALUATION OF WHEEZING: CPT

## 2025-01-21 PROCEDURE — 94760 N-INVAS EAR/PLS OXIMETRY 1: CPT

## 2025-01-21 PROCEDURE — 94729 DIFFUSING CAPACITY: CPT

## 2025-01-21 RX ORDER — ALBUTEROL SULFATE 0.83 MG/ML
2.5 SOLUTION RESPIRATORY (INHALATION) ONCE
Status: COMPLETED | OUTPATIENT
Start: 2025-01-21 | End: 2025-01-21

## 2025-01-21 RX ADMIN — ALBUTEROL SULFATE 2.5 MG: 2.5 SOLUTION RESPIRATORY (INHALATION) at 15:16

## 2025-01-22 ENCOUNTER — HOSPITAL ENCOUNTER (OUTPATIENT)
Dept: RADIOLOGY | Age: 65
Discharge: HOME | End: 2025-01-22
Payer: COMMERCIAL

## 2025-01-22 DIAGNOSIS — C34.11 MALIGNANT NEOPLASM OF UPPER LOBE, RIGHT BRONCHUS OR LUNG (HCC): ICD-10-CM

## 2025-01-22 DIAGNOSIS — C34.91 NON-SMALL CELL CANCER OF RIGHT LUNG (HCC): ICD-10-CM

## 2025-01-22 LAB — GLUCOSE SERPL-MCNC: 131 MG/DL (ref 65–140)

## 2025-01-22 PROCEDURE — 78815 PET IMAGE W/CT SKULL-THIGH: CPT

## 2025-01-22 PROCEDURE — A9552 F18 FDG: HCPCS

## 2025-01-22 PROCEDURE — 82948 REAGENT STRIP/BLOOD GLUCOSE: CPT

## 2025-01-23 ENCOUNTER — TELEPHONE (OUTPATIENT)
Dept: FAMILY MEDICINE CLINIC | Facility: CLINIC | Age: 65
End: 2025-01-23

## 2025-01-23 NOTE — TELEPHONE ENCOUNTER
Please ask our prior auth team if they were able to get her ozempic approved.  She has not been able to get the medication  Thank you,  Flor Mckeon, DO

## 2025-01-27 NOTE — TELEPHONE ENCOUNTER
PA for ozempic 0.25 SUBMITTED to [employer]    via    []CMM-KEY:   [x]Surescripts-Case ID # [id number]   []Availity-Auth ID # NDC #   []Faxed to plan   []Other website   []Phone call Case ID #     [x]PA sent as URGENT    All office notes, labs and other pertaining documents and studies sent. Clinical questions answered. Awaiting determination from insurance company.     Turnaround time for your insurance to make a decision on your Prior Authorization can take 7-21 business days.

## 2025-01-28 ENCOUNTER — TELEPHONE (OUTPATIENT)
Dept: HEMATOLOGY ONCOLOGY | Facility: MEDICAL CENTER | Age: 65
End: 2025-01-28

## 2025-01-28 ENCOUNTER — OFFICE VISIT (OUTPATIENT)
Dept: HEMATOLOGY ONCOLOGY | Facility: MEDICAL CENTER | Age: 65
End: 2025-01-28
Payer: COMMERCIAL

## 2025-01-28 VITALS
HEIGHT: 66 IN | DIASTOLIC BLOOD PRESSURE: 78 MMHG | TEMPERATURE: 98.3 F | SYSTOLIC BLOOD PRESSURE: 136 MMHG | RESPIRATION RATE: 18 BRPM | OXYGEN SATURATION: 99 % | HEART RATE: 119 BPM | BODY MASS INDEX: 31.66 KG/M2 | WEIGHT: 197 LBS

## 2025-01-28 DIAGNOSIS — E11.9 TYPE 2 DIABETES MELLITUS WITHOUT COMPLICATION, WITHOUT LONG-TERM CURRENT USE OF INSULIN (HCC): ICD-10-CM

## 2025-01-28 DIAGNOSIS — C34.91 NON-SMALL CELL CANCER OF RIGHT LUNG (HCC): Primary | ICD-10-CM

## 2025-01-28 DIAGNOSIS — C79.31 MALIGNANT NEOPLASM METASTATIC TO BRAIN (HCC): ICD-10-CM

## 2025-01-28 PROCEDURE — 99214 OFFICE O/P EST MOD 30 MIN: CPT | Performed by: INTERNAL MEDICINE

## 2025-01-28 NOTE — PROGRESS NOTES
Jeannie Hoover  1960  [facility], [place] [facility] [address] [address]    30 minutes was spent with patient in direct consultation/examination or reviewing the chart.    DISCUSSION.SUMMARY:    64 year-old female with M1 non-small cell lung carcinoma/adenocarcinoma. [facility] (phase III open label randomized study of [id number] [anti-PD-L1 antibody] in combination with carboplatin and paclitaxel +/- Avastin versus carboplatin and paclitaxel +/- Avastin in patients with stage IV chemotherapy naive non-small cell lung carcinoma).Patient was randomized to receive all 4 medications. Mrs. Hoover completed the 6 cycles of treatment without significant toxicities. Patient had been on maintenance (study drug (atezolizumab) and avastin) - subsequently only Avastin.  This was eventually discontinued.  More recently, patient has been on surveillance only.  Issues:     Stage IV non-small cell lung carcinoma, concern for recurrence/new primary.  Patient was diagnosed approximately 9+ years ago.  Recent CAT scans of the chest demonstrated continued increase in size and attenuation of a groundglass nodular opacity in the right upper lobe.  Recent PET/CT demonstrates 2 lesions, one that has a high SUV concerning for recurrence or a new primary (the other lesion did not seem to be of concern).  Patient has been seen by thoracic surgery.  Options were discussed.    Patient has had PFTs.  As above, PET/CT completed.  Patient is scheduled for a biopsy in approximately 2 weeks.  Results will determine what can be done as far as thoracic procedures (resection etc).  Patient will follow-up with Dr. Hernandez as directed after the biopsy.  To be sure, since patient initially presented with brain metastases, repeat MRI/brain has been requested.    Brain metastases.  As discussed previously patient is status post resection and radiation  years ago.  Patient was long tapered off the Keppra.  No CNS issues.  To be sure, MRI/brain has been requested.  Patient previously released by radiation oncology.    Routine health maintenance and medical care.  Patient has an appointment with Dr. Mckeon tomorrow.    Patient is to return in 6 weeks but this may change depending upon the above.  Mrs. Hoover knows to call the hematology/oncology office if there are any other questions or concerns.    Carefully review your medication list and verify that the list is accurate and up-to-date. Please call the hematology/oncology office if there are medications missing from the list, medications on the list that you are not currently taking or if there is a dosage or instruction that is different from how you're taking that medication.    Patient goals and areas of care: MRI/brain, lung biopsy  Barriers to care:  None  Patient is able to self-care  __________________________________________________________________________________________________    Chief Complaint   Patient presents with    Follow-up    History of non-small cell lung carcinoma, concern for recur     Advance Care Planning/Advance Directives: not discussed    Oncology History   Non-small cell cancer of right lung (HCC)   9/5/2015 Initial Diagnosis    Patient was referred to the emergency room for evaluation of [facility]igo as [facility]. (patient reported.)  Patient underwent the following pertinent imaging scans.  MRI of the brain demonstrated a mass in the right superior all temporal gyrus with measurements of 4.2 x 3.3 x 3.6 cm with vasogenic edema and mass effect.  There was a near uncal herniation noted.   CT of the chest and abdomen/pelvis demonstrated a necrotic right upper lobe mass measuring 7 x 4.2 x 4.1 cm strongly suspicious for bronchogenic carcinoma.  The mass contacts the posterior medial pleural surface and potentially contacts the right posterior tracheal border.  No pathologic  adenopathy was identified in no evidence of metastatic disease in the chest abdomen or pelvis.       9/6/2015 Surgery    Non-small cell carcinoma of lung (HCC) diagnosed from biopsy of right temporal mass. Pathology demonstrates poorly differentiated non-small cell carcinoma.  Pathologist stated that the immunoprofile supported the primary lung non-small cell carcinoma favor adenocarcinoma.  Squamous carcinoma could not be excluded, secondary to focal P 40 staining.  [facility] path report demonstrated no ALK gene rearrangement or dleation and negative ROS1 rearrangement, No EGFR mutations were found.     Surgery completed by Dr. Parsons.      10/7/2015 - 10/16/2015 Radiation    A dose of 3000 cGy in 5 fractions delivered every other day was delivered to the resection cavity (single right superior temporal intracranial metastasis) Dr Hernández     10/20/2015 -  Research Study Participant    [id number] Phase III open label randomized study of [id number] [Anti-PDL1 antibody] in combination with carboplatin and paclitaxel +/- Avastin versus Carboplatin and Paciltaxel +/- Avastin in patients with stage IV Chemotherapy  Naive non-small cell lung carcinoma.  Patient was randomized to receive off for medications.     11/30/2015 - 2/18/2016 Chemotherapy    Started Carboplatin, Paclitaxel, Avastin and PD-L1 (atezolizumab; study drug).  Completed 6 cycles with cycle 6 day 1 on 2/18/16     3/10/2016 -  Chemotherapy    Beginning cycle 7, maintenance cycle with Avastin and PD-L1 (Atezolizumab; study drug)     5/24/2017 Adverse Reaction    LFT elevation-persisted.  Patient was taken off of study drug Atezolizumab, but continues on Avastin maintance.     5/23/2019 - 7/3/2019 Chemotherapy    [No matching medication found in this treatment plan]       History of Present Illness: 64 year old female recently diagnosed with IV lung cancer here for followup. Mrs. Hoover began to have headaches last spring 2015. Patient was seen by her PCP  and treated with antibiotics. Initially the symptoms got better the patient went on vacation. After returning from vacation, Mrs. Hoover once again developed headaches. Patient was treated with a second round of antibiotics and then was diagnosed with vertigo. [facility]. Although the specifics are not entirely clear, the therapist in the [facility] sent the patient to the emergency room. A CAT scan of the brain demonstrated a brain lesion and patient was transferred to [facility]. Patient was seen by Dr. Parsons and underwent resection demonstrating adenocarcinoma. Additional testing demonstrated a lung mass. Patient improved and was discharged. Patient completed SRT with Dr. Hickey and Dr. Hernández.      Patient has been on [facility] 20015-14 (phase III open label randomized study of [id number] [anti-PD-L1 antibody] in combination with carboplatin and paclitaxel +/- Avastin versus carboplatin and paclitaxel +/- Avastin in patients with stage IV chemotherapy naÃ¯ve non-small cell lung carcinoma). Mrs. Hoover was randomized to receive the anti-PD-L1 antibody with chemotherapy - patient completed 6 cycles and was placed on maintenance.      Mrs. Lam subsequently (years ago) suffered a tonic-clonic seizure and was seen in the emergency room. CAT scan of the head did not demonstrate any evidence of disease progression. The seizure was thought to be due to encephalomalacia. Since that time, there have been no seizure issues. Mrs. Hoover had been on Keppra - this has been tapered off.  After long discussions and consultation with the thoracic Working group, patient was taken off of all treatments approximately 6+ years ago.  Up until recently the plan had been surveillance.    Mrs. Hoover states feeling physically well.  Patient is appropriately/obviously upset with the PET/CT results.  No shortness of breath or dyspnea on exertion, no chest pain or pressure.  No cough, sputum or  hemoptysis.  No headaches, blurred vision or dizziness.  Appetite is good, weight is stable.  No other GI or  issues.  Activities are baseline.    Review of Systems   Constitutional:  Negative for fatigue.   HENT: Negative.     Eyes: Negative.    Respiratory: Negative.     Cardiovascular: Negative.    Gastrointestinal: Negative.    Endocrine: Negative.    Genitourinary: Negative.    Musculoskeletal:  Positive for arthralgias. Negative for neck pain.   Skin: Negative.    Allergic/Immunologic: Negative.    Neurological: Negative.    Hematological:  Does not bruise/bleed easily.   Psychiatric/Behavioral: Negative.     All other systems reviewed and are negative.     Patient Active Problem List   Diagnosis    Non-small cell cancer of right lung (HCC)    Mixed hyperlipidemia    Allergic rhinitis    Brain metastasis    Type 2 diabetes mellitus without complication, without long-term current use of insulin (HCC)    Insomnia    Lesion of temporal lobe    Benign hypertension    Chronic maxillary sinusitis     Past Medical History:   Diagnosis Date    Adenocarcinoma of right lung, stage 4 (HCC)     Allergic rhinitis     Alopecia     resolved 10/25/16    Anxiety     last assessed 10/4/16, resolved 10/25/16    Benign hypertension 2/1/2018    Benign neoplasm of skin of trunk 03/25/2008    last assessed 3/25/08    Benign neoplasm of skin of upper extremity and shoulder 03/25/2008    last assessed 3/25/08    Ear deformity, acquired     last assessed 10/13/14, resolved 3/12/15    Eczema     History of chemotherapy     Hyperlipemia     Maintenance chemotherapy     Secondary cancer of brain (HCC)     Seizures (HCC)     Vertigo      Past Surgical History:   Procedure Laterality Date    APPENDECTOMY  1964    BRAIN TUMOR EXCISION      CENTRAL VENOUS CATHETER INSERTION Right     PORTACATH    IR PORT REMOVAL  9/10/2021     Family History   Problem Relation Age of Onset    Diabetes Mother     Heart disease Mother     Lung cancer Father  70        Smoker     Uterine cancer Paternal Grandmother     Pancreatitis Brother     No Known Problems Brother      Social History     Socioeconomic History    Marital status: /Civil Union     Spouse name: Not on file    Number of children: 1    Years of education: Not on file    Highest education level: Not on file   Occupational History    Not on file   Tobacco Use    Smoking status: Former     Current packs/day: 0.00     Average packs/day: 1 pack/day for 47.0 years (47.0 ttl pk-yrs)     Types: Cigarettes     Start date: 1968     Quit date: 2015     Years since quitting: 10.0     Passive exposure: Never    Smokeless tobacco: Never   Vaping Use    Vaping status: Never Used   Substance and Sexual Activity    Alcohol use: Yes     Comment: occasionally / Social     Drug use: Yes     Types: Marijuana    Sexual activity: Yes   Other Topics Concern    Not on file   Social History Narrative    High school or GED     Lives independently with spouse      Social Drivers of Health     Financial Resource Strain: Not on file   Food Insecurity: No Food Insecurity (3/29/2024)    Nursing - Inadequate Food Risk Classification     Worried About Running Out of Food in the Last Year: Never true     Ran Out of Food in the Last Year: Never true     Ran Out of Food in the Last Year: Not on file   Transportation Needs: No Transportation Needs (3/29/2024)    PRAPARE - Transportation     Lack of Transportation (Medical): No     Lack of Transportation (Non-Medical): No   Physical Activity: Not on file   Stress: Not on file   Social Connections: Not on file   Intimate Partner Violence: Not on file   Housing Stability: Low Risk  (3/29/2024)    Housing Stability Vital Sign     Unable to Pay for Housing in the Last Year: No     Number of Times Moved in the Last Year: 1     Homeless in the Last Year: No       Current Outpatient Medications:     amLODIPine (NORVASC) 5 mg tablet, Take 1 tablet (5 mg total) by mouth daily, Disp: 90 tablet,  Rfl: 1    Ascorbic Acid (vitamin C) 100 MG tablet, Take 100 mg by mouth daily, Disp: , Rfl:     atorvastatin (LIPITOR) 10 mg tablet, Take 1 tablet (10 mg total) by mouth daily, Disp: 90 tablet, Rfl: 1    b complex vitamins capsule, Take 1 capsule by mouth daily Plus folic acid and vitamin c, Disp: , Rfl:     cholecalciferol (VITAMIN D3) 1,000 units tablet, Take 1,000 Units by mouth daily, Disp: , Rfl:     halobetasol (ULTRAVATE) 0.05 % cream, Apply topically 2 (two) times a day (Patient taking differently: Apply topically 2 (two) times a day As needed), Disp: 50 g, Rfl: 0    Lactobacillus (ACIDOPHILUS PO), Take by mouth, Disp: , Rfl:     losartan (COZAAR) 50 mg tablet, Take 1 tablet (50 mg total) by mouth daily, Disp: 90 tablet, Rfl: 1    magnesium 30 MG tablet, Take 30 mg by mouth in the morning, Disp: , Rfl:     metFORMIN (GLUCOPHAGE) 500 mg tablet, Take 1 tablet (500 mg total) by mouth daily with breakfast, Disp: 90 tablet, Rfl: 1    Omega-3 Fatty Acids (FISH OIL PO), Take 2 tablets by mouth. (Patient not taking: Reported on 4/5/2024), Disp: , Rfl:     semaglutide, 0.25 or 0.5 mg/dose, (Ozempic, 0.25 or 0.5 MG/DOSE,) 2 mg/3 mL injection pen, Inject 0.75 mL (0.5 mg total) under the skin every 7 days 0.25 mg under the skin every 7 days Code: E11.9, Disp: 9 mL, Rfl: 1    tretinoin (RETIN-A) 0.1 % cream, Apply topically daily at bedtime, Disp: 45 g, Rfl: 2  No current facility-administered medications for this visit.    Facility-Administered Medications Ordered in Other Visits:     alteplase (CATHFLO) injection 2 mg, 2 mg, Intracatheter, PRN, Terrell Osuna MD    sodium chloride 0.9 % infusion, 20 mL/hr, Intravenous, Continuous, Terrell Osuna MD    Allergies   Allergen Reactions    Iodinated Contrast Media Anaphylaxis and Itching    Clindamycin     Clindamycin/Lincomycin        Vitals:    01/28/25 0848   BP: 136/78   Pulse: (!) 119   Resp: 18   Temp: 98.3 °F (36.8 °C)   SpO2: 99%     Physical Exam  Constitutional:        Appearance: She is well-developed.      Comments: Well-nourished female, no respiratory distress   HENT:      Head: Normocephalic and atraumatic.      Right Ear: External ear normal.      Left Ear: External ear normal.      Nose: Nose normal.      Mouth/Throat:      Pharynx: No oropharyngeal exudate.   Eyes:      Conjunctiva/sclera: Conjunctivae normal.      Pupils: Pupils are equal, round, and reactive to light.   Neck:      Comments: Neck is supple, no JVD, good range of motion, no pain or tenderness with movement  Cardiovascular:      Rate and Rhythm: Normal rate and regular rhythm.      Heart sounds: Normal heart sounds.   Pulmonary:      Effort: Pulmonary effort is normal.      Breath sounds: Normal breath sounds.   Abdominal:      General: Bowel sounds are normal.      Palpations: Abdomen is soft.      Comments: Abdomen is soft, nontender, no hepatosplenomegaly, no rigidity or rebound, +bowel sounds   Musculoskeletal:         General: Normal range of motion.      Cervical back: Normal range of motion and neck supple.   Skin:     General: Skin is warm.      Comments: Warm, moist, good color, no petechiae or ecchymoses   Neurological:      Mental Status: She is alert and oriented to person, place, and time.      Deep Tendon Reflexes: Reflexes are normal and symmetric.   Psychiatric:         Behavior: Behavior normal.         Thought Content: Thought content normal.         Judgment: Judgment normal.     Extremities: no lower extremity edema bilaterally, no cords, pulses are 1+  Lymphatics: no adenopathy in the neck, supraclavicular region, axilla and groin bilaterally    Performance Status: 1 - Symptomatic but completely ambulatory    Labs    10/3/2024 WBC = 8.9 hemoglobin = 13.7 hematocrit = 40.2 MCV = 91 platelet = 388 BUN = 15 creatinine = 0.86 calcium = 9.4 LFTs WNL    10/2/2023 WBC = 8.9 hemoglobin = 15.2 hematocrit = 42.4 platelet = 359 BUN = 16 creatinine = 0.93 calcium = 9.7 AST = 45 ALT = 70 alkaline  phosphatase = 75 total bilirubin = 0.7    Imaging    1/22/2025 PET/CT    IMPRESSION:  1. Intense FDG uptake associated with the growing groundglass and soft tissue opacity in the right upper lobe, most compatible with malignancy. No hypermetabolic hilar or mediastinal adenopathy.  2. The more superior wedge-shaped right upper lung opacity demonstrates only minimal FDG activity, favoring posttreatment changes.  3. No worrisome hypermetabolic lesions in the neck, abdomen or pelvis.    12/27/2024 CAT scan chest without contrast    LUNGS:  - The right upper lobe irregularly-shaped opacity image 55 measures 2.3 x 1.3 cm. This has not grown since recent prior studies  - The groundglass opacity in the right upper lobe, for example image 69, measures 2.9 x 1.4 x 1.9 cm, earlier 2.2 x 1.7 x 1.8 cm. It has increased in size and density in particular when compared to prior study from 9/6/2022. Density appears greater.  - Right apical 0.3 cm nodule image 43 is unchanged from numerous prior studies.  - Right lower lobe cyst image 158, unchanged.  - Left lower lobe nodule image 161 measuring 0.3 cm, unchanged. Small groundglass opacity in the left lower lobe abutting the fissure on image 137 is also unchanged.  - Background pulmonary emphysema     PLEURA: Unremarkable.    Impression    1. Continued increase in size and attenuation of groundglass nodular opacity right upper lobe and adjacent more solid-appearing component as described above concerning for progressive neoplasia/adenocarcinoma spectrum lesion. Biopsy advised.  2. Roughly wedge-shaped right upper lobe opacity again noted stable or minimally more prominent than on the prior examination for which continued imaging surveillance is advised.  3. Additional stable findings as noted.    09/06/2022 CT scan chest abdomen pelvis without contrast    1.  Mixed solid and cavitary lesion in the right upper lobe has overall decreased in size.  The soft tissue component appears more  dense and well-defined.  Continued attention on follow-up is recommended.  2.  No evidence of metastatic disease.    09/01/2022 MRI brain    1. Stable treatment related changes in the right temporal lobe with gliosis and encephalomalacia. No evidence of recurrent or progressive tumor.  2. No acute infarction, intracranial hemorrhage or mass. No MR evidence of metastases.  3. Stable white matter signal abnormality, likely microangiopathy.    03/01/2021 CT scan chest abdomen pelvis    No evidence of metastatic disease.     Mixed solid and cavitary lesion in the right upper lobe has overall diminished in size more due to diminished cavity size.  The soft tissue component may be slightly larger.  [place] term follow-up should be considered.     Groundglass opacities at the left lung base have resolved probably inflammatory at that time.    11/21/2019 CT scan of the chest and abdomen pelvis    Right upper lobe cavitary lesion with mild very slow interval retracting size.  Stable component of solid thickening along the posterior wall.  No new pulmonary lesions.  Stable patchy subsegmental groundglass opacities remain present in the left lower lobe.    08/28/2019 CT scan of chest and abdomen pelvis    Stable cavitary target lesion in the right upper lobe.       Stable left lower lobe patchy groundglass opacities with associated mild peribronchial thickening, probably infectious or inflammatory in etiology.  Continued surveillance on follow-up is recommended.  Further evaluation with bronchoscopy can also be considered given persistence of the findings since CT from 8/20/2018.  No evidence of metastatic disease in the abdomen or pelvis.    05/31/2019 PET-CT    1.  No findings suspicious for hypermetabolic malignancy.  2. Mild patchy FDG uptake in the left lower lobe patchy groundglass opacities with associated peribronchial thickening.  This is likely infectious or inflammatory given the appearance, however, given that this  has been present since the 8/20/2018 CT, consider follow-up with bronchoscopy.    04/26/2019 CT scan of the chest and abdomen/pelvis    1.  Stable cavitary target lesion in the right upper lobe.  No new site of metastasis.  2.  Stable left lower lobe patchy groundglass opacities with associated mild peribronchial thickening likely due to a chronic infectious or inflammatory process.  Continued surveillance on follow-up is recommended.    04/22/2019 MRI brain    1.  Stable treatment related changes in the lateral aspect of the right temporal lobe without evidence of recurrent or progressive metastatic disease.  2.  No acute infarction, intracranial hemorrhage or enhancing mass lesion.  3.  Mild scattered white matter disease likely mild, chronic microangiopathy and/or treatment related changes are stable.  4.  Moderate ethmoidal and maxillary sinus disease redemonstrated.    02/21/2019 CT scan of the chest and abdomen/pelvis    Limited evaluation of solid organs and vasculature without intravenous contrast.  1.  Stable cavitary target lesion in the right upper lobe.  2.  Stable left lower lobe patchy groundglass opacities with associated mild peribronchial wall thickening.  This is again more likely chronic infectious or inflammatory in etiology.  Continued surveillance on follow-up is recommended.  3.  No new metastatic lesions are found.    12/20/2018 CT scan of the chest and abdomen/pelvis    1.  Stable right upper lobe 3.3 cm cavitary lesion as described.  2.  Left lower lobe patchy groundglass opacities with associated mild peribronchial wall thickening, similar to October 18, 2018 but improved from August 20, 2018, likely related to a slowly resolving infectious/inflammatory process.  Follow-up   short-term chest CT in 3 months is recommended to evaluate for resolution.  3.  No metastatic disease in the abdomen or pelvis.    10/18/2018 CT scan of the chest without contrast: IMPRESSION: Improved appearance of  the left lung base infiltrate.  Stable right cavitary lesion.  No new findings    08/20/2018 CT scan of the chest and abdomen/pelvis without contrast    RECIST target lesion: Cavitary right upper lobe mass is unchanged, measuring 3.4 x 2.6 cm on series 3 image 15 (previously 3.5 x 2.5 cm).  Solid component along the medial aspect of the posterior wall is also unchanged, measuring 1.4 x 0.9 cm.     LUNGS:  Stable cavitary right upper lobe mass, as above.  No new nodules identified.  There is new small patchy density in the posterior left base compatible with infiltrate.  There is no tracheal or endobronchial lesion.    IMPRESSION    Unchanged cavitary right upper lobe mass.  No new metastatic disease.  Interval development of mild left lower lobe infiltrate noted.      06/18/2018 CT scan of the chest and abdomen/pelvis    Unchanged cavitary right upper lobe mass.   No new metastatic disease.    04/16/2018 CT scan of the chest and abdomen/pelvis    There is no significant interval change in size of the cystic or nodular components of the cavitary right upper lobe mass.  No new metastatic lesions identified in the chest, abdomen or pelvis.    1/22/18 CAT scan of the chest and abdomen/pelvis    RECIST MEASUREMENTS:  TARGET LESION:   1: Right upper lobe cavitary lung mass: The overall size of the lesion is 3.5 x 2.4 cm on image 14 of series 3, significantly decreased from 4.3 x 2.7 cm on previous examination.  Solid component along the medial aspect of the posterior wall is not significantly changed from previous examination currently measuring 1.5 x 0.9 cm on image 14 of series 3 compared with 1.8 x 0.8 cm when measured using similar technique on previous   examination.  Nodular solid component along the lateral aspect of posterior wall measures approximately 0.7 x 0.4 cm on image 13 of series 3, and when measured using similar technique is unchanged from previous examinations.    There is interval decrease in size of  the cystic portion of the cavitary right upper lobe mass however, solid components of this mass are not significantly changed from previous examination.  Otherwise unchanged examination with no new metastatic lesions   identified in the chest, abdomen or pelvis.    Pathology    [facility] results demonstrated no ALK gene rearrangement or deletion and negative for ROS1 rearrangement. No EGFR mutations were found also.     Right temporal mass from September 6, 2015 demonstrated metastatic poorly differentiated non-small cell carcinoma. The tumor cells stained positive for CK 7, TTF-1 and Napsin and negative for CK 20, CK 5/6 andmucicarmine. Pathologist stated that the immunoprofile supported the primary lung non-small cell carcinoma, favor adenocarcinoma. The pathologist also stated that given the focal p40 staining, a squamous carcinoma could not be excluded.      Procedures    05/30/2019 complete pulmonary function test    PFT Interpretation     Quality of Data:  The patient's efforts are acceptable and reproducible.     Test Performed:  Complete pulmonary function tests, including spirometry with and without bronchodilator, measurement of lung volume, and diffusing capacity.     Spirometry Interpretation:  Spirometry is within normal limits.     Flow Volume Loops:  Flow-Volume loops are normal.     Lung Volumes:  Plethysmographic lung volumes are within normal limits.     Diffusing Capacity:  Minimal reduction in diffusion capacity likely clinically insignificant     Conclusions:  An isolated reduction in Diffusing Capacity is present and may represent Early fibrotic or pulmonary vascular disease. Clinical correlation is recommended.

## 2025-01-28 NOTE — TELEPHONE ENCOUNTER
Called pt about MRI appt  -originally declined  -after relaying providers msg about importance of scan, pt agreed to set time and date.                                                                                                                                                                                                                            Will schedule follow up

## 2025-01-28 NOTE — TELEPHONE ENCOUNTER
PA for ozempic APPROVED     Date(s) approved 1/1/2025-1/27/2026    Case #    Patient advised by          [][facility] [employer] Message  [x]Phone call   []LMOM  []L/M to call office as no active Communication consent on file  []Unable to leave detailed message as VM not approved on Communication consent       Pharmacy advised by    []Fax  [x]Phone call    Approval letter scanned into Media No

## 2025-02-03 ENCOUNTER — HOSPITAL ENCOUNTER (OUTPATIENT)
Dept: MRI IMAGING | Facility: HOSPITAL | Age: 65
Discharge: HOME | End: 2025-02-03
Attending: INTERNAL MEDICINE
Payer: COMMERCIAL

## 2025-02-03 DIAGNOSIS — C34.91 NON-SMALL CELL CANCER OF RIGHT LUNG (HCC): ICD-10-CM

## 2025-02-03 PROCEDURE — 70553 MRI BRAIN STEM W/O & W/DYE: CPT

## 2025-02-03 PROCEDURE — A9585 GADOBUTROL INJECTION: HCPCS | Performed by: INTERNAL MEDICINE

## 2025-02-03 RX ORDER — GADOBUTROL 604.72 MG/ML
10 INJECTION INTRAVENOUS
Status: COMPLETED | OUTPATIENT
Start: 2025-02-03 | End: 2025-02-03

## 2025-02-03 RX ADMIN — GADOBUTROL 10 ML: 604.72 INJECTION INTRAVENOUS at 18:30

## 2025-02-04 NOTE — NURSING NOTE
Pre-procedure Instructions for Interventional Radiology  [address]  [address]  INTERVENTIONAL RADIOLOGY [phone]    You are scheduled for a/an lung biopsy .  On 2/5.    Your arrival time is 0815.        To prepare for your procedure:  Please arrange for someone to drive you home after the procedure and stay with you until the next morning if you are instructed to do so.  This is typically for patients receiving some type of sedative or anesthetic for the procedure.  DO NOT EAT OR DRINK ANYTHING after midnight on the evening before your procedure including candy & gum.  ONLY SIPS OF WATER with your medications are allowed on the morning of your procedure.  TAKE ALL OF YOUR REGULAR MEDICATIONS THE MORNING OF YOUR PROCEDURE with sips of water!  We may call you to stop some of your blood sugar, blood pressure and blood thinning medications depending on the procedure.  Please take all of these medications unless we instruct you to stop them.  If you have an allergy to x-ray dye, please contact Interventional Radiology for an x-ray dye preparation which usually consists of an oral steroid and Benadryl.  If you wear a Glucose Monitor, you may be asked to remove it for your procedure if we are using x-ray.  These devices need to be removed when we are imaging with x-ray near the device since the radiation can cause the unit to malfunction.  If possible and not too inconvenient, you may want to schedule your exam closer to day 14 of your 14 day device so your device is not wasted.    The day of your procedure:  Bring a list of the medications you take at home.  Bring medications you take for breathing problems (such as inhalers), medications for chest pain, or both.  Bring a case for your glasses or contacts.  Bring your insurance card and a form of photo ID.  Please leave all valuables such as credit cards and jewelry at home.  Report to the registration desk in the main  charlee at the [facility].  Ask to be directed to the Short Procedure Unit on the 2nd floor.  While your procedure is being performed, your family may wait in the Waiting Room on the 2nd floor. If they need to leave, they may provide a number to be called following the procedure.   Be prepared to stay overnight just in case. Sometimes procedures will indicate the need for further observation or treatment.   If you are scheduled for a follow-up visit with the Interventional Radiologist after your procedure, you will be called with a date and time.  Special Instructions (Medications to stop taking before your procedure etc.):    home

## 2025-02-05 ENCOUNTER — HOSPITAL ENCOUNTER (OUTPATIENT)
Dept: RADIOLOGY | Facility: HOSPITAL | Age: 65
Discharge: HOME | End: 2025-02-05
Attending: RADIOLOGY | Admitting: RADIOLOGY
Payer: COMMERCIAL

## 2025-02-05 VITALS
RESPIRATION RATE: 16 BRPM | DIASTOLIC BLOOD PRESSURE: 75 MMHG | OXYGEN SATURATION: 96 % | TEMPERATURE: 97.2 F | SYSTOLIC BLOOD PRESSURE: 138 MMHG | HEART RATE: 94 BPM

## 2025-02-05 DIAGNOSIS — C34.91 ADENOCARCINOMA OF LUNG, STAGE 4, RIGHT (HCC): ICD-10-CM

## 2025-02-05 LAB
BASOPHILS # BLD AUTO: 0.19 THOUSANDS/ΜL (ref 0–0.1)
BASOPHILS NFR BLD AUTO: 2 % (ref 0–1)
EOSINOPHIL # BLD AUTO: 0.25 THOUSAND/ΜL (ref 0–0.61)
EOSINOPHIL NFR BLD AUTO: 3 % (ref 0–6)
ERYTHROCYTE [DISTWIDTH] IN BLOOD BY AUTOMATED COUNT: 11.8 % (ref 11.6–15.1)
GLUCOSE SERPL-MCNC: 188 MG/DL (ref 65–140)
HCT VFR BLD AUTO: 39.9 % (ref 34.8–46.1)
HGB BLD-MCNC: 13.7 G/DL (ref 11.5–15.4)
IMM GRANULOCYTES # BLD AUTO: 0.01 THOUSAND/UL (ref 0–0.2)
IMM GRANULOCYTES NFR BLD AUTO: 0 % (ref 0–2)
INR PPP: 0.85 (ref 0.85–1.19)
LYMPHOCYTES # BLD AUTO: 2.82 THOUSANDS/ΜL (ref 0.6–4.47)
LYMPHOCYTES NFR BLD AUTO: 30 % (ref 14–44)
MCH RBC QN AUTO: 31 PG (ref 26.8–34.3)
MCHC RBC AUTO-ENTMCNC: 34.3 G/DL (ref 31.4–37.4)
MCV RBC AUTO: 90 FL (ref 82–98)
MONOCYTES # BLD AUTO: 0.73 THOUSAND/ΜL (ref 0.17–1.22)
MONOCYTES NFR BLD AUTO: 8 % (ref 4–12)
NEUTROPHILS # BLD AUTO: 5.3 THOUSANDS/ΜL (ref 1.85–7.62)
NEUTS SEG NFR BLD AUTO: 57 % (ref 43–75)
NRBC BLD AUTO-RTO: 0 /100 WBCS
PLATELET # BLD AUTO: 392 THOUSANDS/UL (ref 149–390)
PMV BLD AUTO: 9 FL (ref 8.9–12.7)
PROTHROMBIN TIME: 12.1 SECONDS (ref 12.3–15)
RBC # BLD AUTO: 4.42 MILLION/UL (ref 3.81–5.12)
WBC # BLD AUTO: 9.3 THOUSAND/UL (ref 4.31–10.16)

## 2025-02-05 PROCEDURE — 88341 IMHCHEM/IMCYTCHM EA ADD ANTB: CPT | Performed by: PATHOLOGY

## 2025-02-05 PROCEDURE — 88305 TISSUE EXAM BY PATHOLOGIST: CPT | Performed by: PATHOLOGY

## 2025-02-05 PROCEDURE — 85610 PROTHROMBIN TIME: CPT | Performed by: RADIOLOGY

## 2025-02-05 PROCEDURE — 99152 MOD SED SAME PHYS/QHP 5/>YRS: CPT | Performed by: RADIOLOGY

## 2025-02-05 PROCEDURE — 88185 FLOWCYTOMETRY/TC ADD-ON: CPT | Performed by: THORACIC SURGERY (CARDIOTHORACIC VASCULAR SURGERY)

## 2025-02-05 PROCEDURE — 32408 CORE NDL BX LNG/MED PERQ: CPT | Performed by: RADIOLOGY

## 2025-02-05 PROCEDURE — 88313 SPECIAL STAINS GROUP 2: CPT | Performed by: PATHOLOGY

## 2025-02-05 PROCEDURE — 82948 REAGENT STRIP/BLOOD GLUCOSE: CPT

## 2025-02-05 PROCEDURE — 99153 MOD SED SAME PHYS/QHP EA: CPT

## 2025-02-05 PROCEDURE — 99152 MOD SED SAME PHYS/QHP 5/>YRS: CPT

## 2025-02-05 PROCEDURE — 85025 COMPLETE CBC W/AUTO DIFF WBC: CPT | Performed by: RADIOLOGY

## 2025-02-05 PROCEDURE — 88342 IMHCHEM/IMCYTCHM 1ST ANTB: CPT | Performed by: PATHOLOGY

## 2025-02-05 PROCEDURE — 88184 FLOWCYTOMETRY/ TC 1 MARKER: CPT | Performed by: THORACIC SURGERY (CARDIOTHORACIC VASCULAR SURGERY)

## 2025-02-05 PROCEDURE — 32408 CORE NDL BX LNG/MED PERQ: CPT

## 2025-02-05 RX ORDER — FENTANYL CITRATE 50 UG/ML
INJECTION, SOLUTION INTRAMUSCULAR; INTRAVENOUS AS NEEDED
Status: COMPLETED | OUTPATIENT
Start: 2025-02-05 | End: 2025-02-05

## 2025-02-05 RX ORDER — MIDAZOLAM HYDROCHLORIDE 2 MG/2ML
INJECTION, SOLUTION INTRAMUSCULAR; INTRAVENOUS AS NEEDED
Status: COMPLETED | OUTPATIENT
Start: 2025-02-05 | End: 2025-02-05

## 2025-02-05 RX ORDER — LIDOCAINE WITH 8.4% SOD BICARB 0.9%(10ML)
SYRINGE (ML) INJECTION AS NEEDED
Status: COMPLETED | OUTPATIENT
Start: 2025-02-05 | End: 2025-02-05

## 2025-02-05 RX ADMIN — MIDAZOLAM 1 MG: 1 INJECTION INTRAMUSCULAR; INTRAVENOUS at 10:19

## 2025-02-05 RX ADMIN — FENTANYL CITRATE 50 MCG: 50 INJECTION, SOLUTION INTRAMUSCULAR; INTRAVENOUS at 10:19

## 2025-02-05 RX ADMIN — FENTANYL CITRATE 50 MCG: 50 INJECTION, SOLUTION INTRAMUSCULAR; INTRAVENOUS at 10:24

## 2025-02-05 RX ADMIN — MIDAZOLAM 1 MG: 1 INJECTION INTRAMUSCULAR; INTRAVENOUS at 10:31

## 2025-02-05 RX ADMIN — MIDAZOLAM 1 MG: 1 INJECTION INTRAMUSCULAR; INTRAVENOUS at 10:24

## 2025-02-05 RX ADMIN — Medication 10 ML: at 10:35

## 2025-02-05 RX ADMIN — FENTANYL CITRATE 50 MCG: 50 INJECTION, SOLUTION INTRAMUSCULAR; INTRAVENOUS at 10:31

## 2025-02-05 NOTE — PROGRESS NOTES
Patient transferred to APU with Gagandeep.  Patient report given to TAL Costa.  Patient may be discharged at 12 noon.  Stretcher in lowest position with both side rails up and call bell at bedside.

## 2025-02-05 NOTE — SEDATION DOCUMENTATION
How Severe Is Your Psoriasis?: severe Lung biopsy complete by Dr. Minor. Patient tolerated procedure well. Specimens sent to lab. Band-aid to site.   Do You Have A Family History Of Psoriasis?: yes Is This A New Presentation, Or A Follow-Up?: Psoriasis

## 2025-02-05 NOTE — DISCHARGE INSTRUCTIONS
Needle Biopsy of the Lung    WHAT YOU NEED TO KNOW:  A needle biopsy of the lung is a procedure to remove cells or tissue from your lung. You may have a fine needle aspiration biopsy (FNAB), or a core needle biopsy (CNB). A FNAB is used to remove cells through a thin needle. CNB uses a thicker needle to remove lung tissue. The samples are collected and tested for inflammation, infection, or cancer.     DISCHARGE INSTRUCTIONS:   Resume your normal diet. Small sips of flat soda will help with nausea. Limit your activity for 24 hours.    Wound Care:      - Remove band aid in 24 hours.    Contact Interventional Radiology at [phone] ([facility] PATIENTS: Contact Interventional Radiology at [phone]) ([facility] PATIENTS: Contact Interventional Radiology at [phone]) if any of the following occur:    - You have a fever greater than 101*    - You cough up large amounts of bright red blood     - You have chest pain with breathing    - You have shortness of breath    -You have persistent nausea and vomiting    - You have pus, redness or swelling around your biopsy site    - You have questions or concerns about your condition or care           Moderate Sedation   WHAT YOU NEED TO KNOW:   Moderate sedation, or conscious sedation, is medicine used during procedures to help you feel relaxed and calm. You will be awake and able to follow directions without anxiety or pain. You will remember little to none of the procedure. You may feel tired, weak, or unsteady on your feet after you get sedation. You may also have trouble concentrating or short-term memory loss. These symptoms should go away in 24 hours or less.   DISCHARGE INSTRUCTIONS:   Call 911 or have someone else call for any of the following:   You have sudden trouble breathing.     You cannot be woken.  Seek care immediately if:   You have a severe headache or dizziness.     Your heart is beating faster than usual.  Contact your healthcare provider if:    You have a fever.     You have nausea or are vomiting for more than 8 hours after the procedure.      Your skin is itchy, swollen, or you have a rash.     You have questions or concerns about your condition or care.  Self-care:   Have someone stay with you for 24 hours. This person can drive you to errands and help you do things around the house. This person can also watch for problems.      Rest and do quiet activities for 24 hours. Do not exercise, ride a bike, or play sports. Stand up slowly to prevent dizziness and falls. Take short walks around the house with another person. Slowly return to your usual activities the next day.      Do not drive or use dangerous machines or tools for 24 hours. You may injure yourself or others. Examples include a lawnmower, saw, or drill. Do not return to work for 24 hours if you use dangerous machines or tools for work.      Do not make important decisions for 24 hours. For example, do not sign important papers or invest money.      Drink liquids as directed. Liquids help flush the sedation medicine out of your body. Ask how much liquid to drink each day and which liquids are best for you.      Eat small, frequent meals to prevent nausea and vomiting. Start with clear liquids such as juice or broth. If you do not vomit after clear liquids, you can eat your usual foods.      Do not drink alcohol or take medicines that make you drowsy. This includes medicines that help you sleep and anxiety medicines. Ask your healthcare provider if it is safe for you to take pain medicine.  Follow up with your healthcare provider as directed: Write down your questions so you remember to ask them during your visits.   © 2017 [employer] Information is for End User's use only and may not be sold, redistributed or otherwise used for commercial purposes. All illustrations and images included in CareNotes® are the copyrighted property of [employer] or [employer].  The above information is an  only. It is not intended as medical advice for individual conditions or treatments. Talk to your doctor, nurse or pharmacist before following any medical regimen to see if it is safe and effective for you.

## 2025-02-05 NOTE — PERIOPERATIVE NURSING NOTE
Patient received from PACU, alert and awake.  PO fluids offered. Vitals stable, no c/o pain a this time. Band aid at back is cl;rashard, dry and intact. Patient is aware about her discharge time at noon. IV line is running.  Call bell within reach. Bed is locked and in lowest position.Plan of care in progress.

## 2025-02-05 NOTE — PERIOPERATIVE NURSING NOTE
AVS reviewed with patient and her  at bed side and all concerns were answered. IV line is taken out. Patient tolerated PO fluids well. Denies any pain at this time. Surgical site dressing is clean, dry and intact. All belongings were sent with patient. Patient left floor with firm understanding of discharge instructions. Patient escorted to her ride via wheelchair by this nurse.

## 2025-02-05 NOTE — BRIEF OP NOTE (RAD/CATH)
INTERVENTIONAL RADIOLOGY PROCEDURE NOTE    Date: 2/5/2025    Procedure:   Procedure Summary       Date: 02/05/25 Room / Location: [facility] CAT Scan    Anesthesia Start:  Anesthesia Stop:     Procedure: IR BIOPSY LUNG Diagnosis:       Adenocarcinoma of lung, stage 4, right (HCC)      (enlarging stage RUL lung mass. History of stage IV lung cancer)    Scheduled Providers:  Responsible Provider:     Anesthesia Type: Not recorded ASA Status: Not recorded            Preoperative diagnosis:   1. Adenocarcinoma of lung, stage 4, right (HCC)         Postoperative diagnosis: Same.    Surgeon: Beckie Minor MD     Assistant: None. No qualified resident was available.    Blood loss: 1 mL    Specimens: five 18-gauge cores    Findings: Successful CT-guided lung biopsy performed of right upper lobe mass.  4 cores sent in formalin.    Complications: None immediate.    Anesthesia: conscious sedation

## 2025-02-06 DIAGNOSIS — C34.91 PRIMARY ADENOCARCINOMA OF LUNG, RIGHT (HCC): Primary | ICD-10-CM

## 2025-02-07 PROCEDURE — 88305 TISSUE EXAM BY PATHOLOGIST: CPT | Performed by: PATHOLOGY

## 2025-02-07 PROCEDURE — 88313 SPECIAL STAINS GROUP 2: CPT | Performed by: PATHOLOGY

## 2025-02-07 PROCEDURE — 88342 IMHCHEM/IMCYTCHM 1ST ANTB: CPT | Performed by: PATHOLOGY

## 2025-02-07 PROCEDURE — 88341 IMHCHEM/IMCYTCHM EA ADD ANTB: CPT | Performed by: PATHOLOGY

## 2025-02-11 LAB — SCAN RESULT: NORMAL

## 2025-02-12 ENCOUNTER — DOCUMENTATION (OUTPATIENT)
Dept: HEMATOLOGY ONCOLOGY | Facility: CLINIC | Age: 65
End: 2025-02-12

## 2025-02-12 ENCOUNTER — OFFICE VISIT (OUTPATIENT)
Dept: CARDIAC SURGERY | Facility: CLINIC | Age: 65
End: 2025-02-12
Payer: MEDICARE

## 2025-02-12 VITALS
RESPIRATION RATE: 14 BRPM | SYSTOLIC BLOOD PRESSURE: 170 MMHG | HEIGHT: 66 IN | WEIGHT: 195.99 LBS | HEART RATE: 105 BPM | OXYGEN SATURATION: 97 % | TEMPERATURE: 98 F | BODY MASS INDEX: 31.5 KG/M2 | DIASTOLIC BLOOD PRESSURE: 81 MMHG

## 2025-02-12 DIAGNOSIS — C34.91 NON-SMALL CELL CANCER OF RIGHT LUNG (HCC): ICD-10-CM

## 2025-02-12 DIAGNOSIS — R79.1 ABNORMAL COAGULATION PROFILE: ICD-10-CM

## 2025-02-12 DIAGNOSIS — C34.11 SQUAMOUS CELL CARCINOMA OF UPPER LOBE OF RIGHT LUNG (HCC): Primary | ICD-10-CM

## 2025-02-12 PROCEDURE — 99214 OFFICE O/P EST MOD 30 MIN: CPT | Performed by: THORACIC SURGERY (CARDIOTHORACIC VASCULAR SURGERY)

## 2025-02-12 RX ORDER — CEFAZOLIN SODIUM 2 G/50ML
2000 SOLUTION INTRAVENOUS ONCE
OUTPATIENT
Start: 2025-02-12 | End: 2025-02-12

## 2025-02-12 RX ORDER — ACETAMINOPHEN 325 MG/1
975 TABLET ORAL ONCE
OUTPATIENT
Start: 2025-02-12 | End: 2025-02-12

## 2025-02-12 RX ORDER — GABAPENTIN 300 MG/1
300 CAPSULE ORAL ONCE
OUTPATIENT
Start: 2025-02-12 | End: 2025-02-12

## 2025-02-12 RX ORDER — HEPARIN SODIUM 5000 [USP'U]/ML
5000 INJECTION, SOLUTION INTRAVENOUS; SUBCUTANEOUS
OUTPATIENT
Start: 2025-02-13 | End: 2025-02-14

## 2025-02-12 NOTE — LETTER
2025     Flor Mckeon, DO  [address]   [address]  [facility] [address]    Patient: Jeannie Hoover   YOB: 1960   Date of Visit: 2025       Dear Dr. Mckeon:    Thank you for referring Jeannie Hoover to me for evaluation. Below are my notes for this consultation.    If you have questions, please do not hesitate to call me. I look forward to following your patient along with you.         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)    Orders:  •  Case request operating room: LOBECTOMY RIGHT UPPER LUNG THORACOSCOPIC W/ ROBOTICS WITH MEDIASTINAL LYMPH NODE DISSECTION, BRONCHOSCOPY FLEXIBLE; Standing  •  Type and screen; Future  •  Basic metabolic panel; Future  •  CBC and Platelet; Future  •  APTT; Future  •  Protime-INR; Future  •  EKG 12 lead; Future    Abnormal coagulation profile    Orders:  •  APTT; Future  •  Protime-INR; Future    Non-small cell cancer of right lung (HCC)  64-year-old female with history of stage IV lung cancer complicated by brain metastasis status post resection in  with an enlarging 2.1 x 1.5 cm right upper lobe mass biopsy-proven malignancy concerning for ongoing cancer    I had a lengthy conversation with Jeannie and her .  She has done remarkably well with stage IV lung cancer diagnosed over 10 years ago.  She now has an enlarging PET avid 2 cm right upper lobe biopsy-proven cancer.  I think this is her original tumor that for what ever reason has become more active.  She has no other sites of distant disease.  Based on her long disease-free interval and reassuring  imaging with malignancy only at 1 site in the right upper lobe I think she is reasonable candidate for surgical resection.  Surgery would be a robotic right upper lobectomy.  She understands with her longstanding chemo and immunotherapy she would be at slightly higher risk for needing an open procedure.  We discussed other risks of surgery including bleeding, infection, prolonged airleak, A-fib, and again need for conversion to open.  She understands she would have some postoperative numbness.  To minimize risk of surgery I have advised her to smoke marijuana as little as possible prior to surgery.  She voiced understanding.  Ultimately she voiced agreement with our treatment plan.  Will proceed with surgical resection on Thursday 2/27.  She will get preoperative blood work.    [facility]                 History of Present Illness  HPI  Jeannie Hoover is a 64 y.o. female who presents back to our office to discuss her PET, PFTs, and biopsy results.  She notes no major changes.  She continues to smoke marijuana about 1 time per day.  She denies any significant cough or hemoptysis.  No shortness of breath.  Her energy levels are good.  She is very physically active.  She tolerated her biopsy without issue.      Review of Systems   Constitutional:  Negative for activity change, appetite change, chills, diaphoresis, fatigue, fever and unexpected weight change.   HENT: Negative.     Eyes: Negative.    Respiratory:  Negative for apnea, cough, chest tightness, shortness of breath, wheezing and stridor.    Cardiovascular:  Negative for chest pain, palpitations and leg swelling.   Gastrointestinal:  Negative for abdominal distention, abdominal pain, blood in stool, constipation, diarrhea, nausea and vomiting.   Endocrine: Negative.    Genitourinary: Negative.    Musculoskeletal: Negative.    Skin: Negative.    Allergic/Immunologic: Negative.    Neurological: Negative.    Hematological: Negative.   "  Psychiatric/Behavioral: Negative.            Objective  /81 (BP Location: Left arm, Patient Position: Sitting, Cuff Size: Large)   Pulse 105   Temp 98 °F (36.7 °C) (Temporal)   Resp 14   Ht 5' 6\" (1.676 m)   Wt 88.9 kg (195 lb 15.8 oz)   LMP  (LMP Unknown)   SpO2 97%   BMI 31.63 kg/m²      Physical Exam  Vitals and nursing note reviewed.   Constitutional:       General: She is not in acute distress.     Appearance: Normal appearance. She is well-developed. She is not diaphoretic.   HENT:      Head: Normocephalic and atraumatic.      Mouth/Throat:      Pharynx: No oropharyngeal exudate.   Eyes:      General: No scleral icterus.     Conjunctiva/sclera: Conjunctivae normal.      Pupils: Pupils are equal, round, and reactive to light.   Neck:      Thyroid: No thyromegaly.      Vascular: No JVD.      Trachea: No tracheal deviation.   Cardiovascular:      Rate and Rhythm: Normal rate and regular rhythm.      Heart sounds: Normal heart sounds. No murmur heard.     No friction rub. No gallop.   Pulmonary:      Effort: Pulmonary effort is normal. No respiratory distress.      Breath sounds: Normal breath sounds. No wheezing or rales.      Comments: Lungs clear to auscultation bilaterally.  Normal work of breathing on  Chest:      Chest wall: No tenderness.   Abdominal:      General: Bowel sounds are normal. There is no distension.      Palpations: Abdomen is soft. There is no mass.      Tenderness: There is no abdominal tenderness. There is no guarding or rebound.   Musculoskeletal:         General: No tenderness or deformity. Normal range of motion.      Cervical back: Normal range of motion and neck supple.   Skin:     General: Skin is warm and dry.      Coloration: Skin is not pale.      Findings: No erythema or rash.   Neurological:      General: No focal deficit present.      Mental Status: She is alert and oriented to person, place, and time.   Psychiatric:         Mood and Affect: Mood normal.         " Behavior: Behavior normal.         Thought Content: Thought content normal.         Judgment: Judgment normal.     I personally reviewed her imaging with her in PACS.  She has a 1.9 x 1.7 x 1.9 central right upper lobe area with an SUV of 10.  Slightly superior she has a 2 x 1.5 cm wedge-shaped area with an SUV of 1.7.  No PET avid disease outside the chest.  No evidence of lymph node involvement.    We reviewed her PFTs from 1/20/2025.  FEV1 normal at 95% predicted.  DLCO 100% predicted    We reviewed the IR biopsy results from 2/5/2025.  This was consistent with squamous cell cancer consistent with lung cancer.  This was the same as her brain metastasis

## 2025-02-12 NOTE — PROGRESS NOTES
In-basket message received from Maricarmen Olvera Rn to add patient to the thoracic [facility] on 2/17/2025. Chart reviewed and prep completed.

## 2025-02-13 ENCOUNTER — TELEPHONE (OUTPATIENT)
Dept: FAMILY MEDICINE CLINIC | Facility: CLINIC | Age: 65
End: 2025-02-13

## 2025-02-13 NOTE — H&P (VIEW-ONLY)
ELISEOI Only - Left messages and sent letter, still no call back, removed order from the que.    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)    Orders:    Case request operating room: LOBECTOMY RIGHT UPPER LUNG THORACOSCOPIC W/ ROBOTICS WITH MEDIASTINAL LYMPH NODE DISSECTION, BRONCHOSCOPY FLEXIBLE; Standing    Type and screen; Future    Basic metabolic panel; Future    CBC and Platelet; Future    APTT; Future    Protime-INR; Future    EKG 12 lead; Future    Abnormal coagulation profile    Orders:    APTT; Future    Protime-INR; Future    Non-small cell cancer of right lung (HCC)  64-year-old female with history of stage IV lung cancer complicated by brain metastasis status post resection in  with an enlarging 2.1 x 1.5 cm right upper lobe mass biopsy-proven malignancy concerning for ongoing cancer    I had a lengthy conversation with Jeannie and her .  She has done remarkably well with stage IV lung cancer diagnosed over 10 years ago.  She now has an enlarging PET avid 2 cm right upper lobe biopsy-proven cancer.  I think this is her original tumor that for what ever reason has become more active.  She has no other sites of distant disease.  Based on her long disease-free interval and reassuring imaging with malignancy only at 1 site in the right upper lobe I think she is reasonable candidate for surgical resection.  Surgery would be a robotic right upper lobectomy.  She understands with her longstanding chemo and immunotherapy she would be at slightly higher risk for needing an open procedure.  We discussed other risks of surgery including bleeding, infection, prolonged airleak, A-fib, and again need for conversion to open.  She understands she would have some postoperative numbness.  To minimize risk of surgery I have advised her to smoke marijuana as little  "as possible prior to surgery.  She voiced understanding.  Ultimately she voiced agreement with our treatment plan.  Will proceed with surgical resection on Thursday 2/27.  She will get preoperative blood work.    Abe [place]                 History of Present Illness   HPI  Jeannie Hoover is a 64 y.o. female who presents back to our office to discuss her PET, PFTs, and biopsy results.  She notes no major changes.  She continues to smoke marijuana about 1 time per day.  She denies any significant cough or hemoptysis.  No shortness of breath.  Her energy levels are good.  She is very physically active.  She tolerated her biopsy without issue.      Review of Systems   Constitutional:  Negative for activity change, appetite change, chills, diaphoresis, fatigue, fever and unexpected weight change.   HENT: Negative.     Eyes: Negative.    Respiratory:  Negative for apnea, cough, chest tightness, shortness of breath, wheezing and stridor.    Cardiovascular:  Negative for chest pain, palpitations and leg swelling.   Gastrointestinal:  Negative for abdominal distention, abdominal pain, blood in stool, constipation, diarrhea, nausea and vomiting.   Endocrine: Negative.    Genitourinary: Negative.    Musculoskeletal: Negative.    Skin: Negative.    Allergic/Immunologic: Negative.    Neurological: Negative.    Hematological: Negative.    Psychiatric/Behavioral: Negative.            Objective   /81 (BP Location: Left arm, Patient Position: Sitting, Cuff Size: Large)   Pulse 105   Temp 98 °F (36.7 °C) (Temporal)   Resp 14   Ht 5' 6\" (1.676 m)   Wt 88.9 kg (195 lb 15.8 oz)   LMP  (LMP Unknown)   SpO2 97%   BMI 31.63 kg/m²      Physical Exam  Vitals and nursing note reviewed.   Constitutional:       General: She is not in acute distress.     Appearance: Normal appearance. She is well-developed. She is not diaphoretic.   HENT:      Head: Normocephalic and atraumatic.      Mouth/Throat:      Pharynx: No " oropharyngeal exudate.   Eyes:      General: No scleral icterus.     Conjunctiva/sclera: Conjunctivae normal.      Pupils: Pupils are equal, round, and reactive to light.   Neck:      Thyroid: No thyromegaly.      Vascular: No JVD.      Trachea: No tracheal deviation.   Cardiovascular:      Rate and Rhythm: Normal rate and regular rhythm.      Heart sounds: Normal heart sounds. No murmur heard.     No friction rub. No gallop.   Pulmonary:      Effort: Pulmonary effort is normal. No respiratory distress.      Breath sounds: Normal breath sounds. No wheezing or rales.      Comments: Lungs clear to auscultation bilaterally.  Normal work of breathing on  Chest:      Chest wall: No tenderness.   Abdominal:      General: Bowel sounds are normal. There is no distension.      Palpations: Abdomen is soft. There is no mass.      Tenderness: There is no abdominal tenderness. There is no guarding or rebound.   Musculoskeletal:         General: No tenderness or deformity. Normal range of motion.      Cervical back: Normal range of motion and neck supple.   Skin:     General: Skin is warm and dry.      Coloration: Skin is not pale.      Findings: No erythema or rash.   Neurological:      General: No focal deficit present.      Mental Status: She is alert and oriented to person, place, and time.   Psychiatric:         Mood and Affect: Mood normal.         Behavior: Behavior normal.         Thought Content: Thought content normal.         Judgment: Judgment normal.     I personally reviewed her imaging with her in PACS.  She has a 1.9 x 1.7 x 1.9 central right upper lobe area with an SUV of 10.  Slightly superior she has a 2 x 1.5 cm wedge-shaped area with an SUV of 1.7.  No PET avid disease outside the chest.  No evidence of lymph node involvement.    We reviewed her PFTs from 1/20/2025.  FEV1 normal at 95% predicted.  DLCO 100% predicted    We reviewed the IR biopsy results from 2/5/2025.  This was consistent with squamous cell  cancer consistent with lung cancer.  This was the same as her brain metastasis

## 2025-02-13 NOTE — ASSESSMENT & PLAN NOTE
64-year-old female with history of stage IV lung cancer complicated by brain metastasis status post resection in 2015 with an enlarging 2.1 x 1.5 cm right upper lobe mass biopsy-proven malignancy concerning for ongoing cancer    I had a lengthy conversation with Jeannie and her .  She has done remarkably well with stage IV lung cancer diagnosed over 10 years ago.  She now has an enlarging PET avid 2 cm right upper lobe biopsy-proven cancer.  I think this is her original tumor that for what ever reason has become more active.  She has no other sites of distant disease.  Based on her long disease-free interval and reassuring imaging with malignancy only at 1 site in the right upper lobe I think she is reasonable candidate for surgical resection.  Surgery would be a robotic right upper lobectomy.  She understands with her longstanding chemo and immunotherapy she would be at slightly higher risk for needing an open procedure.  We discussed other risks of surgery including bleeding, infection, prolonged airleak, A-fib, and again need for conversion to open.  She understands she would have some postoperative numbness.  To minimize risk of surgery I have advised her to smoke marijuana as little as possible prior to surgery.  She voiced understanding.  Ultimately she voiced agreement with our treatment plan.  Will proceed with surgical resection on Thursday 2/27.  She will get preoperative blood work.    Abe Hernandez

## 2025-02-13 NOTE — PROGRESS NOTES
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 (HCC)    Orders:    Case request operating room: LOBECTOMY RIGHT UPPER LUNG THORACOSCOPIC W/ ROBOTICS WITH MEDIASTINAL LYMPH NODE DISSECTION, BRONCHOSCOPY FLEXIBLE; Standing    Type and screen; Future    Basic metabolic panel; Future    CBC and Platelet; Future    APTT; Future    Protime-INR; Future    EKG 12 lead; Future    Abnormal coagulation profile    Orders:    APTT; Future    Protime-INR; Future    Non-small cell cancer of right lung (HCC)  64-year-old female with history of stage IV lung cancer complicated by brain metastasis status post resection in  with an enlarging 2.1 x 1.5 cm right upper lobe mass biopsy-proven malignancy concerning for ongoing cancer    I had a lengthy conversation with Jeannie and her .  She has done remarkably well with stage IV lung cancer diagnosed over 10 years ago.  She now has an enlarging PET avid 2 cm right upper lobe biopsy-proven cancer.  I think this is her original tumor that for what ever reason has become more active.  She has no other sites of distant disease.  Based on her long disease-free interval and reassuring imaging with malignancy only at 1 site in the right upper lobe I think she is reasonable candidate for surgical resection.  Surgery would be a robotic right upper lobectomy.  She understands with her longstanding chemo and immunotherapy she would be at slightly higher risk for needing an open procedure.  We discussed other risks of surgery including bleeding, infection, prolonged airleak, A-fib, and again need for conversion to open.  She understands she would have some postoperative numbness.  To minimize risk of surgery I have advised her to smoke marijuana as little  "as possible prior to surgery.  She voiced understanding.  Ultimately she voiced agreement with our treatment plan.  Will proceed with surgical resection on Thursday 2/27.  She will get preoperative blood work.    Abe [place]                 History of Present Illness   HPI  Jeannie Hoover is a 64 y.o. female who presents back to our office to discuss her PET, PFTs, and biopsy results.  She notes no major changes.  She continues to smoke marijuana about 1 time per day.  She denies any significant cough or hemoptysis.  No shortness of breath.  Her energy levels are good.  She is very physically active.  She tolerated her biopsy without issue.      Review of Systems   Constitutional:  Negative for activity change, appetite change, chills, diaphoresis, fatigue, fever and unexpected weight change.   HENT: Negative.     Eyes: Negative.    Respiratory:  Negative for apnea, cough, chest tightness, shortness of breath, wheezing and stridor.    Cardiovascular:  Negative for chest pain, palpitations and leg swelling.   Gastrointestinal:  Negative for abdominal distention, abdominal pain, blood in stool, constipation, diarrhea, nausea and vomiting.   Endocrine: Negative.    Genitourinary: Negative.    Musculoskeletal: Negative.    Skin: Negative.    Allergic/Immunologic: Negative.    Neurological: Negative.    Hematological: Negative.    Psychiatric/Behavioral: Negative.            Objective   /81 (BP Location: Left arm, Patient Position: Sitting, Cuff Size: Large)   Pulse 105   Temp 98 °F (36.7 °C) (Temporal)   Resp 14   Ht 5' 6\" (1.676 m)   Wt 88.9 kg (195 lb 15.8 oz)   LMP  (LMP Unknown)   SpO2 97%   BMI 31.63 kg/m²      Physical Exam  Vitals and nursing note reviewed.   Constitutional:       General: She is not in acute distress.     Appearance: Normal appearance. She is well-developed. She is not diaphoretic.   HENT:      Head: Normocephalic and atraumatic.      Mouth/Throat:      Pharynx: No " oropharyngeal exudate.   Eyes:      General: No scleral icterus.     Conjunctiva/sclera: Conjunctivae normal.      Pupils: Pupils are equal, round, and reactive to light.   Neck:      Thyroid: No thyromegaly.      Vascular: No JVD.      Trachea: No tracheal deviation.   Cardiovascular:      Rate and Rhythm: Normal rate and regular rhythm.      Heart sounds: Normal heart sounds. No murmur heard.     No friction rub. No gallop.   Pulmonary:      Effort: Pulmonary effort is normal. No respiratory distress.      Breath sounds: Normal breath sounds. No wheezing or rales.      Comments: Lungs clear to auscultation bilaterally.  Normal work of breathing on  Chest:      Chest wall: No tenderness.   Abdominal:      General: Bowel sounds are normal. There is no distension.      Palpations: Abdomen is soft. There is no mass.      Tenderness: There is no abdominal tenderness. There is no guarding or rebound.   Musculoskeletal:         General: No tenderness or deformity. Normal range of motion.      Cervical back: Normal range of motion and neck supple.   Skin:     General: Skin is warm and dry.      Coloration: Skin is not pale.      Findings: No erythema or rash.   Neurological:      General: No focal deficit present.      Mental Status: She is alert and oriented to person, place, and time.   Psychiatric:         Mood and Affect: Mood normal.         Behavior: Behavior normal.         Thought Content: Thought content normal.         Judgment: Judgment normal.     I personally reviewed her imaging with her in PACS.  She has a 1.9 x 1.7 x 1.9 central right upper lobe area with an SUV of 10.  Slightly superior she has a 2 x 1.5 cm wedge-shaped area with an SUV of 1.7.  No PET avid disease outside the chest.  No evidence of lymph node involvement.    We reviewed her PFTs from 1/20/2025.  FEV1 normal at 95% predicted.  DLCO 100% predicted    We reviewed the IR biopsy results from 2/5/2025.  This was consistent with squamous cell  cancer consistent with lung cancer.  This was the same as her brain metastasis

## 2025-02-17 ENCOUNTER — DOCUMENTATION (OUTPATIENT)
Dept: HEMATOLOGY ONCOLOGY | Facility: CLINIC | Age: 65
End: 2025-02-17

## 2025-02-17 NOTE — PROGRESS NOTES
THORACIC ONCOLOGY MULTIDISCIPLINARY CASE REVIEW    DATE:  2/17/2025    PRESENTING DOCTOR:  Amylyn Mortimer, PA-C    DIAGNOSIS:  Squamous Cell Carcinoma RUL  STAGING: Hx of Stage IV    Jeannie Hoover is a 64 y.o. female who was presented at the Thoracic Oncology Multidisciplinary Tumor Conference today. She has a history of stage IV non-small cell lung cancer with a right upper lobe primary and a single right superior temporal intracranial metastasis status post resection 05/06/15. She completed a course of hypo-fractionated stereotactic radiotherapy, a dose of 3000 cGy in 5 fractions on 10/16/2015.  [facility] (phase III open label randomized study of [id number] [anti-PD-L1 antibody] in combination with carboplatin and paclitaxel +/- Avastin versus carboplatin and paclitaxel +/- Avastin in patients with stage IV chemotherapy naive non-small cell lung carcinoma). She was randomized to receive all 4 medications. She completed the 6 cycles of treatment without significant toxicities. Patient had been on maintenance (study drug (atezolizumab) and avastin) - subsequently only Avastin.  This was eventually discontinued.   She is being presented today to discuss enlarging RUL nodule suspicious for recurrence.  Workup includes MRI brain, PET CT, CT chest, PFT' and lung biopsy.     PHYSICIAN RECOMMENDED PLAN: Proceed with right upper lobectomy.       Imaging reviewed:   01/22/25 PET CT  12/27/24 CT chest wo contrast   07/09/24 CT chest wo contrast   11/08/23 PET CT  10/12/23 CT chest wo contrast   09/06/22 CT chest abdomen pelvis wo contrast    Pathology reviewed:   02/05/25 Tissue Exam     PFT's reviewed: 01/21/25    FEV1: 90%    DLCO: 100%    Future imaging: No    Referrals: No    Clinical Trials Reviewed:  No  Clinical Trial Eligibility:     Team agreed to plan.  NCCN guidelines were readily available for review at this discussion    The final treatment plan will be left to the discretion of the  patient and the treating physician.     DISCLAIMERS:  TO THE TREATING PHYSICIAN:  This conference is a meeting of clinicians from various specialty areas who evaluate and discuss patients for whom a multidisciplinary treatment approach is being considered. Please note that the above opinion was a consensus of the conference attendees and is intended only to assist in quality care of the discussed patient.  The responsibility for follow up on the input given during the conference, along with any final decisions regarding plan of care, is that of the patient and the patient's provider. Accordingly, appointments have only been recommended based on this information and have NOT been scheduled unless otherwise noted.      TO THE PATIENT:  This summary is a brief record of major aspects of your cancer treatment. You may choose to share a copy with any of your doctors or nurses. However, this is not a detailed or comprehensive record of your care.

## 2025-02-17 NOTE — PROGRESS NOTES
Chart reviewed in preparation of Thoracic Tumor Conference presentation by Dr. Hernandez on 02/17/25.  Patient with history of stage IV lung cancer  metastatic non-small cell lung cancer with a right upper lobe primary and a single right superior temporal intracranial metastasis status post resection 05/06/15. She completed a course of hypo-fractionated stereotactic radiotherapy, a dose of 3000 cGy in 5 fractions on 10/16/2015.  [facility] (phase III open label randomized study of [id number] [anti-PD-L1 antibody] in combination with carboplatin and paclitaxel +/- Avastin versus carboplatin and paclitaxel +/- Avastin in patients with stage IV chemotherapy naive non-small cell lung carcinoma). She was randomized to receive all 4 medications. She completed the 6 cycles of treatment without significant toxicities. Patient had been on maintenance (study drug (atezolizumab) and avastin) - subsequently only Avastin.  This was eventually discontinued.   She is being presented today to discuss enlarging RUL nodule suspicious for recurrence.  Workup includes MRI brain, PET CT, CT chest, PFT' and lung biopsy.

## 2025-02-18 ENCOUNTER — APPOINTMENT (OUTPATIENT)
Dept: LAB | Facility: HOSPITAL | Age: 65
End: 2025-02-18
Attending: THORACIC SURGERY (CARDIOTHORACIC VASCULAR SURGERY)
Payer: COMMERCIAL

## 2025-02-18 ENCOUNTER — OFFICE VISIT (OUTPATIENT)
Dept: HEMATOLOGY ONCOLOGY | Facility: MEDICAL CENTER | Age: 65
End: 2025-02-18
Payer: MEDICARE

## 2025-02-18 ENCOUNTER — LAB REQUISITION (OUTPATIENT)
Dept: LAB | Facility: HOSPITAL | Age: 65
End: 2025-02-18
Payer: COMMERCIAL

## 2025-02-18 ENCOUNTER — APPOINTMENT (OUTPATIENT)
Dept: LAB | Facility: HOSPITAL | Age: 65
End: 2025-02-18

## 2025-02-18 VITALS
DIASTOLIC BLOOD PRESSURE: 75 MMHG | BODY MASS INDEX: 31.34 KG/M2 | OXYGEN SATURATION: 99 % | HEART RATE: 90 BPM | TEMPERATURE: 98.4 F | WEIGHT: 195 LBS | HEIGHT: 66 IN | RESPIRATION RATE: 17 BRPM | SYSTOLIC BLOOD PRESSURE: 152 MMHG

## 2025-02-18 DIAGNOSIS — R79.1 ABNORMAL COAGULATION PROFILE: ICD-10-CM

## 2025-02-18 DIAGNOSIS — C34.91 NON-SMALL CELL CANCER OF RIGHT LUNG (HCC): Primary | ICD-10-CM

## 2025-02-18 DIAGNOSIS — C34.11 MALIGNANT NEOPLASM OF UPPER LOBE, RIGHT BRONCHUS OR LUNG (HCC): ICD-10-CM

## 2025-02-18 DIAGNOSIS — C34.11 SQUAMOUS CELL CARCINOMA OF UPPER LOBE OF RIGHT LUNG (HCC): ICD-10-CM

## 2025-02-18 LAB
ABO GROUP BLD: NORMAL
BLD GP AB SCN SERPL QL: NEGATIVE
RH BLD: POSITIVE
SPECIMEN EXPIRATION DATE: NORMAL

## 2025-02-18 PROCEDURE — 86850 RBC ANTIBODY SCREEN: CPT | Performed by: THORACIC SURGERY (CARDIOTHORACIC VASCULAR SURGERY)

## 2025-02-18 PROCEDURE — 86901 BLOOD TYPING SEROLOGIC RH(D): CPT | Performed by: THORACIC SURGERY (CARDIOTHORACIC VASCULAR SURGERY)

## 2025-02-18 PROCEDURE — 99214 OFFICE O/P EST MOD 30 MIN: CPT | Performed by: INTERNAL MEDICINE

## 2025-02-18 PROCEDURE — 86900 BLOOD TYPING SEROLOGIC ABO: CPT | Performed by: THORACIC SURGERY (CARDIOTHORACIC VASCULAR SURGERY)

## 2025-02-18 NOTE — PROGRESS NOTES
Jeannie Hoover  1960  [facility] [address] [address]    30 minutes was spent with patient in direct consultation/examination or reviewing the chart.    DISCUSSION.SUMMARY:    64 year-old female with M1 non-small cell lung carcinoma/adenocarcinoma. [facility] (phase III open label randomized study of [id number] [anti-PD-L1 antibody] in combination with carboplatin and paclitaxel +/- Avastin versus carboplatin and paclitaxel +/- Avastin in patients with stage IV chemotherapy naive non-small cell lung carcinoma).Patient was randomized to receive all 4 medications. Mrs. Hoover completed the 6 cycles of treatment without significant toxicities. Patient had been on maintenance (study drug (atezolizumab) and avastin) - subsequently only Avastin.  This was eventually discontinued.  More recently, patient has been on surveillance only.  Issues:     Stage IV non-small cell lung carcinoma, concern for recurrence/new primary.  Patient was diagnosed approximately 10 years ago.  Recent CAT scans of the chest demonstrated continued increase in size and attenuation of a groundglass nodular opacity in the right upper lobe.  Recent PET/CT demonstrates 2 lesions, one that has a high SUV concerning for recurrence or a new primary (the other lesion did not seem to be of concern).  Patient has been seen by thoracic surgery.  Repeat biopsy was consistent with malignancy.  Patient was represented at the thoracic tumor board on February 17, 2025.  The plan is to proceed with a right upper lobectomy (scheduled for next week).  Pathology results will determine if additional oncology workup/treatment is needed.  Patient is aware of this.    History of brain metastases.  As discussed previously patient is status post resection and radiation years ago.  Patient was long tapered off the Keppra.  No CNS issues.  Recent repeat  MRI/brain did not demonstrate any concerning abnormalities.    Patient is to return in 6 weeks but this may change depending upon the above.  Mrs. Hoover knows to call the hematology/oncology office if there are any other questions or concerns.    Carefully review your medication list and verify that the list is accurate and up-to-date. Please call the hematology/oncology office if there are medications missing from the list, medications on the list that you are not currently taking or if there is a dosage or instruction that is different from how you're taking that medication.    Patient goals and areas of care: Follow-up with surgery  Barriers to care:  None  Patient is able to self-care  __________________________________________________________________________________________________    Chief Complaint   Patient presents with    Follow-up    Non-small cell lung carcinoma     Advance Care Planning/Advance Directives: not discussed    Oncology History   Non-small cell cancer of right lung (HCC)   9/5/2015 Initial Diagnosis    Patient was referred to the emergency room for evaluation of [facility]igo as [facility]. (patient reported.)  Patient underwent the following pertinent imaging scans.  MRI of the brain demonstrated a mass in the right superior all temporal gyrus with measurements of 4.2 x 3.3 x 3.6 cm with vasogenic edema and mass effect.  There was a near uncal herniation noted.   CT of the chest and abdomen/pelvis demonstrated a necrotic right upper lobe mass measuring 7 x 4.2 x 4.1 cm strongly suspicious for bronchogenic carcinoma.  The mass contacts the posterior medial pleural surface and potentially contacts the right posterior tracheal border.  No pathologic adenopathy was identified in no evidence of metastatic disease in the chest abdomen or pelvis.       9/6/2015 Surgery    Non-small cell carcinoma of lung (HCC) diagnosed from biopsy of right temporal mass. Pathology demonstrates  poorly differentiated non-small cell carcinoma.  Pathologist stated that the immunoprofile supported the primary lung non-small cell carcinoma favor adenocarcinoma.  Squamous carcinoma could not be excluded, secondary to focal P 40 staining.  [facility] path report demonstrated no ALK gene rearrangement or dleation and negative ROS1 rearrangement, No EGFR mutations were found.     Surgery completed by Dr. Parsons.      10/7/2015 - 10/16/2015 Radiation    A dose of 3000 cGy in 5 fractions delivered every other day was delivered to the resection cavity (single right superior temporal intracranial metastasis) Dr Hernández     10/20/2015 -  Research Study Participant    [id number] Phase III open label randomized study of [id number] [Anti-PDL1 antibody] in combination with carboplatin and paclitaxel +/- Avastin versus Carboplatin and Paciltaxel +/- Avastin in patients with stage IV Chemotherapy  Naive non-small cell lung carcinoma.  Patient was randomized to receive off for medications.     11/30/2015 - 2/18/2016 Chemotherapy    Started Carboplatin, Paclitaxel, Avastin and PD-L1 (atezolizumab; study drug).  Completed 6 cycles with cycle 6 day 1 on 2/18/16     3/10/2016 -  Chemotherapy    Beginning cycle 7, maintenance cycle with Avastin and PD-L1 (Atezolizumab; study drug)     5/24/2017 Adverse Reaction    LFT elevation-persisted.  Patient was taken off of study drug Atezolizumab, but continues on Avastin maintance.     5/23/2019 - 7/3/2019 Chemotherapy    [No matching medication found in this treatment plan]       History of Present Illness: 64 year old female recently diagnosed with IV lung cancer here for followup. Mrs. Hoover began to have headaches last spring 2015. Patient was seen by her PCP and treated with antibiotics. Initially the symptoms got better the patient went on vacation. After returning from vacation, Mrs. Hoover once again developed headaches. Patient was treated with a second round of antibiotics and  then was diagnosed with vertigo. [facility]. Although the specifics are not entirely clear, the therapist in the [facility] sent the patient to the emergency room. A CAT scan of the brain demonstrated a brain lesion and patient was transferred to [place]. Patient was seen by Dr. Parsons and underwent resection demonstrating adenocarcinoma. Additional testing demonstrated a lung mass. Patient improved and was discharged. Patient completed SRT with Dr. Hickey and Dr. Hernández.      Patient has been on [facility] 20015-14 (phase III open label randomized study of [id number] [anti-PD-L1 antibody] in combination with carboplatin and paclitaxel +/- Avastin versus carboplatin and paclitaxel +/- Avastin in patients with stage IV chemotherapy naÃ¯ve non-small cell lung carcinoma). Mrs. Hoover was randomized to receive the anti-PD-L1 antibody with chemotherapy - patient completed 6 cycles and was placed on maintenance.      Mrs. Lam subsequently (years ago) suffered a tonic-clonic seizure and was seen in the emergency room. CAT scan of the head did not demonstrate any evidence of disease progression. The seizure was thought to be due to encephalomalacia. Since that time, there have been no seizure issues. Mrs. Hoover had been on Keppra - this has been tapered off.  After long discussions and consultation with the thoracic Working group, patient was taken off of all treatments approximately 6+ years ago.  Up until recently the plan had been surveillance.    Mrs. Hoover states feeling okay, same as before.  No respiratory issues.  Appetite is good, weight is stable.  Activities are baseline.  No headaches, blurred vision or dizziness.  Patient with a small rash/itchy by the site of the recent CAT scan guided biopsy. No other GI or  issues.  Activities are baseline.    Review of Systems   Constitutional:  Negative for fatigue.   HENT: Negative.     Eyes: Negative.    Respiratory:  Negative.     Cardiovascular: Negative.    Gastrointestinal: Negative.    Endocrine: Negative.    Genitourinary: Negative.    Musculoskeletal:  Positive for arthralgias. Negative for neck pain.   Skin: Negative.    Allergic/Immunologic: Negative.    Neurological: Negative.    Hematological:  Does not bruise/bleed easily.   Psychiatric/Behavioral: Negative.     All other systems reviewed and are negative.     Patient Active Problem List   Diagnosis    Non-small cell cancer of right lung (HCC)    Mixed hyperlipidemia    Allergic rhinitis    Brain metastasis    Type 2 diabetes mellitus without complication, without long-term current use of insulin (HCC)    Insomnia    Lesion of temporal lobe    Benign hypertension    Chronic maxillary sinusitis     Past Medical History:   Diagnosis Date    Adenocarcinoma of right lung, stage 4 (HCC)     Allergic rhinitis     Alopecia     resolved 10/25/16    Anxiety     last assessed 10/4/16, resolved 10/25/16    Benign hypertension 2/1/2018    Benign neoplasm of skin of trunk 03/25/2008    last assessed 3/25/08    Benign neoplasm of skin of upper extremity and shoulder 03/25/2008    last assessed 3/25/08    Ear deformity, acquired     last assessed 10/13/14, resolved 3/12/15    Eczema     History of chemotherapy     Hyperlipemia     Maintenance chemotherapy     Secondary cancer of brain (HCC)     Seizures (HCC)     Vertigo      Past Surgical History:   Procedure Laterality Date    APPENDECTOMY  1964    BRAIN TUMOR EXCISION      CENTRAL VENOUS CATHETER INSERTION Right     PORTACATH    IR BIOPSY LUNG  2/5/2025    IR PORT REMOVAL  9/10/2021     Family History   Problem Relation Age of Onset    Diabetes Mother     Heart disease Mother     Lung cancer Father 70        Smoker     Uterine cancer Paternal Grandmother     Pancreatitis Brother     No Known Problems Brother      Social History     Socioeconomic History    Marital status: /Civil Union     Spouse name: Not on file    Number  of children: 1    Years of education: Not on file    Highest education level: Not on file   Occupational History    Not on file   Tobacco Use    Smoking status: Former     Current packs/day: 0.00     Average packs/day: 1 pack/day for 47.0 years (47.0 ttl pk-yrs)     Types: Cigarettes     Start date: 1968     Quit date: 2015     Years since quitting: 10.1     Passive exposure: Never    Smokeless tobacco: Never   Vaping Use    Vaping status: Never Used   Substance and Sexual Activity    Alcohol use: Yes     Comment: occasionally / Social     Drug use: Yes     Types: Marijuana    Sexual activity: Yes   Other Topics Concern    Not on file   Social History Narrative    High school or GED     Lives independently with spouse      Social Drivers of Health     Financial Resource Strain: Not on file   Food Insecurity: No Food Insecurity (3/29/2024)    Nursing - Inadequate Food Risk Classification     Worried About Running Out of Food in the Last Year: Never true     Ran Out of Food in the Last Year: Never true     Ran Out of Food in the Last Year: Not on file   Transportation Needs: No Transportation Needs (3/29/2024)    PRAPARE - Transportation     Lack of Transportation (Medical): No     Lack of Transportation (Non-Medical): No   Physical Activity: Not on file   Stress: Not on file   Social Connections: Not on file   Intimate Partner Violence: Not on file   Housing Stability: Low Risk  (3/29/2024)    Housing Stability Vital Sign     Unable to Pay for Housing in the Last Year: No     Number of Times Moved in the Last Year: 1     Homeless in the Last Year: No       Current Outpatient Medications:     amLODIPine (NORVASC) 5 mg tablet, Take 1 tablet (5 mg total) by mouth daily, Disp: 90 tablet, Rfl: 1    Ascorbic Acid (vitamin C) 100 MG tablet, Take 100 mg by mouth daily, Disp: , Rfl:     atorvastatin (LIPITOR) 10 mg tablet, Take 1 tablet (10 mg total) by mouth daily, Disp: 90 tablet, Rfl: 1    b complex vitamins capsule, Take  1 capsule by mouth daily Plus folic acid and vitamin c, Disp: , Rfl:     cholecalciferol (VITAMIN D3) 1,000 units tablet, Take 1,000 Units by mouth daily, Disp: , Rfl:     halobetasol (ULTRAVATE) 0.05 % cream, Apply topically 2 (two) times a day (Patient taking differently: Apply topically 2 (two) times a day As needed), Disp: 50 g, Rfl: 0    Lactobacillus (ACIDOPHILUS PO), Take by mouth, Disp: , Rfl:     losartan (COZAAR) 50 mg tablet, Take 1 tablet (50 mg total) by mouth daily, Disp: 90 tablet, Rfl: 1    magnesium 30 MG tablet, Take 30 mg by mouth in the morning, Disp: , Rfl:     metFORMIN (GLUCOPHAGE) 500 mg tablet, Take 1 tablet (500 mg total) by mouth daily with breakfast, Disp: 90 tablet, Rfl: 1    Omega-3 Fatty Acids (FISH OIL PO), Take 2 tablets by mouth, Disp: , Rfl:     semaglutide, 0.25 or 0.5 mg/dose, (Ozempic, 0.25 or 0.5 MG/DOSE,) 2 mg/3 mL injection pen, Inject 0.75 mL (0.5 mg total) under the skin every 7 days 0.25 mg under the skin every 7 days Code: E11.9, Disp: 9 mL, Rfl: 1    tretinoin (RETIN-A) 0.1 % cream, Apply topically daily at bedtime, Disp: 45 g, Rfl: 2  No current facility-administered medications for this visit.    Facility-Administered Medications Ordered in Other Visits:     alteplase (CATHFLO) injection 2 mg, 2 mg, Intracatheter, PRN, Terrell Osuna MD    sodium chloride 0.9 % infusion, 20 mL/hr, Intravenous, Continuous, Terrell Osuna MD    Allergies   Allergen Reactions    Iodinated Contrast Media Anaphylaxis and Itching    Other Hives     Band-aids and adhesives, any dressing with that type of property induces hives and rash for extended period    Clindamycin     Clindamycin/Lincomycin        Vitals:    02/18/25 0926   BP: 152/75   Pulse: 90   Resp: 17   Temp: 98.4 °F (36.9 °C)   SpO2: 99%     Physical Exam  Constitutional:       Appearance: She is well-developed.      Comments: Well-nourished female, no respiratory distress   HENT:      Head: Normocephalic and atraumatic.      Right  Ear: External ear normal.      Left Ear: External ear normal.      Nose: Nose normal.      Mouth/Throat:      Pharynx: No oropharyngeal exudate.   Eyes:      Conjunctiva/sclera: Conjunctivae normal.      Pupils: Pupils are equal, round, and reactive to light.   Neck:      Comments: Neck is supple, no JVD, good range of motion, no pain or tenderness with movement  Cardiovascular:      Rate and Rhythm: Normal rate and regular rhythm.      Heart sounds: Normal heart sounds.   Pulmonary:      Effort: Pulmonary effort is normal.      Breath sounds: Normal breath sounds.   Abdominal:      General: Bowel sounds are normal.      Palpations: Abdomen is soft.      Comments: Abdomen is soft, nontender, no hepatosplenomegaly, no rigidity or rebound, +bowel sounds   Musculoskeletal:         General: Normal range of motion.      Cervical back: Normal range of motion and neck supple.   Skin:     General: Skin is warm.      Comments: Warm, moist, good color, no petechiae or ecchymoses, small area of dry slightly red skin over the right scapula, no discharge, no bleeding   Neurological:      Mental Status: She is alert and oriented to person, place, and time.      Deep Tendon Reflexes: Reflexes are normal and symmetric.   Psychiatric:         Behavior: Behavior normal.         Thought Content: Thought content normal.         Judgment: Judgment normal.     Extremities: no lower extremity edema bilaterally, no cords, pulses are 1+  Lymphatics: no adenopathy in the neck, supraclavicular region, axilla and groin bilaterally    Performance Status: 1 - Symptomatic but completely ambulatory    Labs    10/3/2024 WBC = 8.9 hemoglobin = 13.7 hematocrit = 40.2 MCV = 91 platelet = 388 BUN = 15 creatinine = 0.86 calcium = 9.4 LFTs WNL    10/2/2023 WBC = 8.9 hemoglobin = 15.2 hematocrit = 42.4 platelet = 359 BUN = 16 creatinine = 0.93 calcium = 9.7 AST = 45 ALT = 70 alkaline phosphatase = 75 total bilirubin = 0.7    Imaging    1/22/2025  PET/CT    IMPRESSION:  1. Intense FDG uptake associated with the growing groundglass and soft tissue opacity in the right upper lobe, most compatible with malignancy. No hypermetabolic hilar or mediastinal adenopathy.  2. The more superior wedge-shaped right upper lung opacity demonstrates only minimal FDG activity, favoring posttreatment changes.  3. No worrisome hypermetabolic lesions in the neck, abdomen or pelvis.    2/3/2025 MRI brain with and without contrast.  Impression stated stable posttreatment changes in the right anterior temporal lobe, no enhancing lesions identified.    12/27/2024 CAT scan chest without contrast    LUNGS:  - The right upper lobe irregularly-shaped opacity image 55 measures 2.3 x 1.3 cm. This has not grown since recent prior studies  - The groundglass opacity in the right upper lobe, for example image 69, measures 2.9 x 1.4 x 1.9 cm, earlier 2.2 x 1.7 x 1.8 cm. It has increased in size and density in particular when compared to prior study from 9/6/2022. Density appears greater.  - Right apical 0.3 cm nodule image 43 is unchanged from numerous prior studies.  - Right lower lobe cyst image 158, unchanged.  - Left lower lobe nodule image 161 measuring 0.3 cm, unchanged. Small groundglass opacity in the left lower lobe abutting the fissure on image 137 is also unchanged.  - Background pulmonary emphysema     PLEURA: Unremarkable.    Impression    1. Continued increase in size and attenuation of groundglass nodular opacity right upper lobe and adjacent more solid-appearing component as described above concerning for progressive neoplasia/adenocarcinoma spectrum lesion. Biopsy advised.  2. Roughly wedge-shaped right upper lobe opacity again noted stable or minimally more prominent than on the prior examination for which continued imaging surveillance is advised.  3. Additional stable findings as noted.    09/06/2022 CT scan chest abdomen pelvis without contrast    1.  Mixed solid and  cavitary lesion in the right upper lobe has overall decreased in size.  The soft tissue component appears more dense and well-defined.  Continued attention on follow-up is recommended.  2.  No evidence of metastatic disease.    09/01/2022 MRI brain    1. Stable treatment related changes in the right temporal lobe with gliosis and encephalomalacia. No evidence of recurrent or progressive tumor.  2. No acute infarction, intracranial hemorrhage or mass. No MR evidence of metastases.  3. Stable white matter signal abnormality, likely microangiopathy.    03/01/2021 CT scan chest abdomen pelvis    No evidence of metastatic disease.     Mixed solid and cavitary lesion in the right upper lobe has overall diminished in size more due to diminished cavity size.  The soft tissue component may be slightly larger.  [place] term follow-up should be considered.     Groundglass opacities at the left lung base have resolved probably inflammatory at that time.    11/21/2019 CT scan of the chest and abdomen pelvis    Right upper lobe cavitary lesion with mild very slow interval retracting size.  Stable component of solid thickening along the posterior wall.  No new pulmonary lesions.  Stable patchy subsegmental groundglass opacities remain present in the left lower lobe.    08/28/2019 CT scan of chest and abdomen pelvis    Stable cavitary target lesion in the right upper lobe.       Stable left lower lobe patchy groundglass opacities with associated mild peribronchial thickening, probably infectious or inflammatory in etiology.  Continued surveillance on follow-up is recommended.  Further evaluation with bronchoscopy can also be considered given persistence of the findings since CT from 8/20/2018.  No evidence of metastatic disease in the abdomen or pelvis.    05/31/2019 PET-CT    1.  No findings suspicious for hypermetabolic malignancy.  2. Mild patchy FDG uptake in the left lower lobe patchy groundglass opacities with associated  peribronchial thickening.  This is likely infectious or inflammatory given the appearance, however, given that this has been present since the 8/20/2018 CT, consider follow-up with bronchoscopy.    04/26/2019 CT scan of the chest and abdomen/pelvis    1.  Stable cavitary target lesion in the right upper lobe.  No new site of metastasis.  2.  Stable left lower lobe patchy groundglass opacities with associated mild peribronchial thickening likely due to a chronic infectious or inflammatory process.  Continued surveillance on follow-up is recommended.    04/22/2019 MRI brain    1.  Stable treatment related changes in the lateral aspect of the right temporal lobe without evidence of recurrent or progressive metastatic disease.  2.  No acute infarction, intracranial hemorrhage or enhancing mass lesion.  3.  Mild scattered white matter disease likely mild, chronic microangiopathy and/or treatment related changes are stable.  4.  Moderate ethmoidal and maxillary sinus disease redemonstrated.    02/21/2019 CT scan of the chest and abdomen/pelvis    Limited evaluation of solid organs and vasculature without intravenous contrast.  1.  Stable cavitary target lesion in the right upper lobe.  2.  Stable left lower lobe patchy groundglass opacities with associated mild peribronchial wall thickening.  This is again more likely chronic infectious or inflammatory in etiology.  Continued surveillance on follow-up is recommended.  3.  No new metastatic lesions are found.    12/20/2018 CT scan of the chest and abdomen/pelvis    1.  Stable right upper lobe 3.3 cm cavitary lesion as described.  2.  Left lower lobe patchy groundglass opacities with associated mild peribronchial wall thickening, similar to October 18, 2018 but improved from August 20, 2018, likely related to a slowly resolving infectious/inflammatory process.  Follow-up   short-term chest CT in 3 months is recommended to evaluate for resolution.  3.  No metastatic disease  in the abdomen or pelvis.    10/18/2018 CT scan of the chest without contrast: IMPRESSION: Improved appearance of the left lung base infiltrate.  Stable right cavitary lesion.  No new findings    08/20/2018 CT scan of the chest and abdomen/pelvis without contrast    RECIST target lesion: Cavitary right upper lobe mass is unchanged, measuring 3.4 x 2.6 cm on series 3 image 15 (previously 3.5 x 2.5 cm).  Solid component along the medial aspect of the posterior wall is also unchanged, measuring 1.4 x 0.9 cm.     LUNGS:  Stable cavitary right upper lobe mass, as above.  No new nodules identified.  There is new small patchy density in the posterior left base compatible with infiltrate.  There is no tracheal or endobronchial lesion.    IMPRESSION    Unchanged cavitary right upper lobe mass.  No new metastatic disease.  Interval development of mild left lower lobe infiltrate noted.      06/18/2018 CT scan of the chest and abdomen/pelvis    Unchanged cavitary right upper lobe mass.   No new metastatic disease.    04/16/2018 CT scan of the chest and abdomen/pelvis    There is no significant interval change in size of the cystic or nodular components of the cavitary right upper lobe mass.  No new metastatic lesions identified in the chest, abdomen or pelvis.    1/22/18 CAT scan of the chest and abdomen/pelvis    RECIST MEASUREMENTS:  TARGET LESION:   1: Right upper lobe cavitary lung mass: The overall size of the lesion is 3.5 x 2.4 cm on image 14 of series 3, significantly decreased from 4.3 x 2.7 cm on previous examination.  Solid component along the medial aspect of the posterior wall is not significantly changed from previous examination currently measuring 1.5 x 0.9 cm on image 14 of series 3 compared with 1.8 x 0.8 cm when measured using similar technique on previous   examination.  Nodular solid component along the lateral aspect of posterior wall measures approximately 0.7 x 0.4 cm on image 13 of series 3, and when  measured using similar technique is unchanged from previous examinations.    There is interval decrease in size of the cystic portion of the cavitary right upper lobe mass however, solid components of this mass are not significantly changed from previous examination.  Otherwise unchanged examination with no new metastatic lesions   identified in the chest, abdomen or pelvis.    Pathology    [facility] results demonstrated no ALK gene rearrangement or deletion and negative for ROS1 rearrangement. No EGFR mutations were found also.     Right temporal mass from September 6, 2015 demonstrated metastatic poorly differentiated non-small cell carcinoma. The tumor cells stained positive for CK 7, TTF-1 and Napsin and negative for CK 20, CK 5/6 andmucicarmine. Pathologist stated that the immunoprofile supported the primary lung non-small cell carcinoma, favor adenocarcinoma. The pathologist also stated that given the focal p40 staining, a squamous carcinoma could not be excluded.      Procedures    05/30/2019 complete pulmonary function test    PFT Interpretation     Quality of Data:  The patient's efforts are acceptable and reproducible.     Test Performed:  Complete pulmonary function tests, including spirometry with and without bronchodilator, measurement of lung volume, and diffusing capacity.     Spirometry Interpretation:  Spirometry is within normal limits.     Flow Volume Loops:  Flow-Volume loops are normal.     Lung Volumes:  Plethysmographic lung volumes are within normal limits.     Diffusing Capacity:  Minimal reduction in diffusion capacity likely clinically insignificant     Conclusions:  An isolated reduction in Diffusing Capacity is present and may represent Early fibrotic or pulmonary vascular disease. Clinical correlation is recommended.

## 2025-02-19 LAB
ATRIAL RATE: 79 BPM
P AXIS: 73 DEGREES
PR INTERVAL: 148 MS
QRS AXIS: 53 DEGREES
QRSD INTERVAL: 86 MS
QT INTERVAL: 368 MS
QTC INTERVAL: 422 MS
T WAVE AXIS: 29 DEGREES
VENTRICULAR RATE: 79 BPM

## 2025-02-19 PROCEDURE — 93010 ELECTROCARDIOGRAM REPORT: CPT | Performed by: INTERNAL MEDICINE

## 2025-02-20 NOTE — PRE-PROCEDURE INSTRUCTIONS
Pre-Surgery Instructions:   Medication Instructions    amLODIPine (NORVASC) 5 mg tablet Take day of surgery.    Ascorbic Acid (vitamin C) 100 MG tablet Stop taking 7 days prior to surgery.    atorvastatin (LIPITOR) 10 mg tablet Take night before surgery    b complex vitamins capsule Stop taking 7 days prior to surgery.    cholecalciferol (VITAMIN D3) 1,000 units tablet Stop taking 7 days prior to surgery.    halobetasol (ULTRAVATE) 0.05 % cream Hold day of surgery.    Lactobacillus (ACIDOPHILUS PO) Stop taking 7 days prior to surgery.    losartan (COZAAR) 50 mg tablet Hold day of surgery.    magnesium 30 MG tablet Stop taking 7 days prior to surgery.    metFORMIN (GLUCOPHAGE) 500 mg tablet Hold day of surgery.    Omega-3 Fatty Acids (FISH OIL PO) Stop taking 7 days prior to surgery.    tretinoin (RETIN-A) 0.1 % cream Hold day of surgery.    Medication instructions for day of surgery reviewed. Please take all instructed medications with only a sip of water.       You will receive a call one business day prior to surgery with an arrival time and hospital directions. If your surgery is scheduled on a Monday, the hospital will be calling you on the Friday prior to your surgery. If you have not heard from anyone by 8pm, please call the hospital supervisor through the hospital  at [phone]. ([place] [phone] or [place] [phone]).    Do not eat or drink anything after midnight the night before your surgery, including candy, mints, lifesavers, or chewing gum. Do not drink alcohol 24hrs before your surgery. Try not to smoke at least 24hrs before your surgery.       Follow the pre surgery showering instructions as listed in the “My Surgical Experience Booklet” or otherwise provided by your surgeon's office. Do not use a blade to shave the surgical area 1 week before surgery. It is okay to use a clean electric clippers up to 24 hours before surgery. Do not apply any lotions, creams, including makeup,  cologne, deodorant, or perfumes after showering on the day of your surgery. Do not use dry shampoo, hair spray, hair gel, or any type of hair products.     No contact lenses, eye make-up, or artificial eyelashes. Remove nail polish, including gel polish, and any artificial, gel, or acrylic nails if possible. Remove all jewelry including rings and body piercing jewelry.     Wear causal clothing that is easy to take on and off. Consider your type of surgery.    Keep any valuables, jewelry, piercings at home. Please bring any specially ordered equipment (sling, braces) if indicated.    Arrange for a responsible person to drive you to and from the hospital on the day of your surgery. Please confirm the visitor policy for the day of your procedure when you receive your phone call with an arrival time.     Call the surgeon's office with any new illnesses, exposures, or additional questions prior to surgery.    Please reference your “My Surgical Experience Booklet” for additional information to prepare for your upcoming surgery.

## 2025-02-26 ENCOUNTER — TELEPHONE (OUTPATIENT)
Dept: FAMILY MEDICINE CLINIC | Facility: CLINIC | Age: 65
End: 2025-02-26

## 2025-02-26 ENCOUNTER — TELEPHONE (OUTPATIENT)
Age: 65
End: 2025-02-26

## 2025-02-26 LAB
CARIS ALK: NEGATIVE
CARIS GENOMIC LOH - EXOME: NORMAL
CARIS HER2/NEU: NORMAL
CARIS HLA-A: NORMAL
CARIS HLA-B: NORMAL
CARIS HLA-C: NORMAL
CARIS MSI - EXOME: NORMAL
CARIS PD-L1 (22C3): POSITIVE
CARIS PD-L1 (SP263): POSITIVE
CARIS PD-L1 FDA (28-8): POSITIVE
CARIS PD-L1 FDA(SP142): NEGATIVE
CARIS TMB - EXOME: NORMAL

## 2025-02-26 NOTE — TELEPHONE ENCOUNTER
[facility] called and transferred a Becka from Abe Hrenandez MD's office. The question was for Dr Mckeon to find out if it is okay for patient to be NPO prior to her procedure since she is a diabetic. Dr Mckeon is not in the office. I spoke with Dr Lombardi and the short answer is yes she can be NPO as long as she withholds her diabetic medication and her ozempic before the procedure. The only thing that would happen is the patients sugar will go up. I came back to the phone and Becka was gone, so if she calls back let her know that it is okay for NPO.   Gilma Blackwell MA

## 2025-02-26 NOTE — TELEPHONE ENCOUNTER
"She is not taking the Ozempic since the beginning of January anyway, and she said she did know that information anyway about the diabetes meds. Her biggest concern is that she gets nauseated when she doesn't eat so she normally smokes pot when she gets nauseated but she cannot smoke pot due to surgery. Surgery is not until 1 pm , which is why she was concerned. She said she will \"just suck it up\" and she will be fine. She is just anxious about her surgery. She was thankful for the call but stated that we did not even need to call her. She understands since she already spoke with other nurses. She knows to call back if any further questions or concerns arise RICHYoylAlex  "

## 2025-02-26 NOTE — TELEPHONE ENCOUNTER
[facility] called and transferred a Becka from Abe Hernandez MD's office. The question was for Dr Mckeon to find out if it is okay for patient to be NPO prior to her procedure since she is a diabetic. Dr Mckeon is not in the office. I spoke with Dr Lombardi and the short answer is yes she can be NPO as long as she withholds her diabetic medication and her ozempic before the procedure. The only thing that would happen is the patients sugar will go up. I came back to the phone and Becka was gone, so if she calls back let her know that it is okay for NPO.   Gilma Blackwell MA

## 2025-02-26 NOTE — TELEPHONE ENCOUNTER
Gilma Blackwell MA routed conversation to [facility]; [facility] Clerical4 minutes ago (3:39 PM)     Gilma Blackwell MA5 minutes ago (3:38 PM)     [facility] called and transferred a Becka from Abe Hernandez MD's office. The question was for Dr Mckeon to find out if it is okay for patient to be NPO prior to her procedure since she is a diabetic. Dr Mckeon is not in the office. I spoke with Dr Lombardi and the short answer is yes she can be NPO as long as she withholds her diabetic medication and her ozempic before the procedure. The only thing that would happen is the patients sugar will go up. I came back to the phone and Becka was gone, so if she calls back let her know that it is okay for NPO.   Gilma Blackwell MA

## 2025-02-26 NOTE — TELEPHONE ENCOUNTER
Spoke with patient who is scheduled for lobectomy procedure tomorrow at 1pm and instructed to be NPO after midnight. Patient is concerned due to her diabetes and length of NPO time. Discussed with TAL Delgado and advised patient to reach out to PCP for diabetes recommendations. Warm transfer attempted to PCP office, call disconnected. Patient stated she only requires call back from PCP if they have additional recommendations and she will call if she develops nausea or vomiting tomorrow.

## 2025-02-27 ENCOUNTER — ANESTHESIA (OUTPATIENT)
Dept: PERIOP | Facility: HOSPITAL | Age: 65
DRG: 164 | End: 2025-02-27
Payer: COMMERCIAL

## 2025-02-27 ENCOUNTER — ANESTHESIA EVENT (OUTPATIENT)
Dept: PERIOP | Facility: HOSPITAL | Age: 65
DRG: 164 | End: 2025-02-27
Payer: COMMERCIAL

## 2025-02-27 ENCOUNTER — HOSPITAL ENCOUNTER (INPATIENT)
Facility: HOSPITAL | Age: 65
LOS: 2 days | Discharge: HOME | DRG: 164 | End: 2025-03-01
Attending: THORACIC SURGERY (CARDIOTHORACIC VASCULAR SURGERY) | Admitting: THORACIC SURGERY (CARDIOTHORACIC VASCULAR SURGERY)
Payer: COMMERCIAL

## 2025-02-27 ENCOUNTER — APPOINTMENT (INPATIENT)
Dept: RADIOLOGY | Facility: HOSPITAL | Age: 65
DRG: 164 | End: 2025-02-27
Payer: COMMERCIAL

## 2025-02-27 DIAGNOSIS — C34.91 NON-SMALL CELL CANCER OF RIGHT LUNG (HCC): Primary | ICD-10-CM

## 2025-02-27 DIAGNOSIS — C34.11 SQUAMOUS CELL CARCINOMA OF UPPER LOBE OF RIGHT LUNG (HCC): ICD-10-CM

## 2025-02-27 LAB
ANION GAP SERPL CALCULATED.3IONS-SCNC: 10 MMOL/L (ref 4–13)
BUN SERPL-MCNC: 13 MG/DL (ref 5–25)
CALCIUM SERPL-MCNC: 9 MG/DL (ref 8.4–10.2)
CHLORIDE SERPL-SCNC: 103 MMOL/L (ref 96–108)
CO2 SERPL-SCNC: 23 MMOL/L (ref 21–32)
CREAT SERPL-MCNC: 0.88 MG/DL (ref 0.6–1.3)
ERYTHROCYTE [DISTWIDTH] IN BLOOD BY AUTOMATED COUNT: 11.9 % (ref 11.6–15.1)
GFR SERPL CREATININE-BSD FRML MDRD: 68 ML/MIN/1.73SQ M
GLUCOSE SERPL-MCNC: 120 MG/DL (ref 65–140)
GLUCOSE SERPL-MCNC: 267 MG/DL (ref 65–140)
GLUCOSE SERPL-MCNC: 271 MG/DL (ref 65–140)
GLUCOSE SERPL-MCNC: 283 MG/DL (ref 65–140)
HCT VFR BLD AUTO: 37.5 % (ref 34.8–46.1)
HGB BLD-MCNC: 12.7 G/DL (ref 11.5–15.4)
MAGNESIUM SERPL-MCNC: 1.7 MG/DL (ref 1.9–2.7)
MCH RBC QN AUTO: 31.1 PG (ref 26.8–34.3)
MCHC RBC AUTO-ENTMCNC: 33.9 G/DL (ref 31.4–37.4)
MCV RBC AUTO: 92 FL (ref 82–98)
PLATELET # BLD AUTO: 438 THOUSANDS/UL (ref 149–390)
PMV BLD AUTO: 9 FL (ref 8.9–12.7)
POTASSIUM SERPL-SCNC: 4.3 MMOL/L (ref 3.5–5.3)
RBC # BLD AUTO: 4.09 MILLION/UL (ref 3.81–5.12)
SODIUM SERPL-SCNC: 136 MMOL/L (ref 135–147)
WBC # BLD AUTO: 25.41 THOUSAND/UL (ref 4.31–10.16)

## 2025-02-27 PROCEDURE — 8E0W4CZ ROBOTIC ASSISTED PROCEDURE OF TRUNK REGION, PERCUTANEOUS ENDOSCOPIC APPROACH: ICD-10-PCS | Performed by: THORACIC SURGERY (CARDIOTHORACIC VASCULAR SURGERY)

## 2025-02-27 PROCEDURE — NC001 PR NO CHARGE: Performed by: PHYSICIAN ASSISTANT

## 2025-02-27 PROCEDURE — 88341 IMHCHEM/IMCYTCHM EA ADD ANTB: CPT | Performed by: PATHOLOGY

## 2025-02-27 PROCEDURE — 0BTC4ZZ RESECTION OF RIGHT UPPER LUNG LOBE, PERCUTANEOUS ENDOSCOPIC APPROACH: ICD-10-PCS | Performed by: THORACIC SURGERY (CARDIOTHORACIC VASCULAR SURGERY)

## 2025-02-27 PROCEDURE — 07B74ZZ EXCISION OF THORAX LYMPHATIC, PERCUTANEOUS ENDOSCOPIC APPROACH: ICD-10-PCS | Performed by: THORACIC SURGERY (CARDIOTHORACIC VASCULAR SURGERY)

## 2025-02-27 PROCEDURE — 88313 SPECIAL STAINS GROUP 2: CPT | Performed by: PATHOLOGY

## 2025-02-27 PROCEDURE — 83735 ASSAY OF MAGNESIUM: CPT | Performed by: SURGERY

## 2025-02-27 PROCEDURE — 32663 THORACOSCOPY W/LOBECTOMY: CPT | Performed by: THORACIC SURGERY (CARDIOTHORACIC VASCULAR SURGERY)

## 2025-02-27 PROCEDURE — 82948 REAGENT STRIP/BLOOD GLUCOSE: CPT

## 2025-02-27 PROCEDURE — 80048 BASIC METABOLIC PNL TOTAL CA: CPT | Performed by: SURGERY

## 2025-02-27 PROCEDURE — S2900 ROBOTIC SURGICAL SYSTEM: HCPCS | Performed by: THORACIC SURGERY (CARDIOTHORACIC VASCULAR SURGERY)

## 2025-02-27 PROCEDURE — 85027 COMPLETE CBC AUTOMATED: CPT | Performed by: SURGERY

## 2025-02-27 PROCEDURE — 88342 IMHCHEM/IMCYTCHM 1ST ANTB: CPT | Performed by: PATHOLOGY

## 2025-02-27 PROCEDURE — 88305 TISSUE EXAM BY PATHOLOGIST: CPT | Performed by: PATHOLOGY

## 2025-02-27 PROCEDURE — 0BJ08ZZ INSPECTION OF TRACHEOBRONCHIAL TREE, VIA NATURAL OR ARTIFICIAL OPENING ENDOSCOPIC: ICD-10-PCS | Performed by: THORACIC SURGERY (CARDIOTHORACIC VASCULAR SURGERY)

## 2025-02-27 PROCEDURE — 71045 X-RAY EXAM CHEST 1 VIEW: CPT

## 2025-02-27 PROCEDURE — 32674 THORACOSCOPY LYMPH NODE EXC: CPT | Performed by: THORACIC SURGERY (CARDIOTHORACIC VASCULAR SURGERY)

## 2025-02-27 PROCEDURE — 88309 TISSUE EXAM BY PATHOLOGIST: CPT | Performed by: PATHOLOGY

## 2025-02-27 RX ORDER — ONDANSETRON 2 MG/ML
4 INJECTION INTRAMUSCULAR; INTRAVENOUS ONCE AS NEEDED
Status: DISCONTINUED | OUTPATIENT
Start: 2025-02-27 | End: 2025-02-27 | Stop reason: HOSPADM

## 2025-02-27 RX ORDER — OXYCODONE HYDROCHLORIDE 5 MG/1
5 TABLET ORAL EVERY 4 HOURS PRN
Refills: 0 | Status: DISCONTINUED | OUTPATIENT
Start: 2025-02-27 | End: 2025-03-01 | Stop reason: HOSPADM

## 2025-02-27 RX ORDER — HYDRALAZINE HYDROCHLORIDE 20 MG/ML
5 INJECTION INTRAMUSCULAR; INTRAVENOUS
Status: DISCONTINUED | OUTPATIENT
Start: 2025-02-27 | End: 2025-02-27 | Stop reason: HOSPADM

## 2025-02-27 RX ORDER — OXYCODONE HYDROCHLORIDE 10 MG/1
10 TABLET ORAL EVERY 4 HOURS PRN
Refills: 0 | Status: DISCONTINUED | OUTPATIENT
Start: 2025-02-27 | End: 2025-03-01 | Stop reason: HOSPADM

## 2025-02-27 RX ORDER — SODIUM CHLORIDE, SODIUM LACTATE, POTASSIUM CHLORIDE, CALCIUM CHLORIDE 600; 310; 30; 20 MG/100ML; MG/100ML; MG/100ML; MG/100ML
125 INJECTION, SOLUTION INTRAVENOUS CONTINUOUS
Status: DISCONTINUED | OUTPATIENT
Start: 2025-02-27 | End: 2025-02-27

## 2025-02-27 RX ORDER — METOCLOPRAMIDE HYDROCHLORIDE 5 MG/ML
10 INJECTION INTRAMUSCULAR; INTRAVENOUS ONCE AS NEEDED
Status: DISCONTINUED | OUTPATIENT
Start: 2025-02-27 | End: 2025-02-27 | Stop reason: HOSPADM

## 2025-02-27 RX ORDER — MIDAZOLAM HYDROCHLORIDE 2 MG/2ML
INJECTION, SOLUTION INTRAMUSCULAR; INTRAVENOUS AS NEEDED
Status: DISCONTINUED | OUTPATIENT
Start: 2025-02-27 | End: 2025-02-27

## 2025-02-27 RX ORDER — CEFAZOLIN SODIUM 2 G/50ML
2000 SOLUTION INTRAVENOUS ONCE
Status: DISCONTINUED | OUTPATIENT
Start: 2025-02-27 | End: 2025-02-27 | Stop reason: HOSPADM

## 2025-02-27 RX ORDER — GABAPENTIN 300 MG/1
300 CAPSULE ORAL 3 TIMES DAILY
Status: DISCONTINUED | OUTPATIENT
Start: 2025-02-27 | End: 2025-03-01 | Stop reason: HOSPADM

## 2025-02-27 RX ORDER — ALBUTEROL SULFATE 0.83 MG/ML
2.5 SOLUTION RESPIRATORY (INHALATION) ONCE AS NEEDED
Status: DISCONTINUED | OUTPATIENT
Start: 2025-02-27 | End: 2025-02-27 | Stop reason: HOSPADM

## 2025-02-27 RX ORDER — HYDROMORPHONE HCL IN WATER/PF 6 MG/30 ML
0.2 PATIENT CONTROLLED ANALGESIA SYRINGE INTRAVENOUS
Status: DISCONTINUED | OUTPATIENT
Start: 2025-02-27 | End: 2025-02-27 | Stop reason: HOSPADM

## 2025-02-27 RX ORDER — SODIUM CHLORIDE, SODIUM LACTATE, POTASSIUM CHLORIDE, CALCIUM CHLORIDE 600; 310; 30; 20 MG/100ML; MG/100ML; MG/100ML; MG/100ML
60 INJECTION, SOLUTION INTRAVENOUS CONTINUOUS
Status: DISCONTINUED | OUTPATIENT
Start: 2025-02-27 | End: 2025-02-28

## 2025-02-27 RX ORDER — HEPARIN SODIUM 5000 [USP'U]/ML
5000 INJECTION, SOLUTION INTRAVENOUS; SUBCUTANEOUS
Status: COMPLETED | OUTPATIENT
Start: 2025-02-28 | End: 2025-02-27

## 2025-02-27 RX ORDER — CEFAZOLIN SODIUM 1 G/3ML
INJECTION, POWDER, FOR SOLUTION INTRAMUSCULAR; INTRAVENOUS AS NEEDED
Status: DISCONTINUED | OUTPATIENT
Start: 2025-02-27 | End: 2025-02-27

## 2025-02-27 RX ORDER — LIDOCAINE HYDROCHLORIDE 10 MG/ML
INJECTION, SOLUTION EPIDURAL; INFILTRATION; INTRACAUDAL; PERINEURAL AS NEEDED
Status: DISCONTINUED | OUTPATIENT
Start: 2025-02-27 | End: 2025-02-27

## 2025-02-27 RX ORDER — CELECOXIB 100 MG/1
100 CAPSULE ORAL 2 TIMES DAILY
Status: DISCONTINUED | OUTPATIENT
Start: 2025-02-27 | End: 2025-03-01 | Stop reason: HOSPADM

## 2025-02-27 RX ORDER — FENTANYL CITRATE/PF 50 MCG/ML
25 SYRINGE (ML) INJECTION
Status: DISCONTINUED | OUTPATIENT
Start: 2025-02-27 | End: 2025-02-27 | Stop reason: HOSPADM

## 2025-02-27 RX ORDER — LABETALOL HYDROCHLORIDE 5 MG/ML
10 INJECTION, SOLUTION INTRAVENOUS
Status: DISCONTINUED | OUTPATIENT
Start: 2025-02-27 | End: 2025-02-27 | Stop reason: HOSPADM

## 2025-02-27 RX ORDER — PROPOFOL 10 MG/ML
INJECTION, EMULSION INTRAVENOUS AS NEEDED
Status: DISCONTINUED | OUTPATIENT
Start: 2025-02-27 | End: 2025-02-27

## 2025-02-27 RX ORDER — SODIUM CHLORIDE 9 MG/ML
INJECTION, SOLUTION INTRAVENOUS CONTINUOUS PRN
Status: DISCONTINUED | OUTPATIENT
Start: 2025-02-27 | End: 2025-02-27

## 2025-02-27 RX ORDER — FENTANYL CITRATE 50 UG/ML
INJECTION, SOLUTION INTRAMUSCULAR; INTRAVENOUS AS NEEDED
Status: DISCONTINUED | OUTPATIENT
Start: 2025-02-27 | End: 2025-02-27

## 2025-02-27 RX ORDER — AMLODIPINE BESYLATE 5 MG/1
5 TABLET ORAL DAILY
Status: DISCONTINUED | OUTPATIENT
Start: 2025-02-28 | End: 2025-03-01 | Stop reason: HOSPADM

## 2025-02-27 RX ORDER — ATORVASTATIN CALCIUM 10 MG/1
10 TABLET, FILM COATED ORAL DAILY
Status: DISCONTINUED | OUTPATIENT
Start: 2025-02-28 | End: 2025-03-01 | Stop reason: HOSPADM

## 2025-02-27 RX ORDER — PROMETHAZINE HYDROCHLORIDE 25 MG/ML
12.5 INJECTION, SOLUTION INTRAMUSCULAR; INTRAVENOUS ONCE AS NEEDED
Status: DISCONTINUED | OUTPATIENT
Start: 2025-02-27 | End: 2025-02-27 | Stop reason: HOSPADM

## 2025-02-27 RX ORDER — ENOXAPARIN SODIUM 100 MG/ML
40 INJECTION SUBCUTANEOUS DAILY
Status: DISCONTINUED | OUTPATIENT
Start: 2025-02-28 | End: 2025-03-01 | Stop reason: HOSPADM

## 2025-02-27 RX ORDER — SODIUM CHLORIDE, SODIUM LACTATE, POTASSIUM CHLORIDE, CALCIUM CHLORIDE 600; 310; 30; 20 MG/100ML; MG/100ML; MG/100ML; MG/100ML
125 INJECTION, SOLUTION INTRAVENOUS CONTINUOUS
Status: CANCELLED | OUTPATIENT
Start: 2025-02-27

## 2025-02-27 RX ORDER — ROCURONIUM BROMIDE 10 MG/ML
INJECTION, SOLUTION INTRAVENOUS AS NEEDED
Status: DISCONTINUED | OUTPATIENT
Start: 2025-02-27 | End: 2025-02-27

## 2025-02-27 RX ORDER — GABAPENTIN 300 MG/1
300 CAPSULE ORAL ONCE
Status: COMPLETED | OUTPATIENT
Start: 2025-02-27 | End: 2025-02-27

## 2025-02-27 RX ORDER — ONDANSETRON 2 MG/ML
INJECTION INTRAMUSCULAR; INTRAVENOUS AS NEEDED
Status: DISCONTINUED | OUTPATIENT
Start: 2025-02-27 | End: 2025-02-27

## 2025-02-27 RX ORDER — ACETAMINOPHEN 325 MG/1
975 TABLET ORAL EVERY 6 HOURS SCHEDULED
Status: DISCONTINUED | OUTPATIENT
Start: 2025-02-28 | End: 2025-03-01 | Stop reason: HOSPADM

## 2025-02-27 RX ORDER — HYDROMORPHONE HCL/PF 1 MG/ML
0.5 SYRINGE (ML) INJECTION
Refills: 0 | Status: DISCONTINUED | OUTPATIENT
Start: 2025-02-27 | End: 2025-03-01 | Stop reason: HOSPADM

## 2025-02-27 RX ORDER — ACETAMINOPHEN 325 MG/1
975 TABLET ORAL ONCE
Status: COMPLETED | OUTPATIENT
Start: 2025-02-27 | End: 2025-02-27

## 2025-02-27 RX ORDER — DEXAMETHASONE SODIUM PHOSPHATE 10 MG/ML
INJECTION, SOLUTION INTRAMUSCULAR; INTRAVENOUS AS NEEDED
Status: DISCONTINUED | OUTPATIENT
Start: 2025-02-27 | End: 2025-02-27

## 2025-02-27 RX ORDER — INSULIN LISPRO 100 [IU]/ML
1-5 INJECTION, SOLUTION INTRAVENOUS; SUBCUTANEOUS 4 TIMES DAILY
Status: DISCONTINUED | OUTPATIENT
Start: 2025-02-27 | End: 2025-03-01

## 2025-02-27 RX ORDER — SODIUM CHLORIDE, SODIUM LACTATE, POTASSIUM CHLORIDE, CALCIUM CHLORIDE 600; 310; 30; 20 MG/100ML; MG/100ML; MG/100ML; MG/100ML
INJECTION, SOLUTION INTRAVENOUS CONTINUOUS PRN
Status: DISCONTINUED | OUTPATIENT
Start: 2025-02-27 | End: 2025-02-27

## 2025-02-27 RX ORDER — HYDROMORPHONE HCL/PF 1 MG/ML
0.5 SYRINGE (ML) INJECTION
Status: DISCONTINUED | OUTPATIENT
Start: 2025-02-27 | End: 2025-02-27 | Stop reason: HOSPADM

## 2025-02-27 RX ADMIN — SODIUM CHLORIDE: 0.9 INJECTION, SOLUTION INTRAVENOUS at 17:15

## 2025-02-27 RX ADMIN — CEFAZOLIN 2000 MG: 1 INJECTION, POWDER, FOR SOLUTION INTRAMUSCULAR; INTRAVENOUS at 17:41

## 2025-02-27 RX ADMIN — FENTANYL CITRATE 25 MCG: 50 INJECTION INTRAMUSCULAR; INTRAVENOUS at 21:24

## 2025-02-27 RX ADMIN — GABAPENTIN 300 MG: 300 CAPSULE ORAL at 22:21

## 2025-02-27 RX ADMIN — GABAPENTIN 300 MG: 300 CAPSULE ORAL at 13:55

## 2025-02-27 RX ADMIN — FENTANYL CITRATE 50 MCG: 50 INJECTION INTRAMUSCULAR; INTRAVENOUS at 17:10

## 2025-02-27 RX ADMIN — OXYCODONE HYDROCHLORIDE 10 MG: 10 TABLET ORAL at 22:22

## 2025-02-27 RX ADMIN — ONDANSETRON 4 MG: 2 INJECTION INTRAMUSCULAR; INTRAVENOUS at 20:30

## 2025-02-27 RX ADMIN — FENTANYL CITRATE 25 MCG: 50 INJECTION INTRAMUSCULAR; INTRAVENOUS at 21:57

## 2025-02-27 RX ADMIN — LABETALOL HYDROCHLORIDE 10 MG: 5 INJECTION, SOLUTION INTRAVENOUS at 21:53

## 2025-02-27 RX ADMIN — SODIUM CHLORIDE, SODIUM LACTATE, POTASSIUM CHLORIDE, AND CALCIUM CHLORIDE 125 ML/HR: .6; .31; .03; .02 INJECTION, SOLUTION INTRAVENOUS at 14:27

## 2025-02-27 RX ADMIN — ROCURONIUM 50 MG: 50 INJECTION, SOLUTION INTRAVENOUS at 17:10

## 2025-02-27 RX ADMIN — FENTANYL CITRATE 25 MCG: 50 INJECTION INTRAMUSCULAR; INTRAVENOUS at 21:36

## 2025-02-27 RX ADMIN — HEPARIN SODIUM 5000 UNITS: 5000 INJECTION INTRAVENOUS; SUBCUTANEOUS at 18:56

## 2025-02-27 RX ADMIN — SODIUM CHLORIDE, SODIUM LACTATE, POTASSIUM CHLORIDE, AND CALCIUM CHLORIDE: .6; .31; .03; .02 INJECTION, SOLUTION INTRAVENOUS at 17:05

## 2025-02-27 RX ADMIN — CELECOXIB 100 MG: 100 CAPSULE ORAL at 22:22

## 2025-02-27 RX ADMIN — ACETAMINOPHEN 975 MG: 325 TABLET, FILM COATED ORAL at 13:55

## 2025-02-27 RX ADMIN — LIDOCAINE HYDROCHLORIDE 50 MG: 10 INJECTION, SOLUTION EPIDURAL; INFILTRATION; INTRACAUDAL; PERINEURAL at 17:10

## 2025-02-27 RX ADMIN — PROPOFOL 50 MCG/KG/MIN: 10 INJECTION, EMULSION INTRAVENOUS at 17:35

## 2025-02-27 RX ADMIN — PROPOFOL 150 MG: 10 INJECTION, EMULSION INTRAVENOUS at 17:10

## 2025-02-27 RX ADMIN — PROPOFOL 50 MG: 10 INJECTION, EMULSION INTRAVENOUS at 17:28

## 2025-02-27 RX ADMIN — DEXAMETHASONE SODIUM PHOSPHATE 10 MG: 10 INJECTION, SOLUTION INTRAMUSCULAR; INTRAVENOUS at 18:05

## 2025-02-27 RX ADMIN — INSULIN HUMAN 4 UNITS: 100 INJECTION, SOLUTION PARENTERAL at 21:30

## 2025-02-27 RX ADMIN — FENTANYL CITRATE 50 MCG: 50 INJECTION INTRAMUSCULAR; INTRAVENOUS at 17:29

## 2025-02-27 RX ADMIN — SUGAMMADEX 200 MG: 100 INJECTION, SOLUTION INTRAVENOUS at 20:32

## 2025-02-27 RX ADMIN — SODIUM CHLORIDE, SODIUM LACTATE, POTASSIUM CHLORIDE, AND CALCIUM CHLORIDE 60 ML/HR: .6; .31; .03; .02 INJECTION, SOLUTION INTRAVENOUS at 22:24

## 2025-02-27 RX ADMIN — INSULIN LISPRO 2 UNITS: 100 INJECTION, SOLUTION INTRAVENOUS; SUBCUTANEOUS at 22:32

## 2025-02-27 RX ADMIN — MIDAZOLAM 2 MG: 1 INJECTION INTRAMUSCULAR; INTRAVENOUS at 17:05

## 2025-02-27 NOTE — ANESTHESIA PROCEDURE NOTES
"Arterial Line Insertion    Performed by: Dong Ortega MD  Authorized by: Dong Ortega MD  Consent: Verbal consent obtained. Written consent obtained.  Risks and benefits: risks, benefits and alternatives were discussed  Consent given by: patient  Patient understanding: patient states understanding of the procedure being performed  Patient consent: the patient's understanding of the procedure matches consent given  Procedure consent: procedure consent matches procedure scheduled  Relevant documents: relevant documents present and verified  Test results: test results available and properly labeled  Site marked: the operative site was marked  Radiology Images: Radiology Images displayed and confirmed. If images not available, report reviewed  Required items: required blood products, implants, devices, and special equipment available  Patient identity confirmed: verbally with patient, arm band, provided demographic data and hospital-assigned identification number  Time out: Immediately prior to procedure a \"time out\" was called to verify the correct patient, procedure, equipment, support staff and site/side marked as required.  Preparation: Patient was prepped and draped in the usual sterile fashion.  Indications: hemodynamic monitoring  Orientation:  Left  Location: radial artery  Procedure Details:  Heri's test normal: yes  Needle gauge: 20  Seldinger technique: Seldinger technique used  Number of attempts: 1    Post-procedure:  Waveform: good waveform and waveform confirmed  Post-procedure CNS: unchanged  Patient tolerance: patient tolerated the procedure well with no immediate complications          "

## 2025-02-27 NOTE — ANESTHESIA PREPROCEDURE EVALUATION
Procedure:  LOBECTOMY RIGHT UPPER LUNG THORACOSCOPIC W/ ROBOTICS WITH MEDIASTINAL LYMPH NODE DISSECTION (Right: Chest)  BRONCHOSCOPY FLEXIBLE ([place])    Relevant Problems   CARDIO   (+) Benign hypertension   (+) Mixed hyperlipidemia      ENDO   (+) Type 2 diabetes mellitus without complication, without long-term current use of insulin (HCC)        Physical Exam    Airway    Mallampati score: II  TM Distance: >3 FB  Neck ROM: full     Dental        Cardiovascular      Pulmonary      Other Findings  post-pubertal.      Anesthesia Plan  ASA Score- 3     Anesthesia Type- general with ASA Monitors.         Additional Monitors: arterial line.    Airway Plan: ETT.    Comment: SHAZIA.       Plan Factors-Exercise tolerance (METS): >4 METS.    Chart reviewed.        Patient is a current smoker.      Obstructive sleep apnea risk education given perioperatively.        Induction- intravenous.    Postoperative Plan- Plan for postoperative opioid use. Planned trial extubation    Perioperative Resuscitation Plan - Level 1 - Full Code.       Informed Consent- Anesthetic plan and risks discussed with patient.  I personally reviewed this patient with the CRNA. Discussed and agreed on the Anesthesia Plan with the CRNA..      NPO Status:  Vitals Value Taken Time   Date of last liquid 02/27/25 02/27/25 1329   Time of last liquid 0700 02/27/25 1329   Date of last solid 02/26/25 02/27/25 1329   Time of last solid 1830 02/27/25 1329       Anesthesia plan and consent discussed with Jeannie who expressed understanding and agreement. Risks/benefits and alternatives discussed with patient including possible PONV, sore throat, damage to teeth/lips/gums and possibility of rare anesthetic and surgical emergencies.

## 2025-02-28 ENCOUNTER — APPOINTMENT (INPATIENT)
Dept: RADIOLOGY | Facility: HOSPITAL | Age: 65
DRG: 164 | End: 2025-02-28
Payer: COMMERCIAL

## 2025-02-28 LAB
ANION GAP SERPL CALCULATED.3IONS-SCNC: 12 MMOL/L (ref 4–13)
BUN SERPL-MCNC: 15 MG/DL (ref 5–25)
CALCIUM SERPL-MCNC: 9 MG/DL (ref 8.4–10.2)
CHLORIDE SERPL-SCNC: 102 MMOL/L (ref 96–108)
CO2 SERPL-SCNC: 20 MMOL/L (ref 21–32)
CREAT SERPL-MCNC: 0.85 MG/DL (ref 0.6–1.3)
ERYTHROCYTE [DISTWIDTH] IN BLOOD BY AUTOMATED COUNT: 11.9 % (ref 11.6–15.1)
GFR SERPL CREATININE-BSD FRML MDRD: 71 ML/MIN/1.73SQ M
GLUCOSE SERPL-MCNC: 149 MG/DL (ref 65–140)
GLUCOSE SERPL-MCNC: 155 MG/DL (ref 65–140)
GLUCOSE SERPL-MCNC: 155 MG/DL (ref 65–140)
GLUCOSE SERPL-MCNC: 172 MG/DL (ref 65–140)
GLUCOSE SERPL-MCNC: 204 MG/DL (ref 65–140)
GLUCOSE SERPL-MCNC: 231 MG/DL (ref 65–140)
HCT VFR BLD AUTO: 38.5 % (ref 34.8–46.1)
HGB BLD-MCNC: 12.7 G/DL (ref 11.5–15.4)
MAGNESIUM SERPL-MCNC: 3.3 MG/DL (ref 1.9–2.7)
MCH RBC QN AUTO: 30.8 PG (ref 26.8–34.3)
MCHC RBC AUTO-ENTMCNC: 33 G/DL (ref 31.4–37.4)
MCV RBC AUTO: 93 FL (ref 82–98)
PLATELET # BLD AUTO: 392 THOUSANDS/UL (ref 149–390)
PMV BLD AUTO: 9.2 FL (ref 8.9–12.7)
POTASSIUM SERPL-SCNC: 4.8 MMOL/L (ref 3.5–5.3)
RBC # BLD AUTO: 4.12 MILLION/UL (ref 3.81–5.12)
SODIUM SERPL-SCNC: 134 MMOL/L (ref 135–147)
WBC # BLD AUTO: 14.82 THOUSAND/UL (ref 4.31–10.16)

## 2025-02-28 PROCEDURE — 80048 BASIC METABOLIC PNL TOTAL CA: CPT | Performed by: SURGERY

## 2025-02-28 PROCEDURE — 82948 REAGENT STRIP/BLOOD GLUCOSE: CPT

## 2025-02-28 PROCEDURE — 83735 ASSAY OF MAGNESIUM: CPT | Performed by: SURGERY

## 2025-02-28 PROCEDURE — 99024 POSTOP FOLLOW-UP VISIT: CPT | Performed by: THORACIC SURGERY (CARDIOTHORACIC VASCULAR SURGERY)

## 2025-02-28 PROCEDURE — 85027 COMPLETE CBC AUTOMATED: CPT | Performed by: SURGERY

## 2025-02-28 PROCEDURE — 97162 PT EVAL MOD COMPLEX 30 MIN: CPT

## 2025-02-28 PROCEDURE — 97166 OT EVAL MOD COMPLEX 45 MIN: CPT

## 2025-02-28 PROCEDURE — 71046 X-RAY EXAM CHEST 2 VIEWS: CPT

## 2025-02-28 RX ORDER — GABAPENTIN 100 MG/1
100 CAPSULE ORAL 3 TIMES DAILY
Qty: 63 CAPSULE | Refills: 0 | Status: SHIPPED | OUTPATIENT
Start: 2025-02-28 | End: 2025-03-01

## 2025-02-28 RX ORDER — MAGNESIUM SULFATE HEPTAHYDRATE 40 MG/ML
4 INJECTION, SOLUTION INTRAVENOUS ONCE
Status: COMPLETED | OUTPATIENT
Start: 2025-02-28 | End: 2025-02-28

## 2025-02-28 RX ORDER — SENNOSIDES 8.6 MG
650 CAPSULE ORAL EVERY 6 HOURS
Qty: 28 TABLET | Refills: 0 | Status: SHIPPED | OUTPATIENT
Start: 2025-02-28 | End: 2025-03-07

## 2025-02-28 RX ORDER — OXYCODONE HYDROCHLORIDE 5 MG/1
5 TABLET ORAL EVERY 6 HOURS PRN
Qty: 20 TABLET | Refills: 0 | Status: SHIPPED | OUTPATIENT
Start: 2025-02-28 | End: 2025-03-06 | Stop reason: SDUPTHER

## 2025-02-28 RX ADMIN — GABAPENTIN 300 MG: 300 CAPSULE ORAL at 17:04

## 2025-02-28 RX ADMIN — CELECOXIB 100 MG: 100 CAPSULE ORAL at 17:04

## 2025-02-28 RX ADMIN — ENOXAPARIN SODIUM 40 MG: 40 INJECTION SUBCUTANEOUS at 08:32

## 2025-02-28 RX ADMIN — AMLODIPINE BESYLATE 5 MG: 5 TABLET ORAL at 08:31

## 2025-02-28 RX ADMIN — OXYCODONE HYDROCHLORIDE 5 MG: 5 TABLET ORAL at 11:41

## 2025-02-28 RX ADMIN — GABAPENTIN 300 MG: 300 CAPSULE ORAL at 08:31

## 2025-02-28 RX ADMIN — GABAPENTIN 300 MG: 300 CAPSULE ORAL at 20:18

## 2025-02-28 RX ADMIN — INSULIN LISPRO 1 UNITS: 100 INJECTION, SOLUTION INTRAVENOUS; SUBCUTANEOUS at 21:06

## 2025-02-28 RX ADMIN — CELECOXIB 100 MG: 100 CAPSULE ORAL at 08:31

## 2025-02-28 RX ADMIN — MAGNESIUM SULFATE HEPTAHYDRATE 4 G: 40 INJECTION, SOLUTION INTRAVENOUS at 00:31

## 2025-02-28 RX ADMIN — HYDROMORPHONE HYDROCHLORIDE 0.5 MG: 1 INJECTION, SOLUTION INTRAMUSCULAR; INTRAVENOUS; SUBCUTANEOUS at 06:43

## 2025-02-28 RX ADMIN — HYDROMORPHONE HYDROCHLORIDE 0.5 MG: 1 INJECTION, SOLUTION INTRAMUSCULAR; INTRAVENOUS; SUBCUTANEOUS at 00:21

## 2025-02-28 RX ADMIN — ACETAMINOPHEN 975 MG: 325 TABLET, FILM COATED ORAL at 17:04

## 2025-02-28 RX ADMIN — OXYCODONE HYDROCHLORIDE 10 MG: 10 TABLET ORAL at 20:18

## 2025-02-28 RX ADMIN — ATORVASTATIN CALCIUM 10 MG: 10 TABLET, FILM COATED ORAL at 08:31

## 2025-02-28 RX ADMIN — ACETAMINOPHEN 975 MG: 325 TABLET, FILM COATED ORAL at 11:39

## 2025-02-28 RX ADMIN — OXYCODONE HYDROCHLORIDE 10 MG: 10 TABLET ORAL at 02:40

## 2025-02-28 RX ADMIN — INSULIN LISPRO 2 UNITS: 100 INJECTION, SOLUTION INTRAVENOUS; SUBCUTANEOUS at 08:33

## 2025-02-28 NOTE — ANESTHESIA POSTPROCEDURE EVALUATION
Post-Op Assessment Note    CV Status:  Stable  Pain Score: 0    Pain management: adequate       Mental Status:  Alert and awake   Hydration Status:  Euvolemic   PONV Controlled:  Controlled   Airway Patency:  Patent     Post Op Vitals Reviewed: Yes    No anethesia notable event occurred.    Staff: CRNA           Last Filed PACU Vitals:  Vitals Value Taken Time   Temp 97.0    Pulse 84 02/27/25 2112   /77 02/27/25 2111   Resp 24 02/27/25 2112   SpO2 95 % 02/27/25 2112   Vitals shown include unfiled device data.

## 2025-02-28 NOTE — PROGRESS NOTES
"Progress Note - Thoracic    Name: Jeannie Hoover 64 y.o. female I MRN: [id number]  Unit/Bed#: [facility] 506-01 I Date of Admission: 2/27/2025   Date of Service: 2/28/2025 I Hospital Day: 1     Assessment & Plan  Non-small cell cancer of right lung (HCC)  S/p 2/27 robotic RULectomy, MLND.    Plan:  - dc CT, get PA/lat CXR  - dc IVF, reg diet  - DVT ppx, IS/OOB    Subjective/Objective   NAOE. Pain controlled, no SOB. No n/v. Voiding. Walking.    Physical Exam:  General: NAD  CV: nl rate  Lungs: nl wob. No resp distress. On RA. IS 1250cc. R CT (-2, -AL): 75cc ss. CDI, NT.  ABD: Soft, ND, NT  Extrem: No CCE  UOP x1    Patient Vitals for the past 24 hrs:   BP Temp Temp src Pulse Resp SpO2 Height Weight   02/28/25 0752 156/76 -- -- -- -- -- -- --   02/28/25 0700 123/67 -- -- -- 20 -- -- --   02/28/25 0519 -- -- -- -- -- -- -- 87.8 kg (193 lb 9.6 oz)   02/28/25 0409 107/71 -- -- 83 -- -- -- --   02/28/25 0339 130/64 -- -- 83 -- -- -- --   02/28/25 0309 154/66 -- -- 83 -- -- -- --   02/28/25 0239 138/65 -- -- 81 -- -- -- --   02/28/25 0209 133/64 -- -- 82 -- -- -- --   02/28/25 0139 146/65 -- -- 94 -- -- -- --   02/28/25 0109 130/68 -- -- 84 -- 95 % -- --   02/28/25 0039 134/60 -- -- 86 -- -- -- --   02/28/25 0009 130/56 -- -- 82 -- -- -- --   02/27/25 2339 138/60 -- -- 82 -- -- -- --   02/27/25 2309 146/60 -- -- 84 -- -- -- --   02/27/25 2239 152/68 -- -- 86 -- -- -- --   02/27/25 2218 -- 98.3 °F (36.8 °C) -- -- 18 -- -- --   02/27/25 2200 (!) 174/81 -- -- -- 20 94 % -- --   02/27/25 2145 166/68 -- -- 86 20 94 % -- --   02/27/25 2130 165/84 -- -- 84 20 96 % -- --   02/27/25 2124 -- -- -- -- 20 -- -- --   02/27/25 2115 159/77 (!) 97 °F (36.1 °C) -- 84 20 94 % -- --   02/27/25 1329 -- -- -- -- -- -- 5' 7\" (1.702 m) 88.5 kg (195 lb)   02/27/25 1322 (!) 181/99 -- -- -- -- -- -- --   02/27/25 1320 (!) 199/108 97.6 °F (36.4 °C) Temporal (!) 120 18 99 % -- --       I/O         02/26 0701 02/27 0700 02/27 0701 02/28 0700 02/28 " 0701  03/01 0700    P.O.   300    I.V. (mL/kg)  2777.8 (31.6)     IV Piggyback  11.7     Total Intake(mL/kg)  2789.5 (31.8) 300 (3.4)    Urine (mL/kg/hr)  0     Chest Tube  75     Total Output  75     Net  +2714.5 +300           Unmeasured Urine Occurrence  1 x             Recent Labs     02/27/25  2121 02/28/25  0512   WBC 25.41* 14.82*   HGB 12.7 12.7   * 392*   SODIUM 136 134*   K 4.3 4.8    102   CO2 23 20*   BUN 13 15   CREATININE 0.88 0.85   GLUC 283* 204*   CALCIUM 9.0 9.0   MG 1.7* 3.3*   EGFR 68 71     Lab Results   Component Value Date    URINECX 10,000-19,000 cfu/ml Mixed Contaminants X4 10/16/2016    LEUKOCYTESUR Negative 07/01/2019    LEUKOCYTESUR 2+ (A) 05/23/2017

## 2025-02-28 NOTE — OP NOTE
OPERATIVE REPORT  PATIENT NAME: Jeannie Hoover    :  1960  MRN: [id number]  Pt Location: [facility] OR ROOM 15    SURGERY DATE: 2025    Surgeons and Role:     * Abe Hernandez MD - Primary     * Cherelle Mtz PA-C - Assisting     * Say Hoffman MD - Assisting    Preop Diagnosis:  Squamous cell carcinoma of upper lobe of right lung (HCC) [C34.11]    Post-Op Diagnosis Codes:     * Squamous cell carcinoma of upper lobe of right lung (HCC) [C34.11]    Procedure(s):  Robotic right upper lobectomy  Mediastinal lymph node dissection  Bronchoscopy  Right T3-T10 intercostal nerve block with Exparel    Specimen(s):  ID Type Source Tests Collected by Time Destination   1 : Level 8 Tissue Lymph Node TISSUE EXAM Abe Hernandez MD 2025    2 : Posterior Level  10 Tissue Lymph Node TISSUE EXAM Abe Hernandez MD 2025 1813    3 : Level  7 #1 Tissue Lymph Node TISSUE EXAM Abe Hernandez MD 2025 1817    4 : Level  7 #2 Tissue Lymph Node TISSUE EXAM Abe Hernandez MD 2025 1819    5 : Level  4R Tissue Lymph Node TISSUE EXAM Abe Hernandez MD 2025 1831    6 : Level  2R Tissue Lymph Node TISSUE EXAM Abe Hernandez MD 2025 183    7 : Level  4R #2 Tissue Lymph Node TISSUE EXAM Abe Hernandez MD 2025 1836    8 : Posterior Level  11 Tissue Lymph Node TISSUE EXAM Abe Hernandez MD 2025 1848    9 : Posterior Level  11 #2 Tissue Lymph Node TISSUE EXAM Abe Hernandez MD 2025 1859    10 : Posterior Level  12 Tissue Lymph Node TISSUE EXAM Abe Hernandez MD 2025 1901    11 : Posterior Level  12 #2 Tissue Lymph Node TISSUE EXAM Abe Hernandez MD 2025 1902    12 :  Tissue Lung, Right Upper Lobe TISSUE EXAM Abe Hernandez MD 2025        Estimated Blood Loss:   Minimal    Drains:  Chest Tube Right Pleural 28 Fr. (Active)   Number of days: 0       Anesthesia Type:   General    Operative Indications:  Squamous  cell carcinoma of upper lobe of right lung (HCC) [C34.11]  64-year-old female with history of stage IV lung cancer complicated by brain metastasis status post resection in 2015 with an enlarging 2.1 x 1.5 cm right upper lobe biopsy-proven squamous cell cancer suspicious for recurrent/ongoing malignancy.  No evidence of distant metastatic disease    Operative Findings:  Right upper lobe mass seen.  Some adhesions to the pleura which I resected en bloc with the lobe specimen.  No obvious signs of lymph node involvement.  Able to completely resect with grossly negative margins      Complications:   None    Procedure and Technique:  After obtaining informed consent from the patient, they were transferred to the operating room and placed supine on the operating table. Bilateral SCDs were placed and turned on prior to induction of general anesthesia. General anesthesia was then induced and a double-lumen endotracheal tube was placed without incident.  Positioning of the double-lumen tube was verified with a pediatric bronchoscope.  The patient was then positioned in a left lateral decubitus position with the table fully flexed.  The left arm was secured to an armboard and the right arm was positioned safely in a arm pang.  Rolled blankets were used for support anterior and posterior and pillows were placed between her legs. All bony prominences were padded and checked.  Positioning of double-lumen endotracheal tube was verified at this time.     Next a formal time-out was called verifying patient , date of birth, procedure, consent, antibiotics, beta-blocker as indicated, anticipated specimens with handling, SCD use and other special considerations.      A bronchoscopy was performed through the double-lumen endotracheal tube.  There were no endobronchial masses or obstructions identified.  All secretions were suctioned out and positioning of the double lumen tube was again verified. There was no suspicion or identified  risk for TB or other air board infectious disease.  This bronchoscopy was being performed for diagnostic purposes only.     The right chest was then prepped and draped in the standard sterile fashion. Anesthesia clamped and placed suction to the right lung at this time to help desufflate.  Bony landmarks were identified and planned incisions were mapped out.  An 8 mm incision was made in the posterior axillary line 7th interspace for the camera port. The camera was inserted verifying that we are in the thoracic cavity and insufflation was initiated to 8 mm of mercury.  A thorough thoracoscopy was carried out at this time.  There were no significant adhesions. Next an intercostal nerve block was performed using 60 mL of a mixture 1/2 percent Marcaine, normal saline and liposomal bupivacaine (Exparel).  Rib spaces 3 through 10 were identified and using a percutaneous approach a block was placed into each of these ribs spaces with approximately 5mL of this mixture.  The remainder of the robotic ports were placed at this time.  An 8 mm incision was made as far posterior as possible in the 8th interspace in the posterior port was placed under direct visualization. An 12 mm incision was made care home in between the camera and arm 1 port and this port was placed under direct visualization. Anteriorly a 12 mm incision was made as anterior and inferior as possible along the costal margin with the insertion point just above the diaphragm.  The robotic stapling port was placed through this under direct visualization. Finally a 15 mm incision was made care home between the camera and arm 4, as inferior as possible staying above the diaphragm.  A 15 mm assistant port was placed through here.   The robot was docked at this time.  A caudier forceps was inserted into arm 2, [place] bipolar grasper into arm 4 and tip up fenestrated grasper into arm 1.  I un-scrubbed at this time and went to the console.     We began by taking down the  inferior pulmonary ligament with bipolar cautery. This dissection was continued towards the hilum to the level of the inferior pulmonary vein. We looked for any possible level 8 or level 9 lymph nodes. These nodes were removed and sent for permanent pathology. This dissection was continued up posteriorly towards the azygous vein. We dissected the right upper and lower lobes off of the right mainstem bronchus, bronchus intermedius and right upper lobe bronchus reflecting the lung anteriorly.   Next we dissected out the subcarinal level 7 lymph node packet.  This was removed completely and sent for permanent pathology. We continued to mobilize as much of the upper and lower lobe is possible from the posterior aspect.  We cleared off the apical hilar attachments.     The lung was then reflected downward exposing the level 4R and 2R montse space. We entered into this space with bipolar cautery. Two large montse packets were identified, removed and sent for permanent pathology.   Additional deeper 4R lymph node that was removed and sent for permanent pathology.  This was most concerning for previous malignancy as this was firmly adherent to the surrounding structures.                                     We then went to the fissure.  The fissure was incomplete and needed to be carefully and meticulously  with bipolar cautery. I eventually dissected down onto a large level 11 lymph node on the PA and followed this back posteriorly.  We remove this lymph node and sent this for permanent pathology.  This eventually met up with our posterior dissection.  He then completed the posterior fissure with a 30 mm blue load stapler.    We then dissected out level 12 lymph nodes on the PA.  These were removed and sent for permanent pathology.  We then dissected out the posterior ascending artery.  This was about 2 mm.  This was taken with a vessel sealer due to its small size.    We then dissected out the right upper lobe  bronchus.  We made certain to preserve the ongoing bronchus intermedius.  Once we dissected this out a vessel loop was placed around here and we stapled this with a robotic 30 mm green load stapler    We then turned to the anterior hilum.  The superior pulmonary vein was identified and dissected free.  We made certain to identify the middle lobe branch.  We dissected around this and encircled this with a vessel loop.  This was stapled with a robotic 30 mm white load stapler.  Then we cleared off attachments onto the main PA.  We followed this back and found a large apical trunk.  We dissected around this keeping a lymph node with our specimen.  We encircled this with a vessel loop and stapled this with a 30 mm white load stapler.    All that remained at this time was the minor fissure.  We did this with multiple 45 mm robotic blue load sure form staple fires.  We made certain to preserve all middle lobe vasculature with this.  This fully  our specimen.     The right upper lobe specimen was placed in a 15 mm thoracoscopic anchor bag and removed through the assistant port. We examined the specimen and found our mass.  Margins were grossly negative. This was sent for permanent pathology.     The right chest was thoroughly explored and aspirated dry.  There was no appreciable bleeding seen.  All robotic ports were removed under direct visualization and inspected for bleeding. Once satisfied with hemostasis, a 28 Latvian straight chest tube was placed through the camera port and the left upper lobe was then insufflated by anesthesia. This came up without incident and the camera was removed at this time.     The 28 Latvian chest tube was secured to the chest wall with an 0 Prolene.  An 0 Prolene U-stitch was also placed for help closing this incision following chest tube removal. The assistant port and specimen removal site was closed with running 0 Vicryl in the deep layers, 3-0 Vicryl in the soft tissue and 4 0  Monocryl for skin. The remainder of the ports were closed with interrupted 3-0 Vicryl and 4 Monocryl. Surgical glue was applied to all incisions.  The chest tube was placed to a Marydel Pleural drainage device to water seal.       The patient extubated in the operating room and was transferred safely to the stretcher.  There were transferred to the PACU in stable condition.        I was present for the entire procedure.    Patient Disposition:  PACU              SIGNATURE: Abe Hernandez MD  DATE: February 27, 2025  TIME: 8:35 PM

## 2025-02-28 NOTE — ASSESSMENT & PLAN NOTE
S/p 2/27 robotic RULectomy, MLND.    Plan:  - dc CT, get PA/lat CXR  - dc IVF, reg diet  - DVT ppx, IS/OOB

## 2025-02-28 NOTE — OCCUPATIONAL THERAPY NOTE
Occupational Therapy Evaluation     Patient Name: Jeannie Hoover  Today's Date: 2/28/2025  Problem List  Principal Problem:    Non-small cell cancer of right lung (HCC)    Past Medical History  Past Medical History:   Diagnosis Date    Adenocarcinoma of right lung, stage 4 (HCC)     Allergic rhinitis     Alopecia     resolved 10/25/16    Anxiety     last assessed 10/4/16, resolved 10/25/16    Benign hypertension 2/1/2018    Benign neoplasm of skin of trunk 03/25/2008    last assessed 3/25/08    Benign neoplasm of skin of upper extremity and shoulder 03/25/2008    last assessed 3/25/08    Ear deformity, acquired     last assessed 10/13/14, resolved 3/12/15    Eczema     History of chemotherapy     Hyperlipemia     Maintenance chemotherapy     Secondary cancer of brain (HCC)     Seizures (HCC)     Vertigo      Past Surgical History  Past Surgical History:   Procedure Laterality Date    APPENDECTOMY  1964    BRAIN TUMOR EXCISION      CENTRAL VENOUS CATHETER INSERTION Right     PORTACATH    IR BIOPSY LUNG  2/5/2025    IR PORT REMOVAL  9/10/2021    KY BRNCHSC INCL FLUOR GDNCE DX W/CELL WASHG SPX N/A 2/27/2025    Procedure: BRONCHOSCOPY FLEXIBLE;  Surgeon: Abe Hernandez MD;  Location: [facility];  Service: Thoracic    KY THORACOSCOPY W/LOBECTOMY SINGLE LOBE Right 2/27/2025    Procedure: LOBECTOMY RIGHT UPPER LUNG THORACOSCOPIC W/ ROBOTICS WITH MEDIASTINAL LYMPH NODE DISSECTION;  Surgeon: Abe Hernandez MD;  Location: [facility];  Service: Thoracic      02/28/25 1029   OT Last Visit   OT Visit Date 02/28/25   Note Type   Note type Evaluation   Pain Assessment   Pain Assessment Tool 0-10   Pain Score 7   Pain Location/Orientation Orientation: Right;Location: Chest  (surgical incision)   Hospital Pain Intervention(s) Ambulation/increased activity;Repositioned;Emotional support;Rest   Restrictions/Precautions   Weight Bearing Precautions Per Order No   Other Precautions Cognitive;Chair Alarm;Bed  Alarm;Pain;Multiple lines;Telemetry  (+Chest tube)   Home Living   Type of Home Apartment   Home Layout One level;Laundry in basement   Bathroom Shower/Tub Walk-in shower   Bathroom Toilet Raised   Bathroom Equipment Shower chair   Bathroom Accessibility Accessible   Prior Function   Level of [place] Independent with ADLs;Independent with functional mobility;Independent with IADLS   Lives With Spouse   Receives Help From Family   IADLs Independent with meal prep;Independent with medication management;Independent with driving   Falls in the last 6 months 0   Vocational Retired   Lifestyle   Autonomy I with ADL's/shares homemaking tasks with spouse (spouse performs laundry in basement, pt folds/pt cooks) no AD with functional mobility, +drives   Reciprocal Relationships spouse, brother, son   Service to Others retired   Intrinsic Gratification Read, cooking   General   Family/Caregiver Present No   ADL   Eating Assistance 7  Independent   Grooming Assistance 7  Independent   UB Bathing Assistance 5  Supervision/Setup   LB Bathing Assistance 5  Supervision/Setup   UB Dressing Assistance 5  Supervision/Setup   LB Dressing Assistance 4  Minimal Assistance   LB Dressing Deficit (educated pt on compensatory strategies to increase I/decrease discomfort to surgical site with LB dressing tasks)   Toileting Assistance  5  Supervision/Setup   Bed Mobility   Supine to Sit 6  Modified independent   Sit to Supine 6  Modified independent   Transfers   Sit to Stand 6  Modified independent   Stand to Sit 6  Modified independent   Additional Comments (-)AD   Functional Mobility   Functional Mobility 6  Modified independent   Additional Comments mod I without AD household distance funtional mobilty into hallway, no overt LOB, good actiity tolerance/no KOWALSKI.   Balance   Static Sitting Normal   Dynamic Sitting Good   Static Standing Fair +   Dynamic Standing Fair   Ambulatory Fair   Activity Tolerance   Activity Tolerance Patient  tolerated treatment well   Medical Staff Made Aware PT due to the patient's co-morbidities, clinically unstable presentation, and present impairments which are a regression from the patient's baseline.    Nurse Made Aware RN cleared pt for therapy   RUE Assessment   RUE Assessment WFL   LUE Assessment   LUE Assessment WFL   Hand Function   Gross Motor Coordination Functional   Fine Motor Coordination Functional   Vision-Basic Assessment   Current Vision No visual deficits   Cognition   Overall Cognitive Status WFL   Arousal/Participation Alert;Responsive;Cooperative   Attention Within functional limits   Orientation Level Oriented X4   Memory Within functional limits   Following Commands Follows all commands and directions without difficulty   Comments pt pleasant and cooperative, no cognitive concerns   Assessment   Assessment Pt is a 64 y.o. female who was admitted to [facility] on 2/27/2025 with Non-small cell cancer of right lung (HCC) s/p right lobectomy/biopsy on 12/27. +chest tube Patient . has a past medical history of Adenocarcinoma of right lung, stage 4 (HCC), Allergic rhinitis, Alopecia, Anxiety, Benign hypertension (2/1/2018), Benign neoplasm of skin of trunk (03/25/2008), Benign neoplasm of skin of upper extremity and shoulder (03/25/2008), Ear deformity, acquired, Eczema, History of chemotherapy, Hyperlipemia, Maintenance chemotherapy, Secondary cancer of brain (HCC), Seizures (HCC), and Vertigo.  At baseline pt was completing I with ADL's/IADL's, no AD with functional mobility +drives. Pt [place] and 2 [place]. Currently pt requires UB S/set-up, min a LB for overall ADLS and mod I without AD for functional mobility/transfers. Pt currently presents with impairments in the following categories -difficulty performing ADLS and difficulty performing IADLS  activity tolerance, endurance, and standing balance/tolerance. These impairments, as well as pt's fatigue, pain, and risk  for falls  limit pt's ability to safely engage in all baseline areas of occupation, includingbathing, dressing, toileting, functional mobility/transfers, and community mobility The patient's raw score on the AM-PAC Daily Activity Inpatient Short Form is 21. A raw score of greater than or equal to 19 suggests the patient may benefit from discharge to home. Please refer to the recommendation of the Occupational Therapist for safe discharge planning. From OT standpoint, recommend increase social support, no DME needs upon D/C. No further acute OT needs indicated at this time - Anticipate d/c home with family support when medically cleared - d/c from caseload   Goals   Patient Goals go home soon   Discharge Recommendation   Rehab Resource Intensity Level, OT No post-acute rehabilitation needs   AM-PAC Daily Activity Inpatient   Lower Body Dressing 3   Bathing 3   Toileting 3   Upper Body Dressing 4   Grooming 4   Eating 4   Daily Activity Raw Score 21   Daily Activity Standardized Score (Calc for Raw Score >=11) 44.27   AM-PAC Applied Cognition Inpatient   Following a Speech/Presentation 4   Understanding Ordinary Conversation 4   Taking Medications 4   Remembering Where Things Are Placed or Put Away 4   Remembering List of 4-5 Errands 4   Taking Care of Complicated Tasks 4   Applied Cognition Raw Score 24   Applied Cognition Standardized Score 62.21   End of Consult   Education Provided Yes  (LB ADL compensatory strategies)   Patient Position at End of Consult Supine;Bed/Chair alarm activated;All needs within reach   Nurse Communication Nurse aware of consult    Kalyani Dixon OTR/L

## 2025-02-28 NOTE — QUICK NOTE
"Post Op Check  Jeannie Hoover 64 y.o. female MRN: [id number]  Unit/Bed#: [facility] 506-01 Encounter: [id number]    Assessment:  64 y.o. female post op day 0 s/p Robotic Rt upper lobectomy, MLND, bronchoscopy, Rt T3-T10 intercostal nerve block w Exparel secondary to squamous cell carcinoma of the Rt upper lobe.      Vital signs stable, afebrile. 94% on 3 L O2 via NC.    CXR while in PACU unremarkable, lungs expanded, minimal post-op pneumothorax.    Rt CT in place, 90 total output (10 in last hour) per Thopaz, sanguinous, Thopaz -2, no air leak    Plan:  Regular diet  LR 60 mL/hr  Monitor CT output  DVT ppx: Lovenox  Pain/ nausea control PRN  OOB/ ambulation  Incentive Spirometry      Subjective/Objective     Subjective:   Patient seen and examined at bedside on arrival to the floor post operatively. Patient reports improved pain with current pain regimen but does endorse pain around incision sites that worsens with deep breathing. Denies N/V, F/C, SOB. Has not eaten or drank anything yet. No BM or flatus, has not urinated yet. Has not been OOB yet.    Objective:   Vitals:Blood pressure (!) 174/81, pulse 86, temperature 98.3 °F (36.8 °C), resp. rate 18, height 5' 7\" (1.702 m), weight 88.5 kg (195 lb), SpO2 94%, not currently breastfeeding.  Temp (24hrs), Av.6 °F (36.4 °C), Min:97 °F (36.1 °C), Max:98.3 °F (36.8 °C)        Intake/Output Summary (Last 24 hours) at 2025  Last data filed at 2025  Gross per 24 hour   Intake 1300 ml   Output --   Net 1300 ml       Invasive Devices       Peripheral Intravenous Line  Duration             Peripheral IV 25 Left Wrist <1 day    Peripheral IV 25 Right Hand <1 day              Arterial Line  Duration             Arterial Line 25 Left Radial <1 day              Drain  Duration             Chest Tube Right Pleural 28 Fr. <1 day                    Physical Exam:  General: No acute distress, alert and oriented  CV: Well perfused, regular " rate  Lungs: Normal work of breathing, no increased respiratory effort on 3 L O2 via NC  Abdomen: Soft, non-tender, non-distended. Incisions clean, dry and intact.  Extremities: No edema, clubbing or cyanosis  Skin: Warm, dry. Incisions CDI.     Lab Results: BMP/CMP:   Lab Results   Component Value Date    SODIUM 136 02/27/2025    K 4.3 02/27/2025     02/27/2025    CO2 23 02/27/2025    BUN 13 02/27/2025    CREATININE 0.88 02/27/2025    CALCIUM 9.0 02/27/2025    EGFR 68 02/27/2025    and CBC:   Lab Results   Component Value Date    WBC 25.41 (H) 02/27/2025    HGB 12.7 02/27/2025    HCT 37.5 02/27/2025    MCV 92 02/27/2025     (H) 02/27/2025    RBC 4.09 02/27/2025    MCH 31.1 02/27/2025    MCHC 33.9 02/27/2025    RDW 11.9 02/27/2025    MPV 9.0 02/27/2025     VTE Prophylaxis: Enoxaparin (Lovenox)    Noa Carrillo  2/27/2025

## 2025-02-28 NOTE — PHYSICAL THERAPY NOTE
Physical Therapy Evaluation     Patient's Name: Jeannie Hoover    Admitting Diagnosis  Squamous cell carcinoma of upper lobe of right lung (HCC) [C34.11]    Problem List  Patient Active Problem List   Diagnosis    Non-small cell cancer of right lung (HCC)    Mixed hyperlipidemia    Allergic rhinitis    Brain metastasis    Type 2 diabetes mellitus without complication, without long-term current use of insulin (HCC)    Insomnia    Lesion of temporal lobe    Benign hypertension    Chronic maxillary sinusitis       Past Medical History  Past Medical History:   Diagnosis Date    Adenocarcinoma of right lung, stage 4 (HCC)     Allergic rhinitis     Alopecia     resolved 10/25/16    Anxiety     last assessed 10/4/16, resolved 10/25/16    Benign hypertension 2/1/2018    Benign neoplasm of skin of trunk 03/25/2008    last assessed 3/25/08    Benign neoplasm of skin of upper extremity and shoulder 03/25/2008    last assessed 3/25/08    Ear deformity, acquired     last assessed 10/13/14, resolved 3/12/15    Eczema     History of chemotherapy     Hyperlipemia     Maintenance chemotherapy     Secondary cancer of brain (HCC)     Seizures (HCC)     Vertigo        Past Surgical History  Past Surgical History:   Procedure Laterality Date    APPENDECTOMY  1964    BRAIN TUMOR EXCISION      CENTRAL VENOUS CATHETER INSERTION Right     PORTACATH    IR BIOPSY LUNG  2/5/2025    IR PORT REMOVAL  9/10/2021    IN BRNCHSC INCL FLUOR GDNCE DX W/CELL WASHG SPX N/A 2/27/2025    Procedure: BRONCHOSCOPY FLEXIBLE;  Surgeon: Abe Hernandez MD;  Location: [facility];  Service: Thoracic    IN THORACOSCOPY W/LOBECTOMY SINGLE LOBE Right 2/27/2025    Procedure: LOBECTOMY RIGHT UPPER LUNG THORACOSCOPIC W/ ROBOTICS WITH MEDIASTINAL LYMPH NODE DISSECTION;  Surgeon: Abe Hernandez MD;  Location: [facility];  Service: Thoracic          02/28/25 1047   PT Last Visit   PT Visit Date 02/28/25   Note Type   Note type Evaluation   Pain Assessment   Pain  Assessment Tool 0-10   Pain Score 7   Pain Location/Orientation Orientation: Right;Location: Chest;Location: Incision   Pain Onset/Description Onset: Ongoing;Frequency: Constant/Continuous;Descriptor: Discomfort;Descriptor: Sore   Effect of Pain on Daily Activities limits comfort and mobility   Patient's Stated Pain Goal No pain   Hospital Pain Intervention(s) Repositioned;Ambulation/increased activity;Emotional support   Restrictions/Precautions   Weight Bearing Precautions Per Order No   Other Precautions Pain;Multiple lines;Telemetry  (CT)   Home Living   Type of Home House   Home Layout One level;Access;Stairs to enter with rails  (2STE)   Bathroom Shower/Tub Walk-in shower   Bathroom Toilet Raised   Bathroom Equipment Shower chair   Bathroom Accessibility Accessible   Prior Function   Level of [place] Independent with functional mobility;Independent with IADLS;Independent with ADLs   Lives With Spouse   Receives Help From Family   IADLs Independent with driving;Independent with medication management;Independent with meal prep   Falls in the last 6 months 0   Vocational Retired   General   Family/Caregiver Present No   Cognition   Overall Cognitive Status WFL   Arousal/Participation Alert   Orientation Level Oriented X4   Memory Within functional limits   Following Commands Follows all commands and directions without difficulty   Comments Patient pleasant and cooperative   Subjective   Subjective Patient agreeable to PT eval   RUE Assessment   RUE Assessment WFL   LUE Assessment   LUE Assessment WFL   RLE Assessment   RLE Assessment WFL   LLE Assessment   LLE Assessment WFL   Bed Mobility   Supine to Sit 6  Modified independent   Sit to Supine 6  Modified independent   Transfers   Sit to Stand 6  Modified independent   Stand to Sit 6  Modified independent   Additional Comments no AD   Ambulation/Elevation   Gait pattern WNL   Gait Assistance 6  Modified independent   Assistive Device None   Distance 400'    Balance   Static Sitting Normal   Dynamic Sitting Good   Static Standing Fair +   Dynamic Standing Fair +   Ambulatory Fair   Endurance Deficit   Endurance Deficit No   Activity Tolerance   Activity Tolerance Patient tolerated treatment well   Medical Staff Made Aware OT   Nurse Made Aware RN cleared   Assessment   Prognosis Good   Problem List Pain   Assessment Pt is a 64 y.o. female seen for a moderate complexity PT evaluation due to Ongoing medical management for primary dx, Decreased activity tolerance compared to baseline, Fall risk, s/p surgical intervention. Patient is s/p admit to [facility] on 2/27/2025 for Squamous cell carcinoma of upper lobe of right lung (HCC) (C34.11). Patient  has a past medical history of Adenocarcinoma of right lung, stage 4 (HCC), Allergic rhinitis, Alopecia, Anxiety, Benign hypertension, Benign neoplasm of skin of trunk, Benign neoplasm of skin of upper extremity and shoulder, Ear deformity, acquired, Eczema, History of chemotherapy, Hyperlipemia, Maintenance chemotherapy, Secondary cancer of brain (HCC), Seizures (HCC), and Vertigo..     PT now consulted to assess functional mobility and needs for safe d/c planning. pt independent with functional mobility, independent ADLs, and independent IADLs. Personal factors affecting status include fall risk, steps to enter home, and medical status.     Currently pt requires modified independent for bed mobility, modified independent for functional transfers with no AD ; modified independent for ambulation with no AD. Pt presents functioning at or near baseline level of function. Patient states no concerns with functional mobility at present. Patient is encouraged to continue ambulating with staff assist as able in order to maintain current LOF.     The patient's AM-PAC Basic Mobility Inpatient Short Form Raw Score Is 24. PT is currently recommending no post acute rehab needs on d/c from hospital. Due to patient current status,  plan to sign off formal inpatient PT services at this time. Please re-consult should current status change.   Barriers to Discharge None   Goals   Patient Goals to go home   PT Treatment Day 0   Plan   Treatment/Interventions   (d/c PT)   PT Frequency   (d/c PT)   Discharge Recommendation   Rehab Resource Intensity Level, PT No post-acute rehabilitation needs   AM-PAC Basic Mobility Inpatient   Turning in Flat Bed Without Bedrails 4   Lying on Back to Sitting on Edge of Flat Bed Without Bedrails 4   Moving Bed to Chair 4   Standing Up From Chair Using Arms 4   Walk in Room 4   Climb 3-5 Stairs With Railing 4   Basic Mobility Inpatient Raw Score 24   Basic Mobility Standardized Score 57.68   [facility] Highest Level Of Mobility   -HLM Goal 8: Walk 250 feet or more   -HLM Achieved 8: Walk 250 feet ot more   Modified Rains Scale   Modified Rains Scale 2   End of Consult   Patient Position at End of Consult All needs within reach;Supine         Sita Bonner, PT, DPT

## 2025-02-28 NOTE — PLAN OF CARE
Problem: PAIN - ADULT  Goal: Verbalizes/displays adequate comfort level or baseline comfort level  Description: Interventions:  - Encourage patient to monitor pain and request assistance  - Assess pain using appropriate pain scale  - Administer analgesics based on type and severity of pain and evaluate response  - Implement non-pharmacological measures as appropriate and evaluate response  - Consider cultural and social influences on pain and pain management  - Notify physician/advanced practitioner if interventions unsuccessful or patient reports new pain  Outcome: Progressing     Problem: INFECTION - ADULT  Goal: Absence or prevention of progression during hospitalization  Description: INTERVENTIONS:  - Assess and monitor for signs and symptoms of infection  - Monitor lab/diagnostic results  - Monitor all insertion sites, i.e. indwelling lines, tubes, and drains  - Monitor endotracheal if appropriate and nasal secretions for changes in amount and color  - [place] appropriate cooling/warming therapies per order  - Administer medications as ordered  - Instruct and encourage patient and family to use good hand hygiene technique  - Identify and instruct in appropriate isolation precautions for identified infection/condition  Outcome: Progressing  Goal: Absence of fever/infection during neutropenic period  Description: INTERVENTIONS:  - Monitor WBC    Outcome: Progressing     Problem: SAFETY ADULT  Goal: Patient will remain free of falls  Description: INTERVENTIONS:  - Educate patient/family on patient safety including physical limitations  - Instruct patient to call for assistance with activity   - Consult OT/PT to assist with strengthening/mobility   - Keep Call bell within reach  - Keep bed low and locked with side rails adjusted as appropriate  - Keep care items and personal belongings within reach  - Initiate and maintain comfort rounds  - Make Fall Risk Sign visible to staff  - Offer Toileting every  Hours,  in advance of need  - Initiate/Maintain alarm  - Obtain necessary fall risk management equipment:   - Apply yellow socks and bracelet for high fall risk patients  - Consider moving patient to room near nurses station  Outcome: Progressing  Goal: Maintain or return to baseline ADL function  Description: INTERVENTIONS:  -  Assess patient's ability to carry out ADLs; assess patient's baseline for ADL function and identify physical deficits which impact ability to perform ADLs (bathing, care of mouth/teeth, toileting, grooming, dressing, etc.)  - Assess/evaluate cause of self-care deficits   - Assess range of motion  - Assess patient's mobility; develop plan if impaired  - Assess patient's need for assistive devices and provide as appropriate  - Encourage maximum independence but intervene and supervise when necessary  - Involve family in performance of ADLs  - Assess for home care needs following discharge   - Consider OT consult to assist with ADL evaluation and planning for discharge  - Provide patient education as appropriate  Outcome: Progressing  Goal: Maintains/Returns to pre admission functional level  Description: INTERVENTIONS:  - Perform AM-PAC 6 Click Basic Mobility/ Daily Activity assessment daily.  - Set and communicate daily mobility goal to care team and patient/family/caregiver.   - Collaborate with rehabilitation services on mobility goals if consulted  - Perform Range of Motion  times a day.  - Reposition patient every  hours.  - Dangle patient  times a day  - Stand patient  times a day  - Ambulate patient  times a day  - Out of bed to chair  times a day   - Out of bed for meals times a day  - Out of bed for toileting  - Record patient progress and toleration of activity level   Outcome: Progressing     Problem: DISCHARGE PLANNING  Goal: Discharge to home or other facility with appropriate resources  Description: INTERVENTIONS:  - Identify barriers to discharge w/patient and caregiver  - Arrange for  needed discharge resources and transportation as appropriate  - Identify discharge learning needs (meds, wound care, etc.)  - Arrange for interpretive services to assist at discharge as needed  - Refer to Case Management Department for coordinating discharge planning if the patient needs post-hospital services based on physician/advanced practitioner order or complex needs related to functional status, cognitive ability, or social support system  Outcome: Progressing     Problem: Knowledge Deficit  Goal: Patient/family/caregiver demonstrates understanding of disease process, treatment plan, medications, and discharge instructions  Description: Complete learning assessment and assess knowledge base.  Interventions:  - Provide teaching at level of understanding  - Provide teaching via preferred learning methods  Outcome: Progressing     Problem: CARDIOVASCULAR - ADULT  Goal: Maintains optimal cardiac output and hemodynamic stability  Description: INTERVENTIONS:  - Monitor I/O, vital signs and rhythm  - Monitor for S/S and trends of decreased cardiac output  - Administer and titrate ordered vasoactive medications to optimize hemodynamic stability  - Assess quality of pulses, skin color and temperature  - Assess for signs of decreased coronary artery perfusion  - Instruct patient to report change in severity of symptoms  Outcome: Progressing  Goal: Absence of cardiac dysrhythmias or at baseline rhythm  Description: INTERVENTIONS:  - Continuous cardiac monitoring, vital signs, obtain 12 lead EKG if ordered  - Administer antiarrhythmic and heart rate control medications as ordered  - Monitor electrolytes and administer replacement therapy as ordered  Outcome: Progressing     Problem: RESPIRATORY - ADULT  Goal: Achieves optimal ventilation and oxygenation  Description: INTERVENTIONS:  - Assess for changes in respiratory status  - Assess for changes in mentation and behavior  - Position to facilitate oxygenation and  minimize respiratory effort  - Oxygen administered by appropriate delivery if ordered  - Initiate smoking cessation education as indicated  - Encourage broncho-pulmonary hygiene including cough, deep breathe, Incentive Spirometry  - Assess the need for suctioning and aspirate as needed  - Assess and instruct to report SOB or any respiratory difficulty  - Respiratory Therapy support as indicated  Outcome: Progressing

## 2025-02-28 NOTE — DISCHARGE INSTR - AVS FIRST PAGE
Lobectomy  Incision Care:  - Your incisions were closed with stitches underneath of the skin which will dissolve. There is purple surgical glue on top of the incisions. The surgical glue will flake off over the next few weeks. There is a non-dissolvable stitch at your chest tube site which will be removed in the office.   - After your chest tube is removed there will be gauze placed over the incision. This gauze can be removed in 24 hours. After this time you may place a bandaid over your chest tube site if it is leaking some fluid.  - You do not need dressings over your other incisions.  Do not apply any cream or ointments to the incisions unless instructed by your surgeon.  - You may shower daily - no soaking in a tub bath. Wash your incisions gently with soap and water and pat dry. Do not scrub the incisions.  - Bruising at your incisions is normal. This will resolve over the next few weeks.     Activity  - No lifting greater than 10lbs until you are evaluated in the office.   - Walking and light activity is encouraged. Recommend you are active during the day and using your Incentive Spirometer when you are sitting for a prolonged period.   - No driving while you are using narcotic pain medications. If you are no longer taking narcotics and considering driving, you must make sure you can quickly react to changes on the road. This can be challenging in the first few weeks after surgery.     Pain:  - Some degree of pain or discomfort after surgery is expected. This pain may become more noticeable after discharge as you start moving around more.   - Your pain regiment includes the following:   - Tylenol 650 mg tablet. Take 1 tablet, every 6 hours for 1 week.    - Gabapentin 100 mg tablet. Take 1 tablet, 3x a day for 3 weeks.    - Oxycodone (Narcotic) 5mg tablet. Take 1 tablet, every 4-6 hours as needed for pain.     Medications:  - Continue on your home medications including any blood thinner and aspirin, as  instructed.     Diet:  - You should continue on your normal diet.     Bowel Regiment:  - Constipation after surgery while on narcotic pain medications is normal.  - You should continue taking a bowel regiment while on a narcotic pain medication to keep your bowel movements soft. Your discharge bowel regiment:   - Docusate (Colace) 100mg tablet. Take 1 tablet, twice a day.  (Purchase over the counter)   - Senna 8.6mg tablet. Take 1 tablet daily. (Purchase over the counter)   - Miralax as needed daily if you are still constipated despite taking Colace and Senna   - If your bowel movements become lose, stop taking the bowel regiment above.     Follow-up:  - You have a scheduled follow-up appointment on: 3/19/2025 at 1:40pm  - Obtain your Chest X-ray prior to your scheduled post-operative appointment.   - A couple of days after discharge our office will call you to check in with how you are recovering at home.     Concerns:  - Call the office for any questions or concerns. Many postoperative issues can be sorted out over the phone.   - Call the office or go to the Emergency Department if you develop a fever greater than 101, persistent chills, persistent nausea/vomiting, worsening or uncontrolled pain, and/or increasing redness or foul smelling drainage from an incision.     Thoracic Surgery Office: [phone]    Dr. William Burfeind, MD Rachael Hart, PA-C Dr. Meredith Harrison, MD Amylyn Mortimer, PA-C Dr. Dustin Manchester, MD Dr. Stephen Dingley, DO

## 2025-02-28 NOTE — RESPIRATORY THERAPY NOTE
02/27/25 2236   Respiratory Protocol   Protocol Initiated? Yes   Protocol Selection Airway Clearance   Language Barrier? Yes   Medical & Social History Reviewed? Yes   Diagnostic Studies Reviewed? Yes   Physical Assessment Performed? Yes   Airway Clearance Plan Discontinue Protocol   Respiratory Assessment   Assessment Type Assess only   General Appearance Awake   Respiratory Pattern Normal   Chest Assessment Chest expansion symmetrical   Bilateral Breath Sounds Diminished   Resp Comments Pt was admitted with cancer of right upper lobe of the lung. There is no pulmonary medication/diagnosis on file. Bs are diminished . Pt is currently on 2L nc with saturation of 94%. There is no indication for airway protocol at this time. I will d/c the protocol and reassess later.

## 2025-02-28 NOTE — QUICK NOTE
02/28/25    Procedure: Chest tube removal    chest tube removed in routine fashion without incident.  The patient tolerated the procedure well. A dry, sterile dressing was placed. Will check a pa/lat chest x-ray. Montse kennedy placed in P5 soiled utility room.    Amylyn Jena Mortimer, PA-C

## 2025-02-28 NOTE — PLAN OF CARE
Problem: PHYSICAL THERAPY ADULT  Goal: Performs mobility at highest level of function for planned discharge setting.  See evaluation for individualized goals.  Description: Treatment/Interventions:  (d/c PT)          See flowsheet documentation for full assessment, interventions and recommendations.  Outcome: Completed  Note: Prognosis: Good  Problem List: Pain  Assessment: Pt is a 64 y.o. female seen for a moderate complexity PT evaluation due to Ongoing medical management for primary dx, Decreased activity tolerance compared to baseline, Fall risk, s/p surgical intervention. Patient is s/p admit to [facility] on 2/27/2025 for Squamous cell carcinoma of upper lobe of right lung (HCC) (C34.11). Patient  has a past medical history of Adenocarcinoma of right lung, stage 4 (HCC), Allergic rhinitis, Alopecia, Anxiety, Benign hypertension, Benign neoplasm of skin of trunk, Benign neoplasm of skin of upper extremity and shoulder, Ear deformity, acquired, Eczema, History of chemotherapy, Hyperlipemia, Maintenance chemotherapy, Secondary cancer of brain (HCC), Seizures (HCC), and Vertigo..     PT now consulted to assess functional mobility and needs for safe d/c planning. pt independent with functional mobility, independent ADLs, and independent IADLs. Personal factors affecting status include fall risk, steps to enter home, and medical status.     Currently pt requires modified independent for bed mobility, modified independent for functional transfers with no AD ; modified independent for ambulation with no AD. Pt presents functioning at or near baseline level of function. Patient states no concerns with functional mobility at present. Patient is encouraged to continue ambulating with staff assist as able in order to maintain current LOF.     The patient's AM-PAC Basic Mobility Inpatient Short Form Raw Score Is 24. PT is currently recommending no post acute rehab needs on d/c from hospital. Due to patient  current status, plan to sign off formal inpatient PT services at this time. Please re-consult should current status change.  Barriers to Discharge: None     Rehab Resource Intensity Level, PT: No post-acute rehabilitation needs    See flowsheet documentation for full assessment.

## 2025-03-01 VITALS
BODY MASS INDEX: 30.39 KG/M2 | DIASTOLIC BLOOD PRESSURE: 79 MMHG | WEIGHT: 193.6 LBS | RESPIRATION RATE: 16 BRPM | SYSTOLIC BLOOD PRESSURE: 149 MMHG | OXYGEN SATURATION: 91 % | HEART RATE: 91 BPM | HEIGHT: 67 IN | TEMPERATURE: 98.8 F

## 2025-03-01 LAB
GLUCOSE SERPL-MCNC: 113 MG/DL (ref 65–140)
GLUCOSE SERPL-MCNC: 135 MG/DL (ref 65–140)

## 2025-03-01 PROCEDURE — 82948 REAGENT STRIP/BLOOD GLUCOSE: CPT

## 2025-03-01 PROCEDURE — NC001 PR NO CHARGE: Performed by: THORACIC SURGERY (CARDIOTHORACIC VASCULAR SURGERY)

## 2025-03-01 PROCEDURE — 99024 POSTOP FOLLOW-UP VISIT: CPT | Performed by: THORACIC SURGERY (CARDIOTHORACIC VASCULAR SURGERY)

## 2025-03-01 RX ORDER — IBUPROFEN 600 MG/1
600 TABLET, FILM COATED ORAL EVERY 6 HOURS SCHEDULED
Qty: 28 TABLET | Refills: 0 | Status: SHIPPED | OUTPATIENT
Start: 2025-03-01 | End: 2025-03-08

## 2025-03-01 RX ORDER — GABAPENTIN 300 MG/1
300 CAPSULE ORAL 3 TIMES DAILY
Qty: 63 CAPSULE | Refills: 0 | Status: SHIPPED | OUTPATIENT
Start: 2025-03-01 | End: 2025-03-22

## 2025-03-01 RX ORDER — INSULIN LISPRO 100 [IU]/ML
1-5 INJECTION, SOLUTION INTRAVENOUS; SUBCUTANEOUS
Status: DISCONTINUED | OUTPATIENT
Start: 2025-03-01 | End: 2025-03-01 | Stop reason: HOSPADM

## 2025-03-01 RX ORDER — DOCUSATE SODIUM 100 MG/1
100 CAPSULE, LIQUID FILLED ORAL 2 TIMES DAILY
Status: DISCONTINUED | OUTPATIENT
Start: 2025-03-01 | End: 2025-03-01 | Stop reason: HOSPADM

## 2025-03-01 RX ADMIN — ATORVASTATIN CALCIUM 10 MG: 10 TABLET, FILM COATED ORAL at 08:41

## 2025-03-01 RX ADMIN — DOCUSATE SODIUM 100 MG: 100 CAPSULE, LIQUID FILLED ORAL at 11:45

## 2025-03-01 RX ADMIN — ENOXAPARIN SODIUM 40 MG: 40 INJECTION SUBCUTANEOUS at 08:41

## 2025-03-01 RX ADMIN — OXYCODONE HYDROCHLORIDE 10 MG: 10 TABLET ORAL at 13:42

## 2025-03-01 RX ADMIN — CELECOXIB 100 MG: 100 CAPSULE ORAL at 08:41

## 2025-03-01 RX ADMIN — ACETAMINOPHEN 975 MG: 325 TABLET, FILM COATED ORAL at 11:07

## 2025-03-01 RX ADMIN — GABAPENTIN 300 MG: 300 CAPSULE ORAL at 08:41

## 2025-03-01 RX ADMIN — OXYCODONE HYDROCHLORIDE 10 MG: 10 TABLET ORAL at 08:43

## 2025-03-01 RX ADMIN — AMLODIPINE BESYLATE 5 MG: 5 TABLET ORAL at 08:41

## 2025-03-01 RX ADMIN — OXYCODONE HYDROCHLORIDE 10 MG: 10 TABLET ORAL at 03:40

## 2025-03-01 NOTE — DISCHARGE SUMMARY
"Discharge Summary - Thoracic    Name: Jeannie Hoover 64 y.o. female I MRN: [id number]  Unit/Bed#: [facility] 506-01 I Date of Admission: 2/27/2025   Date of Service: 3/1/2025 I Hospital Day: 2      Discharge Summary - Jeannie Hoover 64 y.o. female MRN: [id number]  Unit/Bed#: [facility] 506-01 Encounter: [id number]    Admission Date:   Admission Orders (From admission, onward)       Ordered        02/27/25 2035  Inpatient Admission  Once                          Admitting Diagnosis: Squamous cell carcinoma of upper lobe of right lung (HCC) [C34.11]    HPI per 2/27/25: \"64-year-old female with history of stage IV lung cancer complicated by brain metastasis status post resection in 2015 with an enlarging 2.1 x 1.5 cm right upper lobe mass biopsy-proven malignancy concerning for ongoing cancer  ... She has done remarkably well with stage IV lung cancer diagnosed over 10 years ago.  She now has an enlarging PET avid 2 cm right upper lobe biopsy-proven cancer.  I think this is her original tumor that for what ever reason has become more active.  She has no other sites of distant disease.  Based on her long disease-free interval and reassuring imaging with malignancy only at 1 site in the right upper lobe I think she is reasonable candidate for surgical resection.  Surgery would be a robotic right upper lobectomy.  She understands with her longstanding chemo and immunotherapy she would be at slightly higher risk for needing an open procedure.  We discussed other risks of surgery including bleeding, infection, prolonged airleak, A-fib, and again need for conversion to open.  She understands she would have some postoperative numbness.  To minimize risk of surgery I have advised her to smoke marijuana as little as possible prior to surgery.  She voiced understanding.  Ultimately she voiced agreement with our treatment plan.  Will proceed with surgical resection on Thursday 2/27.  She will get preoperative blood work.\"    Procedures " Performed: No orders of the defined types were placed in this encounter.    Summary of Hospital Course: Underwent 2/27 robotic RULectomy, MLND. POD1 dc'd CT with no issue, dc'd Denise, IVF. PT/OT had no needs. POD2 stable for discharge home with outpt f/u with PA/lat CXR prior.    Discharge Diagnosis: Squamous cell carcinoma of upper lobe of right lung (HCC) [C34.11]    Medical Problems       Resolved Problems  Date Reviewed: 10/7/2024   None       Condition at Discharge: stable     Discharge Information:  See after visit summary for information provided to patient and family, information related to follow-up care and any pertinent home health orders, and for reconciled discharge medications provided to patient and family.    PCP: Flor Mckeon DO    Disposition: Home  Planned Readmission: No    Discharge Statement   I spent 30 minutes discharging the patient. This time was spent on the day of discharge. I had direct contact with the patient on the day of discharge. Additional documentation is required if more than 30 minutes were spent on discharge.

## 2025-03-01 NOTE — ASSESSMENT & PLAN NOTE
S/p 2/27 robotic RULectomy, MLND.    Chest tube removed 2/28, trace apical pneumothorax on CXR    Plan:  - Regular diet  - Pain and nausea control PRN  - Encourage IS, ambulation   - PT/OT eval and treat: No needs  - DVT ppx   - Anticipate dc

## 2025-03-01 NOTE — PLAN OF CARE
Problem: PAIN - ADULT  Goal: Verbalizes/displays adequate comfort level or baseline comfort level  Description: Interventions:  - Encourage patient to monitor pain and request assistance  - Assess pain using appropriate pain scale  - Administer analgesics based on type and severity of pain and evaluate response  - Implement non-pharmacological measures as appropriate and evaluate response  - Consider cultural and social influences on pain and pain management  - Notify physician/advanced practitioner if interventions unsuccessful or patient reports new pain  Outcome: Not Progressing     Problem: INFECTION - ADULT  Goal: Absence or prevention of progression during hospitalization  Description: INTERVENTIONS:  - Assess and monitor for signs and symptoms of infection  - Monitor lab/diagnostic results  - Monitor all insertion sites, i.e. indwelling lines, tubes, and drains  - Monitor endotracheal if appropriate and nasal secretions for changes in amount and color  - [place] appropriate cooling/warming therapies per order  - Administer medications as ordered  - Instruct and encourage patient and family to use good hand hygiene technique  - Identify and instruct in appropriate isolation precautions for identified infection/condition  Outcome: Not Progressing  Goal: Absence of fever/infection during neutropenic period  Description: INTERVENTIONS:  - Monitor WBC    Outcome: Not Progressing     Problem: SAFETY ADULT  Goal: Patient will remain free of falls  Description: INTERVENTIONS:  - Educate patient/family on patient safety including physical limitations  - Instruct patient to call for assistance with activity   - Consult OT/PT to assist with strengthening/mobility   - Keep Call bell within reach  - Keep bed low and locked with side rails adjusted as appropriate  - Keep care items and personal belongings within reach  - Initiate and maintain comfort rounds  - Make Fall Risk Sign visible to staff  - Apply yellow socks  and bracelet for high fall risk patients  - Consider moving patient to room near nurses station  Outcome: Not Progressing  Goal: Maintain or return to baseline ADL function  Description: INTERVENTIONS:  -  Assess patient's ability to carry out ADLs; assess patient's baseline for ADL function and identify physical deficits which impact ability to perform ADLs (bathing, care of mouth/teeth, toileting, grooming, dressing, etc.)  - Assess/evaluate cause of self-care deficits   - Assess range of motion  - Assess patient's mobility; develop plan if impaired  - Assess patient's need for assistive devices and provide as appropriate  - Encourage maximum independence but intervene and supervise when necessary  - Involve family in performance of ADLs  - Assess for home care needs following discharge   - Consider OT consult to assist with ADL evaluation and planning for discharge  - Provide patient education as appropriate  Outcome: Not Progressing  Goal: Maintains/Returns to pre admission functional level  Description: INTERVENTIONS:  - Perform AM-PAC 6 Click Basic Mobility/ Daily Activity assessment daily.  - Set and communicate daily mobility goal to care team and patient/family/caregiver.   - Collaborate with rehabilitation services on mobility goals if consulted  - Perform Range of Motion 3 times a day.  - Reposition patient every 3 hours.  - Dangle patient 3 times a day  - Stand patient 3 times a day  - Ambulate patient 3 times a day  - Out of bed to chair 3 times a day   - Out of bed for meals 3 times a day  - Out of bed for toileting  - Record patient progress and toleration of activity level   Outcome: Not Progressing     Problem: DISCHARGE PLANNING  Goal: Discharge to home or other facility with appropriate resources  Description: INTERVENTIONS:  - Identify barriers to discharge w/patient and caregiver  - Arrange for needed discharge resources and transportation as appropriate  - Identify discharge learning needs  (meds, wound care, etc.)  - Arrange for interpretive services to assist at discharge as needed  - Refer to Case Management Department for coordinating discharge planning if the patient needs post-hospital services based on physician/advanced practitioner order or complex needs related to functional status, cognitive ability, or social support system  Outcome: Not Progressing     Problem: Knowledge Deficit  Goal: Patient/family/caregiver demonstrates understanding of disease process, treatment plan, medications, and discharge instructions  Description: Complete learning assessment and assess knowledge base.  Interventions:  - Provide teaching at level of understanding  - Provide teaching via preferred learning methods  Outcome: Not Progressing     Problem: CARDIOVASCULAR - ADULT  Goal: Maintains optimal cardiac output and hemodynamic stability  Description: INTERVENTIONS:  - Monitor I/O, vital signs and rhythm  - Monitor for S/S and trends of decreased cardiac output  - Administer and titrate ordered vasoactive medications to optimize hemodynamic stability  - Assess quality of pulses, skin color and temperature  - Assess for signs of decreased coronary artery perfusion  - Instruct patient to report change in severity of symptoms  Outcome: Not Progressing  Goal: Absence of cardiac dysrhythmias or at baseline rhythm  Description: INTERVENTIONS:  - Continuous cardiac monitoring, vital signs, obtain 12 lead EKG if ordered  - Administer antiarrhythmic and heart rate control medications as ordered  - Monitor electrolytes and administer replacement therapy as ordered  Outcome: Not Progressing     Problem: RESPIRATORY - ADULT  Goal: Achieves optimal ventilation and oxygenation  Description: INTERVENTIONS:  - Assess for changes in respiratory status  - Assess for changes in mentation and behavior  - Position to facilitate oxygenation and minimize respiratory effort  - Oxygen administered by appropriate delivery if ordered  -  Initiate smoking cessation education as indicated  - Encourage broncho-pulmonary hygiene including cough, deep breathe, Incentive Spirometry  - Assess the need for suctioning and aspirate as needed  - Assess and instruct to report SOB or any respiratory difficulty  - Respiratory Therapy support as indicated  Outcome: Not Progressing

## 2025-03-01 NOTE — PROGRESS NOTES
Progress Note - Thoracic    Name: Jeannie Hoover 64 y.o. female I MRN: [id number]  [facility] Date of Admission: 2/27/2025   Date of Service: 3/1/2025 I Hospital Day: 2     Assessment & Plan  Non-small cell cancer of right lung (HCC)  S/p 2/27 robotic RULectomy, MLND.    Chest tube removed 2/28, trace apical pneumothorax on CXR    Plan:  - Regular diet  - Pain and nausea control PRN  - Encourage IS, ambulation   - PT/OT eval and treat: No needs  - DVT ppx   - Anticipate dc     Subjective/Objective   Pain controlled.  Tolerating p.o.  No nausea or vomiting.  Denies shortness of breath, chest pain.  Has been out of bed and ambulating.  Voiding and passing flatus.    Physical Exam:   Gen: NAD, Resting in bed  Neuro: A&O, No focal deficits  Head: Normal Cephalic, Atraumatic  Eye: EOMI, No scleral icterus  CV: Regular rate, Cap refill <2 sec  Pulm: Normal work of breathing, No respiratory distress on RA.  Incisions clean, dry, intact  Skin: Warm, Dry, Intact    Patient Vitals for the past 24 hrs:   BP Temp Temp src Pulse Resp SpO2   02/28/25 2248 138/89 98.4 °F (36.9 °C) -- 92 18 96 %   02/28/25 2247 138/89 98.4 °F (36.9 °C) -- 95 17 92 %   02/28/25 1705 153/70 99.5 °F (37.5 °C) Oral 89 18 94 %   02/28/25 1059 141/73 99 °F (37.2 °C) Oral -- 18 --   02/28/25 0839 -- 97.8 °F (36.6 °C) Oral -- -- --   02/28/25 0752 156/76 -- -- 100 18 94 %   02/28/25 0700 123/67 -- -- -- 20 --       I/O         02/26 0701 02/27 0700 02/27 0701  02/28 0700 02/28 0701  03/01 0700    P.O.   300    I.V. (mL/kg)  2777.8 (31.6)     IV Piggyback  11.7     Total Intake(mL/kg)  2789.5 (31.8) 300 (3.4)    Urine (mL/kg/hr)  0     Chest Tube  75     Total Output  75     Net  +2714.5 +300           Unmeasured Urine Occurrence  1 x           Recent Labs     02/27/25 2121 02/28/25  0512   WBC 25.41* 14.82*   HGB 12.7 12.7   * 392*   SODIUM 136 134*   K 4.3 4.8    102   CO2 23 20*   BUN 13 15   CREATININE 0.88 0.85   GLUC 283*  204*   CALCIUM 9.0 9.0   MG 1.7* 3.3*   EGFR 68 71     Lab Results   Component Value Date    URINECX 10,000-19,000 cfu/ml Mixed Contaminants X4 10/16/2016    LEUKOCYTESUR Negative 07/01/2019    LEUKOCYTESUR 2+ (A) 05/23/2017     ---  Janae Pena MD  General Surgery PGY-II

## 2025-03-06 ENCOUNTER — TELEPHONE (OUTPATIENT)
Dept: CARDIAC SURGERY | Facility: CLINIC | Age: 65
End: 2025-03-06

## 2025-03-06 DIAGNOSIS — C34.91 NON-SMALL CELL CANCER OF RIGHT LUNG (HCC): ICD-10-CM

## 2025-03-06 RX ORDER — OXYCODONE HYDROCHLORIDE 5 MG/1
5 TABLET ORAL EVERY 6 HOURS PRN
Qty: 20 TABLET | Refills: 0 | Status: SHIPPED | OUTPATIENT
Start: 2025-03-06 | End: 2025-03-16

## 2025-03-06 NOTE — ANESTHESIA POSTPROCEDURE EVALUATION
Post-Op Assessment Note    Last Filed PACU Vitals:  Vitals Value Taken Time   Temp 97 °F (36.1 °C) 02/27/25 2115   Pulse 86 02/27/25 2152   /81 02/27/25 2212   Resp 20 02/27/25 2200   SpO2 94 % 02/27/25 2200   Vitals shown include unfiled device data.    Modified Jeancarlos:     Vitals Value Taken Time   Activity 2 02/27/25 2200   Respiration 2 02/27/25 2200   Circulation 2 02/27/25 2200   Consciousness 2 02/27/25 2200   Oxygen Saturation 1 02/27/25 2200     Modified Jeancarlos Score: 9

## 2025-03-06 NOTE — TELEPHONE ENCOUNTER
I called patient to see how she is feeling and to let her know to make a follow up appointment from the hospital. Patient has a followup already for April and would also like to have her pain killers refilled. Patient would like a message directly from Dr. Mckeon.  Gilma Blackwell MA

## 2025-03-06 NOTE — TELEPHONE ENCOUNTER
3/6/2025 10:12 AM returned call to Jeannie   She is still have a lot of pain from her cancer surgery   She would like a refill of her pain medication.  She is having severe pain from the surgery.     I sent in a refill of her oxycodone. She is going to follow up with her surgeon     [place] prescription monitoring program report reviewed and is appropriate.      Flor Mckeon, DO

## 2025-03-10 NOTE — TELEPHONE ENCOUNTER
Surgeon/Procedure/Date: Dr. Hernandez - 2/27/25   Follow up appt: 3/19/25 @140pm and will get chest x-ray 1-3 day prior       Constipation: (Yes: Colace 100 mg BID, Senokot 2 tabs QHS or Senokot 2 tabs qhs   No: Dulcolax/Miralax/suppository/enema)  Eating and drinking well. Urinating well, she is currently taking stool softeners, had a bowel movement today she states she did have some constipation for the first week. But is now going regularly for the past few days.    2.   Pain Management: (Oxycodone 5-10 mg prn, Tylenol 650 mg q6 hrs and +/- Advil 600 mg TID for 7 days. Neurontin 300 mg TID for 21 days, or Neurontin 100 mg TID for patients over 70)  States she has been sleeping on sofa for comfort. Oxycodone about every 6 hours, Tylenol and gabapentin as prescribed. Which helps with the pain. Pain ranges from 6-7. She states the pain and discomfort is constant.     3.  Fever/Chills/Cough/shortness of breath:  (Call for temp >100.4 )  Denies fever, chills, cough and shortness breath.     4. Incisions: (Warmth, redness, drainage? May shower, no baths. No creams, lotions, or ointments to incisions)  Painful especially under the right breast, denies drainage and redness though they are painful.     5.  Ambulation/Incentive Spirometry: Use between 8961-7199 and about 10 times a day.

## 2025-03-12 ENCOUNTER — DOCUMENTATION (OUTPATIENT)
Dept: HEMATOLOGY ONCOLOGY | Facility: CLINIC | Age: 65
End: 2025-03-12

## 2025-03-12 ENCOUNTER — TELEPHONE (OUTPATIENT)
Dept: CARDIAC SURGERY | Facility: CLINIC | Age: 65
End: 2025-03-12

## 2025-03-12 DIAGNOSIS — C34.91 NON-SMALL CELL CANCER OF RIGHT LUNG (HCC): Primary | ICD-10-CM

## 2025-03-12 RX ORDER — OXYCODONE HYDROCHLORIDE 5 MG/1
5 TABLET ORAL EVERY 4 HOURS PRN
Qty: 40 TABLET | Refills: 0 | Status: SHIPPED | OUTPATIENT
Start: 2025-03-12

## 2025-03-12 NOTE — PROGRESS NOTES
In-basket message received from Dr. Hernandez to add patient to the thoracic [facility] on 3/17/2025. Chart reviewed and prep completed.

## 2025-03-12 NOTE — TELEPHONE ENCOUNTER
Spoke with Jeannie.  She is doing okay.  She is still having some moderate pain.  No major shortness of breath.  She is taking oxycodone but is just running out.  No cough fevers or chills.  Otherwise doing well.    I reviewed her pathology with her.  She understands this was a biopsy-proven cancer.  All lymph nodes were negative.  We will review her case at multidisciplinary tumor board to see if this needs any further treatment    I will send her in a refill for oxycodone right now.  She voiced understanding and appreciation.  Will see her in the office next week

## 2025-03-13 ENCOUNTER — PATIENT MESSAGE (OUTPATIENT)
Dept: CARDIAC SURGERY | Facility: CLINIC | Age: 65
End: 2025-03-13

## 2025-03-13 NOTE — TELEPHONE ENCOUNTER
"I spoke with patient. She complained of difficulty sitting due to soreness in \"butt crack\". Requesting antibiotics. I explained to her this isn't something we normally treat. Discussed with Ioana Nelson PA-C she recommends that she be seen at Urgent care since her PCP is on vacation. She refused stated she can't wear a seatbelt over her breast or sit in the seat. Advised her to put a towel or small pillow between seatbelt and breast. She refused to call PCP's office to see who is covering her. Stated her PCP wouldn't make her come in and would just prescribe antibiotics. Stated she will look on  line to see how to treat this. Encouraged her to stay off the area as much as possible."

## 2025-03-17 ENCOUNTER — HOSPITAL ENCOUNTER (OUTPATIENT)
Dept: RADIOLOGY | Facility: HOSPITAL | Age: 65
Discharge: HOME | End: 2025-03-17
Payer: MEDICARE

## 2025-03-17 ENCOUNTER — DOCUMENTATION (OUTPATIENT)
Dept: HEMATOLOGY ONCOLOGY | Facility: CLINIC | Age: 65
End: 2025-03-17

## 2025-03-17 DIAGNOSIS — C34.91 NON-SMALL CELL CANCER OF RIGHT LUNG (HCC): ICD-10-CM

## 2025-03-17 PROCEDURE — 71046 X-RAY EXAM CHEST 2 VIEWS: CPT

## 2025-03-17 NOTE — PROGRESS NOTES
THORACIC ONCOLOGY MULTIDISCIPLINARY CASE REVIEW    DATE:  3/17/2025    PRESENTING DOCTOR:  Dr. Hernandez    DIAGNOSIS:  NSCLC   STAGING: Hx of Stage IV    Jeannie Hoover is a 64 y.o. female who was presented at the Thoracic Oncology Multidisciplinary Tumor Conference today. She has a history of stage IV non-small cell lung cancer with a right upper lobe primary and a single right superior temporal intracranial metastasis status post resection 05/06/15. She completed a course of hypo-fractionated stereotactic radiotherapy, a dose of 3000 cGy in 5 fractions on 10/16/2015.  [id number] (phase III open label randomized study of [id number] [anti-PD-L1 antibody] in combination with carboplatin and paclitaxel +/- Avastin versus carboplatin and paclitaxel +/- Avastin in patients with stage IV chemotherapy naive non-small cell lung carcinoma). She was randomized to receive all 4 medications. She completed the 6 cycles of treatment without significant toxicities. Patient had been on maintenance (study drug (atezolizumab) and avastin) - subsequently only Avastin.  This was eventually discontinued. She is now s/p right upper lobectomy and MLND. Presents today to discuss pathology and treatment plan.     PHYSICIAN RECOMMENDED PLAN: Follow up with Dr. Osuna (F/U Dr. Osuna 04/02/25)      Imaging reviewed: No        Pathology reviewed:   02/27/25 Tissue Exam    PFT's reviewed: No    Future imaging: No    Referrals: No    Clinical Trials Reviewed:  No  Clinical Trial Eligibility:     Team agreed to plan.  NCCN guidelines were readily available for review at this discussion    The final treatment plan will be left to the discretion of the patient and the treating physician.     DISCLAIMERS:  TO THE TREATING PHYSICIAN:  This conference is a meeting of clinicians from various specialty areas who evaluate and discuss patients for whom a multidisciplinary treatment approach is being considered. Please note that the  above opinion was a consensus of the conference attendees and is intended only to assist in quality care of the discussed patient.  The responsibility for follow up on the input given during the conference, along with any final decisions regarding plan of care, is that of the patient and the patient's provider. Accordingly, appointments have only been recommended based on this information and have NOT been scheduled unless otherwise noted.      TO THE PATIENT:  This summary is a brief record of major aspects of your cancer treatment. You may choose to share a copy with any of your doctors or nurses. However, this is not a detailed or comprehensive record of your care.

## 2025-03-19 ENCOUNTER — OFFICE VISIT (OUTPATIENT)
Dept: CARDIAC SURGERY | Facility: CLINIC | Age: 65
End: 2025-03-19

## 2025-03-19 VITALS
HEART RATE: 93 BPM | BODY MASS INDEX: 31.04 KG/M2 | HEIGHT: 67 IN | SYSTOLIC BLOOD PRESSURE: 150 MMHG | TEMPERATURE: 97.9 F | RESPIRATION RATE: 15 BRPM | WEIGHT: 197.75 LBS | OXYGEN SATURATION: 97 % | DIASTOLIC BLOOD PRESSURE: 98 MMHG

## 2025-03-19 DIAGNOSIS — C34.91 NON-SMALL CELL CANCER OF RIGHT LUNG (HCC): Primary | ICD-10-CM

## 2025-03-19 PROCEDURE — 99024 POSTOP FOLLOW-UP VISIT: CPT | Performed by: THORACIC SURGERY (CARDIOTHORACIC VASCULAR SURGERY)

## 2025-03-19 RX ORDER — IBUPROFEN 800 MG/1
800 TABLET, FILM COATED ORAL EVERY 8 HOURS SCHEDULED
Qty: 60 TABLET | Refills: 0 | Status: SHIPPED | OUTPATIENT
Start: 2025-03-19

## 2025-03-19 NOTE — LETTER
2025     Flor Mckeon, DO  [address]   [address]  [facility] [address]    Patient: Jeannie Hoover   YOB: 1960   Date of Visit: 3/19/2025       Dear Dr. Mckeon:    Thank you for referring Jeannie Hoover to me for evaluation. Below are my notes for this consultation.    If you have questions, please do not hesitate to call me. I look forward to following your patient along with you.         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    Jeannie is doing excellent 3 weeks out from surgery.  She does have some discomfort which is to be expected.  I assured her that the numbness is okay.  She should continue to take gabapentin 303 times daily.  This should subside with time.  Additionally she can ice her use heating pads to the area.  He should continue to take anti-inflammatories either ibuprofen 800 mg 3 times daily or Tylenol 1000 mg 3 times daily.  She understands she can take both these together.  Last resort she can take oxycodone.  She should wean off this over the next 1 to 2 weeks.    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  All margins were negative.  All lymph nodes were negative.  This was small at less than 3 cm.    I would like her to follow-up with her medical oncologist Dr. Terrell Osuna to discuss need for any further  treatment.  She has a follow-up appointment scheduled for early April.  I will defer to him as to whether or not to offer adjuvant chemo or immunotherapy.  Again from my standpoint this was completely resected with negative margins and no involved lymph nodes.    I like to see her back in 3 to 4 weeks to assess her ongoing recovery.  I have asked her to call us sooner with any questions or concerns.  She understands we will schedule her for surveillance scan for the end of August 2025.    Abe Hernandez      Orders:  •  XR chest pa and lateral; Future  •  ibuprofen (MOTRIN) 800 mg tablet; Take 1 tablet (800 mg total) by mouth every 8 (eight) hours        History of Present Illness  HPI  Jeannie Hoover is a 64 y.o. female who presents for office following 2/27/2025 robotic right upper lobectomy.  Hospital course was uncomplicated and she was discharged home on postoperative day #2.  Since being at home she has had some numbness and pain.  We reviewed her medications and refilled them appropriately.  She she is still taking anti-inflammatories, gabapentin, and oxycodone.  She denies any major shortness of breath.  No major cough fevers or chills.  She is regaining her appetite and energy.      Review of Systems   Constitutional:  Negative for activity change, appetite change, chills, diaphoresis, fatigue, fever and unexpected weight change.   HENT: Negative.     Eyes: Negative.    Respiratory:  Negative for apnea, cough, chest tightness, shortness of breath, wheezing and stridor.    Cardiovascular:  Negative for chest pain, palpitations and leg swelling.   Gastrointestinal:  Negative for abdominal distention, abdominal pain, blood in stool, constipation, diarrhea, nausea and vomiting.   Endocrine: Negative.    Genitourinary: Negative.    Musculoskeletal: Negative.    Skin: Negative.    Allergic/Immunologic: Negative.    Neurological:  Positive for numbness.   Hematological: Negative.    Psychiatric/Behavioral:  "Negative.            Objective  /98 (BP Location: Left arm, Patient Position: Sitting, Cuff Size: Large)   Pulse 93   Temp 97.9 °F (36.6 °C) (Temporal)   Resp 15   Ht 5' 7\" (1.702 m)   Wt 89.7 kg (197 lb 12 oz)   LMP  (LMP Unknown)   SpO2 97%   BMI 30.97 kg/m²      Physical Exam  Vitals and nursing note reviewed.   Constitutional:       General: She is not in acute distress.     Appearance: She is well-developed. She is not diaphoretic.   HENT:      Head: Normocephalic and atraumatic.      Mouth/Throat:      Pharynx: No oropharyngeal exudate.   Eyes:      General: No scleral icterus.     Conjunctiva/sclera: Conjunctivae normal.      Pupils: Pupils are equal, round, and reactive to light.   Neck:      Thyroid: No thyromegaly.      Vascular: No JVD.      Trachea: No tracheal deviation.   Cardiovascular:      Rate and Rhythm: Normal rate and regular rhythm.      Heart sounds: Normal heart sounds. No murmur heard.     No friction rub. No gallop.   Pulmonary:      Effort: Pulmonary effort is normal. No respiratory distress.      Breath sounds: Normal breath sounds. No wheezing or rales.      Comments: Chest incisions X 5 are well-healed.  Suture removed.  No erythema.  No drainage.  Chest:      Chest wall: No tenderness.   Abdominal:      General: Bowel sounds are normal. There is no distension.      Palpations: Abdomen is soft. There is no mass.      Tenderness: There is no abdominal tenderness. There is no guarding or rebound.   Musculoskeletal:         General: No tenderness or deformity. Normal range of motion.      Cervical back: Normal range of motion and neck supple.   Skin:     General: Skin is warm and dry.      Coloration: Skin is not pale.      Findings: No erythema or rash.   Neurological:      Mental Status: She is alert and oriented to person, place, and time.   Psychiatric:         Behavior: Behavior normal.         Thought Content: Thought content normal.         Judgment: Judgment normal. "     I personally reviewed her most recent x-ray from 3/17/2025.  This shows no effusion.  No pneumothorax.  They read this as a small nodule however I do not think this is high definition enough to see this.  I did not appreciate any nodule intraoperatively.  Will plan on a repeat x-ray in 1 month when we see her back.    We reviewed her pathology at length.  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  All margins were negative.  All lymph nodes were negative.

## 2025-03-20 NOTE — ASSESSMENT & PLAN NOTE
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    Jeannie is doing excellent 3 weeks out from surgery.  She does have some discomfort which is to be expected.  I assured her that the numbness is okay.  She should continue to take gabapentin 303 times daily.  This should subside with time.  Additionally she can ice her use heating pads to the area.  He should continue to take anti-inflammatories either ibuprofen 800 mg 3 times daily or Tylenol 1000 mg 3 times daily.  She understands she can take both these together.  Last resort she can take oxycodone.  She should wean off this over the next 1 to 2 weeks.    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  All margins were negative.  All lymph nodes were negative.  This was small at less than 3 cm.    I would like her to follow-up with her medical oncologist Dr. Terrell Osuna to discuss need for any further treatment.  She has a follow-up appointment scheduled for early April.  I will defer to him as to whether or not to offer adjuvant chemo or immunotherapy.  Again from my standpoint this was completely resected with negative margins and no involved lymph nodes.    I like to see her back in 3 to 4 weeks to assess her ongoing recovery.  I have asked her to call us sooner with any questions or concerns.  She understands we will schedule her for surveillance scan for the end of August 2025.    Abe Advid      Orders:    XR chest pa and lateral; Future    ibuprofen (MOTRIN) 800 mg tablet; Take 1 tablet (800 mg total) by mouth every 8 (eight) hours

## 2025-03-25 DIAGNOSIS — C34.91 NON-SMALL CELL CANCER OF RIGHT LUNG (HCC): ICD-10-CM

## 2025-03-26 ENCOUNTER — TELEPHONE (OUTPATIENT)
Dept: HEMATOLOGY ONCOLOGY | Facility: MEDICAL CENTER | Age: 65
End: 2025-03-26

## 2025-03-26 RX ORDER — OXYCODONE HYDROCHLORIDE 5 MG/1
5 TABLET ORAL EVERY 4 HOURS PRN
Qty: 40 TABLET | Refills: 0 | OUTPATIENT
Start: 2025-03-26

## 2025-03-26 NOTE — TELEPHONE ENCOUNTER
Pt received 40 tablets earlier this month. In yesterday's visit, Dr. Hernandez discussed weaning oxycodone.

## 2025-03-26 NOTE — TELEPHONE ENCOUNTER
Spoke with Jeannie about rescheduling 4/02 appointment due to provider rounding.  Offered 4/09 at 1:40pm, understanding verbalized and new time/date confirmed.

## 2025-04-05 DIAGNOSIS — I10 BENIGN HYPERTENSION: ICD-10-CM

## 2025-04-05 DIAGNOSIS — E78.2 MIXED HYPERLIPIDEMIA: ICD-10-CM

## 2025-04-05 DIAGNOSIS — E11.9 TYPE 2 DIABETES MELLITUS WITHOUT COMPLICATION, WITHOUT LONG-TERM CURRENT USE OF INSULIN (HCC): ICD-10-CM

## 2025-04-07 ENCOUNTER — RA CDI HCC (OUTPATIENT)
Dept: OTHER | Facility: HOSPITAL | Age: 65
End: 2025-04-07

## 2025-04-07 RX ORDER — AMLODIPINE BESYLATE 5 MG/1
5 TABLET ORAL DAILY
Qty: 90 TABLET | Refills: 1 | Status: SHIPPED | OUTPATIENT
Start: 2025-04-07

## 2025-04-07 RX ORDER — ATORVASTATIN CALCIUM 10 MG/1
10 TABLET, FILM COATED ORAL DAILY
Qty: 90 TABLET | Refills: 1 | Status: SHIPPED | OUTPATIENT
Start: 2025-04-07

## 2025-04-07 RX ORDER — LOSARTAN POTASSIUM 50 MG/1
50 TABLET ORAL DAILY
Qty: 90 TABLET | Refills: 1 | Status: SHIPPED | OUTPATIENT
Start: 2025-04-07

## 2025-04-09 ENCOUNTER — OFFICE VISIT (OUTPATIENT)
Dept: HEMATOLOGY ONCOLOGY | Facility: MEDICAL CENTER | Age: 65
End: 2025-04-09
Payer: MEDICARE

## 2025-04-09 VITALS
RESPIRATION RATE: 16 BRPM | OXYGEN SATURATION: 98 % | HEIGHT: 67 IN | SYSTOLIC BLOOD PRESSURE: 138 MMHG | BODY MASS INDEX: 30.97 KG/M2 | DIASTOLIC BLOOD PRESSURE: 78 MMHG | HEART RATE: 87 BPM | TEMPERATURE: 98 F

## 2025-04-09 DIAGNOSIS — C34.81 MALIGNANT NEOPLASM OF OVERLAPPING SITES OF RIGHT BRONCHUS AND LUNG (HCC): ICD-10-CM

## 2025-04-09 DIAGNOSIS — C34.91 NON-SMALL CELL CANCER OF RIGHT LUNG (HCC): Primary | ICD-10-CM

## 2025-04-09 PROCEDURE — 99214 OFFICE O/P EST MOD 30 MIN: CPT | Performed by: INTERNAL MEDICINE

## 2025-04-09 NOTE — PROGRESS NOTES
Jeannie Hoover  1960  [facility]AT[facility] [address] [address]    30 minutes was spent with patient in direct consultation/examination or reviewing the chart.    DISCUSSION.SUMMARY:    64 year-old female with M1 non-small cell lung carcinoma/adenocarcinoma. Patient has been on [facility] 20015-14 (phase III open label randomized study of [id number] [anti-PD-L1 antibody] in combination with carboplatin and paclitaxel +/- Avastin versus carboplatin and paclitaxel +/- Avastin in patients with stage IV chemotherapy naive non-small cell lung carcinoma).Patient was randomized to receive all 4 medications. Mrs. Hoover completed the 6 cycles of treatment without significant toxicities. Patient had been on maintenance (study drug (atezolizumab) and avastin) - subsequently only Avastin.  This was eventually discontinued.  More recently, patient has been on surveillance only.  Issues:     Stage IV non-small cell lung carcinoma, recent recurrence.  Patient was seen/evaluated by Dr. Hernandez, underwent resection.  Patient seems to be healing well, pain is controlled.  Patient is using Motrin for pain.  Patient will follow-up with thoracic surgery as directed.    Recurrent non-small cell lung carcinoma.  We discussed the pathology results at length.  Patient understands that she was found to have additional tumor.  All lymph nodes were negative for metastases.  The tumor was TTF-1 +.  Patient was treated with chemoimmunotherapy for many years previously, does not wish to go back on chemotherapy or immunotherapy (or combination).  We discussed potential future options.  Patient demonstrates a good understanding of the situation, understands that the tumor could appear somewhere else.  At this time Mrs. Hoover wishes to continue with surveillance only.  Patient is to return in 2 months with a repeat PET/CT before (there will have been approximately  6 months between the 2 PET CTs).  Afterwards patient wishes to continue with CAT scans only.    History of brain metastases.  No recent CNS issues.  As discussed previously patient is status post resection and radiation years ago.  Patient was long tapered off the Keppra.  No CNS issues.  Recent repeat MRI/brain did not demonstrate any concerning abnormalities.  Surveillance continues.    Patient is to return in 2 months.  Mrs. Hoover knows to call the hematology/oncology office if there are any other questions or concerns.    Carefully review your medication list and verify that the list is accurate and up-to-date. Please call the hematology/oncology office if there are medications missing from the list, medications on the list that you are not currently taking or if there is a dosage or instruction that is different from how you're taking that medication.    Patient goals and areas of care: Resume lung cancer surveillance  Barriers to care:  None  Patient is able to self-care  __________________________________________________________________________________________________    Chief Complaint   Patient presents with    Follow-up    Recurrent lung cancer     Advance Care Planning/Advance Directives: not discussed    Oncology History   Non-small cell cancer of right lung (HCC)   9/5/2015 Initial Diagnosis    Patient was referred to the emergency room for evaluation of vertigo as [facility] the [facility]. (patient reported.)  Patient underwent the following pertinent imaging scans.  MRI of the brain demonstrated a mass in the right superior all temporal gyrus with measurements of 4.2 x 3.3 x 3.6 cm with vasogenic edema and mass effect.  There was a near uncal herniation noted.   CT of the chest and abdomen/pelvis demonstrated a necrotic right upper lobe mass measuring 7 x 4.2 x 4.1 cm strongly suspicious for bronchogenic carcinoma.  The mass contacts the posterior medial pleural surface and potentially contacts  the right posterior tracheal border.  No pathologic adenopathy was identified in no evidence of metastatic disease in the chest abdomen or pelvis.       9/6/2015 Surgery    Non-small cell carcinoma of lung (HCC) diagnosed from biopsy of right temporal mass. Pathology demonstrates poorly differentiated non-small cell carcinoma.  Pathologist stated that the immunoprofile supported the primary lung non-small cell carcinoma favor adenocarcinoma.  Squamous carcinoma could not be excluded, secondary to focal P 40 staining.  [facility] path report demonstrated no ALK gene rearrangement or dleation and negative ROS1 rearrangement, No EGFR mutations were found.     Surgery completed by Dr. Parsons.      10/7/2015 - 10/16/2015 Radiation    A dose of 3000 cGy in 5 fractions delivered every other day was delivered to the resection cavity (single right superior temporal intracranial metastasis) Dr Hernández     10/20/2015 -  Research Study Participant    [id number] Phase III open label randomized study of [id number] [Anti-PDL1 antibody] in combination with carboplatin and paclitaxel +/- Avastin versus Carboplatin and Paciltaxel +/- Avastin in patients with stage IV Chemotherapy  Naive non-small cell lung carcinoma.  Patient was randomized to receive off for medications.     11/30/2015 - 2/18/2016 Chemotherapy    Started Carboplatin, Paclitaxel, Avastin and PD-L1 (atezolizumab; study drug).  Completed 6 cycles with cycle 6 day 1 on 2/18/16     3/10/2016 -  Chemotherapy    Beginning cycle 7, maintenance cycle with Avastin and PD-L1 (Atezolizumab; study drug)     5/24/2017 Adverse Reaction    LFT elevation-persisted.  Patient was taken off of study drug Atezolizumab, but continues on Avastin maintance.     5/23/2019 - 7/3/2019 Chemotherapy    [No matching medication found in this treatment plan]       History of Present Illness: 64 year old female recently diagnosed with IV lung cancer here for followup. Mrs. Hoover began to have  headaches last spring 2015. Patient was seen by her PCP and treated with antibiotics. Initially the symptoms got better the patient went on vacation. After returning from vacation, Mrs. Hoover once again developed headaches. Patient was treated with a second round of antibiotics and then was diagnosed with vertigo. [facility]. Although the specifics are not entirely clear, the therapist in the [facility] sent the patient to the emergency room. A CAT scan of the brain demonstrated a brain lesion and patient was transferred to [place]. Patient was seen by Dr. Parsons and underwent resection demonstrating adenocarcinoma. Additional testing demonstrated a lung mass. Patient improved and was discharged. Patient completed SRT with Dr. Hickey and Dr. Hernández.      Patient has been on [facility] 20015-14 (phase III open label randomized study of [id number] [anti-PD-L1 antibody] in combination with carboplatin and paclitaxel +/- Avastin versus carboplatin and paclitaxel +/- Avastin in patients with stage IV chemotherapy naÃ¯ve non-small cell lung carcinoma). Mrs. Hoover was randomized to receive the anti-PD-L1 antibody with chemotherapy - patient completed 6 cycles and was placed on maintenance.      Mrs. Lam subsequently (years ago) suffered a tonic-clonic seizure and was seen in the emergency room. CAT scan of the head did not demonstrate any evidence of disease progression. The seizure was thought to be due to encephalomalacia. Since that time, there have been no seizure issues. Mrs. Hoover had been on Keppra - this has been tapered off.  After long discussions and consultation with the thoracic Working group, patient was taken off of all treatments approximately 6+ years ago.  Up until recently the plan had been surveillance.    Mrs. Hoover was recently found to have evidence of recurrence.  Patient was seen by thoracic surgery, underwent surgery, pathology results are listed  below.    Patient states feeling okay, getting better.  Pain is controlled with oxycodone and with Motrin.  No shortness of breath at rest, minimal dyspnea on exertion.  No chest pain or pressure otherwise.  No GI,  or GYN issues.  No headaches, blurred vision or dizziness.  Activities continue to improve.    Review of Systems   Constitutional:  Negative for fatigue.   HENT: Negative.     Eyes: Negative.    Respiratory: Negative.     Cardiovascular: Negative.    Gastrointestinal: Negative.    Endocrine: Negative.    Genitourinary: Negative.    Musculoskeletal:  Positive for arthralgias. Negative for neck pain.   Skin: Negative.    Allergic/Immunologic: Negative.    Neurological: Negative.    Hematological:  Does not bruise/bleed easily.   Psychiatric/Behavioral: Negative.     All other systems reviewed and are negative.     Patient Active Problem List   Diagnosis    Non-small cell cancer of right lung (HCC)    Mixed hyperlipidemia    Allergic rhinitis    Brain metastasis    Type 2 diabetes mellitus without complication, without long-term current use of insulin (HCC)    Insomnia    Lesion of temporal lobe    Benign hypertension    Chronic maxillary sinusitis     Past Medical History:   Diagnosis Date    Adenocarcinoma of right lung, stage 4 (HCC)     Allergic rhinitis     Alopecia     resolved 10/25/16    Anxiety     last assessed 10/4/16, resolved 10/25/16    Benign hypertension 2/1/2018    Benign neoplasm of skin of trunk 03/25/2008    last assessed 3/25/08    Benign neoplasm of skin of upper extremity and shoulder 03/25/2008    last assessed 3/25/08    Ear deformity, acquired     last assessed 10/13/14, resolved 3/12/15    Eczema     History of chemotherapy     Hyperlipemia     Maintenance chemotherapy     Secondary cancer of brain (HCC)     Seizures (HCC)     Vertigo      Past Surgical History:   Procedure Laterality Date    APPENDECTOMY  1964    BRAIN TUMOR EXCISION      CENTRAL VENOUS CATHETER INSERTION Right      PORTACATH    IR BIOPSY LUNG  2/5/2025    IR PORT REMOVAL  9/10/2021    GA BRNCHSC INCL FLUOR GDNCE DX W/CELL WASHG SPX N/A 2/27/2025    Procedure: BRONCHOSCOPY FLEXIBLE;  Surgeon: Abe Hernandez MD;  Location: [facility];  Service: Thoracic    GA THORACOSCOPY W/LOBECTOMY SINGLE LOBE Right 2/27/2025    Procedure: LOBECTOMY RIGHT UPPER LUNG THORACOSCOPIC W/ ROBOTICS WITH MEDIASTINAL LYMPH NODE DISSECTION;  Surgeon: Abe Hernandez MD;  Location: [facility];  Service: Thoracic     Family History   Problem Relation Age of Onset    Diabetes Mother     Heart disease Mother     Lung cancer Father 70        Smoker     Uterine cancer Paternal Grandmother     Pancreatitis Brother     No Known Problems Brother      Social History     Socioeconomic History    Marital status: /Civil Union     Spouse name: Not on file    Number of children: 1    Years of education: Not on file    Highest education level: Not on file   Occupational History    Not on file   Tobacco Use    Smoking status: Former     Current packs/day: 0.00     Average packs/day: 1 pack/day for 47.0 years (47.0 ttl pk-yrs)     Types: Cigarettes     Start date: 1968     Quit date: 2015     Years since quitting: 10.2     Passive exposure: Never    Smokeless tobacco: Never   Vaping Use    Vaping status: Never Used   Substance and Sexual Activity    Alcohol use: Yes     Alcohol/week: 7.0 standard drinks of alcohol     Types: 7 Shots of liquor per week    Drug use: Yes     Types: Marijuana    Sexual activity: Yes   Other Topics Concern    Not on file   Social History Narrative    High school or GED     Lives independently with spouse      Social Drivers of Health     Financial Resource Strain: Not on file   Food Insecurity: No Food Insecurity (4/9/2025)    Hunger Vital Sign     Worried About Running Out of Food in the Last Year: Never true     Ran Out of Food in the Last Year: Never true   Transportation Needs: No Transportation Needs (4/9/2025)     PRAPARE - Transportation     Lack of Transportation (Medical): No     Lack of Transportation (Non-Medical): No   Physical Activity: Not on file   Stress: Not on file   Social Connections: Not on file   Intimate Partner Violence: Unknown (2/27/2025)    Nursing IPS     Feels Physically and Emotionally Safe: Not on file     Physically Hurt by Someone: Not on file     Humiliated or Emotionally Abused by Someone: Not on file     Physically Hurt by Someone: No     Hurt or Threatened by Someone: No   Housing Stability: Unknown (4/9/2025)    Housing Stability Vital Sign     Unable to Pay for Housing in the Last Year: No     Number of Times Moved in the Last Year: Not on file     Homeless in the Last Year: No       Current Outpatient Medications:     amLODIPine (NORVASC) 5 mg tablet, TAKE 1 TABLET (5 MG TOTAL) BY MOUTH DAILY., Disp: 90 tablet, Rfl: 1    Ascorbic Acid (vitamin C) 100 MG tablet, Take 100 mg by mouth daily, Disp: , Rfl:     atorvastatin (LIPITOR) 10 mg tablet, TAKE 1 TABLET BY MOUTH EVERY DAY, Disp: 90 tablet, Rfl: 1    b complex vitamins capsule, Take 1 capsule by mouth daily Plus folic acid and vitamin c, Disp: , Rfl:     cholecalciferol (VITAMIN D3) 1,000 units tablet, Take 1,000 Units by mouth daily, Disp: , Rfl:     gabapentin (NEURONTIN) 300 mg capsule, Take 1 capsule (300 mg total) by mouth 3 (three) times a day for 21 days, Disp: 63 capsule, Rfl: 0    halobetasol (ULTRAVATE) 0.05 % cream, Apply topically 2 (two) times a day (Patient taking differently: Apply topically 2 (two) times a day As needed), Disp: 50 g, Rfl: 0    ibuprofen (MOTRIN) 600 mg tablet, Take 1 tablet (600 mg total) by mouth every 6 (six) hours for 7 days, Disp: 28 tablet, Rfl: 0    ibuprofen (MOTRIN) 800 mg tablet, Take 1 tablet (800 mg total) by mouth every 8 (eight) hours, Disp: 60 tablet, Rfl: 0    Lactobacillus (ACIDOPHILUS PO), Take by mouth in the morning, Disp: , Rfl:     losartan (COZAAR) 50 mg tablet, TAKE 1 TABLET BY MOUTH  EVERY DAY, Disp: 90 tablet, Rfl: 1    magnesium 30 MG tablet, Take 30 mg by mouth in the morning, Disp: , Rfl:     metFORMIN (GLUCOPHAGE) 500 mg tablet, TAKE 1 TABLET BY MOUTH EVERY DAY WITH BREAKFAST, Disp: 90 tablet, Rfl: 1    Omega-3 Fatty Acids (FISH OIL PO), Take 2 tablets by mouth in the morning, Disp: , Rfl:     oxyCODONE (Roxicodone) 5 immediate release tablet, Take 1 tablet (5 mg total) by mouth every 4 (four) hours as needed for moderate pain Max Daily Amount: 30 mg (Patient not taking: Reported on 4/9/2025), Disp: 40 tablet, Rfl: 0    semaglutide, 0.25 or 0.5 mg/dose, (Ozempic, 0.25 or 0.5 MG/DOSE,) 2 mg/3 mL injection pen, Inject 0.75 mL (0.5 mg total) under the skin every 7 days 0.25 mg under the skin every 7 days Code: E11.9 (Patient not taking: Reported on 3/19/2025), Disp: 9 mL, Rfl: 1    tretinoin (RETIN-A) 0.1 % cream, Apply topically daily at bedtime, Disp: 45 g, Rfl: 2  No current facility-administered medications for this visit.    Facility-Administered Medications Ordered in Other Visits:     alteplase (CATHFLO) injection 2 mg, 2 mg, Intracatheter, PRN, Terrell Osuna MD    sodium chloride 0.9 % infusion, 20 mL/hr, Intravenous, Continuous, Terrell Osuna MD    Allergies   Allergen Reactions    Iodinated Contrast Media Anaphylaxis and Itching    Other Hives     Band-aids and adhesives, any dressing with that type of property induces hives and rash for extended period    Clindamycin Other (See Comments)     Per pt she cannot remember what reaction she had to this medicaton    Clindamycin/Lincomycin        Vitals:    04/09/25 1330   BP: 138/78   Pulse: 87   Resp: 16   Temp: 98 °F (36.7 °C)   SpO2: 98%     Physical Exam  Constitutional:       Appearance: She is well-developed.      Comments: Well-nourished female, no respiratory distress   HENT:      Head: Normocephalic and atraumatic.      Right Ear: External ear normal.      Left Ear: External ear normal.      Nose: Nose normal.      Mouth/Throat:       Pharynx: No oropharyngeal exudate.   Eyes:      Conjunctiva/sclera: Conjunctivae normal.      Pupils: Pupils are equal, round, and reactive to light.   Neck:      Comments: Neck is supple, no JVD, good range of motion, no pain or tenderness with movement  Cardiovascular:      Rate and Rhythm: Normal rate and regular rhythm.      Heart sounds: Normal heart sounds.   Pulmonary:      Effort: Pulmonary effort is normal.      Breath sounds: Normal breath sounds.   Abdominal:      General: Bowel sounds are normal.      Palpations: Abdomen is soft.      Comments: Abdomen is soft, nontender, no hepatosplenomegaly, no rigidity or rebound, +bowel sounds   Musculoskeletal:         General: Normal range of motion.      Cervical back: Normal range of motion and neck supple.   Skin:     General: Skin is warm.      Comments: Warm, moist, good color, no petechiae or ecchymoses, small area of dry slightly red skin over the right scapula, no discharge, no bleeding   Neurological:      Mental Status: She is alert and oriented to person, place, and time.      Deep Tendon Reflexes: Reflexes are normal and symmetric.   Psychiatric:         Behavior: Behavior normal.         Thought Content: Thought content normal.         Judgment: Judgment normal.     Extremities: no lower extremity edema bilaterally, no cords, pulses are 1+  Lymphatics: no adenopathy in the neck, supraclavicular region, axilla and groin bilaterally    Performance Status: 1 - Symptomatic but completely ambulatory    Labs    2/28/2025 WBC = 14.8 hemoglobin = 12.7 hematocrit = 38.5 platelet = 392 BUN = 15 creatinine = 0.85    Imaging    1/22/2025 PET/CT    IMPRESSION:  1. Intense FDG uptake associated with the growing groundglass and soft tissue opacity in the right upper lobe, most compatible with malignancy. No hypermetabolic hilar or mediastinal adenopathy.  2. The more superior wedge-shaped right upper lung opacity demonstrates only minimal FDG activity, favoring  posttreatment changes.  3. No worrisome hypermetabolic lesions in the neck, abdomen or pelvis.    2/3/2025 MRI brain with and without contrast.  Impression stated stable posttreatment changes in the right anterior temporal lobe, no enhancing lesions identified.    12/27/2024 CAT scan chest without contrast    LUNGS:  - The right upper lobe irregularly-shaped opacity image 55 measures 2.3 x 1.3 cm. This has not grown since recent prior studies  - The groundglass opacity in the right upper lobe, for example image 69, measures 2.9 x 1.4 x 1.9 cm, earlier 2.2 x 1.7 x 1.8 cm. It has increased in size and density in particular when compared to prior study from 9/6/2022. Density appears greater.  - Right apical 0.3 cm nodule image 43 is unchanged from numerous prior studies.  - Right lower lobe cyst image 158, unchanged.  - Left lower lobe nodule image 161 measuring 0.3 cm, unchanged. Small groundglass opacity in the left lower lobe abutting the fissure on image 137 is also unchanged.  - Background pulmonary emphysema     PLEURA: Unremarkable.    Impression    1. Continued increase in size and attenuation of groundglass nodular opacity right upper lobe and adjacent more solid-appearing component as described above concerning for progressive neoplasia/adenocarcinoma spectrum lesion. Biopsy advised.  2. Roughly wedge-shaped right upper lobe opacity again noted stable or minimally more prominent than on the prior examination for which continued imaging surveillance is advised.  3. Additional stable findings as noted.    09/06/2022 CT scan chest abdomen pelvis without contrast    1.  Mixed solid and cavitary lesion in the right upper lobe has overall decreased in size.  The soft tissue component appears more dense and well-defined.  Continued attention on follow-up is recommended.  2.  No evidence of metastatic disease.    09/01/2022 MRI brain    1. Stable treatment related changes in the right temporal lobe with gliosis and  encephalomalacia. No evidence of recurrent or progressive tumor.  2. No acute infarction, intracranial hemorrhage or mass. No MR evidence of metastases.  3. Stable white matter signal abnormality, likely microangiopathy.    08/28/2019 CT scan of chest and abdomen pelvis    Stable cavitary target lesion in the right upper lobe.       Stable left lower lobe patchy groundglass opacities with associated mild peribronchial thickening, probably infectious or inflammatory in etiology.  Continued surveillance on follow-up is recommended.  Further evaluation with bronchoscopy can also be considered given persistence of the findings since CT from 8/20/2018.  No evidence of metastatic disease in the abdomen or pelvis.    05/31/2019 PET-CT    1.  No findings suspicious for hypermetabolic malignancy.  2. Mild patchy FDG uptake in the left lower lobe patchy groundglass opacities with associated peribronchial thickening.  This is likely infectious or inflammatory given the appearance, however, given that this has been present since the 8/20/2018 CT, consider follow-up with bronchoscopy.    04/26/2019 CT scan of the chest and abdomen/pelvis    1.  Stable cavitary target lesion in the right upper lobe.  No new site of metastasis.  2.  Stable left lower lobe patchy groundglass opacities with associated mild peribronchial thickening likely due to a chronic infectious or inflammatory process.  Continued surveillance on follow-up is recommended.    08/20/2018 CT scan of the chest and abdomen/pelvis without contrast    RECIST target lesion: Cavitary right upper lobe mass is unchanged, measuring 3.4 x 2.6 cm on series 3 image 15 (previously 3.5 x 2.5 cm).  Solid component along the medial aspect of the posterior wall is also unchanged, measuring 1.4 x 0.9 cm.     LUNGS:  Stable cavitary right upper lobe mass, as above.  No new nodules identified.  There is new small patchy density in the posterior left base compatible with infiltrate.   There is no tracheal or endobronchial lesion.    IMPRESSION    Unchanged cavitary right upper lobe mass.  No new metastatic disease.  Interval development of mild left lower lobe infiltrate noted.    Pathology    Case Report   Surgical Pathology Report                         Case: [id number]                                   Authorizing Provider:  Abe Hernandez MD      Collected:           02/27/2025 1810               Ordering Location:     [facility]      Received:            02/27/2025 2141                                      Hospital Operating Room                                                       Pathologist:           oJse Green MD                                                       Specimens:   A) - Lymph Node, Level 8                                                                            B) - Lymph Node, Posterior Level  10                                                                C) - Lymph Node, Level  7 #1                                                                        D) - Lymph Node, Level  7 #2                                                                        E) - Lymph Node, Level  4R                                                                          F) - Lymph Node, Level  2R                                                                          G) - Lymph Node, Level  4R #2                                                                        H) - Lymph Node, Posterior Level  11                                                                I) - Lymph Node, Posterior Level  11 #2                                                              J) - Lymph Node, Posterior Level  12                                                                K) - Lymph Node, Posterior Level  12 #2                                                              L) - Lung, Right Upper Lobe                                                                Addendum    The purpose of this supplemental report is to add the result of immunostains performed on this tumor     Tumor cells are  positive for  Napsin ( adenocarcinoma marker) , Reticulin stain used to assess  viscera pleural invasion . Stains performed on multiple blocks Controls reacted appropriately .This staining patern support the above diagnosis. The final diagnosis remains unchanged      Addendum electronically signed by Jose Green MD on 3/27/2025 at 1407 EDT   Final Diagnosis   A. Lymph Node, Level 8, ;excision:   -Fragments of lymph node with anthracotic pigmentation, negative for malignancy (0/1).        B. Lymph Node, Posterior Level  10,  ;excision:   -Fragment of lymph node with anthracotic pigmentation, negative for malignancy (0/1).     C. Lymph Node, Level  7 #1,  ;excision:   -Fragment of lymph node with anthracotic pigmentation, negative for malignancy , confirmed by Pankeratin MCK stain (0/1).        D. Lymph Node, Level  7 #2,  ;excision:   -Fragments of lymph node with  sinus histiocytosis and anthracotic pigmentation, negative for malignancy ,  confirmed by Pankeratin MCK stain (0/1).     E. Lymph Node, Level  4R, excision:   -Fragments of lymph node with  sinus histiocytosis , negative for malignancy, confirmed by Pankeratin MCK stain (0/1).        F. Lymph Node, Level  2R, excision:   -Fragments of lymph node with  sinus histiocytosis , negative for malignancy (0/1).        G. Lymph Node, Level  4R #2, excision:   -Fragment of lymph node with  fibrotic nodule, negative for malignancy, confirmed by Pankeratin MCK stain (0/1)           H. Lymph Node, Posterior Level  11, excision:   -Fragment of lymph node with  sinus histiocytosis and anthracotic pigmentation, negative for malignancy (0/1).        I. Lymph Node, Posterior Level  11 #2,  excision:   -Fragment of lymph node with  sinus histiocytosis and anthracotic pigmentation, negative for malignancy (0/1).        J. Lymph Node, Posterior  Level  12,  excision:   -Fragment of lymph node with  sinus histiocytosis and anthracotic pigmentation, negative for malignancy, confirmed by Pankeratin MCK stain  (0/1).     K. Lymph Node, Posterior Level  12 #2,  excision:   -Fragment of lymph node with  sinus histiocytosis and anthracotic pigmentation, negative for malignancy (0/1).     L. 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    The tumor is located 2 cm from the bronchial and vascular margins.    The tumor extends to the inner surface of viscera pleura but does not extends to the external pleural surface  -The Bronchial, vascular  margins are negative for tumor.  -Eight hilar  lymph nodes, negative for tumor.(0/8)     Comment  Tumor cells are  positive for  mucin, TTF-1 ( adenocarcinoma marker) and P40 (  squamous marker)   Controls reacted appropriately      This staining pattern & morphology are similar to the patient previous metastatic lung carcinoma ([id number] , (stage IV lung cancer) and would be compatible with recurrent non small cell lung carcinoma. Clinical correlation is recommended.      Dr HERSON Hernandez, was notified by EPIC message on 3/6/25 at 4:20 PM    Electronically signed by Jose Green MD on 3/7/2025 at 0943 EST       [facility] results demonstrated no ALK gene rearrangement or deletion and negative for ROS1 rearrangement. No EGFR mutations were found also.     Right temporal mass from September 6, 2015 demonstrated metastatic poorly differentiated non-small cell carcinoma. The tumor cells stained positive for CK 7, TTF-1 and Napsin and negative for CK 20, CK 5/6 andmucicarmine. Pathologist stated that the immunoprofile supported the primary lung non-small cell carcinoma, favor adenocarcinoma. The pathologist also stated that given the focal p40  staining, a squamous carcinoma could not be excluded.      Procedures    05/30/2019 complete pulmonary function test    PFT Interpretation     Quality of Data:  The patient's efforts are acceptable and reproducible.     Test Performed:  Complete pulmonary function tests, including spirometry with and without bronchodilator, measurement of lung volume, and diffusing capacity.     Spirometry Interpretation:  Spirometry is within normal limits.     Flow Volume Loops:  Flow-Volume loops are normal.     Lung Volumes:  Plethysmographic lung volumes are within normal limits.     Diffusing Capacity:  Minimal reduction in diffusion capacity likely clinically insignificant     Conclusions:  An isolated reduction in Diffusing Capacity is present and may represent Early fibrotic or pulmonary vascular disease. Clinical correlation is recommended.

## 2025-04-14 ENCOUNTER — HOSPITAL ENCOUNTER (OUTPATIENT)
Dept: RADIOLOGY | Facility: HOSPITAL | Age: 65
Discharge: HOME | End: 2025-04-14
Payer: COMMERCIAL

## 2025-04-14 ENCOUNTER — OFFICE VISIT (OUTPATIENT)
Dept: FAMILY MEDICINE CLINIC | Facility: CLINIC | Age: 65
End: 2025-04-14
Payer: COMMERCIAL

## 2025-04-14 VITALS
HEART RATE: 88 BPM | SYSTOLIC BLOOD PRESSURE: 150 MMHG | HEIGHT: 66 IN | WEIGHT: 192.8 LBS | TEMPERATURE: 97.9 F | DIASTOLIC BLOOD PRESSURE: 94 MMHG | BODY MASS INDEX: 30.98 KG/M2

## 2025-04-14 DIAGNOSIS — E11.9 TYPE 2 DIABETES MELLITUS WITHOUT COMPLICATION, WITHOUT LONG-TERM CURRENT USE OF INSULIN (HCC): ICD-10-CM

## 2025-04-14 DIAGNOSIS — Z78.0 POSTMENOPAUSAL: ICD-10-CM

## 2025-04-14 DIAGNOSIS — C34.91 NON-SMALL CELL CANCER OF RIGHT LUNG (HCC): ICD-10-CM

## 2025-04-14 DIAGNOSIS — Z00.00 ANNUAL PHYSICAL EXAM: Primary | ICD-10-CM

## 2025-04-14 DIAGNOSIS — E78.2 MIXED HYPERLIPIDEMIA: ICD-10-CM

## 2025-04-14 DIAGNOSIS — I10 BENIGN HYPERTENSION: ICD-10-CM

## 2025-04-14 PROCEDURE — 99396 PREV VISIT EST AGE 40-64: CPT | Performed by: FAMILY MEDICINE

## 2025-04-14 PROCEDURE — 71046 X-RAY EXAM CHEST 2 VIEWS: CPT

## 2025-04-14 PROCEDURE — 99214 OFFICE O/P EST MOD 30 MIN: CPT | Performed by: FAMILY MEDICINE

## 2025-04-14 NOTE — ASSESSMENT & PLAN NOTE
Has follow-up appointment with lung surgeon this week.  Continues to have significant pain since her surgery

## 2025-04-14 NOTE — ASSESSMENT & PLAN NOTE
Insurance has not been covering ozempic  Will check labs  Will consider mounjaro pending labs   Lab Results   Component Value Date    HGBA1C 6.6 (H) 10/03/2024       Orders:  •  Hemoglobin A1C; Future

## 2025-04-14 NOTE — ASSESSMENT & PLAN NOTE
Blood pressure is not controlled today but was normal at her recent oncology visit.  She did not sleep well last night due to pain  Orders:  •  CBC; Future  •  Comprehensive metabolic panel; Future  •  Lipid Panel with Direct LDL reflex; Future

## 2025-04-14 NOTE — PROGRESS NOTES
Name: Jeannie Hoover      : 1960      MRN: [id number]  Encounter Provider: Flor Mckeon DO  Encounter Date: 2025   Encounter department: [facility]  :  Assessment & Plan  Initial Medicare annual wellness visit         Postmenopausal    Orders:  •  DXA bone density spine hip and pelvis; Future    Benign hypertension  Blood pressure is not controlled today but was normal at her recent oncology visit.  She did not sleep well last night due to pain  Orders:  •  CBC; Future  •  Comprehensive metabolic panel; Future  •  Lipid Panel with Direct LDL reflex; Future    Type 2 diabetes mellitus without complication, without long-term current use of insulin (HCC)  Insurance has not been covering ozempic  Will check labs  Will consider mounjaro pending labs   Lab Results   Component Value Date    HGBA1C 6.6 (H) 10/03/2024       Orders:  •  Hemoglobin A1C; Future    Mixed hyperlipidemia  Stable  Continue atorvastatin 10 mg daily  Orders:  •  Comprehensive metabolic panel; Future  •  Lipid Panel with Direct LDL reflex; Future    Non-small cell cancer of right lung (HCC)  Has follow-up appointment with lung surgeon this week.  Continues to have significant pain since her surgery          Preventive health issues were discussed with patient, and age appropriate screening tests were ordered as noted in patient's After Visit Summary. Personalized health advice and appropriate referrals for health education or preventive services given if needed, as noted in patient's After Visit Summary.    Return in about 6 months (around 10/14/2025) for Next scheduled follow up.    History of Present Illness     She has been having a lot of pain in her right side where her surgery was.  She is in pain every day.  She has a hard time sleeping.        Patient Care Team:  Flor Mckeon DO as PCP - General  Yemi Hernández MD (Radiation Oncology)  DO Terrell Johns MD (Hematology and Oncology)  Nilsa Chiang MD  (Neurology)  Ottoniel Heath MD (Hematology and Oncology)  Abe Hernandez MD (Thoracic Surgery)    Review of Systems  Medical History Reviewed by provider this encounter:  Tobacco  Allergies  Meds  Problems  Med Hx  Surg Hx  Fam Hx       Annual Wellness Visit Questionnaire   Jeannie is here for her Initial Wellness visit.     Health Risk Assessment:   Patient rates overall health as good. Patient feels that their physical health rating is same. Patient is satisfied with their life. Eyesight was rated as same. Hearing was rated as same. Patient feels that their emotional and mental health rating is same. Patients states they are sometimes angry. Patient states they are sometimes unusually tired/fatigued. Pain experienced in the last 7 days has been some. Patient's pain rating has been 6/10. Patient states that she has experienced weight loss or gain in last 6 months.     Depression Screening:   PHQ-2 Score: 0      Fall Risk Screening:   In the past year, patient has experienced: no history of falling in past year      Urinary Incontinence Screening:   Patient has not leaked urine accidently in the last six months.     Home Safety:  Patient does not have trouble with stairs inside or outside of their home. Patient has working smoke alarms and has working carbon monoxide detector. Home safety hazards include: none.     Nutrition:   Current diet is Diabetic.     Medications:   Patient is currently taking over-the-counter supplements. OTC medications include: see medication list. Patient is able to manage medications.     Activities of Daily Living (ADLs)/Instrumental Activities of Daily Living (IADLs):   Walk and transfer into and out of bed and chair?: Yes  Dress and groom yourself?: Yes    Bathe or shower yourself?: Yes    Feed yourself? Yes  Do your laundry/housekeeping?: Yes  Manage your money, pay your bills and track your expenses?: Yes  Make your own meals?: Yes    Do your own shopping?: Yes    Previous  Hospitalizations:   Any hospitalizations or ED visits within the last 12 months?: Yes    How many hospitalizations have you had in the last year?: 1-2    Advance Care Planning:   Living will: No    Durable POA for healthcare: No    Advanced directive: No    Advanced directive counseling given: Yes    ACP document given: Yes    End of Life Decisions reviewed with patient: Yes    Provider agrees with end of life decisions: Yes      Cognitive Screening:   Provider or family/friend/caregiver concerned regarding cognition?: No    Preventive Screenings      Cardiovascular Screening:    General: Screening Not Indicated and History Lipid Disorder      Diabetes Screening:     General: Screening Not Indicated and History Diabetes      Colorectal Cancer Screening:     General: Risks and Benefits Discussed and Patient Declines      Breast Cancer Screening:     General: Screening Current      Cervical Cancer Screening:    General: Screening Not Indicated      Osteoporosis Screening:    General: Risks and Benefits Discussed    Due for: DXA Axial      Abdominal Aortic Aneurysm (AAA) Screening:        General: Screening Not Indicated      Lung Cancer Screening:     General: Screening Not Indicated and History Lung Cancer      Hepatitis C Screening:    General: Screening Current    Immunizations:  - Immunizations due: Influenza    Screening, Brief Intervention, and Referral to Treatment (SBIRT)     Screening  Typical number of drinks in a day: 1  Typical number of drinks in a week: 7  Interpretation: Low risk drinking behavior.    AUDIT-C Screenin) How often did you have a drink containing alcohol in the past year? 4 or more times a week  2) How many drinks did you have on a typical day when you were drinking in the past year? 1 to 2  3) How often did you have 6 or more drinks on one occasion in the past year? never    AUDIT-C Score: 4  Interpretation: Score 3-12 (female): POSITIVE screen for alcohol misuse    AUDIT  "Screenin) How often during the last year have you found that you were not able to stop drinking once you had started? 0 - never  5) How often during the last year have you failed to do what was normally expected from you because of drinking? 0 - never  6) How often during the last year have you needed a first drink in the morning to get yourself going after a heavy drinking session? 0 - never  7) How often during the last year have you had a feeling of guilt or remorse after drinking? 0 - never  8) How often during the last year have you been unable to remember what happened the night before because you had been drinking? 0 - never  9) Have you or someone else been injured as a result of your drinking? 0 - no  10) Has a relative or friend or a doctor or another health worker been concerned about your drinking or suggested you cut down? 0 - no    AUDIT Score: 4  Interpretation: Low risk alcohol consumption    Single Item Drug Screening:  How often have you used an illegal drug (including marijuana) or a prescription medication for non-medical reasons in the past year? daily or almost daily    Single Item Drug Screen Score: 4  Interpretation: POSITIVE screen for possible drug use disorder    Drug Abuse Screening Test (DAST-10):  1) Have you used drugs other than those required for medical reasons? No  2) Do you abuse more than one drug at a time? No  3) Are you always able to stop using drugs when you want to? Yes  4) Have you had \"blackouts\" or \"flashbacks\" as a result of drug use? No  5) Do you ever feel bad or guilty about your drug use? No  6) Does your spouse (or parents) ever complain about your involvement with drugs? No  7) Have you neglected your family because of your use of drugs? No  8) Have you engaged in illegal activities in order to obtain drugs? No  9) Have you ever experienced withdrawal symptoms (felt sick) when you stopped taking drugs? No  10) Have you had medical problems as a result of your " "drug use (e.g., memory loss, hepatitis, convulsions, bleeding, etc.)? No    DAST-10 Score: 0  Interpretation: No problems reported    Brief Intervention  Alcohol & drug use screenings were reviewed. No concerns regarding substance use disorder identified.     Social Drivers of Health     Food Insecurity: No Food Insecurity (4/14/2025)    Hunger Vital Sign    • Worried About Running Out of Food in the Last Year: Never true    • Ran Out of Food in the Last Year: Never true   Transportation Needs: No Transportation Needs (4/14/2025)    PRAPARE - Transportation    • Lack of Transportation (Medical): No    • Lack of Transportation (Non-Medical): No   Housing Stability: Low Risk  (4/14/2025)    Housing Stability Vital Sign    • Unable to Pay for Housing in the Last Year: No    • Number of Times Moved in the Last Year: 0    • Homeless in the Last Year: No   Utilities: Not At Risk (4/14/2025)    [facility] Utilities    • Threatened with loss of utilities: No     No results found.    Objective   /94   Pulse 88   Temp 97.9 °F (36.6 °C)   Ht 5' 5.75\" (1.67 m)   Wt 87.5 kg (192 lb 12.8 oz)   LMP  (LMP Unknown)   BMI 31.36 kg/m²     Physical Exam  Vitals and nursing note reviewed.   Constitutional:       General: She is not in acute distress.     Appearance: She is well-developed.   HENT:      Head: Normocephalic and atraumatic.      Right Ear: Tympanic membrane normal.      Left Ear: Tympanic membrane normal.   Cardiovascular:      Rate and Rhythm: Normal rate and regular rhythm.      Heart sounds: No murmur heard.  Pulmonary:      Effort: Pulmonary effort is normal. No respiratory distress.      Breath sounds: Normal breath sounds.   Musculoskeletal:      Cervical back: Neck supple.   Neurological:      Mental Status: She is alert.         "

## 2025-04-14 NOTE — PATIENT INSTRUCTIONS
Medicare Preventive Visit Patient Instructions  Thank you for completing your Welcome to Medicare Visit or Medicare Annual Wellness Visit today. Your next wellness visit will be due in one year (4/15/2026).  The screening/preventive services that you may require over the next 5-10 years are detailed below. Some tests may not apply to you based off risk factors and/or age. Screening tests ordered at today's visit but not completed yet may show as past due. Also, please note that scanned in results may not display below.  Preventive Screenings:  Service Recommendations Previous Testing/Comments   Colorectal Cancer Screening  * Colonoscopy    * Fecal Occult Blood Test (FOBT)/Fecal Immunochemical Test (FIT)  * Fecal DNA/Cologuard Test  * Flexible Sigmoidoscopy Age: 45-75 years old   Colonoscopy: every 10 years (may be performed more frequently if at higher risk)  OR  FOBT/FIT: every 1 year  OR  Cologuard: every 3 years  OR  Sigmoidoscopy: every 5 years  Screening may be recommended earlier than age 45 if at higher risk for colorectal cancer. Also, an individualized decision between you and your healthcare provider will decide whether screening between the ages of 76-85 would be appropriate. Colonoscopy: Not on file  FOBT/FIT: Not on file  Cologuard: Not on file  Sigmoidoscopy: Not on file          Breast Cancer Screening Age: 40+ years old  Frequency: every 1-2 years  Not required if history of left and right mastectomy Mammogram: 04/20/2023    Screening Current   Cervical Cancer Screening Between the ages of 21-29, pap smear recommended once every 3 years.   Between the ages of 30-65, can perform pap smear with HPV co-testing every 5 years.   Recommendations may differ for women with a history of total hysterectomy, cervical cancer, or abnormal pap smears in past. Pap Smear: Not on file    Screening Not Indicated   Hepatitis C Screening Once for adults born between 1945 and 1965  More frequently in patients at high  risk for Hepatitis C Hep C Antibody: 09/28/2022    Screening Current   Diabetes Screening 1-2 times per year if you're at risk for diabetes or have pre-diabetes Fasting glucose: 136 mg/dL (2/18/2025)  A1C: 6.6 % (10/3/2024)  Screening Not Indicated  History Diabetes   Cholesterol Screening Once every 5 years if you don't have a lipid disorder. May order more often based on risk factors. Lipid panel: 10/03/2024    Screening Not Indicated  History Lipid Disorder     Other Preventive Screenings Covered by Medicare:  Abdominal Aortic Aneurysm (AAA) Screening: covered once if your at risk. You're considered to be at risk if you have a family history of AAA.  Lung Cancer Screening: covers low dose CT scan once per year if you meet all of the following conditions: (1) Age 55-77; (2) No signs or symptoms of lung cancer; (3) Current smoker or have quit smoking within the last 15 years; (4) You have a tobacco smoking history of at least 20 pack years (packs per day multiplied by number of years you smoked); (5) You get a written order from a healthcare provider.  Glaucoma Screening: covered annually if you're considered high risk: (1) You have diabetes OR (2) Family history of glaucoma OR (3)  aged 50 and older OR (4)  American aged 65 and older  Osteoporosis Screening: covered every 2 years if you meet one of the following conditions: (1) You're estrogen deficient and at risk for osteoporosis based off medical history and other findings; (2) Have a vertebral abnormality; (3) On glucocorticoid therapy for more than 3 months; (4) Have primary hyperparathyroidism; (5) On osteoporosis medications and need to assess response to drug therapy.   Last bone density test (DXA Scan): Not on file.  HIV Screening: covered annually if you're between the age of 15-65. Also covered annually if you are younger than 15 and older than 65 with risk factors for HIV infection. For pregnant patients, it is covered up to 3  times per pregnancy.    Immunizations:  Immunization Recommendations   Influenza Vaccine Annual influenza vaccination during flu season is recommended for all persons aged >= 6 months who do not have contraindications   Pneumococcal Vaccine   * Pneumococcal conjugate vaccine = PCV13 (Prevnar 13), PCV15 (Vaxneuvance), PCV20 (Prevnar 20)  * Pneumococcal polysaccharide vaccine = PPSV23 (Pneumovax) Adults 19-63 yo with certain risk factors or if 65+ yo  If never received any pneumonia vaccine: recommend Prevnar 20 (PCV20)  Give PCV20 if previously received 1 dose of PCV13 or PPSV23   Hepatitis B Vaccine 3 dose series if at intermediate or high risk (ex: diabetes, end stage renal disease, liver disease)   Respiratory syncytial virus (RSV) Vaccine - COVERED BY MEDICARE PART D  * RSVPreF3 (Arexvy) CDC recommends that adults 60 years of age and older may receive a single dose of RSV vaccine using shared clinical decision-making (SCDM)   Tetanus (Td) Vaccine - COST NOT COVERED BY MEDICARE PART B Following completion of primary series, a booster dose should be given every 10 years to maintain immunity against tetanus. Td may also be given as tetanus wound prophylaxis.   Tdap Vaccine - COST NOT COVERED BY MEDICARE PART B Recommended at least once for all adults. For pregnant patients, recommended with each pregnancy.   Shingles Vaccine (Shingrix) - COST NOT COVERED BY MEDICARE PART B  2 shot series recommended in those 19 years and older who have or will have weakened immune systems or those 50 years and older     Health Maintenance Due:      Topic Date Due   • Colorectal Cancer Screening  Never done   • Breast Cancer Screening: Mammogram  04/20/2025   • Hepatitis C Screening  Completed     Immunizations Due:      Topic Date Due   • COVID-19 Vaccine (3 - Moderna risk series) 12/10/2021   • Influenza Vaccine (1) 09/01/2024     Advance Directives   What are advance directives?  Advance directives are legal documents that state  your wishes and plans for medical care. These plans are made ahead of time in case you lose your ability to make decisions for yourself. Advance directives can apply to any medical decision, such as the treatments you want, and if you want to donate organs.   What are the types of advance directives?  There are many types of advance directives, and each state has rules about how to use them. You may choose a combination of any of the following:  Living will:  This is a written record of the treatment you want. You can also choose which treatments you do not want, which to limit, and which to stop at a certain time. This includes surgery, medicine, IV fluid, and tube feedings.   Durable power of  for healthcare (DPAHC):  This is a written record that states who you want to make healthcare choices for you when you are unable to make them for yourself. This person, called a proxy, is usually a family member or a friend. You may choose more than 1 proxy.  Do not resuscitate (DNR) order:  A DNR order is used in case your heart stops beating or you stop breathing. It is a request not to have certain forms of treatment, such as CPR. A DNR order may be included in other types of advance directives.  Medical directive:  This covers the care that you want if you are in a coma, near death, or unable to make decisions for yourself. You can list the treatments you want for each condition. Treatment may include pain medicine, surgery, blood transfusions, dialysis, IV or tube feedings, and a ventilator (breathing machine).  Values history:  This document has questions about your views, beliefs, and how you feel and think about life. This information can help others choose the care that you would choose.  Why are advance directives important?  An advance directive helps you control your care. Although spoken wishes may be used, it is better to have your wishes written down. Spoken wishes can be misunderstood, or not  followed. Treatments may be given even if you do not want them. An advance directive may make it easier for your family to make difficult choices about your care.   Weight Management   Why it is important to manage your weight:  Being overweight increases your risk of health conditions such as heart disease, high blood pressure, type 2 diabetes, and certain types of cancer. It can also increase your risk for osteoarthritis, sleep apnea, and other respiratory problems. Aim for a slow, steady weight loss. Even a small amount of weight loss can lower your risk of health problems.  How to lose weight safely:  A safe and healthy way to lose weight is to eat fewer calories and get regular exercise. You can lose up about 1 pound a week by decreasing the number of calories you eat by 500 calories each day.   Healthy meal plan for weight management:  A healthy meal plan includes a variety of foods, contains fewer calories, and helps you stay healthy. A healthy meal plan includes the following:  Eat whole-grain foods more often.  A healthy meal plan should contain fiber. Fiber is the part of grains, fruits, and vegetables that is not broken down by your body. Whole-grain foods are healthy and provide extra fiber in your diet. Some examples of whole-grain foods are whole-wheat breads and pastas, oatmeal, brown rice, and bulgur.  Eat a variety of vegetables every day.  Include dark, leafy greens such as spinach, kale, dalia greens, and mustard greens. Eat yellow and orange vegetables such as carrots, sweet potatoes, and winter squash.   Eat a variety of fruits every day.  Choose fresh or canned fruit (canned in its own juice or light syrup) instead of juice. Fruit juice has very little or no fiber.  Eat low-fat dairy foods.  Drink fat-free (skim) milk or 1% milk. Eat fat-free yogurt and low-fat cottage cheese. Try low-fat cheeses such as mozzarella and other reduced-fat cheeses.  Choose meat and other protein foods that are  "low in fat.  Choose beans or other legumes such as split peas or lentils. Choose fish, skinless poultry (chicken or turkey), or lean cuts of red meat (beef or pork). Before you cook meat or poultry, cut off any visible fat.   Use less fat and oil.  Try baking foods instead of frying them. Add less fat, such as margarine, sour cream, regular salad dressing and mayonnaise to foods. Eat fewer high-fat foods. Some examples of high-fat foods include french fries, doughnuts, ice cream, and cakes.  Eat fewer sweets.  Limit foods and drinks that are high in sugar. This includes candy, cookies, regular soda, and sweetened drinks.  Exercise:  Exercise at least 30 minutes per day on most days of the week. Some examples of exercise include walking, biking, dancing, and swimming. You can also fit in more physical activity by taking the stairs instead of the elevator or parking farther away from stores. Ask your healthcare provider about the best exercise plan for you.   Alcohol Use and Your Health    Drinking too much can harm your health.  Excessive alcohol use leads to about 88,000 death in the [place] each year, and shortens the life of those who diet by almost 30 years.  Further, excessive drinking cost the economy $249 billion in 2010.  Most excessive drinkers are not alcohol dependent.    Excessive alcohol use has immediate effects that increase the risk of many harmful health conditions.  These are most often the result of binge drinking.  Over time, excessive alcohol use can lead to the development of chronic diseases and other series health problems.    What is considered a \"drink\"?        Excessive alcohol use includes:  Binge Drinking: For women, 4 or more drinks consumed on one occasion. For men, 5 or more drinks consumed on one occasion.  Heavy Drinking: For women, 8 or more drinks per week. For men, 15 or more drinks per week  Any alcohol used by pregnant women  Any alcohol used by those under the age of 21 " years    If you choose to drink, do so in moderation:  Do not drink at all if you are under the age of 21, or if you are or may be pregnant, or have health problems that could be made worse by drinking.  For women, up to 1 drink per day  For men, up to 2 drinks a day    No one should begin drinking or drink more frequently based on potential health benefits    Short-Term Health Risks:  Injuries: motor vehicle crashes, falls, drownings, burns  Violence: homicide, suicide, sexual assault, intimate partner violence  Alcohol poisoning  Reproductive health: risky sexual behaviors, unintended prengnacy, sexually transmitted diseases, miscarriage, stillbirth, fetal alcohol syndrome    Long-Term Health Risks:  Chronic diseases: high blood pressure, heart disease, stroke, liver disease, digestive problems  Cancers: breast, mouth and throat, liver, colon  Learning and memory problems: dementia, poor school performance  Mental health: depression, anxiety, insomnia  Social problems: lost productivity, family problems, unemployment  Alcohol dependence    For support and more information:  Substance Abuse and Mental Health Services Administration  [address]  [place] [address]  Web Address: http://www.samhsa.gov    Alcoholics Anonymous        Web Address: http://www.aa.org    https://www.cdc.gov/alcohol/fact-sheets/alcohol-use.htm     © Copyright [employer] 2018 Information is for End User's use only and may not be sold, redistributed or otherwise used for commercial purposes. All illustrations and images included in CareNotes® are the copyrighted property of [employer] Inc[employer] or [employer]

## 2025-04-14 NOTE — ASSESSMENT & PLAN NOTE
Stable  Continue atorvastatin 10 mg daily  Orders:  •  Comprehensive metabolic panel; Future  •  Lipid Panel with Direct LDL reflex; Future

## 2025-04-16 ENCOUNTER — OFFICE VISIT (OUTPATIENT)
Dept: CARDIAC SURGERY | Facility: CLINIC | Age: 65
End: 2025-04-16

## 2025-04-16 VITALS
HEART RATE: 106 BPM | BODY MASS INDEX: 31.21 KG/M2 | WEIGHT: 194.22 LBS | OXYGEN SATURATION: 99 % | HEIGHT: 66 IN | RESPIRATION RATE: 14 BRPM | DIASTOLIC BLOOD PRESSURE: 80 MMHG | TEMPERATURE: 97.7 F | SYSTOLIC BLOOD PRESSURE: 142 MMHG

## 2025-04-16 DIAGNOSIS — C34.91 NON-SMALL CELL CANCER OF RIGHT LUNG (HCC): ICD-10-CM

## 2025-04-16 PROCEDURE — 99024 POSTOP FOLLOW-UP VISIT: CPT | Performed by: THORACIC SURGERY (CARDIOTHORACIC VASCULAR SURGERY)

## 2025-04-16 RX ORDER — GABAPENTIN 300 MG/1
300 CAPSULE ORAL 3 TIMES DAILY
Qty: 90 CAPSULE | Refills: 1 | Status: SHIPPED | OUTPATIENT
Start: 2025-04-16 | End: 2025-05-07

## 2025-04-16 RX ORDER — METHYLPREDNISOLONE 4 MG/1
TABLET ORAL
Qty: 1 EACH | Refills: 0 | Status: SHIPPED | OUTPATIENT
Start: 2025-04-16

## 2025-04-22 NOTE — ASSESSMENT & PLAN NOTE
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    Jeannie continues to have some irritation from her surgery.  This is the biggest limiting factor at this time.  Otherwise she has done very well.  She continues to be on her gabapentin.  I refilled this prescription for her.  We discussed that this is a time-limited process and will improve as she continues to heal.  We discussed starting steroids or intercostal nerve injections.  She would like to try steroids first.  I think this is reasonable.  Will prescribe her a Medrol Dosepak.  I like to see her back in our office afterwards to see how this is going.  Long-term she understands that we will go back to surveillance imaging with a repeat scan in August.  I will order that CT now.  I have asked her to call us should her nerve pain worsen or not improve on steroids.  She voiced understanding and agreement.    From a cancer standpoint she discussed with her oncologist just observation.  Again I think that is reasonable    Abe Wataga      Orders:    gabapentin (NEURONTIN) 300 mg capsule; Take 1 capsule (300 mg total) by mouth 3 (three) times a day for 21 days    methylPREDNISolone 4 MG tablet therapy pack; Use as directed on package

## 2025-04-22 NOTE — PROGRESS NOTES
Name: Jeannie Hoover      : 1960      MRN: [id number]  Encounter Provider: Abe Hernandez MD  Encounter Date: 2025   Encounter department: [facility]  :  Assessment & Plan  Non-small cell cancer of right lung (HCC)  64-year-old female with history of stage IV lung cancer metastatic to the brain back in  with residual right upper lobe cancer now status post 2025 robotic right upper lobectomy doing well.    Jeannie continues to have some irritation from her surgery.  This is the biggest limiting factor at this time.  Otherwise she has done very well.  She continues to be on her gabapentin.  I refilled this prescription for her.  We discussed that this is a time-limited process and will improve as she continues to heal.  We discussed starting steroids or intercostal nerve injections.  She would like to try steroids first.  I think this is reasonable.  Will prescribe her a Medrol Dosepak.  I like to see her back in our office afterwards to see how this is going.  Long-term she understands that we will go back to surveillance imaging with a repeat scan in August.  I will order that CT now.  I have asked her to call us should her nerve pain worsen or not improve on steroids.  She voiced understanding and agreement.    From a cancer standpoint she discussed with her oncologist just observation.  Again I think that is reasonable    Abe Hernandez      Orders:    gabapentin (NEURONTIN) 300 mg capsule; Take 1 capsule (300 mg total) by mouth 3 (three) times a day for 21 days    methylPREDNISolone 4 MG tablet therapy pack; Use as directed on package          Thoracic History   Oncology History   Non-small cell cancer of right lung (HCC)   2015 Initial Diagnosis    Patient was referred to the emergency room for evaluation of vertigo as sent from the [facility]. (patient reported.)  Patient underwent the following pertinent imaging scans.  MRI of the  brain demonstrated a mass in the right superior all temporal gyrus with measurements of 4.2 x 3.3 x 3.6 cm with vasogenic edema and mass effect.  There was a near uncal herniation noted.   CT of the chest and abdomen/pelvis demonstrated a necrotic right upper lobe mass measuring 7 x 4.2 x 4.1 cm strongly suspicious for bronchogenic carcinoma.  The mass contacts the posterior medial pleural surface and potentially contacts the right posterior tracheal border.  No pathologic adenopathy was identified in no evidence of metastatic disease in the chest abdomen or pelvis.       9/6/2015 Surgery    Non-small cell carcinoma of lung (HCC) diagnosed from biopsy of right temporal mass. Pathology demonstrates poorly differentiated non-small cell carcinoma.  Pathologist stated that the immunoprofile supported the primary lung non-small cell carcinoma favor adenocarcinoma.  Squamous carcinoma could not be excluded, secondary to focal P 40 staining.  [facility] path report demonstrated no ALK gene rearrangement or dleation and negative ROS1 rearrangement, No EGFR mutations were found.     Surgery completed by Dr. Parsons.      10/7/2015 - 10/16/2015 Radiation    A dose of 3000 cGy in 5 fractions delivered every other day was delivered to the resection cavity (single right superior temporal intracranial metastasis) Dr Hernández     10/20/2015 -  Research Study Participant    [id number] Phase III open label randomized study of [id number] [Anti-PDL1 antibody] in combination with carboplatin and paclitaxel +/- Avastin versus Carboplatin and Paciltaxel +/- Avastin in patients with stage IV Chemotherapy  Naive non-small cell lung carcinoma.  Patient was randomized to receive off for medications.     11/30/2015 - 2/18/2016 Chemotherapy    Started Carboplatin, Paclitaxel, Avastin and PD-L1 (atezolizumab; study drug).  Completed 6 cycles with cycle 6 day 1 on 2/18/16     3/10/2016 -  Chemotherapy    Beginning cycle 7, maintenance cycle with  "Avastin and PD-L1 (Atezolizumab; study drug)     5/24/2017 Adverse Reaction    LFT elevation-persisted.  Patient was taken off of study drug Atezolizumab, but continues on Avastin maintance.     5/23/2019 - 7/3/2019 Chemotherapy    [No matching medication found in this treatment plan]          History of Present Illness   HPI  Jeannie Hoover is a 64 y.o. female who comes back to our office almost 2 months out from her robotic lobectomy.  Her biggest complaint at this time is burning and nerve irritation.  She states this is really debilitating.  She continues to take her gabapentin and is using ice packs and heating pad intermittently.  No shortness of breath.  No other major complaints.    Review of Systems   Constitutional:  Negative for activity change, appetite change, chills, diaphoresis, fatigue, fever and unexpected weight change.   HENT: Negative.     Eyes: Negative.    Respiratory:  Negative for apnea, cough, chest tightness, shortness of breath, wheezing and stridor.    Cardiovascular:  Negative for chest pain, palpitations and leg swelling.   Gastrointestinal:  Negative for abdominal distention, abdominal pain, blood in stool, constipation, diarrhea, nausea and vomiting.   Endocrine: Negative.    Genitourinary: Negative.    Musculoskeletal: Negative.    Skin: Negative.    Allergic/Immunologic: Negative.    Neurological:  Positive for numbness.   Hematological: Negative.    Psychiatric/Behavioral: Negative.             Objective   /80 (BP Location: Left arm, Patient Position: Sitting, Cuff Size: Large)   Pulse (!) 106   Temp 97.7 °F (36.5 °C) (Temporal)   Resp 14   Ht 5' 5.75\" (1.67 m)   Wt 88.1 kg (194 lb 3.6 oz)   LMP  (LMP Unknown)   SpO2 99%   BMI 31.59 kg/m²     Pain Screening:  Pain Score:   6  ECOG    Physical Exam  Vitals and nursing note reviewed.   Constitutional:       General: She is not in acute distress.     Appearance: Normal appearance. She is well-developed. She is not " diaphoretic.   HENT:      Head: Normocephalic and atraumatic.      Mouth/Throat:      Pharynx: No oropharyngeal exudate.   Eyes:      General: No scleral icterus.     Conjunctiva/sclera: Conjunctivae normal.      Pupils: Pupils are equal, round, and reactive to light.   Neck:      Thyroid: No thyromegaly.      Vascular: No JVD.      Trachea: No tracheal deviation.   Cardiovascular:      Rate and Rhythm: Normal rate and regular rhythm.      Heart sounds: Normal heart sounds. No murmur heard.     No friction rub. No gallop.   Pulmonary:      Effort: Pulmonary effort is normal. No respiratory distress.      Breath sounds: Normal breath sounds. No wheezing or rales.      Comments: Right-sided robotic incisions are well-healed.  No erythema.  No drainage.  No masses palpated.  Chest:      Chest wall: No tenderness.   Abdominal:      General: Bowel sounds are normal. There is no distension.      Palpations: Abdomen is soft. There is no mass.      Tenderness: There is no abdominal tenderness. There is no guarding or rebound.   Musculoskeletal:         General: No tenderness or deformity. Normal range of motion.      Cervical back: Normal range of motion and neck supple.   Skin:     General: Skin is warm and dry.      Coloration: Skin is not pale.      Findings: No erythema or rash.   Neurological:      Mental Status: She is alert and oriented to person, place, and time.   Psychiatric:         Behavior: Behavior normal.         Thought Content: Thought content normal.         Judgment: Judgment normal.         Labs:       Pathology: I have reviewed pathology reports described above.

## 2025-04-30 ENCOUNTER — RESULTS FOLLOW-UP (OUTPATIENT)
Dept: FAMILY MEDICINE CLINIC | Facility: CLINIC | Age: 65
End: 2025-04-30

## 2025-04-30 LAB
ALBUMIN SERPL-MCNC: 4.6 G/DL (ref 3.9–4.9)
ALP SERPL-CCNC: 75 IU/L (ref 44–121)
ALT SERPL-CCNC: 44 IU/L (ref 0–32)
AST SERPL-CCNC: 29 IU/L (ref 0–40)
BILIRUB SERPL-MCNC: 0.5 MG/DL (ref 0–1.2)
BUN SERPL-MCNC: 15 MG/DL (ref 8–27)
BUN/CREAT SERPL: 15 (ref 12–28)
CALCIUM SERPL-MCNC: 9.6 MG/DL (ref 8.7–10.3)
CHLORIDE SERPL-SCNC: 100 MMOL/L (ref 96–106)
CHOLEST SERPL-MCNC: 202 MG/DL (ref 100–199)
CO2 SERPL-SCNC: 20 MMOL/L (ref 20–29)
CREAT SERPL-MCNC: 0.99 MG/DL (ref 0.57–1)
EGFR: 63 ML/MIN/1.73
ERYTHROCYTE [DISTWIDTH] IN BLOOD BY AUTOMATED COUNT: 11.8 % (ref 11.7–15.4)
EST. AVERAGE GLUCOSE BLD GHB EST-MCNC: 146 MG/DL
GLOBULIN SER-MCNC: 2.4 G/DL (ref 1.5–4.5)
GLUCOSE SERPL-MCNC: 145 MG/DL (ref 70–99)
HBA1C MFR BLD: 6.7 % (ref 4.8–5.6)
HCT VFR BLD AUTO: 40.4 % (ref 34–46.6)
HDLC SERPL-MCNC: 46 MG/DL
HGB BLD-MCNC: 13.8 G/DL (ref 11.1–15.9)
LDLC SERPL CALC-MCNC: 123 MG/DL (ref 0–99)
LDLC/HDLC SERPL: 2.7 RATIO (ref 0–3.2)
MCH RBC QN AUTO: 30.2 PG (ref 26.6–33)
MCHC RBC AUTO-ENTMCNC: 34.2 G/DL (ref 31.5–35.7)
MCV RBC AUTO: 88 FL (ref 79–97)
MICRODELETION SYND BLD/T FISH: NORMAL
PLATELET # BLD AUTO: 421 X10E3/UL (ref 150–450)
POTASSIUM SERPL-SCNC: 4.1 MMOL/L (ref 3.5–5.2)
PROT SERPL-MCNC: 7 G/DL (ref 6–8.5)
RBC # BLD AUTO: 4.57 X10E6/UL (ref 3.77–5.28)
SL AMB VLDL CHOLESTEROL CALC: 33 MG/DL (ref 5–40)
SODIUM SERPL-SCNC: 139 MMOL/L (ref 134–144)
TRIGL SERPL-MCNC: 184 MG/DL (ref 0–149)
WBC # BLD AUTO: 9.9 X10E3/UL (ref 3.4–10.8)

## 2025-05-30 ENCOUNTER — HOSPITAL ENCOUNTER (OUTPATIENT)
Dept: RADIOLOGY | Age: 65
Discharge: HOME | End: 2025-05-30
Payer: COMMERCIAL

## 2025-05-30 DIAGNOSIS — C34.91 NON-SMALL CELL CANCER OF RIGHT LUNG (HCC): ICD-10-CM

## 2025-05-30 DIAGNOSIS — C34.81 MALIGNANT NEOPLASM OF OVERLAPPING SITES OF RIGHT BRONCHUS AND LUNG (HCC): ICD-10-CM

## 2025-05-30 LAB — GLUCOSE SERPL-MCNC: 149 MG/DL (ref 65–140)

## 2025-05-30 PROCEDURE — A9552 F18 FDG: HCPCS

## 2025-05-30 PROCEDURE — 82948 REAGENT STRIP/BLOOD GLUCOSE: CPT

## 2025-05-30 PROCEDURE — 78815 PET IMAGE W/CT SKULL-THIGH: CPT

## 2025-06-04 ENCOUNTER — OFFICE VISIT (OUTPATIENT)
Dept: CARDIAC SURGERY | Facility: CLINIC | Age: 65
End: 2025-06-04
Payer: COMMERCIAL

## 2025-06-04 VITALS
RESPIRATION RATE: 15 BRPM | HEART RATE: 90 BPM | DIASTOLIC BLOOD PRESSURE: 79 MMHG | OXYGEN SATURATION: 98 % | SYSTOLIC BLOOD PRESSURE: 130 MMHG | HEIGHT: 66 IN | BODY MASS INDEX: 31.59 KG/M2 | TEMPERATURE: 97.9 F

## 2025-06-04 DIAGNOSIS — K22.89 ESOPHAGEAL MASS: ICD-10-CM

## 2025-06-04 DIAGNOSIS — C34.91 NON-SMALL CELL CANCER OF RIGHT LUNG (HCC): Primary | ICD-10-CM

## 2025-06-04 PROCEDURE — 99213 OFFICE O/P EST LOW 20 MIN: CPT | Performed by: THORACIC SURGERY (CARDIOTHORACIC VASCULAR SURGERY)

## 2025-06-04 NOTE — LETTER
2025     Flor Mckeon DO  [address]   [address]  [facility] [address]    Patient: Jeannie Hoover   YOB: 1960   Date of Visit: 2025       Dear DO Terrell Blue MD Ayaz Matin, MD:    Thank you for referring Jeannie Hoover to me for evaluation. Below are my notes for this consultation.    If you have questions, please do not hesitate to call me. I look forward to following your patient along with you.         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  Unfortunately she has a new enlarged level 8 lymph node on the right side.  This was not present earlier this year.  This is PET avid and definitely suspicious for malignancy.  First and foremost I would recommend biopsy.  We discussed 2 options for biopsy being in endoscopy and EUS with biopsy or robotic surgical biopsy.  Unfortunate this is a lymph node metastasis surgery would not necessarily be curative.  This would only be diagnostic.  Therefore I would recommend noninvasive biopsy options first.  Again I think this would be amenable to an EUS and biopsy given its location.  I will reach out to our GI colleagues to try and schedule this ASAP.  I will see her back in our office after this biopsy to discuss next steps.    Abe  "[place]      Orders:  •  Ambulatory Referral to Gastroenterology; Future        Esophageal mass    Orders:  •  Ambulatory Referral to Gastroenterology; Future        History of Present Illness  HPI  Jeannie Hoover is a 64 y.o. female who presents back to our office for follow-up after 2/27/2025 right upper lobectomy.  Due to her history we follow this with a repeat PET scan.  Overall she is still having some pain and numbness at her incisions.  This got better as with steroids.  This is still persistent however.  She denies any recent illness.  No recent cough.  No fevers chills or unintentional weight loss.        Review of Systems   Constitutional:  Negative for activity change, appetite change, chills, diaphoresis, fatigue, fever and unexpected weight change.   HENT: Negative.     Eyes: Negative.    Respiratory:  Negative for apnea, cough, chest tightness, shortness of breath, wheezing and stridor.    Cardiovascular:  Negative for chest pain, palpitations and leg swelling.   Gastrointestinal:  Negative for abdominal distention, abdominal pain, blood in stool, constipation, diarrhea, nausea and vomiting.   Endocrine: Negative.    Genitourinary: Negative.    Musculoskeletal: Negative.    Skin: Negative.    Allergic/Immunologic: Negative.    Neurological:  Positive for numbness.   Hematological: Negative.    Psychiatric/Behavioral: Negative.            Objective  /79 (BP Location: Left arm, Patient Position: Sitting, Cuff Size: Large)   Pulse 90   Temp 97.9 °F (36.6 °C) (Temporal)   Resp 15   Ht 5' 5.75\" (1.67 m)   LMP  (LMP Unknown)   SpO2 98%   BMI 31.59 kg/m²      Physical Exam  Vitals and nursing note reviewed.   Constitutional:       General: She is not in acute distress.     Appearance: Normal appearance. She is well-developed. She is not diaphoretic.   HENT:      Head: Normocephalic and atraumatic.      Mouth/Throat:      Pharynx: No oropharyngeal exudate.     Eyes:      General: No scleral " icterus.     Conjunctiva/sclera: Conjunctivae normal.      Pupils: Pupils are equal, round, and reactive to light.     Neck:      Thyroid: No thyromegaly.      Vascular: No JVD.      Trachea: No tracheal deviation.     Cardiovascular:      Rate and Rhythm: Normal rate and regular rhythm.      Heart sounds: Normal heart sounds. No murmur heard.     No friction rub. No gallop.   Pulmonary:      Effort: Pulmonary effort is normal. No respiratory distress.      Breath sounds: Normal breath sounds. No wheezing or rales.   Chest:      Chest wall: No tenderness.   Abdominal:      General: Bowel sounds are normal. There is no distension.      Palpations: Abdomen is soft. There is no mass.      Tenderness: There is no abdominal tenderness. There is no guarding or rebound.     Musculoskeletal:         General: No tenderness or deformity. Normal range of motion.      Cervical back: Normal range of motion and neck supple.     Skin:     General: Skin is warm and dry.      Coloration: Skin is not pale.      Findings: No erythema or rash.     Neurological:      Mental Status: She is alert and oriented to person, place, and time.     Psychiatric:         Behavior: Behavior normal.         Thought Content: Thought content normal.         Judgment: Judgment normal.     I extensively reviewed imaging with the patient and her .  We reviewed her PET from 5/30/2025.  This shows a new 7 mm level 8 lymph node.  This has significant PET uptake with an SUV of 5.4.  This was not present back in her January PET scan.  This looks very concerning for lymph node metastasis.  No other distant areas of concern.

## 2025-06-07 NOTE — PROGRESS NOTES
Name: Jeannie Hoover      : 1960      MRN: [id number]  Encounter Provider: Abe Hernandez MD  Encounter Date: 2025   Encounter department: 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  Unfortunately she has a new enlarged level 8 lymph node on the right side.  This was not present earlier this year.  This is PET avid and definitely suspicious for malignancy.  First and foremost I would recommend biopsy.  We discussed 2 options for biopsy being in endoscopy and EUS with biopsy or robotic surgical biopsy.  Unfortunate this is a lymph node metastasis surgery would not necessarily be curative.  This would only be diagnostic.  Therefore I would recommend noninvasive biopsy options first.  Again I think this would be amenable to an EUS and biopsy given its location.  I will reach out to our GI colleagues to try and schedule this ASAP.  I will see her back in our office after this biopsy to discuss next steps.    Abe Hernandez      Orders:    Ambulatory Referral to Gastroenterology; Future        Esophageal mass    Orders:    Ambulatory Referral to Gastroenterology; Future        History of Present Illness   HPI  Jeannie Hoover is a 64 y.o. female who presents back to our office for follow-up after 2025 right upper lobectomy.  Due to her history we follow this with a repeat PET scan.  Overall she is still having some pain and numbness at her incisions.  This got better as with steroids.  This is still persistent however.  She denies any recent illness.  No recent cough.  No fevers chills or unintentional weight loss.        Review  "of Systems   Constitutional:  Negative for activity change, appetite change, chills, diaphoresis, fatigue, fever and unexpected weight change.   HENT: Negative.     Eyes: Negative.    Respiratory:  Negative for apnea, cough, chest tightness, shortness of breath, wheezing and stridor.    Cardiovascular:  Negative for chest pain, palpitations and leg swelling.   Gastrointestinal:  Negative for abdominal distention, abdominal pain, blood in stool, constipation, diarrhea, nausea and vomiting.   Endocrine: Negative.    Genitourinary: Negative.    Musculoskeletal: Negative.    Skin: Negative.    Allergic/Immunologic: Negative.    Neurological:  Positive for numbness.   Hematological: Negative.    Psychiatric/Behavioral: Negative.            Objective   /79 (BP Location: Left arm, Patient Position: Sitting, Cuff Size: Large)   Pulse 90   Temp 97.9 °F (36.6 °C) (Temporal)   Resp 15   Ht 5' 5.75\" (1.67 m)   LMP  (LMP Unknown)   SpO2 98%   BMI 31.59 kg/m²      Physical Exam  Vitals and nursing note reviewed.   Constitutional:       General: She is not in acute distress.     Appearance: Normal appearance. She is well-developed. She is not diaphoretic.   HENT:      Head: Normocephalic and atraumatic.      Mouth/Throat:      Pharynx: No oropharyngeal exudate.     Eyes:      General: No scleral icterus.     Conjunctiva/sclera: Conjunctivae normal.      Pupils: Pupils are equal, round, and reactive to light.     Neck:      Thyroid: No thyromegaly.      Vascular: No JVD.      Trachea: No tracheal deviation.     Cardiovascular:      Rate and Rhythm: Normal rate and regular rhythm.      Heart sounds: Normal heart sounds. No murmur heard.     No friction rub. No gallop.   Pulmonary:      Effort: Pulmonary effort is normal. No respiratory distress.      Breath sounds: Normal breath sounds. No wheezing or rales.   Chest:      Chest wall: No tenderness.   Abdominal:      General: Bowel sounds are normal. There is no " distension.      Palpations: Abdomen is soft. There is no mass.      Tenderness: There is no abdominal tenderness. There is no guarding or rebound.     Musculoskeletal:         General: No tenderness or deformity. Normal range of motion.      Cervical back: Normal range of motion and neck supple.     Skin:     General: Skin is warm and dry.      Coloration: Skin is not pale.      Findings: No erythema or rash.     Neurological:      Mental Status: She is alert and oriented to person, place, and time.     Psychiatric:         Behavior: Behavior normal.         Thought Content: Thought content normal.         Judgment: Judgment normal.     I extensively reviewed imaging with the patient and her .  We reviewed her PET from 5/30/2025.  This shows a new 7 mm level 8 lymph node.  This has significant PET uptake with an SUV of 5.4.  This was not present back in her January PET scan.  This looks very concerning for lymph node metastasis.  No other distant areas of concern.

## 2025-06-07 NOTE — ASSESSMENT & PLAN NOTE
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  Unfortunately she has a new enlarged level 8 lymph node on the right side.  This was not present earlier this year.  This is PET avid and definitely suspicious for malignancy.  First and foremost I would recommend biopsy.  We discussed 2 options for biopsy being in endoscopy and EUS with biopsy or robotic surgical biopsy.  Unfortunate this is a lymph node metastasis surgery would not necessarily be curative.  This would only be diagnostic.  Therefore I would recommend noninvasive biopsy options first.  Again I think this would be amenable to an EUS and biopsy given its location.  I will reach out to our GI colleagues to try and schedule this ASAP.  I will see her back in our office after this biopsy to discuss next steps.    Abe Hernandez      Orders:    Ambulatory Referral to Gastroenterology; Future

## 2025-06-09 ENCOUNTER — TELEPHONE (OUTPATIENT)
Dept: GASTROENTEROLOGY | Facility: CLINIC | Age: 65
End: 2025-06-09

## 2025-06-09 ENCOUNTER — PREP FOR PROCEDURE (OUTPATIENT)
Dept: GASTROENTEROLOGY | Facility: MEDICAL CENTER | Age: 65
End: 2025-06-09

## 2025-06-09 DIAGNOSIS — C34.91 NON-SMALL CELL CANCER OF RIGHT LUNG (HCC): Primary | ICD-10-CM

## 2025-06-09 DIAGNOSIS — J98.59 LESION OF MEDIASTINUM: ICD-10-CM

## 2025-06-09 RX ORDER — SODIUM CHLORIDE, SODIUM LACTATE, POTASSIUM CHLORIDE, CALCIUM CHLORIDE 600; 310; 30; 20 MG/100ML; MG/100ML; MG/100ML; MG/100ML
125 INJECTION, SOLUTION INTRAVENOUS CONTINUOUS
Status: CANCELLED | OUTPATIENT
Start: 2025-06-09

## 2025-06-09 NOTE — TELEPHONE ENCOUNTER
----- Message from Xochitl CRUZ sent at 6/9/2025 12:57 PM EDT -----    ----- Message -----  From: Letitia Conti PA-C  Sent: 6/9/2025  12:17 PM EDT  To: [facility]    Ready to schedule. TY!  ----- Message -----  From: Warren Noonan MD  Sent: 6/9/2025  11:56 AM EDT  To: [facility]    Formerly [facility], later [facility]. Please help schedule this patient for an urgent EUS with anyone. Thanks. Warren

## 2025-06-09 NOTE — TELEPHONE ENCOUNTER
Procedure:  EUS   Scheduled date of procedure (as of today): 6/19/25  Physician performing procedure: Dr. Mcnamara   Location of procedure: [place]   Instructions reviewed with patient by:  Xochitl watkins   Clearances:  Diabetic

## 2025-06-11 ENCOUNTER — TELEPHONE (OUTPATIENT)
Dept: HEMATOLOGY ONCOLOGY | Facility: CLINIC | Age: 65
End: 2025-06-11

## 2025-06-11 ENCOUNTER — OFFICE VISIT (OUTPATIENT)
Dept: HEMATOLOGY ONCOLOGY | Facility: MEDICAL CENTER | Age: 65
End: 2025-06-11
Payer: COMMERCIAL

## 2025-06-11 VITALS
WEIGHT: 193 LBS | DIASTOLIC BLOOD PRESSURE: 86 MMHG | RESPIRATION RATE: 14 BRPM | OXYGEN SATURATION: 99 % | TEMPERATURE: 98 F | HEIGHT: 65 IN | BODY MASS INDEX: 32.15 KG/M2 | SYSTOLIC BLOOD PRESSURE: 142 MMHG | HEART RATE: 105 BPM

## 2025-06-11 DIAGNOSIS — C34.91 NON-SMALL CELL CANCER OF RIGHT LUNG (HCC): Primary | ICD-10-CM

## 2025-06-11 PROCEDURE — 99214 OFFICE O/P EST MOD 30 MIN: CPT | Performed by: INTERNAL MEDICINE

## 2025-06-11 NOTE — TELEPHONE ENCOUNTER
I attempted to call PT with appt date but the VM message does not allow longer than 10sec.  Could not leave a proper message.  I have scheduled FU with Dr. Osuna 7/30 at 1:40PM.

## 2025-06-11 NOTE — PROGRESS NOTES
Jeannie Hoover  1960  [facility] [address] [address]    30 minutes was spent with patient/ in direct consultation/examination or reviewing the chart.    DISCUSSION.SUMMARY:    64 year-old female with M1 non-small cell lung carcinoma/adenocarcinoma. [facility] (phase III open label randomized study of [id number] [anti-PD-L1 antibody] in combination with carboplatin and paclitaxel +/- Avastin versus carboplatin and paclitaxel +/- Avastin in patients with stage IV chemotherapy naive non-small cell lung carcinoma).Patient was randomized to receive all 4 medications. Mrs. Hoover completed the 6 cycles of treatment without significant toxicities. Patient had been on maintenance (study drug (atezolizumab) and avastin) - subsequently only Avastin.  This was eventually discontinued.  Patient was on chemotherapy for approximately 6 years.  More recently, patient has been on surveillance only.  Issues:     Stage IV non-small cell lung carcinoma, recent recurrence.  Patient was seen/evaluated by Dr. Hernandez, underwent resection.  Follow-up PET/CT demonstrates new concerning lesion.  Options were discussed and this lesion/lymph node is to be biopsied.  Results will determine treatment options.  Patient is not very happy about the possibility of having to restart treatments for recurrent lung cancer.  Depending upon the results, patient may need to be represented at the thoracic tumor board.    History of brain metastases.  No recent CNS issues.  As discussed previously patient is status post resection and radiation years ago.  Patient was long tapered off the Keppra.  No CNS issues.  Recent repeat MRI/brain did not demonstrate any concerning abnormalities.  Surveillance continues.    Patient was given a 6-week follow-up with this may change.  Mrs. Hoover knows to call the hematology/oncology office  if there are any other questions or concerns.    Carefully review your medication list and verify that the list is accurate and up-to-date. Please call the hematology/oncology office if there are medications missing from the list, medications on the list that you are not currently taking or if there is a dosage or instruction that is different from how you're taking that medication.    Patient goals and areas of care: Repeat biopsy pending  Barriers to care:  None  Patient is able to self-care  __________________________________________________________________________________________________    Chief Complaint   Patient presents with    Follow-up    Recurrent lung cancer     Advance Care Planning/Advance Directives: not discussed    Oncology History   Non-small cell cancer of right lung (HCC)   9/5/2015 Initial Diagnosis    Patient was referred to the emergency room for evaluation of [facility]igo as [facility]. (patient reported.)  Patient underwent the following pertinent imaging scans.  MRI of the brain demonstrated a mass in the right superior all temporal gyrus with measurements of 4.2 x 3.3 x 3.6 cm with vasogenic edema and mass effect.  There was a near uncal herniation noted.   CT of the chest and abdomen/pelvis demonstrated a necrotic right upper lobe mass measuring 7 x 4.2 x 4.1 cm strongly suspicious for bronchogenic carcinoma.  The mass contacts the posterior medial pleural surface and potentially contacts the right posterior tracheal border.  No pathologic adenopathy was identified in no evidence of metastatic disease in the chest abdomen or pelvis.       9/6/2015 Surgery    Non-small cell carcinoma of lung (HCC) diagnosed from biopsy of right temporal mass. Pathology demonstrates poorly differentiated non-small cell carcinoma.  Pathologist stated that the immunoprofile supported the primary lung non-small cell carcinoma favor adenocarcinoma.  Squamous carcinoma could not be excluded,  secondary to focal P 40 staining.  [facility] path report demonstrated no ALK gene rearrangement or dleation and negative ROS1 rearrangement, No EGFR mutations were found.     Surgery completed by Dr. Parsons.      10/7/2015 - 10/16/2015 Radiation    A dose of 3000 cGy in 5 fractions delivered every other day was delivered to the resection cavity (single right superior temporal intracranial metastasis) Dr Hernández     10/20/2015 -  Research Study Participant    [id number] Phase III open label randomized study of [id number] [Anti-PDL1 antibody] in combination with carboplatin and paclitaxel +/- Avastin versus Carboplatin and Paciltaxel +/- Avastin in patients with stage IV Chemotherapy  Naive non-small cell lung carcinoma.  Patient was randomized to receive off for medications.     11/30/2015 - 2/18/2016 Chemotherapy    Started Carboplatin, Paclitaxel, Avastin and PD-L1 (atezolizumab; study drug).  Completed 6 cycles with cycle 6 day 1 on 2/18/16     3/10/2016 -  Chemotherapy    Beginning cycle 7, maintenance cycle with Avastin and PD-L1 (Atezolizumab; study drug)     5/24/2017 Adverse Reaction    LFT elevation-persisted.  Patient was taken off of study drug Atezolizumab, but continues on Avastin maintance.     5/23/2019 - 7/3/2019 Chemotherapy    [No matching medication found in this treatment plan]       History of Present Illness: 64 year old female recently diagnosed with IV lung cancer here for followup. Mrs. Hoover began to have headaches last spring 2015. Patient was seen by her PCP and treated with antibiotics. Initially the symptoms got better the patient went on vacation. After returning from vacation, Mrs. Hoover once again developed headaches. Patient was treated with a second round of antibiotics and then was diagnosed with vertigo. [facility]. Although the specifics are not entirely clear, the therapist in the [facility] sent the patient to the emergency room. A CAT scan of  the brain demonstrated a brain lesion and patient was transferred to [place]. Patient was seen by Dr. Parsons and underwent resection demonstrating adenocarcinoma. Additional testing demonstrated a lung mass. Patient improved and was discharged. Patient completed SRT with Dr. Hickey and Dr. Hernández.      Patient has been on [facility] 20015-14 (phase III open label randomized study of [id number] [anti-PD-L1 antibody] in combination with carboplatin and paclitaxel +/- Avastin versus carboplatin and paclitaxel +/- Avastin in patients with stage IV chemotherapy naÃ¯ve non-small cell lung carcinoma). Mrs. Hoover was randomized to receive the anti-PD-L1 antibody with chemotherapy - patient completed 6 cycles and was placed on maintenance.      Mrs. Lam subsequently (years ago) suffered a tonic-clonic seizure and was seen in the emergency room. CAT scan of the head did not demonstrate any evidence of disease progression. The seizure was thought to be due to encephalomalacia. Since that time, there have been no seizure issues. Mrs. Hoover had been on Keppra - this has been tapered off.  After long discussions and consultation with the thoracic Working group, patient was taken off of all treatments approximately 6+ years ago.  Up until recently the plan had been surveillance.    Mrs. Hoover was recently found to have evidence of recurrence.  Patient was seen by thoracic surgery, underwent surgery, pathology results are listed below.  Follow-up CAT scan was concerning for additional disease.    Patient states feeling okay, about the same as before.  Still with minimal pain at the suture site but otherwise activities at baseline.  No respiratory issues.  No CNS issues.  No GI,  or GYN problems.  No other active medical issues.    Review of Systems   Constitutional:  Negative for fatigue.   HENT: Negative.     Eyes: Negative.    Respiratory: Negative.     Cardiovascular: Negative.    Gastrointestinal: Negative.     Endocrine: Negative.    Genitourinary: Negative.    Musculoskeletal:  Positive for arthralgias. Negative for neck pain.   Skin: Negative.    Allergic/Immunologic: Negative.    Neurological: Negative.    Hematological:  Does not bruise/bleed easily.   Psychiatric/Behavioral: Negative.     All other systems reviewed and are negative.     Patient Active Problem List   Diagnosis    Non-small cell cancer of right lung (HCC)    Mixed hyperlipidemia    Allergic rhinitis    Brain metastasis    Type 2 diabetes mellitus without complication, without long-term current use of insulin (HCC)    Insomnia    Lesion of temporal lobe    Benign hypertension    Chronic maxillary sinusitis     Past Medical History:   Diagnosis Date    Adenocarcinoma of right lung, stage 4 (HCC)     Allergic rhinitis     Alopecia     resolved 10/25/16    Anxiety     last assessed 10/4/16, resolved 10/25/16    Benign hypertension 2/1/2018    Benign neoplasm of skin of trunk 03/25/2008    last assessed 3/25/08    Benign neoplasm of skin of upper extremity and shoulder 03/25/2008    last assessed 3/25/08    Ear deformity, acquired     last assessed 10/13/14, resolved 3/12/15    Eczema     History of chemotherapy     Hyperlipemia     Maintenance chemotherapy     Secondary cancer of brain (HCC)     Seizures (HCC)     Vertigo      Past Surgical History:   Procedure Laterality Date    APPENDECTOMY  1964    BRAIN TUMOR EXCISION      CENTRAL VENOUS CATHETER INSERTION Right     PORTACATH    IR BIOPSY LUNG  2/5/2025    IR PORT REMOVAL  9/10/2021    VA BRNCHSC INCL FLUOR GDNCE DX W/CELL WASHG SPX N/A 2/27/2025    Procedure: BRONCHOSCOPY FLEXIBLE;  Surgeon: Abe Hernandez MD;  Location: [facility];  Service: Thoracic    VA THORACOSCOPY W/LOBECTOMY SINGLE LOBE Right 2/27/2025    Procedure: LOBECTOMY RIGHT UPPER LUNG THORACOSCOPIC W/ ROBOTICS WITH MEDIASTINAL LYMPH NODE DISSECTION;  Surgeon: Abe Hernandez MD;  Location: [facility];  Service: Thoracic      Family History   Problem Relation Name Age of Onset    Diabetes Mother      Heart disease Mother      Lung cancer Father  70        Smoker     Uterine cancer Paternal Grandmother      Pancreatitis Brother      No Known Problems Brother       Social History     Socioeconomic History    Marital status: /Civil Union     Spouse name: Not on file    Number of children: 1    Years of education: Not on file    Highest education level: Not on file   Occupational History    Not on file   Tobacco Use    Smoking status: Former     Current packs/day: 0.00     Average packs/day: 1 pack/day for 47.0 years (47.0 ttl pk-yrs)     Types: Cigarettes     Start date: 1968     Quit date: 2015     Years since quitting: 10.4     Passive exposure: Never    Smokeless tobacco: Never   Vaping Use    Vaping status: Never Used   Substance and Sexual Activity    Alcohol use: Yes     Alcohol/week: 7.0 standard drinks of alcohol     Types: 7 Shots of liquor per week    Drug use: Yes     Types: Marijuana    Sexual activity: Yes   Other Topics Concern    Not on file   Social History Narrative    High school or GED     Lives independently with spouse      Social Drivers of Health     Financial Resource Strain: Not on file   Food Insecurity: No Food Insecurity (4/14/2025)    Nursing - Inadequate Food Risk Classification     Worried About Running Out of Food in the Last Year: Never true     Ran Out of Food in the Last Year: Never true     Ran Out of Food in the Last Year: Never true   Transportation Needs: No Transportation Needs (4/14/2025)    PRAPARE - Transportation     Lack of Transportation (Medical): No     Lack of Transportation (Non-Medical): No   Physical Activity: Not on file   Stress: Not on file   Social Connections: Not on file   Intimate Partner Violence: Unknown (2/27/2025)    Nursing IPS     Feels Physically and Emotionally Safe: Not on file     Physically Hurt by Someone: Not on file     Humiliated or Emotionally Abused by  Someone: Not on file     Physically Hurt by Someone: No     Hurt or Threatened by Someone: No   Housing Stability: Low Risk  (4/14/2025)    Housing Stability Vital Sign     Unable to Pay for Housing in the Last Year: No     Number of Times Moved in the Last Year: 0     Homeless in the Last Year: No       Current Outpatient Medications:     amLODIPine (NORVASC) 5 mg tablet, TAKE 1 TABLET (5 MG TOTAL) BY MOUTH DAILY., Disp: 90 tablet, Rfl: 1    Ascorbic Acid (vitamin C) 100 MG tablet, Take 100 mg by mouth in the morning., Disp: , Rfl:     atorvastatin (LIPITOR) 10 mg tablet, TAKE 1 TABLET BY MOUTH EVERY DAY, Disp: 90 tablet, Rfl: 1    b complex vitamins capsule, Take 1 capsule by mouth in the morning. Plus folic acid and vitamin c., Disp: , Rfl:     cholecalciferol (VITAMIN D3) 1,000 units tablet, Take 1,000 Units by mouth in the morning., Disp: , Rfl:     gabapentin (NEURONTIN) 300 mg capsule, Take 1 capsule (300 mg total) by mouth 3 (three) times a day for 21 days, Disp: 90 capsule, Rfl: 1    halobetasol (ULTRAVATE) 0.05 % cream, Apply topically 2 (two) times a day (Patient taking differently: Apply topically as needed As needed), Disp: 50 g, Rfl: 0    ibuprofen (MOTRIN) 600 mg tablet, Take 1 tablet (600 mg total) by mouth every 6 (six) hours for 7 days, Disp: 28 tablet, Rfl: 0    ibuprofen (MOTRIN) 800 mg tablet, Take 1 tablet (800 mg total) by mouth every 8 (eight) hours, Disp: 60 tablet, Rfl: 0    Lactobacillus (ACIDOPHILUS PO), Take by mouth in the morning, Disp: , Rfl:     losartan (COZAAR) 50 mg tablet, TAKE 1 TABLET BY MOUTH EVERY DAY, Disp: 90 tablet, Rfl: 1    magnesium 30 MG tablet, Take 30 mg by mouth in the morning, Disp: , Rfl:     metFORMIN (GLUCOPHAGE) 500 mg tablet, TAKE 1 TABLET BY MOUTH EVERY DAY WITH BREAKFAST, Disp: 90 tablet, Rfl: 1    methylPREDNISolone 4 MG tablet therapy pack, Use as directed on package, Disp: 1 each, Rfl: 0    Omega-3 Fatty Acids (FISH OIL PO), Take 2 tablets by mouth in the  morning (Patient not taking: Reported on 4/14/2025), Disp: , Rfl:     oxyCODONE (Roxicodone) 5 immediate release tablet, Take 1 tablet (5 mg total) by mouth every 4 (four) hours as needed for moderate pain Max Daily Amount: 30 mg (Patient not taking: Reported on 4/9/2025), Disp: 40 tablet, Rfl: 0    tretinoin (RETIN-A) 0.1 % cream, Apply topically daily at bedtime, Disp: 45 g, Rfl: 2  No current facility-administered medications for this visit.    Facility-Administered Medications Ordered in Other Visits:     alteplase (CATHFLO) injection 2 mg, 2 mg, Intracatheter, PRN, Terrell Osuna MD    sodium chloride 0.9 % infusion, 20 mL/hr, Intravenous, Continuous, Terrell Osuna MD    Allergies   Allergen Reactions    Iodinated Contrast Media Anaphylaxis and Itching    Other Hives     Band-aids and adhesives, any dressing with that type of property induces hives and rash for extended period    Clindamycin Other (See Comments)     Per pt she cannot remember what reaction she had to this medicaton    Clindamycin/Lincomycin        Vitals:    06/11/25 1002   BP: 142/86   Pulse: 105   Resp: 14   Temp: 98 °F (36.7 °C)   SpO2: 99%     Physical Exam  Constitutional:       Appearance: She is well-developed.      Comments: Well-nourished female, no respiratory distress   HENT:      Head: Normocephalic and atraumatic.      Right Ear: External ear normal.      Left Ear: External ear normal.      Nose: Nose normal.      Mouth/Throat:      Pharynx: No oropharyngeal exudate.     Eyes:      Conjunctiva/sclera: Conjunctivae normal.      Pupils: Pupils are equal, round, and reactive to light.     Neck:      Comments: Neck is supple, no JVD, good range of motion, no pain or tenderness with movement  Cardiovascular:      Rate and Rhythm: Normal rate and regular rhythm.      Heart sounds: Normal heart sounds.   Pulmonary:      Effort: Pulmonary effort is normal.      Breath sounds: Normal breath sounds.   Abdominal:      General: Bowel sounds are  normal.      Palpations: Abdomen is soft.      Comments: Abdomen is soft, nontender, no hepatosplenomegaly, no rigidity or rebound, +bowel sounds     Musculoskeletal:         General: Normal range of motion.      Cervical back: Normal range of motion and neck supple.     Skin:     General: Skin is warm.      Comments: Warm, moist, good color, no petechiae or ecchymoses     Neurological:      Mental Status: She is alert and oriented to person, place, and time.      Deep Tendon Reflexes: Reflexes are normal and symmetric.     Psychiatric:         Behavior: Behavior normal.         Thought Content: Thought content normal.         Judgment: Judgment normal.     Extremities: no lower extremity edema bilaterally, no cords, pulses are 1+  Lymphatics: no adenopathy in the neck, supraclavicular region, axilla and groin bilaterally    Performance Status: 1 - Symptomatic but completely ambulatory    Labs    2/28/2025 WBC = 14.8 hemoglobin = 12.7 hematocrit = 38.5 platelet = 392 BUN = 15 creatinine = 0.85    Imaging    5/3/2025 PET/CT    CHEST:  New nodular focus of FDG uptake in the right distal paraesophageal region, SUV max of 5.4. Corresponding nodular density suggested here on limited CT measures up to 7 mm image 145 series 3.    IMPRESSION:  1.  Interval right upper lobectomy.  2.  New nodular focus of FDG uptake in the right distal paraesophageal region. This is concerning for metastasis.  3.  No findings for hypermetabolic metastasis to the neck, abdomen or pelvis.       1/22/2025 PET/CT    IMPRESSION:  1. Intense FDG uptake associated with the growing groundglass and soft tissue opacity in the right upper lobe, most compatible with malignancy. No hypermetabolic hilar or mediastinal adenopathy.  2. The more superior wedge-shaped right upper lung opacity demonstrates only minimal FDG activity, favoring posttreatment changes.  3. No worrisome hypermetabolic lesions in the neck, abdomen or pelvis.    2/3/2025 MRI brain  with and without contrast.  Impression stated stable posttreatment changes in the right anterior temporal lobe, no enhancing lesions identified.    12/27/2024 CAT scan chest without contrast    LUNGS:  - The right upper lobe irregularly-shaped opacity image 55 measures 2.3 x 1.3 cm. This has not grown since recent prior studies  - The groundglass opacity in the right upper lobe, for example image 69, measures 2.9 x 1.4 x 1.9 cm, earlier 2.2 x 1.7 x 1.8 cm. It has increased in size and density in particular when compared to prior study from 9/6/2022. Density appears greater.  - Right apical 0.3 cm nodule image 43 is unchanged from numerous prior studies.  - Right lower lobe cyst image 158, unchanged.  - Left lower lobe nodule image 161 measuring 0.3 cm, unchanged. Small groundglass opacity in the left lower lobe abutting the fissure on image 137 is also unchanged.  - Background pulmonary emphysema     PLEURA: Unremarkable.    Impression    1. Continued increase in size and attenuation of groundglass nodular opacity right upper lobe and adjacent more solid-appearing component as described above concerning for progressive neoplasia/adenocarcinoma spectrum lesion. Biopsy advised.  2. Roughly wedge-shaped right upper lobe opacity again noted stable or minimally more prominent than on the prior examination for which continued imaging surveillance is advised.  3. Additional stable findings as noted.    09/06/2022 CT scan chest abdomen pelvis without contrast    1.  Mixed solid and cavitary lesion in the right upper lobe has overall decreased in size.  The soft tissue component appears more dense and well-defined.  Continued attention on follow-up is recommended.  2.  No evidence of metastatic disease.    09/01/2022 MRI brain    1. Stable treatment related changes in the right temporal lobe with gliosis and encephalomalacia. No evidence of recurrent or progressive tumor.  2. No acute infarction, intracranial hemorrhage or  mass. No MR evidence of metastases.  3. Stable white matter signal abnormality, likely microangiopathy.    08/28/2019 CT scan of chest and abdomen pelvis    Stable cavitary target lesion in the right upper lobe.       Stable left lower lobe patchy groundglass opacities with associated mild peribronchial thickening, probably infectious or inflammatory in etiology.  Continued surveillance on follow-up is recommended.  Further evaluation with bronchoscopy can also be considered given persistence of the findings since CT from 8/20/2018.  No evidence of metastatic disease in the abdomen or pelvis.    05/31/2019 PET-CT    1.  No findings suspicious for hypermetabolic malignancy.  2. Mild patchy FDG uptake in the left lower lobe patchy groundglass opacities with associated peribronchial thickening.  This is likely infectious or inflammatory given the appearance, however, given that this has been present since the 8/20/2018 CT, consider follow-up with bronchoscopy.    04/26/2019 CT scan of the chest and abdomen/pelvis    1.  Stable cavitary target lesion in the right upper lobe.  No new site of metastasis.  2.  Stable left lower lobe patchy groundglass opacities with associated mild peribronchial thickening likely due to a chronic infectious or inflammatory process.  Continued surveillance on follow-up is recommended.    08/20/2018 CT scan of the chest and abdomen/pelvis without contrast    RECIST target lesion: Cavitary right upper lobe mass is unchanged, measuring 3.4 x 2.6 cm on series 3 image 15 (previously 3.5 x 2.5 cm).  Solid component along the medial aspect of the posterior wall is also unchanged, measuring 1.4 x 0.9 cm.     LUNGS:  Stable cavitary right upper lobe mass, as above.  No new nodules identified.  There is new small patchy density in the posterior left base compatible with infiltrate.  There is no tracheal or endobronchial lesion.    IMPRESSION    Unchanged cavitary right upper lobe mass.  No new  metastatic disease.  Interval development of mild left lower lobe infiltrate noted.    Pathology    Case Report   Surgical Pathology Report                         Case: [id number]                                   Authorizing Provider:  Abe Hernandez MD      Collected:           02/27/2025 1810               Ordering Location:     [facility]      Received:            02/27/2025 2141                                      Hospital Operating Room                                                       Pathologist:           Jose Green MD                                                       Specimens:   A) - Lymph Node, Level 8                                                                            B) - Lymph Node, Posterior Level  10                                                                C) - Lymph Node, Level  7 #1                                                                        D) - Lymph Node, Level  7 #2                                                                        E) - Lymph Node, Level  4R                                                                          F) - Lymph Node, Level  2R                                                                          G) - Lymph Node, Level  4R #2                                                                        H) - Lymph Node, Posterior Level  11                                                                I) - Lymph Node, Posterior Level  11 #2                                                              J) - Lymph Node, Posterior Level  12                                                                K) - Lymph Node, Posterior Level  12 #2                                                              L) - Lung, Right Upper Lobe                                                                Addendum   The purpose of this supplemental report is to add the result of immunostains performed on this tumor     Tumor  cells are  positive for  Napsin ( adenocarcinoma marker) , Reticulin stain used to assess  viscera pleural invasion . Stains performed on multiple blocks Controls reacted appropriately .This staining patern support the above diagnosis. The final diagnosis remains unchanged      Addendum electronically signed by Jose Green MD on 3/27/2025 at 1407 EDT   Final Diagnosis   A. Lymph Node, Level 8, ;excision:   -Fragments of lymph node with anthracotic pigmentation, negative for malignancy (0/1).        B. Lymph Node, Posterior Level  10,  ;excision:   -Fragment of lymph node with anthracotic pigmentation, negative for malignancy (0/1).     C. Lymph Node, Level  7 #1,  ;excision:   -Fragment of lymph node with anthracotic pigmentation, negative for malignancy , confirmed by Pankeratin MCK stain (0/1).        D. Lymph Node, Level  7 #2,  ;excision:   -Fragments of lymph node with  sinus histiocytosis and anthracotic pigmentation, negative for malignancy ,  confirmed by Pankeratin MCK stain (0/1).     E. Lymph Node, Level  4R, excision:   -Fragments of lymph node with  sinus histiocytosis , negative for malignancy, confirmed by Pankeratin MCK stain (0/1).        F. Lymph Node, Level  2R, excision:   -Fragments of lymph node with  sinus histiocytosis , negative for malignancy (0/1).        G. Lymph Node, Level  4R #2, excision:   -Fragment of lymph node with  fibrotic nodule, negative for malignancy, confirmed by Pankeratin MCK stain (0/1)           H. Lymph Node, Posterior Level  11, excision:   -Fragment of lymph node with  sinus histiocytosis and anthracotic pigmentation, negative for malignancy (0/1).        I. Lymph Node, Posterior Level  11 #2,  excision:   -Fragment of lymph node with  sinus histiocytosis and anthracotic pigmentation, negative for malignancy (0/1).        J. Lymph Node, Posterior Level  12,  excision:   -Fragment of lymph node with  sinus histiocytosis and anthracotic pigmentation, negative  for malignancy, confirmed by Pankeratin MCK stain  (0/1).     K. Lymph Node, Posterior Level  12 #2,  excision:   -Fragment of lymph node with  sinus histiocytosis and anthracotic pigmentation, negative for malignancy (0/1).     L. 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    The tumor is located 2 cm from the bronchial and vascular margins.    The tumor extends to the inner surface of viscera pleura but does not extends to the external pleural surface  -The Bronchial, vascular  margins are negative for tumor.  -Eight hilar  lymph nodes, negative for tumor.(0/8)     Comment  Tumor cells are  positive for  mucin, TTF-1 ( adenocarcinoma marker) and P40 (  squamous marker)   Controls reacted appropriately      This staining pattern & morphology are similar to the patient previous metastatic lung carcinoma ([id number] , (stage IV lung cancer) and would be compatible with recurrent non small cell lung carcinoma. Clinical correlation is recommended.      Dr HERSON Hernandez, was notified by EPIC message on 3/6/25 at 4:20 PM    Electronically signed by Jose Green MD on 3/7/2025 at 0943 EST       [facility] results demonstrated no ALK gene rearrangement or deletion and negative for ROS1 rearrangement. No EGFR mutations were found also.     Right temporal mass from September 6, 2015 demonstrated metastatic poorly differentiated non-small cell carcinoma. The tumor cells stained positive for CK 7, TTF-1 and Napsin and negative for CK 20, CK 5/6 andmucicarmine. Pathologist stated that the immunoprofile supported the primary lung non-small cell carcinoma, favor adenocarcinoma. The pathologist also stated that given the focal p40 staining, a squamous carcinoma could not be excluded.      Procedures    05/30/2019 complete pulmonary function  test    PFT Interpretation     Quality of Data:  The patient's efforts are acceptable and reproducible.     Test Performed:  Complete pulmonary function tests, including spirometry with and without bronchodilator, measurement of lung volume, and diffusing capacity.     Spirometry Interpretation:  Spirometry is within normal limits.     Flow Volume Loops:  Flow-Volume loops are normal.     Lung Volumes:  Plethysmographic lung volumes are within normal limits.     Diffusing Capacity:  Minimal reduction in diffusion capacity likely clinically insignificant     Conclusions:  An isolated reduction in Diffusing Capacity is present and may represent Early fibrotic or pulmonary vascular disease. Clinical correlation is recommended.

## 2025-06-18 ENCOUNTER — ANESTHESIA (OUTPATIENT)
Dept: ANESTHESIOLOGY | Facility: HOSPITAL | Age: 65
End: 2025-06-18

## 2025-06-18 ENCOUNTER — ANESTHESIA EVENT (OUTPATIENT)
Dept: ANESTHESIOLOGY | Facility: HOSPITAL | Age: 65
End: 2025-06-18

## 2025-06-18 NOTE — ANESTHESIA PREPROCEDURE EVALUATION
Procedure:  PRE-OP ONLY    ECG: NSR    S/p RUL lobectomy   HTN - amlodipine, losartan    Relevant Problems   CARDIO   (+) Benign hypertension   (+) Mixed hyperlipidemia      ENDO   (+) Type 2 diabetes mellitus without complication, without long-term current use of insulin (HCC)             Anesthesia Plan  ASA Score- 2     Anesthesia Type- IV sedation with anesthesia with ASA Monitors.         Additional Monitors:     Airway Plan:            Plan Factors-    Chart reviewed. EKG reviewed.  Existing labs reviewed. Patient summary reviewed.                  Induction-     Postoperative Plan-         Informed Consent-       NPO Status:  No vitals data found for the desired time range.

## 2025-06-19 ENCOUNTER — ANESTHESIA (OUTPATIENT)
Dept: GASTROENTEROLOGY | Facility: HOSPITAL | Age: 65
End: 2025-06-19
Payer: COMMERCIAL

## 2025-06-19 ENCOUNTER — ANESTHESIA EVENT (OUTPATIENT)
Dept: GASTROENTEROLOGY | Facility: HOSPITAL | Age: 65
End: 2025-06-19
Payer: COMMERCIAL

## 2025-06-19 ENCOUNTER — HOSPITAL ENCOUNTER (OUTPATIENT)
Dept: GASTROENTEROLOGY | Facility: HOSPITAL | Age: 65
Setting detail: OUTPATIENT SURGERY
End: 2025-06-19
Attending: INTERNAL MEDICINE
Payer: COMMERCIAL

## 2025-06-19 VITALS
HEIGHT: 66 IN | WEIGHT: 192 LBS | SYSTOLIC BLOOD PRESSURE: 152 MMHG | RESPIRATION RATE: 18 BRPM | OXYGEN SATURATION: 99 % | HEART RATE: 63 BPM | DIASTOLIC BLOOD PRESSURE: 74 MMHG | BODY MASS INDEX: 30.86 KG/M2 | TEMPERATURE: 96.5 F

## 2025-06-19 DIAGNOSIS — C34.91 NON-SMALL CELL CANCER OF RIGHT LUNG (HCC): ICD-10-CM

## 2025-06-19 DIAGNOSIS — J98.59 LESION OF MEDIASTINUM: ICD-10-CM

## 2025-06-19 LAB — GLUCOSE SERPL-MCNC: 146 MG/DL (ref 65–140)

## 2025-06-19 PROCEDURE — 82948 REAGENT STRIP/BLOOD GLUCOSE: CPT

## 2025-06-19 PROCEDURE — C1889 IMPLANT/INSERT DEVICE, NOC: HCPCS

## 2025-06-19 PROCEDURE — 43242 EGD US FINE NEEDLE BX/ASPIR: CPT | Performed by: INTERNAL MEDICINE

## 2025-06-19 PROCEDURE — 88305 TISSUE EXAM BY PATHOLOGIST: CPT | Performed by: STUDENT IN AN ORGANIZED HEALTH CARE EDUCATION/TRAINING PROGRAM

## 2025-06-19 PROCEDURE — 88342 IMHCHEM/IMCYTCHM 1ST ANTB: CPT | Performed by: STUDENT IN AN ORGANIZED HEALTH CARE EDUCATION/TRAINING PROGRAM

## 2025-06-19 RX ORDER — FENTANYL CITRATE 50 UG/ML
INJECTION, SOLUTION INTRAMUSCULAR; INTRAVENOUS AS NEEDED
Status: DISCONTINUED | OUTPATIENT
Start: 2025-06-19 | End: 2025-06-19

## 2025-06-19 RX ORDER — PROPOFOL 10 MG/ML
INJECTION, EMULSION INTRAVENOUS AS NEEDED
Status: DISCONTINUED | OUTPATIENT
Start: 2025-06-19 | End: 2025-06-19

## 2025-06-19 RX ORDER — LIDOCAINE HYDROCHLORIDE 20 MG/ML
INJECTION, SOLUTION EPIDURAL; INFILTRATION; INTRACAUDAL; PERINEURAL AS NEEDED
Status: DISCONTINUED | OUTPATIENT
Start: 2025-06-19 | End: 2025-06-19

## 2025-06-19 RX ORDER — PHENYLEPHRINE HCL IN 0.9% NACL 1 MG/10 ML
SYRINGE (ML) INTRAVENOUS AS NEEDED
Status: DISCONTINUED | OUTPATIENT
Start: 2025-06-19 | End: 2025-06-19

## 2025-06-19 RX ADMIN — PROPOFOL 40 MG: 10 INJECTION, EMULSION INTRAVENOUS at 10:28

## 2025-06-19 RX ADMIN — SODIUM CHLORIDE: 0.9 INJECTION, SOLUTION INTRAVENOUS at 10:40

## 2025-06-19 RX ADMIN — PROPOFOL 30 MG: 10 INJECTION, EMULSION INTRAVENOUS at 10:10

## 2025-06-19 RX ADMIN — LIDOCAINE HYDROCHLORIDE 100 MG: 20 INJECTION, SOLUTION EPIDURAL; INFILTRATION; INTRACAUDAL at 09:58

## 2025-06-19 RX ADMIN — FENTANYL CITRATE 50 MCG: 50 INJECTION INTRAMUSCULAR; INTRAVENOUS at 10:13

## 2025-06-19 RX ADMIN — SODIUM CHLORIDE: 0.9 INJECTION, SOLUTION INTRAVENOUS at 09:54

## 2025-06-19 RX ADMIN — Medication 150 MCG: at 10:43

## 2025-06-19 RX ADMIN — Medication 150 MCG: at 10:52

## 2025-06-19 RX ADMIN — Medication 100 MCG: at 10:33

## 2025-06-19 RX ADMIN — PROPOFOL 100 MCG/KG/MIN: 10 INJECTION, EMULSION INTRAVENOUS at 10:00

## 2025-06-19 RX ADMIN — PROPOFOL 30 MG: 10 INJECTION, EMULSION INTRAVENOUS at 10:08

## 2025-06-19 RX ADMIN — FENTANYL CITRATE 50 MCG: 50 INJECTION INTRAMUSCULAR; INTRAVENOUS at 09:58

## 2025-06-19 RX ADMIN — PROPOFOL 30 MG: 10 INJECTION, EMULSION INTRAVENOUS at 10:16

## 2025-06-19 RX ADMIN — PROPOFOL 100 MG: 10 INJECTION, EMULSION INTRAVENOUS at 09:59

## 2025-06-19 NOTE — H&P
"History and Physical -  [facility]  Jeannie Hoover 64 y.o. female MRN: [id number]                  HPI: Jeannie Hoover is a 64 y.o. year old female who presents for EGD/EUS +/- FNB for abnormal PET scan.      REVIEW OF SYSTEMS: Per the HPI, and otherwise unremarkable.    Historical Information   Past Medical History[1]  Past Surgical History[2]  Social History   Social History     Substance and Sexual Activity   Alcohol Use Yes    Alcohol/week: 7.0 standard drinks of alcohol    Types: 7 Shots of liquor per week     Social History     Substance and Sexual Activity   Drug Use Yes    Types: Marijuana     Tobacco Use History[3]  Family History[4]    Meds/Allergies     Current Medications[5]    Allergies[6]    Objective     BP (!) 187/92   Pulse 96   Temp (!) 96.1 °F (35.6 °C) (Tympanic)   Resp 18   Ht 5' 6\" (1.676 m)   Wt 87.1 kg (192 lb)   LMP  (LMP Unknown)   SpO2 99%   BMI 30.99 kg/m²       PHYSICAL EXAM    Gen: NAD  Head: NCAT  CV: RRR  CHEST: Clear  ABD: soft, NT/ND  EXT: no edema      ASSESSMENT/PLAN:  This is a 64 y.o. year old female here for EUS, and she is stable and optimized for her procedure. We discussed potential adverse events related with the procedure. They verbalized their understanding and are in agreement with proceeding with the procedure.            [1]   Past Medical History:  Diagnosis Date    Adenocarcinoma of right lung, stage 4 (HCC)     Allergic rhinitis     Alopecia     resolved 10/25/16    Anxiety     last assessed 10/4/16, resolved 10/25/16    Benign hypertension 2/1/2018    Benign neoplasm of skin of trunk 03/25/2008    last assessed 3/25/08    Benign neoplasm of skin of upper extremity and shoulder 03/25/2008    last assessed 3/25/08    Ear deformity, acquired     last assessed 10/13/14, resolved 3/12/15    Eczema     History of chemotherapy     Hyperlipemia     Maintenance chemotherapy     Secondary cancer of brain (HCC)     Seizures (HCC)     Vertigo  "   [2]   Past Surgical History:  Procedure Laterality Date    APPENDECTOMY  1964    BRAIN TUMOR EXCISION      CENTRAL VENOUS CATHETER INSERTION Right     PORTACATH    IR BIOPSY LUNG  2/5/2025    IR PORT REMOVAL  9/10/2021    WA BRNCHSC INCL FLUOR GDNCE DX W/CELL WASHG SPX N/A 2/27/2025    Procedure: BRONCHOSCOPY FLEXIBLE;  Surgeon: Abe Hernandez MD;  Location: [facility];  Service: Thoracic    WA THORACOSCOPY W/LOBECTOMY SINGLE LOBE Right 2/27/2025    Procedure: LOBECTOMY RIGHT UPPER LUNG THORACOSCOPIC W/ ROBOTICS WITH MEDIASTINAL LYMPH NODE DISSECTION;  Surgeon: Abe Hernandez MD;  Location: [facility];  Service: Thoracic   [3]   Social History  Tobacco Use   Smoking Status Former    Current packs/day: 0.00    Average packs/day: 1 pack/day for 47.0 years (47.0 ttl pk-yrs)    Types: Cigarettes    Start date: 1968    Quit date: 2015    Years since quitting: 10.4    Passive exposure: Never   Smokeless Tobacco Never   [4]   Family History  Problem Relation Name Age of Onset    Diabetes Mother      Heart disease Mother      Lung cancer Father  70        Smoker     Uterine cancer Paternal Grandmother      Pancreatitis Brother      No Known Problems Brother     [5]   Current Outpatient Medications:     amLODIPine (NORVASC) 5 mg tablet    Ascorbic Acid (vitamin C) 100 MG tablet    atorvastatin (LIPITOR) 10 mg tablet    b complex vitamins capsule    cholecalciferol (VITAMIN D3) 1,000 units tablet    Lactobacillus (ACIDOPHILUS PO)    losartan (COZAAR) 50 mg tablet    magnesium 30 MG tablet    metFORMIN (GLUCOPHAGE) 500 mg tablet    gabapentin (NEURONTIN) 300 mg capsule    halobetasol (ULTRAVATE) 0.05 % cream    ibuprofen (MOTRIN) 600 mg tablet    ibuprofen (MOTRIN) 800 mg tablet    methylPREDNISolone 4 MG tablet therapy pack    Omega-3 Fatty Acids (FISH OIL PO)    oxyCODONE (Roxicodone) 5 immediate release tablet    tretinoin (RETIN-A) 0.1 % cream  No current facility-administered medications for this  encounter.    Facility-Administered Medications Ordered in Other Encounters:     alteplase (CATHFLO) injection 2 mg, 2 mg, Intracatheter, PRN    sodium chloride 0.9 % infusion, 20 mL/hr, Intravenous, Continuous  [6]   Allergies  Allergen Reactions    Iodinated Contrast Media Anaphylaxis and Itching    Other Hives     Band-aids and adhesives, any dressing with that type of property induces hives and rash for extended period    Clindamycin Other (See Comments)     Per pt she cannot remember what reaction she had to this medicaton    Clindamycin/Lincomycin

## 2025-06-19 NOTE — ANESTHESIA PREPROCEDURE EVALUATION
Procedure:  ENDOSCOPIC ULTRASOUND (UPPER)  ECG: NSR     S/p RUL lobectomy   HTN - amlodipine, losartan    Denies the following: CP/SOB with exertion, asthma, COPD, CHALO, stroke/TIA, seizure    Relevant Problems   CARDIO   (+) Benign hypertension   (+) Mixed hyperlipidemia      ENDO   (+) Type 2 diabetes mellitus without complication, without long-term current use of insulin (HCC)        Physical Exam    Airway     Mallampati score: I  TM Distance: >3 FB  Neck ROM: full  Mouth opening: >= 4 cm      Cardiovascular      Dental    edentulous    Pulmonary      Neurological      Other Findings  post-pubertal.      Anesthesia Plan  ASA Score- 3     Anesthesia Type- IV sedation with anesthesia with ASA Monitors.         Additional Monitors:     Airway Plan:            Plan Factors-Exercise tolerance (METS): >4 METS.    Chart reviewed. EKG reviewed. Imaging results reviewed. Existing labs reviewed. Patient summary reviewed.    Patient is not a current smoker.      Obstructive sleep apnea risk education given perioperatively.        Induction-     Postoperative Plan- .   Monitoring Plan - Monitoring plan - standard ASA monitoring          Informed Consent- Anesthetic plan and risks discussed with patient.  I personally reviewed this patient with the CRNA. Discussed and agreed on the Anesthesia Plan with the CRNA..      NPO Status:  Vitals Value Taken Time   Date of last liquid 06/18/25 06/19/25 08:51   Time of last liquid 2200 06/19/25 08:51   Date of last solid 06/18/25 06/19/25 08:51   Time of last solid 2000 06/19/25 08:51

## 2025-06-19 NOTE — ANESTHESIA POSTPROCEDURE EVALUATION
Post-Op Assessment Note    CV Status:  Stable    Pain management: adequate       Mental Status:  Alert and awake   Hydration Status:  Euvolemic   PONV Controlled:  Controlled   Airway Patency:  Patent     Post Op Vitals Reviewed: Yes    No anethesia notable event occurred.            Last Filed PACU Vitals:  Vitals Value Taken Time   Temp 96.5    Pulse 65    /62    Resp 20    SpO2 98

## 2025-06-24 PROCEDURE — 88305 TISSUE EXAM BY PATHOLOGIST: CPT | Performed by: STUDENT IN AN ORGANIZED HEALTH CARE EDUCATION/TRAINING PROGRAM

## 2025-06-24 PROCEDURE — 88342 IMHCHEM/IMCYTCHM 1ST ANTB: CPT | Performed by: STUDENT IN AN ORGANIZED HEALTH CARE EDUCATION/TRAINING PROGRAM

## 2025-06-24 PROCEDURE — 88172 CYTP DX EVAL FNA 1ST EA SITE: CPT | Performed by: STUDENT IN AN ORGANIZED HEALTH CARE EDUCATION/TRAINING PROGRAM

## 2025-06-24 PROCEDURE — 88173 CYTOPATH EVAL FNA REPORT: CPT | Performed by: STUDENT IN AN ORGANIZED HEALTH CARE EDUCATION/TRAINING PROGRAM

## 2025-06-27 ENCOUNTER — TELEPHONE (OUTPATIENT)
Dept: CARDIAC SURGERY | Facility: CLINIC | Age: 65
End: 2025-06-27

## 2025-06-27 NOTE — TELEPHONE ENCOUNTER
I called and left a message for the pt in regards to scheduling a follow-up appointment with Dr. Hernandez. I informed the pt that Dr. Hernandez wants to see her in the office and I scheduled her follow-up for 07/02/2025 @ 11 am. I advised the pt to please call the office back if this time and date does not work her her.

## 2025-06-30 ENCOUNTER — TELEPHONE (OUTPATIENT)
Dept: HEMATOLOGY ONCOLOGY | Facility: MEDICAL CENTER | Age: 65
End: 2025-06-30

## 2025-06-30 NOTE — TELEPHONE ENCOUNTER
Per Dr Osuna:  Lymph node biopsy negative for carcinoma, see note Lymphocytes present. Negative for carcinoma.     Left voicemail for patient to discuss and ask if she would like to move her scheduled appt up  Followed up on voicemail left yesterday, left voicemail for patient to call if she would like to move up her f/u appt with Dr Osuna

## 2025-07-02 ENCOUNTER — OFFICE VISIT (OUTPATIENT)
Dept: CARDIAC SURGERY | Facility: CLINIC | Age: 65
End: 2025-07-02
Payer: MEDICARE

## 2025-07-02 VITALS
RESPIRATION RATE: 18 BRPM | WEIGHT: 192.9 LBS | HEART RATE: 88 BPM | OXYGEN SATURATION: 98 % | HEIGHT: 66 IN | DIASTOLIC BLOOD PRESSURE: 84 MMHG | SYSTOLIC BLOOD PRESSURE: 140 MMHG | BODY MASS INDEX: 31 KG/M2 | TEMPERATURE: 97.9 F

## 2025-07-02 DIAGNOSIS — C34.91 NON-SMALL CELL CANCER OF RIGHT LUNG (HCC): Primary | ICD-10-CM

## 2025-07-02 PROCEDURE — G2211 COMPLEX E/M VISIT ADD ON: HCPCS | Performed by: THORACIC SURGERY (CARDIOTHORACIC VASCULAR SURGERY)

## 2025-07-02 PROCEDURE — 99213 OFFICE O/P EST LOW 20 MIN: CPT | Performed by: THORACIC SURGERY (CARDIOTHORACIC VASCULAR SURGERY)

## 2025-07-02 RX ORDER — LEVOFLOXACIN 500 MG/1
500 TABLET, FILM COATED ORAL EVERY 24 HOURS
Qty: 5 TABLET | Refills: 0 | Status: SHIPPED | OUTPATIENT
Start: 2025-07-02 | End: 2025-07-07

## 2025-07-02 NOTE — LETTER
2025     Flor Mckeon DO  [address]   [address]  [facility] [address]    Patient: Jeannie Hoover   YOB: 1960   Date of Visit: 2025       Dear DO Terrell Blue MD:    Thank you for referring Jeannie Hoover to me for evaluation. Below are my notes for this consultation.    If you have questions, please do not hesitate to call me. I look forward to following your patient along with you.         Sincerely,        Abe Hernandez MD        CC: MD Abe De Jesus MD  2025 12:23 PM  Sign when Signing Visit  Name: Jeannie Hoover      : 1960      MRN: [id number]  Encounter Provider: Abe Hernandez MD  Encounter Date: 2025   Encounter department: [facility]  :  Assessment & Plan  Non-small cell cancer of right lung (HCC)  64-year-old female with history of stage IV lung cancer metastatic to the brain back in  with residual right upper lobe cancer now status post 2025 robotic right upper lobectomy with some concern for lymph node metastasis but a negative EUS.    I had a good visit with Jeannie and her  on .  She underwent an EUS with biopsy of the paraesophageal PET avid mass.  Radiographically this looks like they were in the target lesion.  This appears to be the appropriate dimensions.  Fortunately this showed no evidence of malignancy.    I discussed options with the patient at this point being close observation with a repeat CT scan in a few months or surgery.  She understands this still is somewhat concerning for malignancy.  Surgery would definitively rule this out.  At this time given the negative biopsy she would just like to observe this.  She will follow-up with her oncologist as well.  Again I reiterated the concern of this being a cancer.  If this does grow on her next scan then I would strongly recommend excisional biopsy.  They voiced  "understanding.    Follow-up with me after imaging in August.    Abe [place]      Orders:  •  CT chest wo contrast; Future  •  levofloxacin (LEVAQUIN) 500 mg tablet; Take 1 tablet (500 mg total) by mouth every 24 hours for 5 days              History of Present Illness  HPI  Jeannie Hoover is a 64 y.o. female who presents back to our office after EUS for biopsy of the PET avid paraesophageal mass.  She tolerated this procedure fairly well.  Fortunately this showed no signs of malignancy.  Overall she denies any other major changes.  She does still have some pain in a bandlike area of discomfort around her incision.  She has been dealing with this since our surgery.  This is unchanged.  No other major unexplained changes.          Review of Systems   Constitutional:  Negative for activity change, appetite change, chills, diaphoresis, fatigue, fever and unexpected weight change.   HENT: Negative.     Eyes: Negative.    Respiratory:  Negative for apnea, cough, chest tightness, shortness of breath, wheezing and stridor.    Cardiovascular:  Negative for chest pain, palpitations and leg swelling.   Gastrointestinal:  Negative for abdominal distention, abdominal pain, blood in stool, constipation, diarrhea, nausea and vomiting.   Endocrine: Negative.    Genitourinary: Negative.    Musculoskeletal: Negative.    Skin: Negative.    Allergic/Immunologic: Negative.    Neurological:  Positive for numbness.   Hematological: Negative.    Psychiatric/Behavioral: Negative.            Objective  /84 (Patient Position: Sitting, Cuff Size: Standard)   Pulse 88   Temp 97.9 °F (36.6 °C) (Temporal)   Resp 18   Ht 5' 6\" (1.676 m)   Wt 87.5 kg (192 lb 14.4 oz)   LMP  (LMP Unknown)   SpO2 98%   BMI 31.14 kg/m²      Physical Exam  Vitals and nursing note reviewed.   Constitutional:       General: She is not in acute distress.     Appearance: Normal appearance. She is well-developed. She is not diaphoretic.   HENT:      " Head: Normocephalic and atraumatic.      Mouth/Throat:      Pharynx: No oropharyngeal exudate.     Eyes:      General: No scleral icterus.     Conjunctiva/sclera: Conjunctivae normal.      Pupils: Pupils are equal, round, and reactive to light.     Neck:      Thyroid: No thyromegaly.      Vascular: No JVD.      Trachea: No tracheal deviation.     Cardiovascular:      Rate and Rhythm: Normal rate and regular rhythm.      Heart sounds: Normal heart sounds. No murmur heard.     No friction rub. No gallop.   Pulmonary:      Effort: Pulmonary effort is normal. No respiratory distress.      Breath sounds: Normal breath sounds. No wheezing or rales.      Comments: Lungs clear to auscultation bilaterally.  Normal work of breathing on room air  Chest:      Chest wall: No tenderness.   Abdominal:      General: Bowel sounds are normal. There is no distension.      Palpations: Abdomen is soft. There is no mass.      Tenderness: There is no abdominal tenderness. There is no guarding or rebound.     Musculoskeletal:         General: No tenderness or deformity. Normal range of motion.      Cervical back: Normal range of motion and neck supple.     Skin:     General: Skin is warm and dry.      Coloration: Skin is not pale.      Findings: No erythema or rash.     Neurological:      Mental Status: She is alert and oriented to person, place, and time.     Psychiatric:         Behavior: Behavior normal.         Thought Content: Thought content normal.         Judgment: Judgment normal.     I personally reviewed the EUS with the patient and her .  We looked at images.  The mass biopsied appears consistent with the mass seen on her PET scan.  Similar dimensions.    We reviewed biopsy results.  This showed no evidence of carcinoma.  There were lymphocytes present demonstrating they were in the target lymph node.

## 2025-07-04 NOTE — ASSESSMENT & PLAN NOTE
64-year-old female with history of stage IV lung cancer metastatic to the brain back in 2015 with residual right upper lobe cancer now status post 2/27/2025 robotic right upper lobectomy with some concern for lymph node metastasis but a negative EUS.    I had a good visit with Jeannie and her  on 7/2.  She underwent an EUS with biopsy of the paraesophageal PET avid mass.  Radiographically this looks like they were in the target lesion.  This appears to be the appropriate dimensions.  Fortunately this showed no evidence of malignancy.    I discussed options with the patient at this point being close observation with a repeat CT scan in a few months or surgery.  She understands this still is somewhat concerning for malignancy.  Surgery would definitively rule this out.  At this time given the negative biopsy she would just like to observe this.  She will follow-up with her oncologist as well.  Again I reiterated the concern of this being a cancer.  If this does grow on her next scan then I would strongly recommend excisional biopsy.  They voiced understanding.    Follow-up with me after imaging in August.    Abe Hernandez      Orders:    CT chest wo contrast; Future    levofloxacin (LEVAQUIN) 500 mg tablet; Take 1 tablet (500 mg total) by mouth every 24 hours for 5 days

## 2025-07-04 NOTE — PROGRESS NOTES
Name: Jeannie Hoover      : 1960      MRN: [id number]  Encounter Provider: Abe Hernandez MD  Encounter Date: 2025   Encounter department: [facility] [place]  :  Assessment & Plan  Non-small cell cancer of right lung (HCC)  64-year-old female with history of stage IV lung cancer metastatic to the brain back in  with residual right upper lobe cancer now status post 2025 robotic right upper lobectomy with some concern for lymph node metastasis but a negative EUS.    I had a good visit with Jeannie and her  on .  She underwent an EUS with biopsy of the paraesophageal PET avid mass.  Radiographically this looks like they were in the target lesion.  This appears to be the appropriate dimensions.  Fortunately this showed no evidence of malignancy.    I discussed options with the patient at this point being close observation with a repeat CT scan in a few months or surgery.  She understands this still is somewhat concerning for malignancy.  Surgery would definitively rule this out.  At this time given the negative biopsy she would just like to observe this.  She will follow-up with her oncologist as well.  Again I reiterated the concern of this being a cancer.  If this does grow on her next scan then I would strongly recommend excisional biopsy.  They voiced understanding.    Follow-up with me after imaging in August.    Abe Hernandez      Orders:    CT chest wo contrast; Future    levofloxacin (LEVAQUIN) 500 mg tablet; Take 1 tablet (500 mg total) by mouth every 24 hours for 5 days              History of Present Illness   HPI  Jeannie Hoover is a 64 y.o. female who presents back to our office after EUS for biopsy of the PET avid paraesophageal mass.  She tolerated this procedure fairly well.  Fortunately this showed no signs of malignancy.  Overall she denies any other major changes.  She does still have some pain in a bandlike area of discomfort  "around her incision.  She has been dealing with this since our surgery.  This is unchanged.  No other major unexplained changes.          Review of Systems   Constitutional:  Negative for activity change, appetite change, chills, diaphoresis, fatigue, fever and unexpected weight change.   HENT: Negative.     Eyes: Negative.    Respiratory:  Negative for apnea, cough, chest tightness, shortness of breath, wheezing and stridor.    Cardiovascular:  Negative for chest pain, palpitations and leg swelling.   Gastrointestinal:  Negative for abdominal distention, abdominal pain, blood in stool, constipation, diarrhea, nausea and vomiting.   Endocrine: Negative.    Genitourinary: Negative.    Musculoskeletal: Negative.    Skin: Negative.    Allergic/Immunologic: Negative.    Neurological:  Positive for numbness.   Hematological: Negative.    Psychiatric/Behavioral: Negative.            Objective   /84 (Patient Position: Sitting, Cuff Size: Standard)   Pulse 88   Temp 97.9 °F (36.6 °C) (Temporal)   Resp 18   Ht 5' 6\" (1.676 m)   Wt 87.5 kg (192 lb 14.4 oz)   LMP  (LMP Unknown)   SpO2 98%   BMI 31.14 kg/m²      Physical Exam  Vitals and nursing note reviewed.   Constitutional:       General: She is not in acute distress.     Appearance: Normal appearance. She is well-developed. She is not diaphoretic.   HENT:      Head: Normocephalic and atraumatic.      Mouth/Throat:      Pharynx: No oropharyngeal exudate.     Eyes:      General: No scleral icterus.     Conjunctiva/sclera: Conjunctivae normal.      Pupils: Pupils are equal, round, and reactive to light.     Neck:      Thyroid: No thyromegaly.      Vascular: No JVD.      Trachea: No tracheal deviation.     Cardiovascular:      Rate and Rhythm: Normal rate and regular rhythm.      Heart sounds: Normal heart sounds. No murmur heard.     No friction rub. No gallop.   Pulmonary:      Effort: Pulmonary effort is normal. No respiratory distress.      Breath sounds: " Normal breath sounds. No wheezing or rales.      Comments: Lungs clear to auscultation bilaterally.  Normal work of breathing on room air  Chest:      Chest wall: No tenderness.   Abdominal:      General: Bowel sounds are normal. There is no distension.      Palpations: Abdomen is soft. There is no mass.      Tenderness: There is no abdominal tenderness. There is no guarding or rebound.     Musculoskeletal:         General: No tenderness or deformity. Normal range of motion.      Cervical back: Normal range of motion and neck supple.     Skin:     General: Skin is warm and dry.      Coloration: Skin is not pale.      Findings: No erythema or rash.     Neurological:      Mental Status: She is alert and oriented to person, place, and time.     Psychiatric:         Behavior: Behavior normal.         Thought Content: Thought content normal.         Judgment: Judgment normal.     I personally reviewed the EUS with the patient and her .  We looked at images.  The mass biopsied appears consistent with the mass seen on her PET scan.  Similar dimensions.    We reviewed biopsy results.  This showed no evidence of carcinoma.  There were lymphocytes present demonstrating they were in the target lymph node.

## 2025-07-18 ENCOUNTER — TELEPHONE (OUTPATIENT)
Dept: HEMATOLOGY ONCOLOGY | Facility: MEDICAL CENTER | Age: 65
End: 2025-07-18

## 2025-08-15 ENCOUNTER — HOSPITAL ENCOUNTER (OUTPATIENT)
Dept: RADIOLOGY | Facility: HOSPITAL | Age: 65
Discharge: HOME | End: 2025-08-15
Attending: THORACIC SURGERY (CARDIOTHORACIC VASCULAR SURGERY)
Payer: MEDICARE

## (undated) DEVICE — ARM DRAPE

## (undated) DEVICE — NEEDLE SPINAL 20G X 3.5 LF

## (undated) DEVICE — TROCARS: Brand: KII® OPTICAL ACCESS SYSTEM

## (undated) DEVICE — STAPLER SHEATH: Brand: ENDOWRIST

## (undated) DEVICE — CURVED-TIP STAPLER 30: Brand: ENDOWRIST

## (undated) DEVICE — TIP-UP FENESTRATED GRASPER: Brand: ENDOWRIST

## (undated) DEVICE — GAUZE SPONGES,16 PLY: Brand: CURITY

## (undated) DEVICE — ANTIBACTERIAL UNDYED BRAIDED (POLYGLACTIN 910), SYNTHETIC ABSORBABLE SUTURE: Brand: COATED VICRYL

## (undated) DEVICE — CANISTER FOR THORACIC SUCTION SYSTEM: Brand: THOPAZ DISPOSABLE CANISTER 0.8L

## (undated) DEVICE — SUREFORM 45 CURVED-TIP: Brand: SUREFORM

## (undated) DEVICE — VESSEL SEALER EXTEND: Brand: ENDOWRIST

## (undated) DEVICE — VESSEL LOOPS X-RAY DETECTABLE: Brand: DEROYAL

## (undated) DEVICE — GLOVE SRG BIOGEL ECLIPSE 7.5

## (undated) DEVICE — SURGICEL 4 X 8IN

## (undated) DEVICE — ELECTRO LUBE IS A SINGLE PATIENT USE DEVICE THAT IS INTENDED TO BE USED ON ELECTROSURGICAL ELECTRODES TO REDUCE STICKING.: Brand: KEY SURGICAL ELECTRO LUBE

## (undated) DEVICE — TUBING SUCTION 5MM X 12 FT

## (undated) DEVICE — SYRINGE 20ML LL

## (undated) DEVICE — Device: Brand: TISSUE RETRIEVAL SYSTEM

## (undated) DEVICE — SINGLE USE BIOPSY VALVE MAJ-210: Brand: SINGLE USE BIOPSY VALVE (STERILE)

## (undated) DEVICE — MARYLAND BIPOLAR FORCEPS: Brand: ENDOWRIST

## (undated) DEVICE — SINGLE USE SUCTION VALVE MAJ-209: Brand: SINGLE USE SUCTION VALVE (STERILE)

## (undated) DEVICE — DRAIN SPONGES,6 PLY: Brand: EXCILON

## (undated) DEVICE — GLOVE INDICATOR PI UNDERGLOVE SZ 8 BLUE

## (undated) DEVICE — STAPLER 30 RELOAD GREEN: Brand: ENDOWRIST

## (undated) DEVICE — SUT PROLENE 0 CT-1 30 IN 8424H

## (undated) DEVICE — INTENDED FOR TISSUE SEPARATION, AND OTHER PROCEDURES THAT REQUIRE A SHARP SURGICAL BLADE TO PUNCTURE OR CUT.: Brand: BARD-PARKER SAFETY BLADES SIZE 15, STERILE

## (undated) DEVICE — REDUCER: Brand: ENDOWRIST

## (undated) DEVICE — FIRST STEP BEDSIDE KIT - STAND-UP POUCH, ENDOSCOPIC CLEANING PAD - 1 POUCH: Brand: FIRST STEP BEDSIDE KIT - STAND-UP POUCH, ENDOSCOPIC CLEANING PAD

## (undated) DEVICE — SYRINGE 10ML SLIP TIP LF

## (undated) DEVICE — CADIERE FORCEPS: Brand: ENDOWRIST

## (undated) DEVICE — SUT VICRYL 2-0 SH 27 IN UNDYED J417H

## (undated) DEVICE — STERILE EMESIS BASIN                 070: Brand: CARDINAL HEALTH

## (undated) DEVICE — STAPLER 30 RELOAD: Brand: ENDOWRIST

## (undated) DEVICE — STAPLER 30 RELOAD WHITE: Brand: ENDOWRIST

## (undated) DEVICE — SEAL

## (undated) DEVICE — GAUZE ROLL KITTNER

## (undated) DEVICE — UTILITY MARKER,BLACK WITH LABELS: Brand: DEVON

## (undated) DEVICE — EXOFIN PRECISION PEN HIGH VISCOSITY TOPICAL SKIN ADHESIVE: Brand: EXOFIN PRECISION PEN, 1G

## (undated) DEVICE — SUREFORM 45 RELOAD BLUE: Brand: SUREFORM

## (undated) DEVICE — REM POLYHESIVE ADULT PATIENT RETURN ELECTRODE: Brand: VALLEYLAB

## (undated) DEVICE — KIT, BETHLEHEM THORACIC ROBOT: Brand: CARDINAL HEALTH

## (undated) DEVICE — COLUMN DRAPE

## (undated) DEVICE — HEAVY DUTY TABLE COVER: Brand: CONVERTORS

## (undated) DEVICE — NEEDLE HYPO 23G X 1-1/2 IN

## (undated) DEVICE — SUT MONOCRYL 4-0 PS-2 27 IN Y426H